# Patient Record
Sex: MALE | Race: WHITE | Employment: OTHER | URBAN - METROPOLITAN AREA
[De-identification: names, ages, dates, MRNs, and addresses within clinical notes are randomized per-mention and may not be internally consistent; named-entity substitution may affect disease eponyms.]

---

## 2017-01-09 ENCOUNTER — OFFICE VISIT (OUTPATIENT)
Dept: PULMONOLOGY | Facility: CLINIC | Age: 57
End: 2017-01-09

## 2017-01-09 VITALS
SYSTOLIC BLOOD PRESSURE: 153 MMHG | RESPIRATION RATE: 18 BRPM | HEART RATE: 74 BPM | DIASTOLIC BLOOD PRESSURE: 96 MMHG | OXYGEN SATURATION: 98 % | WEIGHT: 151.6 LBS | HEIGHT: 75 IN | BODY MASS INDEX: 18.85 KG/M2

## 2017-01-09 DIAGNOSIS — I50.22 CHRONIC SYSTOLIC CONGESTIVE HEART FAILURE (HCC): ICD-10-CM

## 2017-01-09 DIAGNOSIS — I63.311 CEREBROVASCULAR ACCIDENT (CVA) DUE TO THROMBOSIS OF RIGHT MIDDLE CEREBRAL ARTERY (HCC): ICD-10-CM

## 2017-01-09 DIAGNOSIS — J20.9 COPD WITH ACUTE BRONCHITIS (HCC): Primary | ICD-10-CM

## 2017-01-09 DIAGNOSIS — J44.0 COPD WITH ACUTE BRONCHITIS (HCC): Primary | ICD-10-CM

## 2017-01-09 DIAGNOSIS — I10 ESSENTIAL HYPERTENSION: ICD-10-CM

## 2017-01-09 DIAGNOSIS — F17.200 SMOKING: ICD-10-CM

## 2017-01-09 PROCEDURE — 99214 OFFICE O/P EST MOD 30 MIN: CPT | Performed by: INTERNAL MEDICINE

## 2017-01-09 RX ORDER — AZITHROMYCIN 250 MG/1
TABLET, FILM COATED ORAL
Qty: 6 TABLET | Refills: 0 | Status: SHIPPED | OUTPATIENT
Start: 2017-01-09 | End: 2017-07-10 | Stop reason: ALTCHOICE

## 2017-02-28 ENCOUNTER — CARE COORDINATION (OUTPATIENT)
Dept: CARE COORDINATION | Facility: CLINIC | Age: 57
End: 2017-02-28

## 2017-03-13 ENCOUNTER — TELEPHONE (OUTPATIENT)
Dept: ADMINISTRATIVE | Age: 57
End: 2017-03-13

## 2017-04-12 ENCOUNTER — CARE COORDINATION (OUTPATIENT)
Dept: CARE COORDINATION | Age: 57
End: 2017-04-12

## 2017-05-23 ENCOUNTER — CARE COORDINATION (OUTPATIENT)
Dept: CARE COORDINATION | Age: 57
End: 2017-05-23

## 2017-06-13 ENCOUNTER — TELEPHONE (OUTPATIENT)
Dept: ADMINISTRATIVE | Age: 57
End: 2017-06-13

## 2017-06-15 ENCOUNTER — TELEPHONE (OUTPATIENT)
Dept: ADMINISTRATIVE | Age: 57
End: 2017-06-15

## 2017-07-07 ENCOUNTER — CARE COORDINATION (OUTPATIENT)
Dept: CARE COORDINATION | Age: 57
End: 2017-07-07

## 2017-07-10 ENCOUNTER — OFFICE VISIT (OUTPATIENT)
Dept: PULMONOLOGY | Age: 57
End: 2017-07-10

## 2017-07-10 ENCOUNTER — OFFICE VISIT (OUTPATIENT)
Dept: PULMONOLOGY | Age: 57
End: 2017-07-10
Payer: MEDICARE

## 2017-07-10 ENCOUNTER — HOSPITAL ENCOUNTER (OUTPATIENT)
Age: 57
Discharge: HOME | End: 2017-07-10
Payer: MEDICARE

## 2017-07-10 ENCOUNTER — HOSPITAL ENCOUNTER (OUTPATIENT)
Dept: GENERAL RADIOLOGY | Age: 57
Discharge: HOME | End: 2017-07-10
Payer: MEDICARE

## 2017-07-10 VITALS
HEART RATE: 97 BPM | BODY MASS INDEX: 18.15 KG/M2 | RESPIRATION RATE: 16 BRPM | TEMPERATURE: 97.9 F | HEIGHT: 75 IN | OXYGEN SATURATION: 97 % | WEIGHT: 146 LBS | DIASTOLIC BLOOD PRESSURE: 92 MMHG | SYSTOLIC BLOOD PRESSURE: 130 MMHG

## 2017-07-10 DIAGNOSIS — R04.2 HEMOPTYSIS: ICD-10-CM

## 2017-07-10 DIAGNOSIS — J44.9 COPD (CHRONIC OBSTRUCTIVE PULMONARY DISEASE) WITH CHRONIC BRONCHITIS (HCC): Primary | ICD-10-CM

## 2017-07-10 DIAGNOSIS — R04.2 HEMOPTYSIS: Primary | ICD-10-CM

## 2017-07-10 DIAGNOSIS — I10 ESSENTIAL HYPERTENSION: ICD-10-CM

## 2017-07-10 DIAGNOSIS — F17.200 SMOKING: ICD-10-CM

## 2017-07-10 DIAGNOSIS — J96.01 ACUTE RESPIRATORY FAILURE WITH HYPOXIA (HCC): ICD-10-CM

## 2017-07-10 PROCEDURE — 94620 PR PULMONARY STRESS TESTING,SIMPLE: CPT | Performed by: INTERNAL MEDICINE

## 2017-07-10 PROCEDURE — G8419 CALC BMI OUT NRM PARAM NOF/U: HCPCS | Performed by: INTERNAL MEDICINE

## 2017-07-10 PROCEDURE — 94729 DIFFUSING CAPACITY: CPT | Performed by: INTERNAL MEDICINE

## 2017-07-10 PROCEDURE — 94375 RESPIRATORY FLOW VOLUME LOOP: CPT | Performed by: INTERNAL MEDICINE

## 2017-07-10 PROCEDURE — 99214 OFFICE O/P EST MOD 30 MIN: CPT | Performed by: INTERNAL MEDICINE

## 2017-07-10 PROCEDURE — 4004F PT TOBACCO SCREEN RCVD TLK: CPT | Performed by: INTERNAL MEDICINE

## 2017-07-10 PROCEDURE — G8428 CUR MEDS NOT DOCUMENT: HCPCS | Performed by: INTERNAL MEDICINE

## 2017-07-10 PROCEDURE — 94726 PLETHYSMOGRAPHY LUNG VOLUMES: CPT | Performed by: INTERNAL MEDICINE

## 2017-07-10 PROCEDURE — 71020 XR CHEST STANDARD TWO VW: CPT

## 2017-07-10 PROCEDURE — G8598 ASA/ANTIPLAT THER USED: HCPCS | Performed by: INTERNAL MEDICINE

## 2017-07-10 PROCEDURE — 3017F COLORECTAL CA SCREEN DOC REV: CPT | Performed by: INTERNAL MEDICINE

## 2017-07-12 RX ORDER — LEVOFLOXACIN 500 MG/1
500 TABLET, FILM COATED ORAL DAILY
Qty: 10 TABLET | Refills: 0 | Status: SHIPPED | OUTPATIENT
Start: 2017-07-12 | End: 2017-07-18 | Stop reason: SDUPTHER

## 2017-07-14 ENCOUNTER — OFFICE VISIT (OUTPATIENT)
Dept: FAMILY MEDICINE CLINIC | Age: 57
End: 2017-07-14
Payer: MEDICARE

## 2017-07-14 ENCOUNTER — HOSPITAL ENCOUNTER (OUTPATIENT)
Age: 57
Setting detail: SPECIMEN
Discharge: HOME | End: 2017-07-14
Payer: MEDICARE

## 2017-07-14 VITALS
TEMPERATURE: 96.6 F | SYSTOLIC BLOOD PRESSURE: 139 MMHG | DIASTOLIC BLOOD PRESSURE: 95 MMHG | HEART RATE: 94 BPM | HEIGHT: 72 IN | BODY MASS INDEX: 19.86 KG/M2 | WEIGHT: 146.6 LBS

## 2017-07-14 DIAGNOSIS — J44.9 CHRONIC OBSTRUCTIVE PULMONARY DISEASE, UNSPECIFIED COPD TYPE (HCC): Primary | ICD-10-CM

## 2017-07-14 DIAGNOSIS — F25.1 SCHIZOAFFECTIVE DISORDER, DEPRESSIVE TYPE (HCC): ICD-10-CM

## 2017-07-14 DIAGNOSIS — R56.9 SEIZURE (HCC): ICD-10-CM

## 2017-07-14 DIAGNOSIS — R11.2 INTRACTABLE VOMITING WITH NAUSEA, UNSPECIFIED VOMITING TYPE: ICD-10-CM

## 2017-07-14 PROBLEM — I10 ESSENTIAL HYPERTENSION: Status: ACTIVE | Noted: 2017-07-14

## 2017-07-14 LAB
ALBUMIN SERPL-MCNC: 4.3 G/DL (ref 3.5–5.2)
ALBUMIN/GLOBULIN RATIO: 1.3 (ref 1–2.5)
ALP BLD-CCNC: 98 U/L (ref 40–129)
ALT SERPL-CCNC: 90 U/L (ref 5–41)
AST SERPL-CCNC: 114 U/L
BILIRUB SERPL-MCNC: 0.36 MG/DL (ref 0.3–1.2)
BILIRUBIN DIRECT: 0.11 MG/DL
BILIRUBIN, INDIRECT: 0.25 MG/DL (ref 0–1)
GLOBULIN: ABNORMAL G/DL (ref 1.5–3.8)
TOTAL PROTEIN: 7.6 G/DL (ref 6.4–8.3)

## 2017-07-14 PROCEDURE — 99213 OFFICE O/P EST LOW 20 MIN: CPT | Performed by: FAMILY MEDICINE

## 2017-07-14 PROCEDURE — 3023F SPIROM DOC REV: CPT | Performed by: FAMILY MEDICINE

## 2017-07-14 PROCEDURE — 4004F PT TOBACCO SCREEN RCVD TLK: CPT | Performed by: FAMILY MEDICINE

## 2017-07-14 PROCEDURE — G8427 DOCREV CUR MEDS BY ELIG CLIN: HCPCS | Performed by: FAMILY MEDICINE

## 2017-07-14 PROCEDURE — G8926 SPIRO NO PERF OR DOC: HCPCS | Performed by: FAMILY MEDICINE

## 2017-07-14 PROCEDURE — 99214 OFFICE O/P EST MOD 30 MIN: CPT

## 2017-07-14 PROCEDURE — G8420 CALC BMI NORM PARAMETERS: HCPCS | Performed by: FAMILY MEDICINE

## 2017-07-14 PROCEDURE — 3017F COLORECTAL CA SCREEN DOC REV: CPT | Performed by: FAMILY MEDICINE

## 2017-07-14 PROCEDURE — G8598 ASA/ANTIPLAT THER USED: HCPCS | Performed by: FAMILY MEDICINE

## 2017-07-14 RX ORDER — ALBUTEROL SULFATE 90 UG/1
2 AEROSOL, METERED RESPIRATORY (INHALATION) EVERY 6 HOURS PRN
Qty: 1 INHALER | Refills: 3 | Status: SHIPPED | OUTPATIENT
Start: 2017-07-14 | End: 2017-07-18 | Stop reason: SDUPTHER

## 2017-07-14 RX ORDER — ONDANSETRON 4 MG/1
4 TABLET, FILM COATED ORAL DAILY PRN
Qty: 15 TABLET | Refills: 0 | Status: SHIPPED | OUTPATIENT
Start: 2017-07-14 | End: 2017-08-18 | Stop reason: ALTCHOICE

## 2017-07-14 ASSESSMENT — ENCOUNTER SYMPTOMS
ABDOMINAL DISTENTION: 0
CHEST TIGHTNESS: 0
CONSTIPATION: 0
SHORTNESS OF BREATH: 1
COUGH: 1
VOMITING: 1
WHEEZING: 0
DIARRHEA: 0
ABDOMINAL PAIN: 1

## 2017-07-18 ENCOUNTER — OFFICE VISIT (OUTPATIENT)
Dept: FAMILY MEDICINE CLINIC | Age: 57
End: 2017-07-18
Payer: MEDICARE

## 2017-07-18 ENCOUNTER — CARE COORDINATOR VISIT (OUTPATIENT)
Dept: CARE COORDINATION | Age: 57
End: 2017-07-18

## 2017-07-18 VITALS — DIASTOLIC BLOOD PRESSURE: 98 MMHG | HEART RATE: 101 BPM | SYSTOLIC BLOOD PRESSURE: 149 MMHG

## 2017-07-18 DIAGNOSIS — Z79.899 MEDICATION MANAGEMENT: Primary | ICD-10-CM

## 2017-07-18 PROCEDURE — 4004F PT TOBACCO SCREEN RCVD TLK: CPT | Performed by: PHARMACIST

## 2017-07-18 PROCEDURE — 99999 PR OFFICE/OUTPT VISIT,PROCEDURE ONLY: CPT | Performed by: PHARMACIST

## 2017-07-18 RX ORDER — LEVOFLOXACIN 500 MG/1
500 TABLET, FILM COATED ORAL DAILY
Qty: 19 TABLET | Refills: 0 | Status: SHIPPED | OUTPATIENT
Start: 2017-07-18 | End: 2017-07-18 | Stop reason: SDUPTHER

## 2017-07-18 RX ORDER — LEVOFLOXACIN 500 MG/1
500 TABLET, FILM COATED ORAL DAILY
Qty: 10 TABLET | Refills: 0 | Status: SHIPPED | OUTPATIENT
Start: 2017-07-18 | End: 2017-07-28

## 2017-07-18 RX ORDER — ALBUTEROL SULFATE 90 UG/1
2 AEROSOL, METERED RESPIRATORY (INHALATION) EVERY 6 HOURS PRN
Qty: 1 INHALER | Refills: 0 | Status: SHIPPED | OUTPATIENT
Start: 2017-07-18

## 2017-07-18 NOTE — CARE COORDINATION
Ambulatory Care Coordination Note  7/18/2017  CM Risk Score: 3  Rita Mortality Risk Score:      ACC: Rebeca Castaneda    Summary Note: CC met the patient today along with Kaylee Maloney. He continues to not take his medications. He gets $1900 a month from . He reports after paying his bills and buying groceries he has $900 left for the month. He has not purchased any prescribed medications. He does drink beer and he smokes cigarettes. He states he has 4 - 5 beers a day. He also states he plays [employer] and pool for leisure. He states he applied as directed for medicare part D \"a year ago\". It was at the 9.13.16 visit with CC he was directed to seek out part D. He reports he has not heard back from . He has tremors today. His B/P is elevated today. He does have a congested cough. He has not filled a [rescription for Levaquin from pulmonary earlier this month. He states he did not eat breakfast today. He states he has emesis after eating so he does not eat \"like I should\". He states he did follow with Dr. Kiara King for a seizure disorder but does not have a future appointment and can not remember his last appointment. CC asked him about a health goal. His response was \"go home sit in my chart and die\". He was followed in the past at [facility] but has not been seen there recently. He is at the appointment with his s/o, Bonilla. Assistance was given from Kaylee Maloney and Dr. Tatiana Lowery to obtain scripts for Albuterol, Marleen Hoops and Levaquin to be filled using the [facility]. The patient understands this assistance can be given once a year only. He is to contact  and Dr. Kiara King to schedule an appointment to be seen. He is scheduled for a visit in one week with Dr. Jazmín Yan for follow up.     Ambulatory Care Coordination Assessment    Care Coordination Protocol   Week 1 - Initial Assessment      Do you have all of your prescriptions and are they filled?:  No (Comment: States he cna not afford them) Barriers to medication adherence:  Other   How often do you have trouble taking your medications the way you have been told to take them?:  Sometimes I take them as prescribed. Do you have Home O2 Therapy?:  No                                                                                       Suggested Interventions and Community Resources                         Prior to Admission medications    Medication Sig Start Date End Date Taking? Authorizing Provider   ipratropium (ATROVENT HFA) 17 MCG/ACT inhaler Inhale 1 puff into the lungs 3 times daily 7/18/17 7/18/18  Hector Ang MD   albuterol sulfate HFA (PROAIR HFA) 108 (90 Base) MCG/ACT inhaler Inhale 2 puffs into the lungs every 6 hours as needed for Wheezing 7/18/17   Hector Ang MD   mometasone-formoterol [facility]) 200-5 MCG/ACT inhaler Inhale 2 puffs into the lungs every 12 hours 7/18/17   Hector Ang MD   levofloxacin (LEVAQUIN) 500 MG tablet Take 1 tablet by mouth daily for 10 days 7/18/17 7/28/17  Hector Ang MD   ondansetron (ZOFRAN) 4 MG tablet Take 1 tablet by mouth daily as needed for Nausea or Vomiting 7/14/17   Ermelinda Del Rosario MD   naproxen (NAPROSYN) 500 MG tablet Take 1 tablet by mouth 2 times daily (with meals) for 15 days 8/6/15 12/12/16  Jim Contreras MD   Respiratory Therapy Supplies (NEBULIZER COMPRESSOR) KIT 1 kit by Does not apply route once for 1 dose.  8/21/14 12/12/16  Christoph Patton MD       Future Appointments  Date Time Provider Stephanie Kilpatrick   7/20/2017 8:30 AM STV CT  [facility] CT SCAN [facility]   7/24/2017 9:45 AM Cherise Banegas MD Resp Spec [id number]   7/28/2017 9:45 AM Ermelinda Del Rosario MD [id number]

## 2017-07-20 ENCOUNTER — HOSPITAL ENCOUNTER (OUTPATIENT)
Dept: CT IMAGING | Age: 57
Discharge: HOME | End: 2017-07-20
Payer: MEDICARE

## 2017-07-20 DIAGNOSIS — R04.2 HEMOPTYSIS: ICD-10-CM

## 2017-07-20 PROCEDURE — 71260 CT THORAX DX C+: CPT

## 2017-07-20 PROCEDURE — 6360000004 HC RX CONTRAST MEDICATION: Performed by: INTERNAL MEDICINE

## 2017-07-20 RX ADMIN — IOVERSOL 75 ML: 741 INJECTION INTRA-ARTERIAL; INTRAVENOUS at 08:24

## 2017-07-24 ENCOUNTER — OFFICE VISIT (OUTPATIENT)
Dept: PULMONOLOGY | Age: 57
End: 2017-07-24
Payer: MEDICARE

## 2017-07-24 VITALS
RESPIRATION RATE: 15 BRPM | DIASTOLIC BLOOD PRESSURE: 93 MMHG | TEMPERATURE: 97.3 F | OXYGEN SATURATION: 94 % | SYSTOLIC BLOOD PRESSURE: 135 MMHG | BODY MASS INDEX: 19.37 KG/M2 | HEIGHT: 72 IN | WEIGHT: 143 LBS | HEART RATE: 93 BPM

## 2017-07-24 DIAGNOSIS — J44.9 COPD WITH CHRONIC BRONCHITIS AND EMPHYSEMA (HCC): Primary | ICD-10-CM

## 2017-07-24 PROCEDURE — 3017F COLORECTAL CA SCREEN DOC REV: CPT | Performed by: INTERNAL MEDICINE

## 2017-07-24 PROCEDURE — G8427 DOCREV CUR MEDS BY ELIG CLIN: HCPCS | Performed by: INTERNAL MEDICINE

## 2017-07-24 PROCEDURE — G8926 SPIRO NO PERF OR DOC: HCPCS | Performed by: INTERNAL MEDICINE

## 2017-07-24 PROCEDURE — 4004F PT TOBACCO SCREEN RCVD TLK: CPT | Performed by: INTERNAL MEDICINE

## 2017-07-24 PROCEDURE — 99214 OFFICE O/P EST MOD 30 MIN: CPT | Performed by: INTERNAL MEDICINE

## 2017-07-24 PROCEDURE — G8598 ASA/ANTIPLAT THER USED: HCPCS | Performed by: INTERNAL MEDICINE

## 2017-07-24 PROCEDURE — 3023F SPIROM DOC REV: CPT | Performed by: INTERNAL MEDICINE

## 2017-07-24 PROCEDURE — G8420 CALC BMI NORM PARAMETERS: HCPCS | Performed by: INTERNAL MEDICINE

## 2017-07-28 ENCOUNTER — OFFICE VISIT (OUTPATIENT)
Dept: FAMILY MEDICINE CLINIC | Age: 57
End: 2017-07-28
Payer: MEDICARE

## 2017-07-28 VITALS
OXYGEN SATURATION: 91 % | WEIGHT: 145 LBS | SYSTOLIC BLOOD PRESSURE: 104 MMHG | TEMPERATURE: 97.4 F | DIASTOLIC BLOOD PRESSURE: 68 MMHG | HEART RATE: 99 BPM | BODY MASS INDEX: 19.64 KG/M2 | HEIGHT: 72 IN

## 2017-07-28 DIAGNOSIS — Z12.11 COLON CANCER SCREENING: ICD-10-CM

## 2017-07-28 DIAGNOSIS — T81.82XD EMPHYSEMA (SUBCUTANEOUS) (SURGICAL) RESULTING FROM A PROCEDURE, SUBSEQUENT ENCOUNTER: Primary | ICD-10-CM

## 2017-07-28 DIAGNOSIS — G89.4 CHRONIC PAIN SYNDROME: ICD-10-CM

## 2017-07-28 PROCEDURE — 99213 OFFICE O/P EST LOW 20 MIN: CPT | Performed by: FAMILY MEDICINE

## 2017-07-28 PROCEDURE — 99213 OFFICE O/P EST LOW 20 MIN: CPT

## 2017-07-28 ASSESSMENT — ENCOUNTER SYMPTOMS
COUGH: 0
CHEST TIGHTNESS: 0
WHEEZING: 0
GASTROINTESTINAL NEGATIVE: 1

## 2017-07-31 ENCOUNTER — CARE COORDINATION (OUTPATIENT)
Dept: CARE COORDINATION | Age: 57
End: 2017-07-31

## 2017-08-03 ENCOUNTER — CARE COORDINATION (OUTPATIENT)
Dept: CARE COORDINATION | Age: 57
End: 2017-08-03

## 2017-08-18 ENCOUNTER — INITIAL CONSULT (OUTPATIENT)
Dept: PAIN MANAGEMENT | Age: 57
End: 2017-08-18
Payer: MEDICARE

## 2017-08-18 VITALS
HEIGHT: 75 IN | SYSTOLIC BLOOD PRESSURE: 116 MMHG | BODY MASS INDEX: 18.33 KG/M2 | OXYGEN SATURATION: 92 % | WEIGHT: 147.4 LBS | RESPIRATION RATE: 16 BRPM | HEART RATE: 88 BPM | DIASTOLIC BLOOD PRESSURE: 78 MMHG

## 2017-08-18 DIAGNOSIS — R20.0 LEFT ARM NUMBNESS: ICD-10-CM

## 2017-08-18 DIAGNOSIS — R20.0 LEFT LEG NUMBNESS: ICD-10-CM

## 2017-08-18 DIAGNOSIS — M54.2 CERVICALGIA: Primary | ICD-10-CM

## 2017-08-18 DIAGNOSIS — M25.552 BILATERAL HIP PAIN: ICD-10-CM

## 2017-08-18 DIAGNOSIS — F99 PSYCHIATRIC ILLNESS: ICD-10-CM

## 2017-08-18 DIAGNOSIS — M25.551 BILATERAL HIP PAIN: ICD-10-CM

## 2017-08-18 PROCEDURE — 4004F PT TOBACCO SCREEN RCVD TLK: CPT | Performed by: ANESTHESIOLOGY

## 2017-08-18 PROCEDURE — G8427 DOCREV CUR MEDS BY ELIG CLIN: HCPCS | Performed by: ANESTHESIOLOGY

## 2017-08-18 PROCEDURE — G8419 CALC BMI OUT NRM PARAM NOF/U: HCPCS | Performed by: ANESTHESIOLOGY

## 2017-08-18 PROCEDURE — G8598 ASA/ANTIPLAT THER USED: HCPCS | Performed by: ANESTHESIOLOGY

## 2017-08-18 PROCEDURE — 3017F COLORECTAL CA SCREEN DOC REV: CPT | Performed by: ANESTHESIOLOGY

## 2017-08-18 PROCEDURE — 99205 OFFICE O/P NEW HI 60 MIN: CPT | Performed by: ANESTHESIOLOGY

## 2017-08-18 ASSESSMENT — ENCOUNTER SYMPTOMS
EYE ITCHING: 1
BACK PAIN: 1
CHEST TIGHTNESS: 1
CHOKING: 1
COUGH: 1
SHORTNESS OF BREATH: 1
ALLERGIC/IMMUNOLOGIC NEGATIVE: 1
VOMITING: 1
BLOOD IN STOOL: 1
WHEEZING: 1
SINUS PRESSURE: 1
NAUSEA: 1

## 2017-08-28 ENCOUNTER — HOSPITAL ENCOUNTER (OUTPATIENT)
Dept: CT IMAGING | Age: 57
Discharge: HOME | End: 2017-08-28
Payer: MEDICARE

## 2017-08-28 ENCOUNTER — HOSPITAL ENCOUNTER (OUTPATIENT)
Age: 57
Discharge: HOME | End: 2017-08-28
Payer: MEDICARE

## 2017-08-28 ENCOUNTER — HOSPITAL ENCOUNTER (OUTPATIENT)
Dept: GENERAL RADIOLOGY | Age: 57
Discharge: HOME | End: 2017-08-28
Payer: MEDICARE

## 2017-08-28 DIAGNOSIS — M25.552 BILATERAL HIP PAIN: ICD-10-CM

## 2017-08-28 DIAGNOSIS — R20.0 LEFT ARM NUMBNESS: ICD-10-CM

## 2017-08-28 DIAGNOSIS — M25.551 BILATERAL HIP PAIN: ICD-10-CM

## 2017-08-28 DIAGNOSIS — M54.2 CERVICALGIA: ICD-10-CM

## 2017-08-28 DIAGNOSIS — R20.0 LEFT LEG NUMBNESS: ICD-10-CM

## 2017-08-28 PROCEDURE — 73521 X-RAY EXAM HIPS BI 2 VIEWS: CPT

## 2017-08-28 PROCEDURE — 72125 CT NECK SPINE W/O DYE: CPT

## 2017-08-28 PROCEDURE — 72052 X-RAY EXAM NECK SPINE 6/>VWS: CPT

## 2017-09-06 ENCOUNTER — CARE COORDINATION (OUTPATIENT)
Dept: CARE COORDINATION | Age: 57
End: 2017-09-06

## 2017-09-14 ENCOUNTER — HOSPITAL ENCOUNTER (OUTPATIENT)
Age: 57
Discharge: HOME | End: 2017-09-14
Payer: MEDICARE

## 2017-09-14 DIAGNOSIS — Z12.11 COLON CANCER SCREENING: ICD-10-CM

## 2017-09-14 LAB
CONTROL: PRESENT
HEMOCCULT STL QL: POSITIVE

## 2017-09-14 PROCEDURE — 82274 ASSAY TEST FOR BLOOD FECAL: CPT

## 2017-09-18 ENCOUNTER — TELEPHONE (OUTPATIENT)
Dept: FAMILY MEDICINE CLINIC | Age: 57
End: 2017-09-18

## 2017-09-18 DIAGNOSIS — R19.5 POSITIVE FECAL IMMUNOCHEMICAL TEST: Primary | ICD-10-CM

## 2017-10-04 ENCOUNTER — CARE COORDINATION (OUTPATIENT)
Dept: CARE COORDINATION | Age: 57
End: 2017-10-04

## 2017-10-04 ENCOUNTER — OFFICE VISIT (OUTPATIENT)
Dept: SURGERY | Age: 57
End: 2017-10-04
Payer: MEDICARE

## 2017-10-04 VITALS
WEIGHT: 148.8 LBS | BODY MASS INDEX: 18.5 KG/M2 | HEIGHT: 75 IN | HEART RATE: 86 BPM | DIASTOLIC BLOOD PRESSURE: 98 MMHG | SYSTOLIC BLOOD PRESSURE: 146 MMHG | TEMPERATURE: 98.4 F

## 2017-10-04 DIAGNOSIS — R19.5 POSITIVE FIT (FECAL IMMUNOCHEMICAL TEST): Primary | ICD-10-CM

## 2017-10-04 PROCEDURE — 99213 OFFICE O/P EST LOW 20 MIN: CPT

## 2017-10-04 PROCEDURE — 3017F COLORECTAL CA SCREEN DOC REV: CPT | Performed by: SURGERY

## 2017-10-04 PROCEDURE — 99213 OFFICE O/P EST LOW 20 MIN: CPT | Performed by: SURGERY

## 2017-10-04 PROCEDURE — G8598 ASA/ANTIPLAT THER USED: HCPCS | Performed by: SURGERY

## 2017-10-04 PROCEDURE — G8420 CALC BMI NORM PARAMETERS: HCPCS | Performed by: SURGERY

## 2017-10-04 PROCEDURE — G8484 FLU IMMUNIZE NO ADMIN: HCPCS | Performed by: SURGERY

## 2017-10-04 PROCEDURE — 4004F PT TOBACCO SCREEN RCVD TLK: CPT | Performed by: SURGERY

## 2017-10-04 PROCEDURE — G8428 CUR MEDS NOT DOCUMENT: HCPCS | Performed by: SURGERY

## 2017-10-04 NOTE — PATIENT INSTRUCTIONS
Thank you letting us take care of you today. We hope that all your questions were addressed. If a question was overlooked or something else comes to mind after you return home, please call our office at [phone]. If you need to cancel or change an appointment, surgery or procedure, please contact the office at [phone].

## 2017-10-04 NOTE — PROGRESS NOTES
 BICEPS TENDON REPAIR      CARDIAC SURGERY      cardiac cath x2    COLONOSCOPY  5/19/2014    FRACTURE SURGERY      right arm, left knee, l ankle pins placed    HERNIA REPAIR      KNEE SURGERY      TIBIA FRACTURE SURGERY Left 07/05/2013    IM nailing    TOE SURGERY Right     UMBILICAL HERNIA REPAIR  4/8/14    with mesh       Current Outpatient Prescriptions   Medication Sig Dispense Refill    ipratropium (ATROVENT HFA) 17 MCG/ACT inhaler Inhale 1 puff into the lungs 3 times daily 1 Inhaler 0    albuterol sulfate HFA (PROAIR HFA) 108 (90 Base) MCG/ACT inhaler Inhale 2 puffs into the lungs every 6 hours as needed for Wheezing 1 Inhaler 0    mometasone-formoterol (DULERA) 200-5 MCG/ACT inhaler Inhale 2 puffs into the lungs every 12 hours 1 Inhaler 0    Respiratory Therapy Supplies (NEBULIZER COMPRESSOR) KIT 1 kit by Does not apply route once for 1 dose. 1 kit 0     No current facility-administered medications for this visit. Allergies   Allergen Reactions    Pcn [Penicillins] Other (See Comments)     Pt unsure of reaction this is from childhood       Family History   Problem Relation Age of Onset   [facility] Stroke Mother     Diabetes Mother     Heart Disease Father     High Blood Pressure Father     Diabetes Maternal Grandmother        Social History     Social History    Marital status: Single     Spouse name: N/A    Number of children: N/A    Years of education: N/A     Occupational History    Not on file.      Social History Main Topics    Smoking status: Current Some Day Smoker     Packs/day: 0.20     Years: 39.00     Types: Cigarettes, Cigars    Smokeless tobacco: Never Used    Alcohol use 0.0 oz/week     0 Standard drinks or equivalent per week      Comment: socially    Drug use: No    Sexual activity: Not on file      Comment: 4 cigars a day     Other Topics Concern    Not on file     Social History Narrative       ROS: Negative except as noted in HPI    Objective   Vitals:

## 2017-10-04 NOTE — MR AVS SNAPSHOT
After Visit Summary             Janelle Fulton   10/4/2017 9:30 AM   Office Visit    Description:  Male : 1960   Provider:  Anna Orozco DO   Department:  [facility]              Your Follow-Up and Future Appointments         Below is a list of your follow-up and future appointments. This may not be a complete list as you may have made appointments directly with providers that we are not aware of or your providers may have made some for you. Please call your providers to confirm appointments. It is important to keep your appointments. Please bring your current insurance card, photo ID, co-pay, and all medication bottles to your appointment. If self-pay, payment is expected at the time of service. Your To-Do List     Future Appointments Provider Department Dept Phone    10/11/2017 1:30  [facility]  [facility] [phone]    PREP:    * Bring a list of medications and/or vitamins you are currently taking   * No lotions, powders, or oils to the area being tested the day of the exam    10/11/2017 1:30 PM MD Андрей Lockett         Information from Your Visit        Department     Name Address Phone Fax    [address] [address] [phone]      Vital Signs     Blood Pressure Pulse Temperature Height Weight Body Mass Index    146/98 (Site: Left Arm, Position: Sitting, Cuff Size: Medium Adult) 86 98.4 °F (36.9 °C) (Temporal) 6' 3.2\" (1.91 m) 148 lb 12.8 oz (67.5 kg) 18.5 kg/m2    Smoking Status                   Current Some Day Smoker           Instructions    Thank you letting us take care of you today. We hope that all your questions were addressed. If a question was overlooked or something else comes to mind after you return home, please call our office at [phone].     If you need to cancel or change an appointment, surgery or procedure, please contact the office at [phone]. Medications and Orders      Your Current Medications Are              ipratropium (ATROVENT HFA) 17 MCG/ACT inhaler Inhale 1 puff into the lungs 3 times daily    albuterol sulfate HFA (PROAIR HFA) 108 (90 Base) MCG/ACT inhaler Inhale 2 puffs into the lungs every 6 hours as needed for Wheezing    mometasone-formoterol (DULERA) 200-5 MCG/ACT inhaler Inhale 2 puffs into the lungs every 12 hours    Respiratory Therapy Supplies (NEBULIZER COMPRESSOR) KIT 1 kit by Does not apply route once for 1 dose.       Allergies              Pcn [Penicillins] Other (See Comments)    Pt unsure of reaction this is from childhood         Additional Information        Basic Information     Date Of Birth Sex Race Ethnicity Preferred Language    1960 Male White Non-/Non  English      Problem List as of 10/4/2017  Date Reviewed: 8/18/2017                COPD exacerbation ([address]    Essential hypertension    Schizoaffective disorder, depressive type ([address]    Seizure (HCC)    Intractable vomiting with nausea    Atypical chest pain    Elevated LFTs    Abnormal results of liver function studies     Screening for condition    Need for vaccination    Tremor    Hypertension    Seizures ([address]    Steroid-induced hyperglycemia    Pneumonitis    Syncope    Depression    Hypokalemia    S/P excision of skin lesion, follow-up exam    CHF (congestive heart failure), NYHA class III (HCC)    Dermoid cyst of scalp    Seasonal allergies    Hemorrhoids    Erectile dysfunction    Hematochezia    Preoperative clearance    Umbilical hernia    Gastroenteritis    Folliculitis    GERD (gastroesophageal reflux disease)    COPD (chronic obstructive pulmonary disease) (HCC)    HLD (hyperlipidemia)    Foot pain, right    HTN (hypertension)    Schizoaffective disorder ([address]    Fracture tibia/fibula      Immunizations as of 10/4/2017     Name Date For questions regarding your [employer] account call [phone]. If you have a clinical question, please call your doctor's office.

## 2017-10-11 ENCOUNTER — HOSPITAL ENCOUNTER (OUTPATIENT)
Dept: NEUROLOGY | Age: 57
Discharge: HOME | End: 2017-10-11
Payer: MEDICARE

## 2017-10-11 DIAGNOSIS — R20.0 LEFT LEG NUMBNESS: ICD-10-CM

## 2017-10-11 DIAGNOSIS — R20.0 LEFT ARM NUMBNESS: ICD-10-CM

## 2017-10-11 DIAGNOSIS — M54.2 CERVICALGIA: ICD-10-CM

## 2017-10-11 PROCEDURE — 95886 MUSC TEST DONE W/N TEST COMP: CPT | Performed by: PHYSICAL MEDICINE & REHABILITATION

## 2017-10-11 PROCEDURE — 95911 NRV CNDJ TEST 9-10 STUDIES: CPT | Performed by: PHYSICAL MEDICINE & REHABILITATION

## 2017-10-17 ENCOUNTER — TELEPHONE (OUTPATIENT)
Dept: PAIN MANAGEMENT | Age: 57
End: 2017-10-17

## 2017-10-17 NOTE — TELEPHONE ENCOUNTER
----- Message from Steffi Bonds MD sent at 10/17/2017 10:05 AM EDT -----  Call patient to schedule office visit to discuss CT scan and EMG results and future directionsguilf

## 2017-10-18 ENCOUNTER — OFFICE VISIT (OUTPATIENT)
Dept: PAIN MANAGEMENT | Age: 57
End: 2017-10-18
Payer: MEDICARE

## 2017-10-18 VITALS
SYSTOLIC BLOOD PRESSURE: 137 MMHG | HEART RATE: 111 BPM | OXYGEN SATURATION: 95 % | RESPIRATION RATE: 16 BRPM | WEIGHT: 146 LBS | DIASTOLIC BLOOD PRESSURE: 97 MMHG | BODY MASS INDEX: 18.15 KG/M2 | HEIGHT: 75 IN

## 2017-10-18 DIAGNOSIS — M47.812 SPONDYLOSIS OF CERVICAL REGION WITHOUT MYELOPATHY OR RADICULOPATHY: Primary | ICD-10-CM

## 2017-10-18 DIAGNOSIS — M87.00 AVN (AVASCULAR NECROSIS OF BONE) (HCC): ICD-10-CM

## 2017-10-18 DIAGNOSIS — M54.16 CHRONIC LUMBAR RADICULOPATHY: ICD-10-CM

## 2017-10-18 DIAGNOSIS — F99 PSYCHIATRIC ILLNESS: ICD-10-CM

## 2017-10-18 PROCEDURE — G8484 FLU IMMUNIZE NO ADMIN: HCPCS | Performed by: ANESTHESIOLOGY

## 2017-10-18 PROCEDURE — 99215 OFFICE O/P EST HI 40 MIN: CPT | Performed by: ANESTHESIOLOGY

## 2017-10-18 PROCEDURE — 3017F COLORECTAL CA SCREEN DOC REV: CPT | Performed by: ANESTHESIOLOGY

## 2017-10-18 PROCEDURE — 4004F PT TOBACCO SCREEN RCVD TLK: CPT | Performed by: ANESTHESIOLOGY

## 2017-10-18 PROCEDURE — G8598 ASA/ANTIPLAT THER USED: HCPCS | Performed by: ANESTHESIOLOGY

## 2017-10-18 PROCEDURE — G8427 DOCREV CUR MEDS BY ELIG CLIN: HCPCS | Performed by: ANESTHESIOLOGY

## 2017-10-18 PROCEDURE — G8420 CALC BMI NORM PARAMETERS: HCPCS | Performed by: ANESTHESIOLOGY

## 2017-10-18 ASSESSMENT — ENCOUNTER SYMPTOMS
BACK PAIN: 1
SHORTNESS OF BREATH: 1
COUGH: 1

## 2017-10-18 NOTE — PROGRESS NOTES
Subjective:      Patient ID: Oscar Morales is a 49 y.o. male. HPI     Among all this complicated pain history he is still able to identify 2 of his main pain complaints   #1 axial cervical spinal pain with extension over the shoulders and numbness in left arm and left leg   #2 chronic bilateral hip pain. His pain is chronic over many many years and has progressively been worsening. He relates this pain to his multiple previous injuries. He describes his pain as constant intense 10 out of 10 and aching throbbing tenderness nagging sharp shooting pain sensation. Every activity aggravates the pain. Here today to review diagnostic work up ordered in last visit    EXAMINATION: CT OF THE CERVICAL SPINE WITHOUT CONTRAST 8/28/2017 7:38 am    There is generalized straightening of the normal cervical lordosis. Severe right-sided facet arthropathy is present at C3-C4 and there is moderate facet arthropathy on the left at C2-C3. There is moderate left-sided neural foraminal stenosis at C2-C3 and severe right-sided neural foraminal stenosis at C3-C4. Degenerate endplate spurring present at the C6-C7. Biapical scarring. EXAMINATION: 7 VIEW CERVICAL SPINE WITH OBLIQUES AND FLEXION/EXTENSION VIEWS   8/28/2017 7:24 am         Cervical spine is not well seen below the C6 level. Again shown is slight loss of height of the C6 vertebral body, likely   degenerative. Calcific plaque of each carotid bulb. Mild-to-moderate   cervical spondylotic changes, most prominent at C6-C7 with loss of disc space   height and osteophytes. No convincing evidence of acute fracture. Oblique   views show mild neural foraminal narrowing on the left at C2-C3 and C3-C4   with severe right neural foraminal narrowing at C3-C4. Impression    Mild to moderate cervical spondylotic changes. No dynamic instability with flexion or extension.       EXAMINATION: SINGLE VIEW OF THE PELVIS AND 2 VIEWS OF EACH OF THE BILATERAL HIPS   8/28/2017 7:24 am  Impression 1. No acute osseous abnormality or significant arthritic change 2. Patchy increased sclerosis about the right humeral head which can be seen in setting of AVN. RECOMMENDATIONS: Further evaluation with MRI of the right hip as clinically warranted. EMG left upper and left lower extremity  Report reviewed  Chronic left L3 4 radiculopathy  Possible early sensory neuropathy    Review of Systems   Constitutional: Positive for activity change and appetite change. Respiratory: Positive for cough and shortness of breath. Cardiovascular: Negative. Genitourinary: Negative. Musculoskeletal: Positive for back pain. Neurological: Positive for weakness and numbness. Psychiatric/Behavioral: Positive for behavioral problems. Objective:   Physical Exam   Constitutional: He is oriented to person, place, and time. He appears well-developed and well-nourished. No distress. HENT:   Head: Normocephalic and atraumatic. Eyes: Conjunctivae and EOM are normal. Pupils are equal, round, and reactive to light. Cardiovascular: Normal rate, regular rhythm and normal heart sounds. Pulmonary/Chest: Effort normal and breath sounds normal.   Musculoskeletal:        Cervical back: He exhibits decreased range of motion, tenderness and pain. Lumbar back: He exhibits decreased range of motion, tenderness and pain. Neurological: He is alert and oriented to person, place, and time. He displays no atrophy and no tremor. No sensory deficit. He exhibits normal muscle tone. He displays no seizure activity. Gait abnormal. Coordination normal.   Ambulates with cane   Skin: Skin is warm and dry. Psychiatric: He has a normal mood and affect. His behavior is normal. Judgment and thought content normal.   Nursing note and vitals reviewed. Assessment:         This is a 53-year-old gentleman with a very complicated and long psychiatric history including bipolar disorder schizoaffective disorder and multiple suicidal attempt. Patient states that he was taking numerous psychiatric medications which were discontinued for the loss of medication coverage. Currently he is not taking any medication for his psychiatric issue and is not followed with a psychiatrist.     He is complaining of generalized all over the body pain. He has shaded in the entire body on the pain diagram.  His pain is chronic over many many years and has progressively been worsening. He relates this pain to his multiple previous injuries. He describes his pain as constant intense 10 out of 10 and aching throbbing tenderness nagging sharp shooting pain sensation. Every activity aggravates the pain. He reports generalized paresthesia all over the body. He reports weakness in his legs and difficulty in walking but denies any weight loss fever chills or loss of bladder or bowel control. Among all this complicated pain history he is still able to identify 2 of his main pain complaints   #1 axial cervical spinal pain with extension over the shoulders and numbness in left arm and left leg   #2 chronic bilateral hip pain. I ordered diagnostic work up    EXAMINATION: CT OF THE CERVICAL SPINE WITHOUT CONTRAST 8/28/2017 7:38 am    There is generalized straightening of the normal cervical lordosis. Severe right-sided facet arthropathy is present at C3-C4 and there is moderate facet arthropathy on the left at C2-C3. There is moderate left-sided neural foraminal stenosis at C2-C3 and severe right-sided neural foraminal stenosis at C3-C4. Degenerate endplate spurring present at the C6-C7. Biapical scarring. EXAMINATION: 7 VIEW CERVICAL SPINE WITH OBLIQUES AND FLEXION/EXTENSION VIEWS   8/28/2017 7:24 am         Cervical spine is not well seen below the C6 level. Again shown is slight loss of height of the C6 vertebral body, likely   degenerative. Calcific plaque of each carotid bulb.   Mild-to-moderate

## 2017-10-23 ENCOUNTER — APPOINTMENT (OUTPATIENT)
Dept: GENERAL RADIOLOGY | Age: 57
End: 2017-10-23
Attending: ANESTHESIOLOGY
Payer: MEDICARE

## 2017-10-23 ENCOUNTER — HOSPITAL ENCOUNTER (OUTPATIENT)
Age: 57
Setting detail: OUTPATIENT SURGERY
Discharge: HOME | End: 2017-10-23
Attending: ANESTHESIOLOGY | Admitting: ANESTHESIOLOGY
Payer: MEDICARE

## 2017-10-23 VITALS
HEART RATE: 87 BPM | TEMPERATURE: 97.7 F | DIASTOLIC BLOOD PRESSURE: 102 MMHG | RESPIRATION RATE: 20 BRPM | SYSTOLIC BLOOD PRESSURE: 162 MMHG | BODY MASS INDEX: 18.65 KG/M2 | WEIGHT: 150 LBS | HEIGHT: 75 IN | OXYGEN SATURATION: 96 %

## 2017-10-23 PROBLEM — M47.812 SPONDYLOSIS OF CERVICAL REGION WITHOUT MYELOPATHY OR RADICULOPATHY: Chronic | Status: ACTIVE | Noted: 2017-10-23

## 2017-10-23 PROCEDURE — 7100000010 HC PHASE II RECOVERY - FIRST 15 MIN: Performed by: ANESTHESIOLOGY

## 2017-10-23 PROCEDURE — 99152 MOD SED SAME PHYS/QHP 5/>YRS: CPT | Performed by: ANESTHESIOLOGY

## 2017-10-23 PROCEDURE — 64492 INJ PARAVERT F JNT C/T 3 LEV: CPT | Performed by: ANESTHESIOLOGY

## 2017-10-23 PROCEDURE — 3600000050 HC PAIN LEVEL 1 BASE: Performed by: ANESTHESIOLOGY

## 2017-10-23 PROCEDURE — 2500000003 HC RX 250 WO HCPCS: Performed by: ANESTHESIOLOGY

## 2017-10-23 PROCEDURE — 3600000051 HC PAIN LEVEL 1 ADDL 15 MIN: Performed by: ANESTHESIOLOGY

## 2017-10-23 PROCEDURE — 6360000004 HC RX CONTRAST MEDICATION: Performed by: ANESTHESIOLOGY

## 2017-10-23 PROCEDURE — 64490 INJ PARAVERT F JNT C/T 1 LEV: CPT | Performed by: ANESTHESIOLOGY

## 2017-10-23 PROCEDURE — 6360000002 HC RX W HCPCS: Performed by: ANESTHESIOLOGY

## 2017-10-23 PROCEDURE — 3209999900 FLUORO FOR SURGICAL PROCEDURES

## 2017-10-23 PROCEDURE — 64491 INJ PARAVERT F JNT C/T 2 LEV: CPT | Performed by: ANESTHESIOLOGY

## 2017-10-23 PROCEDURE — 7100000011 HC PHASE II RECOVERY - ADDTL 15 MIN: Performed by: ANESTHESIOLOGY

## 2017-10-23 RX ORDER — SODIUM CHLORIDE 0.9 % (FLUSH) 0.9 %
10 SYRINGE (ML) INJECTION PRN
Status: DISCONTINUED | OUTPATIENT
Start: 2017-10-23 | End: 2017-10-23 | Stop reason: HOSPADM

## 2017-10-23 RX ORDER — LIDOCAINE HYDROCHLORIDE 40 MG/ML
INJECTION, SOLUTION RETROBULBAR; TOPICAL PRN
Status: DISCONTINUED | OUTPATIENT
Start: 2017-10-23 | End: 2017-10-23 | Stop reason: HOSPADM

## 2017-10-23 RX ORDER — MIDAZOLAM HYDROCHLORIDE 1 MG/ML
INJECTION INTRAMUSCULAR; INTRAVENOUS PRN
Status: DISCONTINUED | OUTPATIENT
Start: 2017-10-23 | End: 2017-10-23 | Stop reason: HOSPADM

## 2017-10-23 RX ORDER — SODIUM CHLORIDE 0.9 % (FLUSH) 0.9 %
10 SYRINGE (ML) INJECTION EVERY 12 HOURS SCHEDULED
Status: DISCONTINUED | OUTPATIENT
Start: 2017-10-23 | End: 2017-10-23 | Stop reason: HOSPADM

## 2017-10-23 ASSESSMENT — PAIN DESCRIPTION - ORIENTATION
ORIENTATION: LEFT

## 2017-10-23 ASSESSMENT — PAIN SCALES - GENERAL
PAINLEVEL_OUTOF10: 10
PAINLEVEL_OUTOF10: 0
PAINLEVEL_OUTOF10: 10
PAINLEVEL_OUTOF10: 0
PAINLEVEL_OUTOF10: 0

## 2017-10-23 ASSESSMENT — PAIN DESCRIPTION - LOCATION
LOCATION: NECK

## 2017-10-23 ASSESSMENT — PAIN DESCRIPTION - DESCRIPTORS: DESCRIPTORS: CONSTANT

## 2017-10-23 ASSESSMENT — PAIN - FUNCTIONAL ASSESSMENT: PAIN_FUNCTIONAL_ASSESSMENT: 0-10

## 2017-10-23 NOTE — H&P
History and Physical Update    Pt Name: Arslan Wyman  MRN: [id number]  YOB: 1960  Date of evaluation: 10/23/2017      [x] I have reviewed the Office Progress Note found in [facility] dated 10/18/17 by Dr. Florence Emmanuel which meets the criteria for an Interval History and Physical note and is attached below. [x] I have examined  Arslan Wyman, a 55 yo male who presents feeling  anxious. No previous injections for pain management. H/o Asthma COPD a smoker for 45 years Has a frequent cough Used his inhaler here. Denies a fever, chills, SOB on exertion. States \"denied home O2 by insurance\" Current SaO2 96%   Drinks daily but denies alcohol today. Last consumed at \"8p last night\"  Presents for Nerve block left cervical C3 TON C4 C5  By Dr Florence Emmanuel for spondylosis of cervical region w/o myelopathy or radiculopathy. Denies chest pain, palpitations, dizziness, or lightheadedness. VITALS:  height is 6' 3\" (1.905 m) and weight is 150 lb (68 kg). His oral temperature is 97.7 °F (36.5 °C). His blood pressure is 178/105 (abnormal) and his pulse is 85. His respiration is 18 and oxygen saturation is 96%. Allergies:  Pcn [penicillins]    Medications:   No current facility-administered medications on file prior to encounter. Current Outpatient Prescriptions on File Prior to Encounter   Medication Sig Dispense Refill    ipratropium (ATROVENT HFA) 17 MCG/ACT inhaler Inhale 1 puff into the lungs 3 times daily 1 Inhaler 0    albuterol sulfate HFA (PROAIR HFA) 108 (90 Base) MCG/ACT inhaler Inhale 2 puffs into the lungs every 6 hours as needed for Wheezing 1 Inhaler 0    mometasone-formoterol (DULERA) 200-5 MCG/ACT inhaler Inhale 2 puffs into the lungs every 12 hours 1 Inhaler 0    Respiratory Therapy Supplies (NEBULIZER COMPRESSOR) KIT 1 kit by Does not apply route once for 1 dose. 1 kit 0            Prior to Admission medications    Medication Sig Start Date End Date Taking?  Authorizing Provider   ipratropium (ATROVENT HFA) 17 MCG/ACT inhaler Inhale 1 puff into the lungs 3 times daily 7/18/17 7/18/18 Yes Eboni Washburn MD   albuterol sulfate HFA (PROAIR HFA) 108 (90 Base) MCG/ACT inhaler Inhale 2 puffs into the lungs every 6 hours as needed for Wheezing 7/18/17  Yes Eboni Washburn MD   mometasone-formoterol [facility]) 200-5 MCG/ACT inhaler Inhale 2 puffs into the lungs every 12 hours 7/18/17   Eboni Washburn MD   Respiratory Therapy Supplies (NEBULIZER COMPRESSOR) KIT 1 kit by Does not apply route once for 1 dose. 8/21/14 12/12/16  Lamine Foreman MD       This is a 56 y.o. male who is cooperative, alert and oriented x3, in no acute distress. Heart: Heart sounds are normal.  Regular rate and rhythm without murmur, gallop or rub. Lungs Normal effort, unlabored respirations at rest, no use of accessory muscles Scattered inspiratory wheezes notes    Abdomen: soft, nontender, nondistended with bowel sounds    LABS:  No results for input(s): HGB, HCT, WBC, MCV, PLT, NA, K, CL, CO2, BUN, CREATININE, GLUCOSE, INR, PROTIME, APTT, AST, ALT, LABALBU, HCG in the last 720 hours. WILD Tucker-BC  Electronically signed 10/23/2017 at 2:29 PM       Progress Notes  Encounter Date: 10/18/2017  Jori Cyr MD   Pain Management   Expand All Collapse All    []Hide copied text  Subjective:      Patient ID: Ryan Estrada is a 49 y.o. male.     HPI      Among all this complicated pain history he is still able to identify 2 of his main pain complaints   #1 axial cervical spinal pain with extension over the shoulders and numbness in left arm and left leg   #2 chronic bilateral hip pain.    His pain is chronic over many many years and has progressively been worsening.  He relates this pain to his multiple previous injuries.  He describes his pain as constant intense 10 out of 10 and aching throbbing tenderness nagging sharp shooting pain sensation.  Every activity aggravates the pain.    Here today to review diagnostic Positive for back pain. Neurological: Positive for weakness and numbness. Psychiatric/Behavioral: Positive for behavioral problems.         Objective:   Physical Exam   Constitutional: He is oriented to person, place, and time. He appears well-developed and well-nourished. No distress. HENT:   Head: Normocephalic and atraumatic. Eyes: Conjunctivae and EOM are normal. Pupils are equal, round, and reactive to light. Cardiovascular: Normal rate, regular rhythm and normal heart sounds. Pulmonary/Chest: Effort normal and breath sounds normal.   Musculoskeletal:        Cervical back: He exhibits decreased range of motion, tenderness and pain. Lumbar back: He exhibits decreased range of motion, tenderness and pain. Neurological: He is alert and oriented to person, place, and time. He displays no atrophy and no tremor. No sensory deficit. He exhibits normal muscle tone. He displays no seizure activity. Gait abnormal. Coordination normal.   Ambulates with cane   Skin: Skin is warm and dry. Psychiatric: He has a normal mood and affect. His behavior is normal. Judgment and thought content normal.   Nursing note and vitals reviewed.        Assessment:         This is a 53-year-old gentleman with a very complicated and long psychiatric history including bipolar disorder schizoaffective disorder and multiple suicidal attempt.  Patient states that he was taking numerous psychiatric medications which were discontinued for the loss of medication coverage.  Currently he is not taking any medication for his psychiatric issue and is not followed with a psychiatrist.     He is complaining of generalized all over the body pain.  He has shaded in the entire body on the pain diagram.  His pain is chronic over many many years and has progressively been worsening.  He relates this pain to his multiple previous injuries.  He describes his pain as constant intense 10 out of 10 and aching throbbing tenderness nagging sharp shooting pain sensation.  Every activity aggravates the pain. He reports generalized paresthesia all over the body.  He reports weakness in his legs and difficulty in walking but denies any weight loss fever chills or loss of bladder or bowel control.     Among all this complicated pain history he is still able to identify 2 of his main pain complaints   #1 axial cervical spinal pain with extension over the shoulders and numbness in left arm and left leg   #2 chronic bilateral hip pain.     I ordered diagnostic work up     EXAMINATION: CT OF THE CERVICAL SPINE WITHOUT CONTRAST 8/28/2017 7:38 am    There is generalized straightening of the normal cervical lordosis.  Severe right-sided facet arthropathy is present at C3-C4 and there is moderate facet arthropathy on the left at C2-C3.  There is moderate left-sided neural foraminal stenosis at C2-C3 and severe right-sided neural foraminal stenosis at C3-C4.  Degenerate endplate spurring present at the C6-C7.  Biapical scarring.     EXAMINATION: 7 VIEW CERVICAL SPINE WITH OBLIQUES AND FLEXION/EXTENSION VIEWS   8/28/2017 7:24 am            Cervical spine is not well seen below the C6 level. Again shown is slight loss of height of the C6 vertebral body, likely   degenerative.  Calcific plaque of each carotid bulb.  Mild-to-moderate   cervical spondylotic changes, most prominent at C6-C7 with loss of disc space   height and osteophytes.  No convincing evidence of acute fracture.  Oblique   views show mild neural foraminal narrowing on the left at C2-C3 and C3-C4   with severe right neural foraminal narrowing at C3-C4.               Impression     Mild to moderate cervical spondylotic changes.     No dynamic instability with flexion or extension.      EXAMINATION: SINGLE VIEW OF THE PELVIS AND 2 VIEWS OF EACH OF THE BILATERAL HIPS   8/28/2017 7:24 am  Impression 1. No acute osseous abnormality or significant arthritic change 2.  Patchy increased sclerosis about the right humeral head which can be seen in setting of AVN.   RECOMMENDATIONS: Further evaluation with MRI of the right hip as clinically warranted.     EMG left upper and left lower extremity  Report reviewed  Chronic left L3 4 radiculopathy  Possible early sensory neuropathy     1. Spondylosis of cervical region without myelopathy or radiculopathy  LA INJ DX/THER AGNT PARAVERT FACET JOINT, CERV/THORAC, 1ST LEVEL   2. Psychiatric illness      3. Chronic lumbar radiculopathy  CT Lumbar Spine WO Contrast   4. AVN (avascular necrosis of bone) ([facility] [address]  MRI HIP RIGHT WO CONTRAST     Ambulatory referral to Orthopedic Surgery                                    Plan: ALL RESULTS DISCUSSED IN DETAIL WITH PATIENT           Orders Placed This Encounter   Procedures    CT Lumbar Spine WO Contrast       Standing Status:   Future       Standing Expiration Date:   10/19/2018       Order Specific Question:   Reason for exam:       Answer:   Back and leg pain    MRI HIP RIGHT WO CONTRAST       Standing Status:   Future       Standing Expiration Date:   10/18/2018    Ambulatory referral to Orthopedic Surgery       Referral Priority:   Routine       Referral Type:   Consult for Advice and Opinion       Referral Reason:   Specialty Services Required       Referred to Provider:   Jacinto Dick MD       Requested Specialty:   Orthopedic Surgery       Number of Visits Requested:   1    LA INJ DX/THER AGNT PARAVERT FACET JOINT, CERV/THORAC, 1ST LEVEL       Left cervical C3/TON/C4/C5        Encounter Medications    No orders of the defined types were placed in this encounter.                                            Office Visit on 10/18/2017            Detailed Report        Progress Notes Info     Author Note Status Last Update User   Kari Casanova MD Signed Kari Casanova MD   Last Update Date/Time: 10/18/2017 11:46 AM   Chart Review Routing History     Routing history could not be found for this note.  This is because the note

## 2017-10-24 ENCOUNTER — HOSPITAL ENCOUNTER (OUTPATIENT)
Dept: MRI IMAGING | Age: 57
Discharge: HOME | End: 2017-10-24
Payer: MEDICARE

## 2017-10-24 ENCOUNTER — HOSPITAL ENCOUNTER (OUTPATIENT)
Dept: CT IMAGING | Age: 57
Discharge: HOME | End: 2017-10-24
Payer: MEDICARE

## 2017-10-24 DIAGNOSIS — M87.00 AVN (AVASCULAR NECROSIS OF BONE) (HCC): ICD-10-CM

## 2017-10-24 DIAGNOSIS — M54.16 CHRONIC LUMBAR RADICULOPATHY: ICD-10-CM

## 2017-10-24 PROCEDURE — 72131 CT LUMBAR SPINE W/O DYE: CPT

## 2017-10-24 PROCEDURE — 73721 MRI JNT OF LWR EXTRE W/O DYE: CPT

## 2017-10-26 ENCOUNTER — TELEPHONE (OUTPATIENT)
Dept: PAIN MANAGEMENT | Age: 57
End: 2017-10-26

## 2017-10-26 DIAGNOSIS — M87.051 AVASCULAR NECROSIS OF BONE OF RIGHT HIP (HCC): Primary | ICD-10-CM

## 2017-11-07 ENCOUNTER — CARE COORDINATION (OUTPATIENT)
Dept: CARE COORDINATION | Age: 57
End: 2017-11-07

## 2017-12-26 ENCOUNTER — CARE COORDINATION (OUTPATIENT)
Dept: CARE COORDINATION | Age: 57
End: 2017-12-26

## 2018-02-09 ENCOUNTER — CARE COORDINATION (OUTPATIENT)
Dept: CARE COORDINATION | Age: 58
End: 2018-02-09

## 2018-02-09 NOTE — CARE COORDINATION
Attempting to reach patient for a follow up care coordination call, left VM for patient to return CC call.

## 2018-03-28 ENCOUNTER — CARE COORDINATION (OUTPATIENT)
Dept: CARE COORDINATION | Age: 58
End: 2018-03-28

## 2018-03-28 NOTE — CARE COORDINATION
VM left with contact information requesting a return call. Letter mailed to the patient requesting a return call. CC has been unable to connect with the patient since speaking with his friend Edmundosarah DuarteNesbitt on 8.3.17.

## 2018-05-01 ENCOUNTER — TELEPHONE (OUTPATIENT)
Dept: ADMINISTRATIVE | Age: 58
End: 2018-05-01

## 2018-05-01 ENCOUNTER — CARE COORDINATION (OUTPATIENT)
Dept: CARE COORDINATION | Age: 58
End: 2018-05-01

## 2018-05-25 ENCOUNTER — TELEPHONE (OUTPATIENT)
Dept: FAMILY MEDICINE CLINIC | Age: 58
End: 2018-05-25

## 2019-03-27 ENCOUNTER — TELEPHONE (OUTPATIENT)
Dept: FAMILY MEDICINE CLINIC | Age: 59
End: 2019-03-27

## 2020-08-09 ENCOUNTER — HOSPITAL ENCOUNTER (OUTPATIENT)
Dept: PREADMISSION TESTING | Age: 60
Setting detail: SPECIMEN
Discharge: HOME | End: 2020-08-13
Payer: MEDICARE

## 2020-08-09 PROCEDURE — U0003 INFECTIOUS AGENT DETECTION BY NUCLEIC ACID (DNA OR RNA); SEVERE ACUTE RESPIRATORY SYNDROME CORONAVIRUS 2 (SARS-COV-2) (CORONAVIRUS DISEASE [COVID-19]), AMPLIFIED PROBE TECHNIQUE, MAKING USE OF HIGH THROUGHPUT TECHNOLOGIES AS DESCRIBED BY CMS-2020-01-R: HCPCS

## 2020-08-11 LAB — SARS-COV-2, NAA: NOT DETECTED

## 2020-08-12 ENCOUNTER — TELEPHONE (OUTPATIENT)
Dept: FAMILY MEDICINE CLINIC | Age: 60
End: 2020-08-12

## 2020-08-13 ENCOUNTER — HOSPITAL ENCOUNTER (OUTPATIENT)
Dept: NEUROLOGY | Age: 60
Discharge: HOME | End: 2020-08-13
Payer: MEDICARE

## 2020-08-13 PROCEDURE — 94729 DIFFUSING CAPACITY: CPT

## 2020-08-13 PROCEDURE — 94060 EVALUATION OF WHEEZING: CPT

## 2020-08-13 RX ORDER — ALBUTEROL SULFATE 90 UG/1
2 AEROSOL, METERED RESPIRATORY (INHALATION) EVERY 6 HOURS PRN
Status: DISCONTINUED | OUTPATIENT
Start: 2020-08-13 | End: 2020-08-14 | Stop reason: HOSPADM

## 2020-08-14 NOTE — PROCEDURES
[id number] [facility] [address]                [address] [place] [address], [address] [place] [address]                               PULMONARY FUNCTION    PATIENT NAME: Cleophas Oppenheim                  :        1960  MED REC NO:   [id number]                             ROOM:  ACCOUNT NO:   [de-identified]                           ADMIT DATE: 2020  PROVIDER:     [facility]    DATE OF PROCEDURE:  2020    TYPE OF STUDY:  Spirometry with diffusion and bronchodilator testing. RESULTS:  The patient had FEV1 of 1.54, which is severely reduced at 48%  predicted. FVC was 2.99, which is mildly reduced at 75% predicted. FEV1/FVC was 51. The mid-flows were severely reduced at 33% predicted. Diffusion was severely reduced at _____% predicted. Following  bronchodilators, there was no significant change in the flows. IMPRESSION:  Severe obstructive pulmonary disease with severe diffusion  impairment and no significant change in the flows following  bronchodilators. Clinical correlation is recommended.         Robert Mcdermott    D: 2020 12:29:25       T: 2020 13:17:34     [email or web address]  Job#: [id number]     Doc#: [id number]    CC:

## 2020-11-03 PROBLEM — I10 HYPERTENSION: Status: RESOLVED | Noted: 2020-11-03 | Resolved: 2020-11-03

## 2020-11-21 ENCOUNTER — HOSPITAL ENCOUNTER (OUTPATIENT)
Dept: PREADMISSION TESTING | Age: 60
Setting detail: SPECIMEN
Discharge: HOME | End: 2020-11-25
Payer: MEDICARE

## 2020-11-21 ENCOUNTER — HOSPITAL ENCOUNTER (OUTPATIENT)
Age: 60
Setting detail: SPECIMEN
Discharge: HOME | End: 2020-11-21
Payer: MEDICARE

## 2020-11-21 LAB
ABSOLUTE EOS #: 0.09 K/UL (ref 0–0.44)
ABSOLUTE IMMATURE GRANULOCYTE: <0.03 K/UL (ref 0–0.3)
ABSOLUTE LYMPH #: 1.75 K/UL (ref 1.1–3.7)
ABSOLUTE MONO #: 0.73 K/UL (ref 0.1–1.2)
BASOPHILS # BLD: 1 % (ref 0–2)
BASOPHILS ABSOLUTE: 0.05 K/UL (ref 0–0.2)
DIFFERENTIAL TYPE: ABNORMAL
EOSINOPHILS RELATIVE PERCENT: 2 % (ref 1–4)
HCT VFR BLD CALC: 51 % (ref 40.7–50.3)
HEMOGLOBIN: 16.5 G/DL (ref 13–17)
IMMATURE GRANULOCYTES: 0 %
LYMPHOCYTES # BLD: 32 % (ref 24–43)
MCH RBC QN AUTO: 34 PG (ref 25.2–33.5)
MCHC RBC AUTO-ENTMCNC: 32.4 G/DL (ref 28.4–34.8)
MCV RBC AUTO: 104.9 FL (ref 82.6–102.9)
MONOCYTES # BLD: 13 % (ref 3–12)
NRBC AUTOMATED: 0 PER 100 WBC
PDW BLD-RTO: 12.7 % (ref 11.8–14.4)
PLATELET # BLD: 197 K/UL (ref 138–453)
PLATELET ESTIMATE: ABNORMAL
PMV BLD AUTO: 11 FL (ref 8.1–13.5)
RBC # BLD: 4.86 M/UL (ref 4.21–5.77)
RBC # BLD: ABNORMAL 10*6/UL
SEG NEUTROPHILS: 52 % (ref 36–65)
SEGMENTED NEUTROPHILS ABSOLUTE COUNT: 2.87 K/UL (ref 1.5–8.1)
WBC # BLD: 5.5 K/UL (ref 3.5–11.3)
WBC # BLD: ABNORMAL 10*3/UL

## 2020-11-21 PROCEDURE — U0003 INFECTIOUS AGENT DETECTION BY NUCLEIC ACID (DNA OR RNA); SEVERE ACUTE RESPIRATORY SYNDROME CORONAVIRUS 2 (SARS-COV-2) (CORONAVIRUS DISEASE [COVID-19]), AMPLIFIED PROBE TECHNIQUE, MAKING USE OF HIGH THROUGHPUT TECHNOLOGIES AS DESCRIBED BY CMS-2020-01-R: HCPCS

## 2020-11-21 PROCEDURE — 36415 COLL VENOUS BLD VENIPUNCTURE: CPT

## 2020-11-21 PROCEDURE — 85025 COMPLETE CBC W/AUTO DIFF WBC: CPT

## 2020-11-21 PROCEDURE — 81256 HFE GENE: CPT

## 2020-11-23 LAB — SARS-COV-2, NAA: DETECTED

## 2020-11-24 ENCOUNTER — TELEPHONE (OUTPATIENT)
Dept: ADMINISTRATIVE | Age: 60
End: 2020-11-24

## 2020-11-27 LAB
C282Y HEMOCHROMATOSIS MUT: NEGATIVE
H63D HEMOCHROMATOSIS MUT: NEGATIVE
HEMOCHROMATOSIS MUTATION INT: NORMAL
HEMOCHROMATOSIS SPECIMEN: NORMAL
S65C HEMOCHROMATOSIS MUT: NEGATIVE

## 2020-12-27 ENCOUNTER — HOSPITAL ENCOUNTER (OUTPATIENT)
Dept: LAB | Age: 60
Setting detail: SPECIMEN
Discharge: HOME | End: 2020-12-27
Payer: MEDICARE

## 2020-12-27 PROCEDURE — U0003 INFECTIOUS AGENT DETECTION BY NUCLEIC ACID (DNA OR RNA); SEVERE ACUTE RESPIRATORY SYNDROME CORONAVIRUS 2 (SARS-COV-2) (CORONAVIRUS DISEASE [COVID-19]), AMPLIFIED PROBE TECHNIQUE, MAKING USE OF HIGH THROUGHPUT TECHNOLOGIES AS DESCRIBED BY CMS-2020-01-R: HCPCS

## 2020-12-28 LAB
SARS-COV-2, RAPID: NORMAL
SARS-COV-2: NORMAL
SARS-COV-2: NOT DETECTED
SOURCE: NORMAL

## 2020-12-29 ENCOUNTER — TELEPHONE (OUTPATIENT)
Dept: PRIMARY CARE CLINIC | Age: 60
End: 2020-12-29

## 2020-12-31 ENCOUNTER — ANESTHESIA EVENT (OUTPATIENT)
Dept: OPERATING ROOM | Age: 60
End: 2020-12-31
Payer: MEDICARE

## 2020-12-31 ENCOUNTER — ANESTHESIA (OUTPATIENT)
Dept: OPERATING ROOM | Age: 60
End: 2020-12-31
Payer: MEDICARE

## 2020-12-31 ENCOUNTER — HOSPITAL ENCOUNTER (OUTPATIENT)
Age: 60
Setting detail: OUTPATIENT SURGERY
Discharge: HOME | End: 2020-12-31
Attending: INTERNAL MEDICINE | Admitting: INTERNAL MEDICINE
Payer: MEDICARE

## 2020-12-31 VITALS
BODY MASS INDEX: 20.51 KG/M2 | SYSTOLIC BLOOD PRESSURE: 138 MMHG | DIASTOLIC BLOOD PRESSURE: 89 MMHG | TEMPERATURE: 96.8 F | WEIGHT: 165 LBS | RESPIRATION RATE: 15 BRPM | HEART RATE: 78 BPM | HEIGHT: 75 IN | OXYGEN SATURATION: 93 %

## 2020-12-31 VITALS — OXYGEN SATURATION: 96 % | DIASTOLIC BLOOD PRESSURE: 68 MMHG | SYSTOLIC BLOOD PRESSURE: 118 MMHG

## 2020-12-31 DIAGNOSIS — Z01.818 PREOP TESTING: ICD-10-CM

## 2020-12-31 PROCEDURE — 3700000001 HC ADD 15 MINUTES (ANESTHESIA): Performed by: INTERNAL MEDICINE

## 2020-12-31 PROCEDURE — 88305 TISSUE EXAM BY PATHOLOGIST: CPT

## 2020-12-31 PROCEDURE — 6370000000 HC RX 637 (ALT 250 FOR IP): Performed by: INTERNAL MEDICINE

## 2020-12-31 PROCEDURE — 3609012400 HC EGD TRANSORAL BIOPSY SINGLE/MULTIPLE: Performed by: INTERNAL MEDICINE

## 2020-12-31 PROCEDURE — 6360000002 HC RX W HCPCS: Performed by: NURSE ANESTHETIST, CERTIFIED REGISTERED

## 2020-12-31 PROCEDURE — 3700000000 HC ANESTHESIA ATTENDED CARE: Performed by: INTERNAL MEDICINE

## 2020-12-31 PROCEDURE — 7100000011 HC PHASE II RECOVERY - ADDTL 15 MIN: Performed by: INTERNAL MEDICINE

## 2020-12-31 PROCEDURE — 3609010600 HC COLONOSCOPY POLYPECTOMY SNARE/COLD BIOPSY: Performed by: INTERNAL MEDICINE

## 2020-12-31 PROCEDURE — 2720000010 HC SURG SUPPLY STERILE: Performed by: INTERNAL MEDICINE

## 2020-12-31 PROCEDURE — 7100000010 HC PHASE II RECOVERY - FIRST 15 MIN: Performed by: INTERNAL MEDICINE

## 2020-12-31 PROCEDURE — 2580000003 HC RX 258: Performed by: ANESTHESIOLOGY

## 2020-12-31 PROCEDURE — 2709999900 HC NON-CHARGEABLE SUPPLY: Performed by: INTERNAL MEDICINE

## 2020-12-31 DEVICE — WORKING LENGTH 235CM, WORKING CHANNEL 2.8MM
Type: IMPLANTABLE DEVICE | Status: FUNCTIONAL
Brand: RESOLUTION 360 CLIP

## 2020-12-31 RX ORDER — TRAZODONE HYDROCHLORIDE 100 MG/1
100 TABLET ORAL NIGHTLY
COMMUNITY

## 2020-12-31 RX ORDER — SODIUM CHLORIDE, SODIUM LACTATE, POTASSIUM CHLORIDE, CALCIUM CHLORIDE 600; 310; 30; 20 MG/100ML; MG/100ML; MG/100ML; MG/100ML
INJECTION, SOLUTION INTRAVENOUS CONTINUOUS
Status: DISCONTINUED | OUTPATIENT
Start: 2020-12-31 | End: 2020-12-31 | Stop reason: HOSPADM

## 2020-12-31 RX ORDER — CARVEDILOL 12.5 MG/1
12.5 TABLET ORAL 2 TIMES DAILY WITH MEALS
Status: ON HOLD | COMMUNITY
End: 2021-05-12 | Stop reason: SDUPTHER

## 2020-12-31 RX ORDER — LIDOCAINE HYDROCHLORIDE 10 MG/ML
1 INJECTION, SOLUTION EPIDURAL; INFILTRATION; INTRACAUDAL; PERINEURAL
Status: DISCONTINUED | OUTPATIENT
Start: 2020-12-31 | End: 2020-12-31 | Stop reason: HOSPADM

## 2020-12-31 RX ORDER — SODIUM CHLORIDE 0.9 % (FLUSH) 0.9 %
10 SYRINGE (ML) INJECTION EVERY 12 HOURS SCHEDULED
Status: DISCONTINUED | OUTPATIENT
Start: 2020-12-31 | End: 2020-12-31 | Stop reason: HOSPADM

## 2020-12-31 RX ORDER — SODIUM CHLORIDE 0.9 % (FLUSH) 0.9 %
10 SYRINGE (ML) INJECTION PRN
Status: DISCONTINUED | OUTPATIENT
Start: 2020-12-31 | End: 2020-12-31 | Stop reason: HOSPADM

## 2020-12-31 RX ORDER — CYCLOBENZAPRINE HCL 10 MG
10 TABLET ORAL 3 TIMES DAILY PRN
COMMUNITY
End: 2021-05-08

## 2020-12-31 RX ORDER — FOLIC ACID 1 MG/1
1 TABLET ORAL DAILY
COMMUNITY

## 2020-12-31 RX ORDER — FENTANYL CITRATE 50 UG/ML
INJECTION, SOLUTION INTRAMUSCULAR; INTRAVENOUS PRN
Status: DISCONTINUED | OUTPATIENT
Start: 2020-12-31 | End: 2020-12-31 | Stop reason: SDUPTHER

## 2020-12-31 RX ORDER — ACETAMINOPHEN 325 MG/1
650 TABLET ORAL EVERY 6 HOURS PRN
COMMUNITY

## 2020-12-31 RX ORDER — OMEPRAZOLE 20 MG/1
40 CAPSULE, DELAYED RELEASE ORAL DAILY
COMMUNITY

## 2020-12-31 RX ORDER — PROPOFOL 10 MG/ML
INJECTION, EMULSION INTRAVENOUS PRN
Status: DISCONTINUED | OUTPATIENT
Start: 2020-12-31 | End: 2020-12-31 | Stop reason: SDUPTHER

## 2020-12-31 RX ORDER — SODIUM CHLORIDE 9 MG/ML
INJECTION, SOLUTION INTRAVENOUS CONTINUOUS
Status: DISCONTINUED | OUTPATIENT
Start: 2020-12-31 | End: 2020-12-31

## 2020-12-31 RX ORDER — ZONISAMIDE 100 MG/1
100 CAPSULE ORAL DAILY
COMMUNITY

## 2020-12-31 RX ADMIN — PROPOFOL 75 MG: 10 INJECTION, EMULSION INTRAVENOUS at 12:28

## 2020-12-31 RX ADMIN — PROPOFOL 75 MG: 10 INJECTION, EMULSION INTRAVENOUS at 12:29

## 2020-12-31 RX ADMIN — SODIUM CHLORIDE, POTASSIUM CHLORIDE, SODIUM LACTATE AND CALCIUM CHLORIDE: 600; 310; 30; 20 INJECTION, SOLUTION INTRAVENOUS at 09:49

## 2020-12-31 RX ADMIN — PROPOFOL 50 MG: 10 INJECTION, EMULSION INTRAVENOUS at 12:33

## 2020-12-31 RX ADMIN — PROPOFOL 50 MG: 10 INJECTION, EMULSION INTRAVENOUS at 12:39

## 2020-12-31 RX ADMIN — PROPOFOL 50 MG: 10 INJECTION, EMULSION INTRAVENOUS at 12:53

## 2020-12-31 RX ADMIN — Medication 100 MCG: at 12:28

## 2020-12-31 RX ADMIN — PROPOFOL 50 MG: 10 INJECTION, EMULSION INTRAVENOUS at 12:45

## 2020-12-31 ASSESSMENT — PULMONARY FUNCTION TESTS
PIF_VALUE: 0

## 2020-12-31 ASSESSMENT — PAIN SCALES - GENERAL
PAINLEVEL_OUTOF10: 0

## 2020-12-31 ASSESSMENT — PAIN - FUNCTIONAL ASSESSMENT: PAIN_FUNCTIONAL_ASSESSMENT: 0-10

## 2020-12-31 NOTE — OP NOTE
Operative Note      Patient: Ellen Márquez  YOB: 1960  MRN: [id number]    Date of Procedure: 12/31/2020    Pre-Op Diagnosis: Nausea and vomiting    Indication for the procedure: Patient is a pleasant 64years old male who is seen with nausea and vomiting. He is scheduled for EGD for further evaluation. Post-Op Diagnosis: Esophagitis, gastritis and small hiatal hernia       Procedure(s):  COLONOSCOPY POLYPECTOMY SNARE/COLD BIOPSY WITH CLIP APPLICATION AND CONTROL OF BLEEDING  EGD BIOPSY    Surgeon(s): Kimberlyn Gunter MD    Assistant:   * No surgical staff found *    Informed consent: Risks, benefits, and alternatives of the procedure were explained to the patient prior to the procedure. Risks include but not limited to bleeding, perforation, aspiration, allergic reaction to medication or death. Patient was also informed about the risk of missing a lesion. Anesthesia: Monitor Anesthesia Care    Estimated Blood Loss (mL): Minimal    Complications: None    Specimens:   ID Type Source Tests Collected by Time Destination   A : DUODENUM Tissue Duodenum SURGICAL PATHOLOGY Kimberlyn Gunter MD 12/31/2020 1231    B : STOMACH Tissue Stomach SURGICAL PATHOLOGY Kimberlyn Gunter MD 12/31/2020 1232    C : DISTAL ESOPHAGUS Tissue Esophagus SURGICAL PATHOLOGY Kimberlyn Gunter MD 12/31/2020 1234    D : PROXIMAL ESOPHAGUS Tissue Esophagus SURGICAL PATHOLOGY Kimberlyn Gunter MD 12/31/2020 1235    E : BIOPSY TRANSVERSE COLON POLYP Tissue Colon-Transverse SURGICAL PATHOLOGY Kimberlyn Gunter MD 12/31/2020 1242    F : rectal polyp Tissue Colon SURGICAL PATHOLOGY Kimberlyn Gunter MD 12/31/2020 1252        Implants:  Implant Name Type Inv.  Item Serial No.  Lot No. LRB No. Used Action   CLIP ENDOSCP 235CM RESOL 360  CLIP ENDOSCP 235CM RESOL 360  [employer]- [id number] N/A 1 Implanted         Drains: * No LDAs found *    Findings: 1-LA grade B esophagitis  2-gastric erosions  3-small hiatal hernia    Detailed Description of Procedure:   Patient was placed in the left lateral decubitus position. The well-lubricated Olympus EGD scope was passed through the bite-block was advanced into the esophagus. Esophageal mucosa of the upper middle esophagus appeared normal.  The scope was then advanced into the distal esophagus. There was LA grade B esophagitis. Biopsies were taken. The scope was then advanced into the stomach toward the antrum. There was antral erosions. Retroflexion was done. Small hiatal hernia were seen. The scope was then advanced into the duodenum. Duodenal mucosa of the bulb and the second portion of the duodenum were seen and appeared normal.  Biopsies were taken for celiac disease. The scope was then removed outside the patient. Plan: 1. Follow up on results of pathology  2-follow-up in the office in 1 to 2 weeks.     Electronically signed by Christiano Salmeron MD on 12/31/2020 at 12:58 PM

## 2020-12-31 NOTE — ANESTHESIA POSTPROCEDURE EVALUATION
Department of Anesthesiology  Postprocedure Note    Patient: Bandar Orozco  MRN: [id number]  YOB: 1960  Date of evaluation: 12/31/2020  Time:  1:55 PM     Procedure Summary     Date: 12/31/20 Room / Location: [facility]  / [address] [place] [address]    Anesthesia Start: 2710 Anesthesia Stop: 1302    Procedures:       COLONOSCOPY POLYPECTOMY SNARE/COLD BIOPSY WITH CLIP APPLICATION AND CONTROL OF BLEEDING (N/A )      EGD BIOPSY (N/A ) Diagnosis: (DX GERD, DIARRHEA)    Surgeons: Linda Phelps MD Responsible Provider: Jordan Yo MD    Anesthesia Type: MAC ASA Status: 3          Anesthesia Type: MAC    Izaiah Phase I: Izaiah Score: 7    Izaiah Phase II: Izaiah Score: 7    Last vitals: Reviewed and per EMR flowsheets.        Anesthesia Post Evaluation    Patient location during evaluation: PACU  Complications: no

## 2020-12-31 NOTE — H&P
History and Physical Service   [facility] [place] [facility]IENT    HISTORY AND PHYSICAL EXAMINATION            Date of Evaluation: 12/31/2020  Patient name:  Allison Rodriguez  MRN:   [id number]  YOB: 1960  PCP:    Lion Monahan MD    History Obtained From:     Patient, medical records    History of Present Illness: This is Allison Rodriguez a 64 y.o. male with multiple comorbidities managed by specialist who presents today for a diagnostic colonoscopy and EGD by Dr. Taty Martinez for diarrhea, GERD. Patient c/o chronic loose watery stools daily for > 5 years. Denies blood or tarry appearance, abdominal pain He c/o nausea, vomiting liquids and food daily The patient with Hx elevated LFT's with known alcohol abuse, drinking 12 pack of beer and 7-8 shots of liquor daily per Dr Annabelle Lei progress notes. His last drink,a beer, was at 0100 today Anesthesia notified. He admits he followed the bowel prep until watery clear. Previous colonoscopy 5/19/2014. He denies unintentional weight loss, fever, chills, cough, wheezing, or chest pain.  No FH colon cancer     Past Medical History:     Past Medical History:   Diagnosis Date    Arthritis     Asthma     Attempted suicide ([address]     attempted 7 times    Bipolar affective ([address]     Blood in stool     CHF (congestive heart failure), NYHA class III ([address] 8/22/2014    COPD (chronic obstructive pulmonary disease) ([address]     CVA (cerebral vascular accident) ([address]     x2 in 2006    Depression 7/26/2015    Difficult intubation 7/5/2013    Needed Glidescope, only epiglottis seen, easy mask ventilation    Erectile dysfunction 5/23/2014    Fracture of wrist     left    GERD (gastroesophageal reflux disease) 2/19/2014    Head injury     Hepatitis     HLD (hyperlipidemia) 2/19/2014    Hypertension     Insomnia     Left wrist pain     Pain     LEFT KNEE, RIGHT HIP    Seasonal allergies 7/15/2014    Seizures ([address]     Spondylosis of cervical region MD Jeyson   zonisamide (ZONEGRAN) 100 MG capsule Take 100 mg by mouth daily    Historical Provider, MD   ipratropium (ATROVENT HFA) 17 MCG/ACT inhaler Inhale 1 puff into the lungs 3 times daily 7/18/17 7/18/18  Damián Solis MD   mometasone-formoterol [facility]) 200-5 MCG/ACT inhaler Inhale 2 puffs into the lungs every 12 hours 7/18/17   Damián Solis MD        Allergies:     Pcn [penicillins]    Social History:     Tobacco:    reports that he has been smoking cigarettes and cigars. He has a 7.80 pack-year smoking history. He has never used smokeless tobacco.  Alcohol:      reports current alcohol use of about 9.0 standard drinks of alcohol per week. Drug Use:  reports no history of drug use. Family History:     Family History   Problem Relation Age of Onset   [de-identified] Stroke Mother     Diabetes Mother     Heart Disease Father     High Blood Pressure Father     Diabetes Maternal Grandmother        Review of Systems:     Positive and Negative as described in HPI. CONSTITUTIONAL: fatigue negative for fevers, chills, sweats, weight loss  HEENT:  negative for vision, hearing changes, runny nose, throat pain  RESPIRATORY:  negative for shortness of breath, cough, congestion, wheezing. CARDIOVASCULAR: Hx CHF  negative for chest pain, palpitations.   GASTROINTESTINAL: See HPI   +nausea, vomiting, diarrhea   GENITOURINARY:  negative for difficulty of urination, burning with urination, frequency   INTEGUMENT:  negative for rash, skin lesions, easy bruising   HEMATOLOGIC/LYMPHATIC:  negative for swelling/edema   ALLERGIC/IMMUNOLOGIC:  negative for urticaria , itching  ENDOCRINE:  negative increase in drinking, increase in urination, hot or cold intolerance  MUSCULOSKELETAL:  Arthralgia back and neck negative joint pains, muscle aches, swelling of joints  NEUROLOGICAL: multiple strokes Hx seizure disorder with last seizure months ago negative for headaches, dizziness, lightheadedness, numbness, pain, tingling extremities  BEHAVIOR/PSYCH:  negative for depression, anxiety    Physical Exam:   BP (!) 148/83   Pulse 110   Temp 96.5 °F (35.8 °C) (Temporal)   Resp 16   Ht 6' 3\" (1.905 m)   Wt 165 lb (74.8 kg)   SpO2 94%   BMI 20.62 kg/m²   No results for input(s): POCGLU in the last 72 hours. General Appearance: thin, disheveled   alert, well,  and in no acute distress  Mental status: oriented to person, place, and time with normal affect  Head:  normocephalic, atraumatic. Dul  Eye: no icterus, redness, pupils equal and reactive, extraocular eye movements intact, conjunctiva clear  Ear: normal external ear, no discharge, hearing intact  Nose:  no drainage noted  Mouth: mucous membranes moist  Neck: supple, no carotid bruits, thyroid not palpable  Lungs: Equal expansion, unlabored at rest, no use of accessory muscles inspiratory expiratory wheezing, diminished breath sounds and scattered rhonchi Anesthesia notified Patient used rescue inhaler. Improved  Cardiovascular:  tachycardiac Repeat apical  asymptomatic rate and regular rhythm, no murmur, gallop, rub. Abdomen: Soft, nontender, nondistended, normal bowel sounds  Neurologic: There are no new focal motor or sensory deficits, normal muscle tone and bulk, no abnormal sensation, normal speech, cranial nerves II through XII grossly intact  Skin: No gross lesions, rashes, bruising or bleeding on exposed skin area  Extremities:  peripheral pulses palpable, no pedal edema or calf pain with palpation  Psych: normal affect     Investigations:      Laboratory Testing:  No results found for this or any previous visit (from the past 24 hour(s)). No results for input(s): HGB, HCT, WBC, MCV, PLATELET, NA, K, CL, CO2, BUN, CREATININE, GLUCOSE, INR, PROTIME, APTT, AST, ALT, LABALBU, HCG in the last 720 hours. Recent Labs     12/27/20  1005   COVID19       Not Detected           Imaging/Diagnostics:    No results found  Diagnosis:      1.  GERD, Diarrhea    Plans:     1.  Diagnostic colonoscopy , EGD      Jorja Apgar, APRN - CNP  2020  10:06 AM

## 2020-12-31 NOTE — OP NOTE
bleeding AVM in the ascending colon that was clipped. Another AVM was seen in the cecum that was not actively bleeding. Detailed Description of Procedure:   Patient was placed in left lateral decubitus position. Initially digital rectal exam was performed. There was perianal skin tags noted. The well-lubricated Olympus colonoscope was passed through the rectum into the cecum. The terminal ileum was intubated and appeared normal.  The scope was then brought back into the cecum. There was a medium size AVM that was not actively bleeding. The scope was then directed to the ascending colon. In the ascending colon there was an actively bleeding AVM 1 Hemoclip was placed there was complete cessation of bleeding. The scope was then brought back into the transverse colon. In the transverse colon there was a 4 mm sessile polyp that was removed biopsy. The scope was then back in the descending colon. Descending colon and sigmoid colon mucosa appeared normal.  The scope was in the rectum the rectum. There was 2 polyps that measured 3 to 4 mm removed with biopsy. Retroflexion was done. Internal hemorrhoids were seen. The scope was then withdrawn outside the patient. Plan: 1. Follow up on results of pathology  2-follow-up in the clinic in 1 to 2 weeks  3-dermatology evaluations for skin tags in the perianal area.   Electronically signed by Marc Talbert MD on 12/31/2020 at 1:02 PM

## 2020-12-31 NOTE — ANESTHESIA PRE PROCEDURE
Department of Anesthesiology  Preprocedure Note       Name:  Della Fitzpatrick   Age:  64 y.o.  :  1960                                          MRN:  [id number]         Date:  2020      Surgeon: Clover Farrell): Carlos Posey MD    Procedure: Procedure(s):  COLONOSCOPY DIAGNOSTIC  EGD ESOPHAGOGASTRODUODENOSCOPY    Medications prior to admission:   Prior to Admission medications    Medication Sig Start Date End Date Taking? Authorizing Provider   carvedilol (COREG) 12.5 MG tablet Take 12.5 mg by mouth 2 times daily (with meals)   Yes Historical Provider, MD   cyclobenzaprine (FLEXERIL) 10 MG tablet Take 10 mg by mouth 3 times daily as needed for Muscle spasms   Yes Historical Provider, MD   folic acid (FOLVITE) 1 MG tablet Take 1 mg by mouth daily   Yes Historical Provider, MD   omeprazole (PRILOSEC) 20 MG delayed release capsule Take 40 mg by mouth daily   Yes Historical Provider, MD   traZODone (DESYREL) 100 MG tablet Take 100 mg by mouth nightly   Yes Historical Provider, MD   acetaminophen (TYLENOL) 325 MG tablet Take 650 mg by mouth every 6 hours as needed for Pain   Yes Historical Provider, MD   LISINOPRIL PO Take 5 mg by mouth   Yes Historical Provider, MD   albuterol sulfate HFA (PROAIR HFA) 108 (90 Base) MCG/ACT inhaler Inhale 2 puffs into the lungs every 6 hours as needed for Wheezing 17  Yes Trista Miller MD   Respiratory Therapy Supplies (NEBULIZER COMPRESSOR) KIT 1 kit by Does not apply route once for 1 dose.  14 Yes Anahi Mesa MD   zonisamide (ZONEGRAN) 100 MG capsule Take 100 mg by mouth daily    Historical Provider, MD   ipratropium (ATROVENT HFA) 17 MCG/ACT inhaler Inhale 1 puff into the lungs 3 times daily 17  Trista Miller MD   mometasone-formoterol [facility]) 200-5 MCG/ACT inhaler Inhale 2 puffs into the lungs every 12 hours 17   Trista Miller MD       Current medications:    Current Facility-Administered Medications Medication Dose Route Frequency Provider Last Rate Last Admin    lactated ringers infusion   Intravenous Continuous Agnieszka Downey  mL/hr at 12/31/20 0949 New Bag at 12/31/20 0949    sodium chloride flush 0.9 % injection 10 mL  10 mL Intravenous 2 times per day Agnieszka Downey MD        sodium chloride flush 0.9 % injection 10 mL  10 mL Intravenous PRN Agnieszka Downey MD        lidocaine PF 1 % injection 1 mL  1 mL Intradermal Once PRN Agnieszka Downey MD           Allergies:     Allergies   Allergen Reactions    Pcn [Penicillins] Other (See Comments)     Pt unsure of reaction this is from childhood       Problem List:    Patient Active Problem List   Diagnosis Code    Fracture tibia/fibula S82.209A, S82.409A    Schizoaffective disorder ([facility] X80.8    Umbilical hernia Q81.2    Gastroenteritis R65.1    Folliculitis A41.0    GERD (gastroesophageal reflux disease) K21.9    COPD (chronic obstructive pulmonary disease) ([facility] J44.9    HLD (hyperlipidemia) E78.5    Foot pain, right M79.671    Hematochezia K92.1    Hemorrhoids K64.9    Erectile dysfunction N52.9    Seasonal allergies J30.2    COPD exacerbation ([address] J44.1    CHF (congestive heart failure), NYHA class III (HCC) I50.9    Dermoid cyst of scalp D23.4    Pneumonitis J18.9    Syncope R55    Depression F32.9    Chest pain R07.9    Syncope and collapse R55    Hypokalemia E87.6    Seizures ([address] R56.9    Steroid-induced hyperglycemia R73.9, T38.0X5A    Need for vaccination Z23    Tremor R25.1    Atypical chest pain R07.89    Elevated LFTs R79.89    Abnormal results of liver function studies  R94.5    Essential hypertension I10    Schizoaffective disorder, depressive type (HCC) F25.1    Seizure ([address] R56.9    Intractable vomiting with nausea R11.2    Spondylosis of cervical region without myelopathy or radiculopathy M47.812    Avascular necrosis of bone of right hip (HCC) M87.051       Past Medical History:        Diagnosis Date  Arthritis     Asthma     Attempted suicide ([facility]     attempted 7 times    Bipolar affective ([address]     Blood in stool     CHF (congestive heart failure), NYHA class III ([address] 8/22/2014    COPD (chronic obstructive pulmonary disease) ([address]     CVA (cerebral vascular accident) ([address]     x2 in 2006    Depression 7/26/2015    Difficult intubation 7/5/2013    Needed Glidescope, only epiglottis seen, easy mask ventilation    Erectile dysfunction 5/23/2014    Fracture of wrist     left    GERD (gastroesophageal reflux disease) 2/19/2014    Head injury     Hepatitis     HLD (hyperlipidemia) 2/19/2014    Hypertension     Insomnia     Left wrist pain     Pain     LEFT KNEE, RIGHT HIP    Seasonal allergies 7/15/2014    Seizures ([address]     Spondylosis of cervical region without myelopathy or radiculopathy     Stroke ([facility] 10/31/11    pt states a total of 4-5strokes    Vomiting     Wears glasses        Past Surgical History:        Procedure Laterality Date    ANESTHESIA NERVE BLOCK Left 10/23/2017    NERVE BLOCK LEFT CERVICAL C3 TON C4 C5 performed by Donny Chan MD at [address]      cardiac cath x2    COLONOSCOPY  5/19/2014    ENDOSCOPY, COLON, DIAGNOSTIC      FRACTURE SURGERY      right arm, left knee, l ankle pins placed    HERNIA REPAIR      KNEE SURGERY      NERVE BLOCK Left 10/23/2017    medial branch block, cervical    TIBIA FRACTURE SURGERY Left 07/05/2013    IM nailing    TOE SURGERY Right     UMBILICAL HERNIA REPAIR  4/8/14    with mesh       Social History:    Social History     Tobacco Use    Smoking status: Current Some Day Smoker     Packs/day: 0.20     Years: 39.00     Pack years: 7.80     Types: Cigarettes, Cigars    Smokeless tobacco: Never Used   Substance Use Topics    Alcohol use:  Yes     Alcohol/week: 9.0 standard drinks     Types: 5 Cans of beer, 4 Shots of liquor per week     Comment: 5 can/day / 4 shot /week Ready to quit: Not Answered  Counseling given: Not Answered      Vital Signs (Current):   Vitals:    12/31/20 0923 12/31/20 0923 12/31/20 0932   BP:  (!) 148/83    Pulse:  110    Resp: 16 16    Temp: 96.5 °F (35.8 °C) 96.5 °F (35.8 °C)    TempSrc: Temporal Temporal    SpO2:  94%    Weight:   165 lb (74.8 kg)   Height:   6' 3\" (1.905 m)                                              BP Readings from Last 3 Encounters:   12/31/20 (!) 148/83   10/23/17 (!) 162/102   10/18/17 (!) 137/97       NPO Status: Time of last liquid consumption: 0100                        Time of last solid consumption: 1800                        Date of last liquid consumption: 12/31/20                        Date of last solid food consumption: 12/29/20    BMI:   Wt Readings from Last 3 Encounters:   12/31/20 165 lb (74.8 kg)   10/23/17 150 lb (68 kg)   10/18/17 146 lb (66.2 kg)     Body mass index is 20.62 kg/m². CBC:   Lab Results   Component Value Date    WBC 5.5 11/21/2020    RBC 4.86 11/21/2020    HGB 16.5 11/21/2020    HCT 51.0 11/21/2020    .9 11/21/2020    RDW 12.7 11/21/2020     11/21/2020       CMP:   Lab Results   Component Value Date     09/13/2016    K 3.8 09/13/2016    CL 96 09/13/2016    CO2 22 09/13/2016    BUN 10 09/13/2016    CREATININE 0.62 09/13/2016    GFRAA >60 09/13/2016    LABGLOM >60 09/13/2016    GLUCOSE 114 09/13/2016    PROT 7.6 07/14/2017    CALCIUM 9.4 09/13/2016    BILITOT 0.36 07/14/2017    ALKPHOS 98 07/14/2017     07/14/2017    ALT 90 07/14/2017       POC Tests: No results for input(s): POCGLU, POCNA, POCK, POCCL, POCBUN, POCHEMO, POCHCT in the last 72 hours.     Coags:   Lab Results   Component Value Date    PROTIME 9.4 11/09/2015    INR 0.9 11/09/2015    APTT 25.4 11/09/2015       HCG (If Applicable): No results found for: PREGTESTUR, PREGSERUM, HCG, HCGQUANT     ABGs:   Lab Results   Component Value Date    PHART 7.44 08/31/2012 PO2ART 287 08/31/2012    RWG1UNA 40 08/31/2012    KRV5JUR 26.8 08/31/2012    D4KARXIU 100 08/31/2012        Type & Screen (If Applicable):  No results found for: LABABO, [address]    Drug/Infectious Status (If Applicable):  Lab Results   Component Value Date    HEPCAB NONREACTIVE 10/25/2016       COVID-19 Screening (If Applicable):   Lab Results   Component Value Date    COVID19 Not Detected 12/27/2020    COVID19 Detected 11/21/2020         Anesthesia Evaluation   no history of anesthetic complications:   Airway: Mallampati: II  TM distance: >3 FB   Neck ROM: full  Mouth opening: > = 3 FB Dental:          Pulmonary:   (+) COPD:  asthma: current smoker                           Cardiovascular:    (+) hypertension:, CHF: diastolic, LEYVA:, hyperlipidemia    (-)  angina                Neuro/Psych:   (+) CVA:, psychiatric history:            GI/Hepatic/Renal:   (+) GERD:,           Endo/Other:                     Abdominal:           Vascular:                                      Anesthesia Plan      MAC     ASA 3       Induction: intravenous. Anesthetic plan and risks discussed with patient.                       Leonora Urrutia MD   12/31/2020

## 2021-01-05 LAB — SURGICAL PATHOLOGY REPORT: NORMAL

## 2021-01-29 ENCOUNTER — HOSPITAL ENCOUNTER (OUTPATIENT)
Age: 61
Discharge: HOME | End: 2021-01-29
Payer: MEDICARE

## 2021-01-29 LAB
FOLATE: 19.9 NG/ML
VITAMIN B-12: 447 PG/ML (ref 232–1245)

## 2021-01-29 PROCEDURE — 82746 ASSAY OF FOLIC ACID SERUM: CPT

## 2021-01-29 PROCEDURE — 82607 VITAMIN B-12: CPT

## 2021-01-29 PROCEDURE — 36415 COLL VENOUS BLD VENIPUNCTURE: CPT

## 2021-01-29 PROCEDURE — 80203 DRUG SCREEN QUANT ZONISAMIDE: CPT

## 2021-01-30 LAB — ZONISAMIDE: 3 UG/ML (ref 10–40)

## 2021-02-18 ENCOUNTER — HOSPITAL ENCOUNTER (OUTPATIENT)
Age: 61
Discharge: HOME | End: 2021-02-18
Payer: MEDICARE

## 2021-02-18 PROCEDURE — 36415 COLL VENOUS BLD VENIPUNCTURE: CPT

## 2021-02-18 PROCEDURE — 80203 DRUG SCREEN QUANT ZONISAMIDE: CPT

## 2021-02-20 LAB — ZONISAMIDE: 3 UG/ML (ref 10–40)

## 2021-03-21 ENCOUNTER — HOSPITAL ENCOUNTER (OUTPATIENT)
Dept: LAB | Age: 61
Setting detail: SPECIMEN
Discharge: HOME | End: 2021-03-21
Payer: MEDICARE

## 2021-03-21 DIAGNOSIS — Z01.818 PREOP TESTING: Primary | ICD-10-CM

## 2021-03-21 PROCEDURE — U0003 INFECTIOUS AGENT DETECTION BY NUCLEIC ACID (DNA OR RNA); SEVERE ACUTE RESPIRATORY SYNDROME CORONAVIRUS 2 (SARS-COV-2) (CORONAVIRUS DISEASE [COVID-19]), AMPLIFIED PROBE TECHNIQUE, MAKING USE OF HIGH THROUGHPUT TECHNOLOGIES AS DESCRIBED BY CMS-2020-01-R: HCPCS

## 2021-03-21 PROCEDURE — U0005 INFEC AGEN DETEC AMPLI PROBE: HCPCS

## 2021-03-22 LAB
SARS-COV-2: NORMAL
SARS-COV-2: NOT DETECTED
SOURCE: NORMAL

## 2021-03-23 ENCOUNTER — TELEPHONE (OUTPATIENT)
Dept: PRIMARY CARE CLINIC | Age: 61
End: 2021-03-23

## 2021-03-24 ENCOUNTER — ANESTHESIA EVENT (OUTPATIENT)
Dept: OPERATING ROOM | Age: 61
End: 2021-03-24
Payer: MEDICARE

## 2021-03-25 ENCOUNTER — HOSPITAL ENCOUNTER (OUTPATIENT)
Age: 61
Setting detail: OUTPATIENT SURGERY
Discharge: HOME | End: 2021-03-25
Attending: INTERNAL MEDICINE | Admitting: INTERNAL MEDICINE
Payer: MEDICARE

## 2021-03-25 ENCOUNTER — ANESTHESIA (OUTPATIENT)
Dept: OPERATING ROOM | Age: 61
End: 2021-03-25
Payer: MEDICARE

## 2021-03-25 VITALS — SYSTOLIC BLOOD PRESSURE: 162 MMHG | DIASTOLIC BLOOD PRESSURE: 91 MMHG | OXYGEN SATURATION: 98 %

## 2021-03-25 VITALS
BODY MASS INDEX: 21.14 KG/M2 | OXYGEN SATURATION: 96 % | HEART RATE: 96 BPM | HEIGHT: 75 IN | RESPIRATION RATE: 16 BRPM | DIASTOLIC BLOOD PRESSURE: 88 MMHG | WEIGHT: 170 LBS | TEMPERATURE: 97.9 F | SYSTOLIC BLOOD PRESSURE: 168 MMHG

## 2021-03-25 PROCEDURE — 3609012400 HC EGD TRANSORAL BIOPSY SINGLE/MULTIPLE: Performed by: INTERNAL MEDICINE

## 2021-03-25 PROCEDURE — 2500000003 HC RX 250 WO HCPCS: Performed by: NURSE ANESTHETIST, CERTIFIED REGISTERED

## 2021-03-25 PROCEDURE — 2709999900 HC NON-CHARGEABLE SUPPLY: Performed by: INTERNAL MEDICINE

## 2021-03-25 PROCEDURE — 2580000003 HC RX 258: Performed by: ANESTHESIOLOGY

## 2021-03-25 PROCEDURE — 7100000010 HC PHASE II RECOVERY - FIRST 15 MIN: Performed by: INTERNAL MEDICINE

## 2021-03-25 PROCEDURE — 6360000002 HC RX W HCPCS: Performed by: NURSE ANESTHETIST, CERTIFIED REGISTERED

## 2021-03-25 PROCEDURE — 88305 TISSUE EXAM BY PATHOLOGIST: CPT

## 2021-03-25 PROCEDURE — 3700000001 HC ADD 15 MINUTES (ANESTHESIA): Performed by: INTERNAL MEDICINE

## 2021-03-25 PROCEDURE — 7100000011 HC PHASE II RECOVERY - ADDTL 15 MIN: Performed by: INTERNAL MEDICINE

## 2021-03-25 PROCEDURE — 3700000000 HC ANESTHESIA ATTENDED CARE: Performed by: INTERNAL MEDICINE

## 2021-03-25 PROCEDURE — 88342 IMHCHEM/IMCYTCHM 1ST ANTB: CPT

## 2021-03-25 RX ORDER — SODIUM CHLORIDE 0.9 % (FLUSH) 0.9 %
10 SYRINGE (ML) INJECTION PRN
Status: DISCONTINUED | OUTPATIENT
Start: 2021-03-25 | End: 2021-03-25 | Stop reason: HOSPADM

## 2021-03-25 RX ORDER — LIDOCAINE HYDROCHLORIDE 20 MG/ML
INJECTION, SOLUTION INFILTRATION; PERINEURAL PRN
Status: DISCONTINUED | OUTPATIENT
Start: 2021-03-25 | End: 2021-03-25 | Stop reason: SDUPTHER

## 2021-03-25 RX ORDER — FENTANYL CITRATE 50 UG/ML
25 INJECTION, SOLUTION INTRAMUSCULAR; INTRAVENOUS EVERY 5 MIN PRN
Status: DISCONTINUED | OUTPATIENT
Start: 2021-03-25 | End: 2021-03-25 | Stop reason: HOSPADM

## 2021-03-25 RX ORDER — SODIUM CHLORIDE 0.9 % (FLUSH) 0.9 %
10 SYRINGE (ML) INJECTION EVERY 12 HOURS SCHEDULED
Status: DISCONTINUED | OUTPATIENT
Start: 2021-03-25 | End: 2021-03-25 | Stop reason: HOSPADM

## 2021-03-25 RX ORDER — PROMETHAZINE HYDROCHLORIDE 25 MG/ML
6.25 INJECTION, SOLUTION INTRAMUSCULAR; INTRAVENOUS
Status: DISCONTINUED | OUTPATIENT
Start: 2021-03-25 | End: 2021-03-25 | Stop reason: HOSPADM

## 2021-03-25 RX ORDER — OXYCODONE HYDROCHLORIDE AND ACETAMINOPHEN 5; 325 MG/1; MG/1
1 TABLET ORAL PRN
Status: DISCONTINUED | OUTPATIENT
Start: 2021-03-25 | End: 2021-03-25 | Stop reason: HOSPADM

## 2021-03-25 RX ORDER — SODIUM CHLORIDE 9 MG/ML
INJECTION, SOLUTION INTRAVENOUS CONTINUOUS
Status: DISCONTINUED | OUTPATIENT
Start: 2021-03-26 | End: 2021-03-25

## 2021-03-25 RX ORDER — OXYCODONE HYDROCHLORIDE AND ACETAMINOPHEN 5; 325 MG/1; MG/1
2 TABLET ORAL PRN
Status: DISCONTINUED | OUTPATIENT
Start: 2021-03-25 | End: 2021-03-25 | Stop reason: HOSPADM

## 2021-03-25 RX ORDER — HYDRALAZINE HYDROCHLORIDE 20 MG/ML
5 INJECTION INTRAMUSCULAR; INTRAVENOUS EVERY 10 MIN PRN
Status: DISCONTINUED | OUTPATIENT
Start: 2021-03-25 | End: 2021-03-25 | Stop reason: HOSPADM

## 2021-03-25 RX ORDER — ONDANSETRON 2 MG/ML
4 INJECTION INTRAMUSCULAR; INTRAVENOUS
Status: DISCONTINUED | OUTPATIENT
Start: 2021-03-25 | End: 2021-03-25 | Stop reason: HOSPADM

## 2021-03-25 RX ORDER — LIDOCAINE HYDROCHLORIDE 10 MG/ML
1 INJECTION, SOLUTION EPIDURAL; INFILTRATION; INTRACAUDAL; PERINEURAL
Status: DISCONTINUED | OUTPATIENT
Start: 2021-03-26 | End: 2021-03-25 | Stop reason: HOSPADM

## 2021-03-25 RX ORDER — SODIUM CHLORIDE, SODIUM LACTATE, POTASSIUM CHLORIDE, CALCIUM CHLORIDE 600; 310; 30; 20 MG/100ML; MG/100ML; MG/100ML; MG/100ML
INJECTION, SOLUTION INTRAVENOUS CONTINUOUS
Status: DISCONTINUED | OUTPATIENT
Start: 2021-03-26 | End: 2021-03-25 | Stop reason: HOSPADM

## 2021-03-25 RX ORDER — HYDROMORPHONE HYDROCHLORIDE 1 MG/ML
0.5 INJECTION, SOLUTION INTRAMUSCULAR; INTRAVENOUS; SUBCUTANEOUS EVERY 5 MIN PRN
Status: DISCONTINUED | OUTPATIENT
Start: 2021-03-25 | End: 2021-03-25 | Stop reason: HOSPADM

## 2021-03-25 RX ORDER — LABETALOL HYDROCHLORIDE 5 MG/ML
5 INJECTION, SOLUTION INTRAVENOUS EVERY 10 MIN PRN
Status: DISCONTINUED | OUTPATIENT
Start: 2021-03-25 | End: 2021-03-25 | Stop reason: HOSPADM

## 2021-03-25 RX ORDER — PROPOFOL 10 MG/ML
INJECTION, EMULSION INTRAVENOUS PRN
Status: DISCONTINUED | OUTPATIENT
Start: 2021-03-25 | End: 2021-03-25 | Stop reason: SDUPTHER

## 2021-03-25 RX ADMIN — PROPOFOL 30 MG: 10 INJECTION, EMULSION INTRAVENOUS at 12:36

## 2021-03-25 RX ADMIN — PROPOFOL 100 MG: 10 INJECTION, EMULSION INTRAVENOUS at 12:32

## 2021-03-25 RX ADMIN — PROPOFOL 40 MG: 10 INJECTION, EMULSION INTRAVENOUS at 12:37

## 2021-03-25 RX ADMIN — SODIUM CHLORIDE, POTASSIUM CHLORIDE, SODIUM LACTATE AND CALCIUM CHLORIDE: 600; 310; 30; 20 INJECTION, SOLUTION INTRAVENOUS at 10:52

## 2021-03-25 RX ADMIN — PROPOFOL 30 MG: 10 INJECTION, EMULSION INTRAVENOUS at 12:34

## 2021-03-25 RX ADMIN — LIDOCAINE HYDROCHLORIDE 100 MG: 20 INJECTION, SOLUTION INFILTRATION; PERINEURAL at 12:32

## 2021-03-25 ASSESSMENT — PULMONARY FUNCTION TESTS
PIF_VALUE: 1
PIF_VALUE: 0

## 2021-03-25 ASSESSMENT — PAIN SCALES - GENERAL
PAINLEVEL_OUTOF10: 0
PAINLEVEL_OUTOF10: 0

## 2021-03-25 NOTE — ANESTHESIA POSTPROCEDURE EVALUATION
Department of Anesthesiology  Postprocedure Note    Patient: Aundrea Ramey  MRN: [id number]  [place]: 1960  Date of evaluation: 3/25/2021  Time:  1:24 PM     Procedure Summary     Date: 03/25/21 Room / Location: [place] [address] / [address] [place] [address]    Anesthesia Start: 6271 Anesthesia Stop: 2265    Procedure: EGD BIOPSY (N/A ) Diagnosis: (DX REEVAL ESOPHAGITIS)    Surgeons: Vic Montoya MD Responsible Provider: Meghan Burciaga MD    Anesthesia Type: MAC ASA Status: 3          Anesthesia Type: MAC    Izaiah Phase I:      Izaiah Phase II:      Last vitals: Reviewed and per EMR flowsheets.        Anesthesia Post Evaluation    Patient participation: complete - patient participated  Level of consciousness: awake  Airway patency: patent  Nausea & Vomiting: no nausea  Complications: no  Cardiovascular status: blood pressure returned to baseline  Respiratory status: acceptable  Hydration status: euvolemic

## 2021-03-25 NOTE — H&P
History and Physical Service   [place] [address]    HISTORY AND PHYSICAL EXAMINATION            Date of Evaluation: 3/25/2021  Patient name:  Chandler Powell  MRN:   [id number]  YOB: 1960  PCP:    Redd Orona MD    History Obtained From:     Patient, medical records    History of Present Illness: This is Chandler Powell a 64 y.o. male with multiple comorbidities who presents today for an EGD by Dr. Belén Feliciano for reevaluation of esophagitis. Hx chronic nausea and vomiting for several years and follows with Gastroenterologist Dr Victorina Hill. Treated medically with PPI several medications  Hx elevated AST and ALT  Patient has alcohol dependence, \"drinking 5-10 beers daily and followed by shots of tequila\" His last drink was \"last night at 1130pm\". He states that the medications are helping and but continues to have n/v in morning or when coughing. He was scheduled by Dr Victorina Hill to have repeat EGD and presents for his procedure. (Please refer to hardcopy note in short chart).      Past Medical History:     Past Medical History:   Diagnosis Date    Arthritis     Asthma     Attempted suicide ([address]     attempted 7 times    Bipolar affective ([address]     Blood in stool     CHF (congestive heart failure), NYHA class III ([address] 8/22/2014    COPD (chronic obstructive pulmonary disease) ([address]     CVA (cerebral vascular accident) ([address]     x2 in 2006    Depression 7/26/2015    Difficult intubation 7/5/2013    Needed Glidescope, only epiglottis seen, easy mask ventilation    Erectile dysfunction 5/23/2014    Fracture of wrist     left    GERD (gastroesophageal reflux disease) 2/19/2014    Head injury     Hepatitis     HLD (hyperlipidemia) 2/19/2014    Hypertension     Insomnia     Left wrist pain     Pain     LEFT KNEE, RIGHT HIP    Seasonal allergies 7/15/2014    Seizures ([address]     Spondylosis of cervical region without myelopathy or radiculopathy     Stroke ([address] 10/31/11    pt albuterol sulfate HFA (PROAIR HFA) 108 (90 Base) MCG/ACT inhaler Inhale 2 puffs into the lungs every 6 hours as needed for Wheezing 7/18/17  Yes Amy Gates MD   mometasone-formoterol (DULERA) 200-5 MCG/ACT inhaler Inhale 2 puffs into the lungs every 12 hours 7/18/17  Yes Amy Gates MD   acetaminophen (TYLENOL) 325 MG tablet Take 650 mg by mouth every 6 hours as needed for Pain    Historical Provider, MD   Respiratory Therapy Supplies (NEBULIZER COMPRESSOR) KIT 1 kit by Does not apply route once for 1 dose. 8/21/14 12/31/20  Nita Simmonds, MD        Allergies:     Pcn [penicillins]    Social History:     Tobacco:    reports that he has been smoking cigarettes and cigars. He has a 7.80 pack-year smoking history. He has never used smokeless tobacco.  Alcohol:      reports current alcohol use of about 9.0 standard drinks of alcohol per week. Drug Use:  reports no history of drug use. Family History:     Family History   Problem Relation Age of Onset   Joseph Willis Stroke Mother     Diabetes Mother     Heart Disease Father     High Blood Pressure Father     Diabetes Maternal Grandmother        Review of Systems:     Positive and Negative as described in HPI. CONSTITUTIONAL:  negative for fevers, chills, sweats, fatigue, weight loss  HEENT:  negative for vision, hearing changes, runny nose, throat pain  RESPIRATORY: Hx Asthma/COPD   Coughing often and continues to \" smoke 5-6 cigarettes/day\"  negative for shortness of breath, cough, congestion, wheezing. CARDIOVASCULAR:  negative for chest pain, palpitations.   GASTROINTESTINAL:  See HPI   GENITOURINARY:  negative for difficulty of urination, burning with urination, frequency   INTEGUMENT:  negative for rash, skin lesions, easy bruising   HEMATOLOGIC/LYMPHATIC:  negative for swelling/edema   ALLERGIC/IMMUNOLOGIC:  negative for urticaria , itching  ENDOCRINE:  negative increase in drinking, increase in urination, hot or cold intolerance  MUSCULOSKELETAL: neck and back pain  negative joint pains, muscle aches, swelling of joints  NEUROLOGICAL:  negative for headaches, dizziness, lightheadedness, numbness, pain, tingling extremities  BEHAVIOR/PSYCH:  negative for depression, anxiety    Physical Exam:   BP (!) 160/94   Pulse 101   Temp 97.5 °F (36.4 °C) (Temporal)   Resp 24   Ht 6' 3\" (1.905 m)   Wt 170 lb (77.1 kg)   SpO2 94%   BMI 21.25 kg/m²   No LMP for male patient. No obstetric history on file. No results for input(s): POCGLU in the last 72 hours. General Appearance:  alert, well appearing, and in no acute distress  Mental status: oriented to person, place, and time with normal affect  Head:  normocephalic, atraumatic. Eye: no icterus, redness, pupils equal and reactive, extraocular eye movements intact, conjunctiva clear  Ear: normal external ear, no discharge, hearing intact  Nose:  no drainage noted  Mouth: mucous membranes moist  Neck: supple, no carotid bruits, thyroid not palpable  Lungs: Bilateral equal air entry, clear to ausculation, no wheezing, rales or rhonchi, normal effort  Cardiovascular:  asymptomatic tachycardic rate and regular rhythm, no murmur, gallop, rub. Abdomen: distended with mid epigastric tenderness soft, normal bowel sounds  Neurologic: There are no new focal motor or sensory deficits, normal muscle tone and bulk, no abnormal sensation, normal speech, cranial nerves II through XII grossly intact  Skin: No gross lesions, rashes, bruising or bleeding on exposed skin area  Extremities:  peripheral pulses palpable, no pedal edema or calf pain with palpation. Equal bilateral lower extremity strength   Psych: normal affect     Investigations:      Laboratory Testing:  No results found for this or any previous visit (from the past 24 hour(s)). No results for input(s): HGB, HCT, WBC, MCV, PLATELET, NA, K, CL, CO2, BUN, CREATININE, GLUCOSE, INR, PROTIME, APTT, AST, ALT, LABALBU, HCG in the last 720 hours.     Recent Labs 03/21/21  1240   COVID19       Not Detected     Imaging/Diagnostics:    No results found. Diagnosis:      1. Reevaluation of esophagitis    Plans:     1.  EGD      ROMANA Causey CNP  3/25/2021  11:12 AM

## 2021-03-25 NOTE — ANESTHESIA PRE PROCEDURE
Rate Last Admin    [START ON 3/26/2021] lactated ringers infusion   Intravenous Continuous Durel Rasp,  mL/hr at 03/25/21 1052 New Bag at 03/25/21 1052    sodium chloride flush 0.9 % injection 10 mL  10 mL Intravenous 2 times per day Durel Rasp, DO        sodium chloride flush 0.9 % injection 10 mL  10 mL Intravenous PRN Durel Rasp, DO        [START ON 3/26/2021] lidocaine PF 1 % injection 1 mL  1 mL Intradermal Once PRN Durel Rasp, DO           Allergies:     Allergies   Allergen Reactions    Pcn [Penicillins] Other (See Comments)     Pt unsure of reaction this is from childhood       Problem List:    Patient Active Problem List   Diagnosis Code    Fracture tibia/fibula S82.209A, S82.409A    Schizoaffective disorder ([facility] J94.9    Umbilical hernia M29.4    Gastroenteritis Q18.7    Folliculitis Y06.5    GERD (gastroesophageal reflux disease) K21.9    COPD (chronic obstructive pulmonary disease) ([facility] J44.9    HLD (hyperlipidemia) E78.5    Foot pain, right M79.671    Hematochezia K92.1    Hemorrhoids K64.9    Erectile dysfunction N52.9    Seasonal allergies J30.2    COPD exacerbation ([facility] J44.1    CHF (congestive heart failure), NYHA class III ([facility]) I50.9    Dermoid cyst of scalp D23.4    Pneumonitis J18.9    Syncope R55    Depression F32.9    Chest pain R07.9    Syncope and collapse R55    Hypokalemia E87.6    Seizures ([facility] R56.9    Steroid-induced hyperglycemia R73.9, T38.0X5A    Need for vaccination Z23    Tremor R25.1    Atypical chest pain R07.89    Elevated LFTs R79.89    Abnormal results of liver function studies  R94.5    Essential hypertension I10    Schizoaffective disorder, depressive type (HCC) F25.1    Seizure ([facility] R56.9    Intractable vomiting with nausea R11.2    Spondylosis of cervical region without myelopathy or radiculopathy M47.812    Avascular necrosis of bone of right hip (HCC) M87.051       Past Medical History: Diagnosis Date    Arthritis     Asthma     Attempted suicide ([facility]     attempted 7 times    Bipolar affective ([facility]     Blood in stool     CHF (congestive heart failure), NYHA class III ([address] 8/22/2014    COPD (chronic obstructive pulmonary disease) ([facility]     CVA (cerebral vascular accident) ([facility]     x2 in 2006    Depression 7/26/2015    Difficult intubation 7/5/2013    Needed Glidescope, only epiglottis seen, easy mask ventilation    Erectile dysfunction 5/23/2014    Fracture of wrist     left    GERD (gastroesophageal reflux disease) 2/19/2014    Head injury     Hepatitis     HLD (hyperlipidemia) 2/19/2014    Hypertension     Insomnia     Left wrist pain     Pain     LEFT KNEE, RIGHT HIP    Seasonal allergies 7/15/2014    Seizures ([facility]     Spondylosis of cervical region without myelopathy or radiculopathy     Stroke ([facility] 10/31/11    pt states a total of 4-5strokes    Vomiting     Wears glasses        Past Surgical History:        Procedure Laterality Date    ANESTHESIA NERVE BLOCK Left 10/23/2017    NERVE BLOCK LEFT CERVICAL C3 TON C4 C5 performed by Carmela Angel MD at [address]      cardiac cath x2    COLONOSCOPY  5/19/2014    COLONOSCOPY N/A 12/31/2020    COLONOSCOPY POLYPECTOMY SNARE/COLD BIOPSY WITH CLIP APPLICATION AND CONTROL OF BLEEDING performed by Flori Gonsales MD at [address] [place] [address] Rd, COLON, [address]      right arm, left knee, l ankle pins placed    HERNIA REPAIR      KNEE SURGERY      NERVE BLOCK Left 10/23/2017    medial branch block, cervical    TIBIA FRACTURE SURGERY Left 07/05/2013    IM nailing    TOE SURGERY Right     UMBILICAL HERNIA REPAIR  4/8/14    with mesh    UPPER GASTROINTESTINAL ENDOSCOPY N/A 12/31/2020    EGD BIOPSY performed by Flori Gonsales MD at [place] [address] History:    Social History     Tobacco Use    Smoking status: Current Some Day Smoker     Packs/day: 0.20 Years: 39.00     Pack years: 7.80     Types: Cigarettes, Cigars    Smokeless tobacco: Never Used   Substance Use Topics    Alcohol use: Yes     Alcohol/week: 9.0 standard drinks     Types: 5 Cans of beer, 4 Shots of liquor per week     Comment: 5 can/day / 4 shot day                                Ready to quit: Not Answered  Counseling given: Not Answered      Vital Signs (Current):   Vitals:    03/25/21 1030 03/25/21 1043   BP: (!) 160/94    Pulse: 101    Resp: 24    Temp: 97.5 °F (36.4 °C)    TempSrc: Temporal    SpO2: 94%    Weight:  170 lb (77.1 kg)   Height:  6' 3\" (1.905 m)                                              BP Readings from Last 3 Encounters:   03/25/21 (!) 160/94   12/31/20 138/89   12/31/20 118/68       NPO Status: Time of last liquid consumption: 2300                        Time of last solid consumption: 2300                        Date of last liquid consumption: 03/24/21                        Date of last solid food consumption: 03/24/21    BMI:   Wt Readings from Last 3 Encounters:   03/25/21 170 lb (77.1 kg)   12/31/20 165 lb (74.8 kg)   10/23/17 150 lb (68 kg)     Body mass index is 21.25 kg/m². CBC:   Lab Results   Component Value Date    WBC 5.5 11/21/2020    RBC 4.86 11/21/2020    HGB 16.5 11/21/2020    HCT 51.0 11/21/2020    .9 11/21/2020    RDW 12.7 11/21/2020     11/21/2020       CMP:   Lab Results   Component Value Date     09/13/2016    K 3.8 09/13/2016    CL 96 09/13/2016    CO2 22 09/13/2016    BUN 10 09/13/2016    CREATININE 0.62 09/13/2016    GFRAA >60 09/13/2016    LABGLOM >60 09/13/2016    GLUCOSE 114 09/13/2016    PROT 7.6 07/14/2017    CALCIUM 9.4 09/13/2016    BILITOT 0.36 07/14/2017    ALKPHOS 98 07/14/2017     07/14/2017    ALT 90 07/14/2017       POC Tests: No results for input(s): POCGLU, POCNA, POCK, POCCL, POCBUN, POCHEMO, POCHCT in the last 72 hours.     Coags:   Lab Results   Component Value Date    PROTIME 9.4 11/09/2015    INR

## 2021-03-25 NOTE — OP NOTE
Operative Note      Patient: Nereida Cueto  YOB: 1960  MRN: [id number]    Date of Procedure: 3/25/2021    Pre-Op Diagnosis: Reflux esophagitis    Indication for the procedure: Patient is a pleasant 64years old male who is seen with history of reflux esophagitis. He is scheduled for EGD to confirm healing of esophagitis and rebiopsy for Soliz's. Post-Op Diagnosis: Small hiatal hernia, gastritis, retained food in the stomach, irregular Z-line. Healing of previously seen esophagitis. Duodenal polyp       Procedure(s):  EGD BIOPSY    Surgeon(s): Veronica Vega MD    Assistant:   * No surgical staff found *    Informed consent: Risks, benefits, and alternatives of the procedure were explained to the patient prior to the procedure. Risks include but not limited to bleeding, perforation, aspiration, allergic reaction to medication or death. Patient was also informed about the risk of missing a lesion. Anesthesia: Monitor Anesthesia Care    Estimated Blood Loss (mL): Minimal    Complications: None    Specimens:   ID Type Source Tests Collected by Time Destination   A : STOMACH Tissue Stomach SURGICAL PATHOLOGY Veronica Vega MD 3/25/2021 1151    B : DISTAL ESOPHAGUS Tissue Esophagus SURGICAL PATHOLOGY Veronica Vega MD 3/25/2021 1152    D : DUODENUM POLYP Tissue Duodenum SURGICAL PATHOLOGY Veronica Vega MD 3/25/2021 1236        Implants:  * No implants in log *      Drains: * No LDAs found *    Findings: 1-small hiatal hernia  2-retained food in the stomach  3-irregular Z-line. Biopsies were taken to rule out Soliz's esophagus. 4-patchy hyperemia of the stomach. Biopsies were taken to rule out H. Pylori. 5-4 mm sessile polyp in the duodenal bulb that was removed with biopsy. Detailed Description of Procedure:   Patient was placed in the left lateral decubitus position. The well-lubricated Olympus EGD scope was passed through the bite-block was advanced into the esophagus.   Esophageal mucosa of the upper middle esophagus appeared normal.  The scope was then advanced into the distal esophagus. The Z-line was seen. It was irregular. Biopsies were taken to rule out Soliz's esophagus. The scope was then advanced into the stomach. There was retained food in the stomach. Retained food limited examination of the stomach. Retroflexion was done. Part of the cardia and fundus was not seen because of retained food. Biopsies were taken from the antrum and gastric body to rule out H. pylori. Pylorus was intubated. In the duodenal bulb there was a 4 mm sessile polyp that was removed with biopsy. The scope was then advanced into the second portion of duodenum which appeared normal.  The scope was then removed outside the patient. Plan: 1. Follow up on results of pathology  2. Follow-up in the office in 1 to 2 weeks. 3.  Gastric emptying study.     Electronically signed by Donald Forrest MD on 3/25/2021 at 12:41 PM

## 2021-03-29 LAB — SURGICAL PATHOLOGY REPORT: NORMAL

## 2021-05-08 ENCOUNTER — APPOINTMENT (OUTPATIENT)
Dept: CT IMAGING | Age: 61
DRG: 194 | End: 2021-05-08
Payer: MEDICARE

## 2021-05-08 ENCOUNTER — HOSPITAL ENCOUNTER (INPATIENT)
Age: 61
LOS: 4 days | Discharge: HOME | DRG: 194 | End: 2021-05-12
Attending: EMERGENCY MEDICINE | Admitting: INTERNAL MEDICINE
Payer: MEDICARE

## 2021-05-08 ENCOUNTER — APPOINTMENT (OUTPATIENT)
Dept: GENERAL RADIOLOGY | Age: 61
DRG: 194 | End: 2021-05-08
Payer: MEDICARE

## 2021-05-08 DIAGNOSIS — J18.9 CAVITARY PNEUMONIA: Primary | ICD-10-CM

## 2021-05-08 DIAGNOSIS — J98.4 CAVITARY PNEUMONIA: Primary | ICD-10-CM

## 2021-05-08 LAB
ABSOLUTE EOS #: 0.03 K/UL (ref 0–0.44)
ABSOLUTE IMMATURE GRANULOCYTE: 0.05 K/UL (ref 0–0.3)
ABSOLUTE LYMPH #: 1.17 K/UL (ref 1.1–3.7)
ABSOLUTE MONO #: 1.42 K/UL (ref 0.1–1.2)
ANION GAP SERPL CALCULATED.3IONS-SCNC: 14 MMOL/L (ref 9–17)
BASOPHILS # BLD: 0 % (ref 0–2)
BASOPHILS ABSOLUTE: 0.05 K/UL (ref 0–0.2)
BNP INTERPRETATION: NORMAL
BUN BLDV-MCNC: 8 MG/DL (ref 6–20)
BUN/CREAT BLD: 11 (ref 9–20)
CALCIUM SERPL-MCNC: 9.8 MG/DL (ref 8.6–10.4)
CHLORIDE BLD-SCNC: 104 MMOL/L (ref 98–107)
CO2: 19 MMOL/L (ref 20–31)
CREAT SERPL-MCNC: 0.75 MG/DL (ref 0.7–1.2)
DIFFERENTIAL TYPE: ABNORMAL
EOSINOPHILS RELATIVE PERCENT: 0 % (ref 1–4)
GFR AFRICAN AMERICAN: >60 ML/MIN
GFR NON-AFRICAN AMERICAN: >60 ML/MIN
GFR SERPL CREATININE-BSD FRML MDRD: ABNORMAL ML/MIN/{1.73_M2}
GFR SERPL CREATININE-BSD FRML MDRD: ABNORMAL ML/MIN/{1.73_M2}
GLUCOSE BLD-MCNC: 120 MG/DL (ref 70–99)
HCT VFR BLD CALC: 49.2 % (ref 40.7–50.3)
HEMOGLOBIN: 15.9 G/DL (ref 13–17)
IMMATURE GRANULOCYTES: 0 %
LYMPHOCYTES # BLD: 10 % (ref 24–43)
MCH RBC QN AUTO: 33.8 PG (ref 25.2–33.5)
MCHC RBC AUTO-ENTMCNC: 32.3 G/DL (ref 28.4–34.8)
MCV RBC AUTO: 104.7 FL (ref 82.6–102.9)
MONOCYTES # BLD: 12 % (ref 3–12)
NRBC AUTOMATED: 0 PER 100 WBC
PDW BLD-RTO: 12.5 % (ref 11.8–14.4)
PLATELET # BLD: 196 K/UL (ref 138–453)
PLATELET ESTIMATE: ABNORMAL
PMV BLD AUTO: 9.5 FL (ref 8.1–13.5)
POTASSIUM SERPL-SCNC: 4.2 MMOL/L (ref 3.7–5.3)
PRO-BNP: 147 PG/ML
RBC # BLD: 4.7 M/UL (ref 4.21–5.77)
RBC # BLD: ABNORMAL 10*6/UL
SARS-COV-2, RAPID: NOT DETECTED
SEG NEUTROPHILS: 78 % (ref 36–65)
SEGMENTED NEUTROPHILS ABSOLUTE COUNT: 8.74 K/UL (ref 1.5–8.1)
SODIUM BLD-SCNC: 137 MMOL/L (ref 135–144)
SPECIMEN DESCRIPTION: NORMAL
WBC # BLD: 11.5 K/UL (ref 3.5–11.3)
WBC # BLD: ABNORMAL 10*3/UL

## 2021-05-08 PROCEDURE — 71260 CT THORAX DX C+: CPT

## 2021-05-08 PROCEDURE — 6370000000 HC RX 637 (ALT 250 FOR IP): Performed by: INTERNAL MEDICINE

## 2021-05-08 PROCEDURE — 94640 AIRWAY INHALATION TREATMENT: CPT

## 2021-05-08 PROCEDURE — 1200000000 HC SEMI PRIVATE

## 2021-05-08 PROCEDURE — 6370000000 HC RX 637 (ALT 250 FOR IP): Performed by: NURSE PRACTITIONER

## 2021-05-08 PROCEDURE — 83880 ASSAY OF NATRIURETIC PEPTIDE: CPT

## 2021-05-08 PROCEDURE — 6360000002 HC RX W HCPCS: Performed by: NURSE PRACTITIONER

## 2021-05-08 PROCEDURE — 99222 1ST HOSP IP/OBS MODERATE 55: CPT | Performed by: NURSE PRACTITIONER

## 2021-05-08 PROCEDURE — 2580000003 HC RX 258: Performed by: EMERGENCY MEDICINE

## 2021-05-08 PROCEDURE — 2580000003 HC RX 258: Performed by: INTERNAL MEDICINE

## 2021-05-08 PROCEDURE — 87040 BLOOD CULTURE FOR BACTERIA: CPT

## 2021-05-08 PROCEDURE — 99284 EMERGENCY DEPT VISIT MOD MDM: CPT

## 2021-05-08 PROCEDURE — 85025 COMPLETE CBC W/AUTO DIFF WBC: CPT

## 2021-05-08 PROCEDURE — 80048 BASIC METABOLIC PNL TOTAL CA: CPT

## 2021-05-08 PROCEDURE — 6360000002 HC RX W HCPCS: Performed by: INTERNAL MEDICINE

## 2021-05-08 PROCEDURE — 71101 X-RAY EXAM UNILAT RIBS/CHEST: CPT

## 2021-05-08 PROCEDURE — 87635 SARS-COV-2 COVID-19 AMP PRB: CPT

## 2021-05-08 PROCEDURE — 2500000003 HC RX 250 WO HCPCS: Performed by: NURSE PRACTITIONER

## 2021-05-08 PROCEDURE — 6360000004 HC RX CONTRAST MEDICATION: Performed by: EMERGENCY MEDICINE

## 2021-05-08 RX ORDER — CLINDAMYCIN PHOSPHATE 300 MG/50ML
300 INJECTION INTRAVENOUS EVERY 8 HOURS
Status: DISCONTINUED | OUTPATIENT
Start: 2021-05-09 | End: 2021-05-12 | Stop reason: HOSPADM

## 2021-05-08 RX ORDER — ACETAMINOPHEN 325 MG/1
650 TABLET ORAL EVERY 6 HOURS PRN
Status: DISCONTINUED | OUTPATIENT
Start: 2021-05-08 | End: 2021-05-08

## 2021-05-08 RX ORDER — PROMETHAZINE HYDROCHLORIDE 12.5 MG/1
12.5 TABLET ORAL EVERY 6 HOURS PRN
Status: DISCONTINUED | OUTPATIENT
Start: 2021-05-08 | End: 2021-05-12 | Stop reason: HOSPADM

## 2021-05-08 RX ORDER — OXYCODONE HYDROCHLORIDE AND ACETAMINOPHEN 5; 325 MG/1; MG/1
2 TABLET ORAL ONCE
Status: COMPLETED | OUTPATIENT
Start: 2021-05-08 | End: 2021-05-08

## 2021-05-08 RX ORDER — TRAZODONE HYDROCHLORIDE 100 MG/1
100 TABLET ORAL NIGHTLY
Status: DISCONTINUED | OUTPATIENT
Start: 2021-05-08 | End: 2021-05-12 | Stop reason: HOSPADM

## 2021-05-08 RX ORDER — ACETAMINOPHEN 325 MG/1
650 TABLET ORAL EVERY 6 HOURS PRN
Status: DISCONTINUED | OUTPATIENT
Start: 2021-05-08 | End: 2021-05-12 | Stop reason: HOSPADM

## 2021-05-08 RX ORDER — ZONISAMIDE 100 MG/1
100 CAPSULE ORAL DAILY
Status: DISCONTINUED | OUTPATIENT
Start: 2021-05-08 | End: 2021-05-12 | Stop reason: HOSPADM

## 2021-05-08 RX ORDER — 0.9 % SODIUM CHLORIDE 0.9 %
80 INTRAVENOUS SOLUTION INTRAVENOUS ONCE
Status: COMPLETED | OUTPATIENT
Start: 2021-05-08 | End: 2021-05-08

## 2021-05-08 RX ORDER — METHYLPREDNISOLONE SODIUM SUCCINATE 125 MG/2ML
125 INJECTION, POWDER, LYOPHILIZED, FOR SOLUTION INTRAMUSCULAR; INTRAVENOUS ONCE
Status: COMPLETED | OUTPATIENT
Start: 2021-05-08 | End: 2021-05-08

## 2021-05-08 RX ORDER — SODIUM CHLORIDE 0.9 % (FLUSH) 0.9 %
10 SYRINGE (ML) INJECTION PRN
Status: DISCONTINUED | OUTPATIENT
Start: 2021-05-08 | End: 2021-05-12 | Stop reason: HOSPADM

## 2021-05-08 RX ORDER — FOLIC ACID 1 MG/1
1 TABLET ORAL DAILY
Status: DISCONTINUED | OUTPATIENT
Start: 2021-05-08 | End: 2021-05-12 | Stop reason: HOSPADM

## 2021-05-08 RX ORDER — NICOTINE 21 MG/24HR
1 PATCH, TRANSDERMAL 24 HOURS TRANSDERMAL DAILY PRN
Status: DISCONTINUED | OUTPATIENT
Start: 2021-05-08 | End: 2021-05-12 | Stop reason: HOSPADM

## 2021-05-08 RX ORDER — LORAZEPAM 2 MG/ML
1 INJECTION INTRAMUSCULAR EVERY 4 HOURS PRN
Status: DISCONTINUED | OUTPATIENT
Start: 2021-05-08 | End: 2021-05-12 | Stop reason: HOSPADM

## 2021-05-08 RX ORDER — LEVOFLOXACIN 5 MG/ML
750 INJECTION, SOLUTION INTRAVENOUS EVERY 24 HOURS
Status: DISCONTINUED | OUTPATIENT
Start: 2021-05-08 | End: 2021-05-12 | Stop reason: HOSPADM

## 2021-05-08 RX ORDER — SODIUM CHLORIDE 0.9 % (FLUSH) 0.9 %
10 SYRINGE (ML) INJECTION PRN
Status: DISCONTINUED | OUTPATIENT
Start: 2021-05-08 | End: 2021-05-08

## 2021-05-08 RX ORDER — SODIUM CHLORIDE 9 MG/ML
INJECTION, SOLUTION INTRAVENOUS CONTINUOUS
Status: DISCONTINUED | OUTPATIENT
Start: 2021-05-08 | End: 2021-05-12

## 2021-05-08 RX ORDER — SODIUM CHLORIDE 9 MG/ML
25 INJECTION, SOLUTION INTRAVENOUS PRN
Status: DISCONTINUED | OUTPATIENT
Start: 2021-05-08 | End: 2021-05-12 | Stop reason: HOSPADM

## 2021-05-08 RX ORDER — ALBUTEROL SULFATE 2.5 MG/3ML
2.5 SOLUTION RESPIRATORY (INHALATION)
Status: DISCONTINUED | OUTPATIENT
Start: 2021-05-08 | End: 2021-05-12 | Stop reason: HOSPADM

## 2021-05-08 RX ORDER — ACETAMINOPHEN 650 MG/1
650 SUPPOSITORY RECTAL EVERY 6 HOURS PRN
Status: DISCONTINUED | OUTPATIENT
Start: 2021-05-08 | End: 2021-05-12 | Stop reason: HOSPADM

## 2021-05-08 RX ORDER — FAMOTIDINE 40 MG/1
40 TABLET, FILM COATED ORAL DAILY
COMMUNITY

## 2021-05-08 RX ORDER — ONDANSETRON 2 MG/ML
4 INJECTION INTRAMUSCULAR; INTRAVENOUS EVERY 6 HOURS PRN
Status: DISCONTINUED | OUTPATIENT
Start: 2021-05-08 | End: 2021-05-12 | Stop reason: HOSPADM

## 2021-05-08 RX ORDER — ALBUTEROL SULFATE 2.5 MG/3ML
2.5 SOLUTION RESPIRATORY (INHALATION) ONCE
Status: COMPLETED | OUTPATIENT
Start: 2021-05-08 | End: 2021-05-08

## 2021-05-08 RX ORDER — SODIUM CHLORIDE 0.9 % (FLUSH) 0.9 %
5-40 SYRINGE (ML) INJECTION EVERY 12 HOURS SCHEDULED
Status: DISCONTINUED | OUTPATIENT
Start: 2021-05-08 | End: 2021-05-12 | Stop reason: HOSPADM

## 2021-05-08 RX ORDER — CLINDAMYCIN PHOSPHATE 600 MG/50ML
600 INJECTION INTRAVENOUS ONCE
Status: COMPLETED | OUTPATIENT
Start: 2021-05-08 | End: 2021-05-08

## 2021-05-08 RX ORDER — BUDESONIDE AND FORMOTEROL FUMARATE DIHYDRATE 160; 4.5 UG/1; UG/1
2 AEROSOL RESPIRATORY (INHALATION) 2 TIMES DAILY
Status: DISCONTINUED | OUTPATIENT
Start: 2021-05-08 | End: 2021-05-12 | Stop reason: HOSPADM

## 2021-05-08 RX ORDER — CHOLESTYRAMINE
POWDER (GRAM) MISCELLANEOUS
COMMUNITY

## 2021-05-08 RX ORDER — IPRATROPIUM BROMIDE AND ALBUTEROL SULFATE 2.5; .5 MG/3ML; MG/3ML
1 SOLUTION RESPIRATORY (INHALATION)
Status: DISCONTINUED | OUTPATIENT
Start: 2021-05-08 | End: 2021-05-12 | Stop reason: HOSPADM

## 2021-05-08 RX ORDER — THIAMINE MONONITRATE (VIT B1) 100 MG
100 TABLET ORAL DAILY
COMMUNITY

## 2021-05-08 RX ORDER — PANTOPRAZOLE SODIUM 40 MG/1
40 TABLET, DELAYED RELEASE ORAL
Status: DISCONTINUED | OUTPATIENT
Start: 2021-05-09 | End: 2021-05-12 | Stop reason: HOSPADM

## 2021-05-08 RX ORDER — CARVEDILOL 12.5 MG/1
12.5 TABLET ORAL 2 TIMES DAILY WITH MEALS
Status: DISCONTINUED | OUTPATIENT
Start: 2021-05-08 | End: 2021-05-12 | Stop reason: HOSPADM

## 2021-05-08 RX ORDER — GAUZE BANDAGE 2" X 2"
100 BANDAGE TOPICAL DAILY
Status: DISCONTINUED | OUTPATIENT
Start: 2021-05-08 | End: 2021-05-12 | Stop reason: HOSPADM

## 2021-05-08 RX ORDER — DICYCLOMINE HYDROCHLORIDE 10 MG/1
10 CAPSULE ORAL
COMMUNITY

## 2021-05-08 RX ADMIN — ZONISAMIDE 100 MG: 100 CAPSULE ORAL at 21:13

## 2021-05-08 RX ADMIN — METHYLPREDNISOLONE SODIUM SUCCINATE 125 MG: 125 INJECTION, POWDER, FOR SOLUTION INTRAMUSCULAR; INTRAVENOUS at 15:30

## 2021-05-08 RX ADMIN — LORAZEPAM 1 MG: 2 INJECTION, SOLUTION INTRAMUSCULAR; INTRAVENOUS at 23:14

## 2021-05-08 RX ADMIN — FOLIC ACID 1 MG: 1 TABLET ORAL at 19:59

## 2021-05-08 RX ADMIN — OXYCODONE AND ACETAMINOPHEN 2 TABLET: 5; 325 TABLET ORAL at 15:30

## 2021-05-08 RX ADMIN — SODIUM CHLORIDE 80 ML: 9 INJECTION, SOLUTION INTRAVENOUS at 16:01

## 2021-05-08 RX ADMIN — IPRATROPIUM BROMIDE AND ALBUTEROL SULFATE 1 AMPULE: .5; 3 SOLUTION RESPIRATORY (INHALATION) at 21:07

## 2021-05-08 RX ADMIN — LEVOFLOXACIN 750 MG: 5 INJECTION, SOLUTION INTRAVENOUS at 20:00

## 2021-05-08 RX ADMIN — BUDESONIDE AND FORMOTEROL FUMARATE DIHYDRATE 2 PUFF: 160; 4.5 AEROSOL RESPIRATORY (INHALATION) at 21:13

## 2021-05-08 RX ADMIN — CLINDAMYCIN PHOSPHATE 600 MG: 600 INJECTION, SOLUTION INTRAVENOUS at 18:15

## 2021-05-08 RX ADMIN — SODIUM CHLORIDE: 9 INJECTION, SOLUTION INTRAVENOUS at 19:43

## 2021-05-08 RX ADMIN — ENOXAPARIN SODIUM 40 MG: 40 INJECTION SUBCUTANEOUS at 19:59

## 2021-05-08 RX ADMIN — IOPAMIDOL 75 ML: 755 INJECTION, SOLUTION INTRAVENOUS at 16:00

## 2021-05-08 RX ADMIN — CARVEDILOL 12.5 MG: 12.5 TABLET, FILM COATED ORAL at 20:00

## 2021-05-08 RX ADMIN — SODIUM CHLORIDE, PRESERVATIVE FREE 10 ML: 5 INJECTION INTRAVENOUS at 16:00

## 2021-05-08 RX ADMIN — Medication 100 MG: at 20:01

## 2021-05-08 RX ADMIN — TRAZODONE HYDROCHLORIDE 100 MG: 100 TABLET ORAL at 20:00

## 2021-05-08 RX ADMIN — SODIUM CHLORIDE, PRESERVATIVE FREE 10 ML: 5 INJECTION INTRAVENOUS at 20:01

## 2021-05-08 RX ADMIN — ALBUTEROL SULFATE 2.5 MG: 2.5 SOLUTION RESPIRATORY (INHALATION) at 16:29

## 2021-05-08 RX ADMIN — OXYCODONE AND ACETAMINOPHEN 2 TABLET: 5; 325 TABLET ORAL at 21:12

## 2021-05-08 ASSESSMENT — ENCOUNTER SYMPTOMS
COUGH: 1
SORE THROAT: 0
NAUSEA: 0
SHORTNESS OF BREATH: 1
COLOR CHANGE: 0
VOMITING: 0
ABDOMINAL PAIN: 0

## 2021-05-08 ASSESSMENT — PAIN SCALES - GENERAL
PAINLEVEL_OUTOF10: 10
PAINLEVEL_OUTOF10: 8

## 2021-05-08 NOTE — PLAN OF CARE
Problem: Falls - Risk of:  Goal: Will remain free from falls  Description: Will remain free from falls  Outcome: Ongoing  Goal: Absence of physical injury  Description: Absence of physical injury  Outcome: Ongoing     Problem: Skin Integrity:  Goal: Will show no infection signs and symptoms  Description: Will show no infection signs and symptoms  Outcome: Ongoing  Goal: Absence of new skin breakdown  Description: Absence of new skin breakdown  Outcome: Ongoing     Problem: SAFETY  Goal: Free from accidental physical injury  Outcome: Ongoing  Goal: Free from intentional harm  Outcome: Ongoing     Problem: DAILY CARE  Goal: Daily care needs are met  Outcome: Ongoing     Problem: PAIN  Goal: Patient's pain/discomfort is manageable  Outcome: Ongoing     Problem: SKIN INTEGRITY  Goal: Skin integrity is maintained or improved  Outcome: Ongoing     Problem: KNOWLEDGE DEFICIT  Goal: Patient/S.O. demonstrates understanding of disease process, treatment plan, medications, and discharge instructions.   Outcome: Ongoing     Problem: DISCHARGE BARRIERS  Goal: Patient's continuum of care needs are met  Outcome: Ongoing

## 2021-05-08 NOTE — ED PROVIDER NOTES
The patient was seen and examined by me in conjunction with the mid-level provider. I agree with his/her assessment and treatment plan. Cavitary pneumonia identified and he is being admitted for IV antibiotic.      Anitra Penaloza MD  05/08/21 5612

## 2021-05-08 NOTE — ED PROVIDER NOTES
[facility] ED  eMERGENCY dEPARTMENT eNCOUnter      Pt Name: Taiwo Wilson  MRN: [id number]  [place] 1960  Date of evaluation: 5/8/2021  Provider: ROMANA Barron CNP    CHIEF COMPLAINT       Chief Complaint   Patient presents with   Buelah Slater Fall     about 2 weeks ago    Shortness of Breath     hx COPD         HISTORY OF PRESENT ILLNESS  (Location/Symptom, Timing/Onset, Context/Setting, Quality, Duration, Modifying Factors, Severity.)   Taiwo Wilson is a 64 y.o. male who presents to the emergency department via private auto for left rib pain, SOB, cough, wheezing. He has a history of COPD. 2 weeks ago he tripped over his dog and fell, injuring the rib area. His discomfort and SOB have increased over the past 2 weeks. Denies fever, chills, N/V. Rates his pain 10/10 at this time. Nursing Notes were reviewed.     ALLERGIES     Pcn [penicillins]    CURRENT MEDICATIONS       Previous Medications    ACETAMINOPHEN (TYLENOL) 325 MG TABLET    Take 650 mg by mouth every 6 hours as needed for Pain    ALBUTEROL SULFATE HFA (PROAIR HFA) 108 (90 BASE) MCG/ACT INHALER    Inhale 2 puffs into the lungs every 6 hours as needed for Wheezing    CARVEDILOL (COREG) 12.5 MG TABLET    Take 12.5 mg by mouth 2 times daily (with meals)    CHOLESTYRAMINE POWD    by Does not apply route 1 scoop daily    FOLIC ACID (FOLVITE) 1 MG TABLET    Take 1 mg by mouth daily    LISINOPRIL PO    Take 5 mg by mouth    MOMETASONE-FORMOTEROL (DULERA) 200-5 MCG/ACT INHALER    Inhale 2 puffs into the lungs every 12 hours    OMEPRAZOLE (PRILOSEC) 20 MG DELAYED RELEASE CAPSULE    Take 40 mg by mouth daily    TRAZODONE (DESYREL) 100 MG TABLET    Take 100 mg by mouth nightly    ZONISAMIDE (ZONEGRAN) 100 MG CAPSULE    Take 100 mg by mouth daily       PAST MEDICAL HISTORY         Diagnosis Date    Arthritis     Asthma     Attempted suicide ([address]     attempted 7 times    Bipolar affective ([address]     Blood in stool     CHF (congestive heart failure), NYHA class III ([facility] 8/22/2014    COPD (chronic obstructive pulmonary disease) (HCC)     CVA (cerebral vascular accident) ([facility]     x2 in 2006    Depression 7/26/2015    Difficult intubation 7/5/2013    Needed Glidescope, only epiglottis seen, easy mask ventilation    Erectile dysfunction 5/23/2014    Fracture of wrist     left    GERD (gastroesophageal reflux disease) 2/19/2014    Head injury     Hepatitis     HLD (hyperlipidemia) 2/19/2014    Hypertension     Insomnia     Left wrist pain     Pain     LEFT KNEE, RIGHT HIP    Seasonal allergies 7/15/2014    Seizures ([address]     Spondylosis of cervical region without myelopathy or radiculopathy     Stroke ([facility] 10/31/11    pt states a total of 4-5strokes    Vomiting     Wears glasses        SURGICAL HISTORY           Procedure Laterality Date    ANESTHESIA NERVE BLOCK Left 10/23/2017    NERVE BLOCK LEFT CERVICAL C3 TON C4 C5 performed by Gerard Romeo MD at [address]      cardiac cath x2    COLONOSCOPY  5/19/2014    COLONOSCOPY N/A 12/31/2020    COLONOSCOPY POLYPECTOMY SNARE/COLD BIOPSY WITH CLIP APPLICATION AND CONTROL OF BLEEDING performed by Nicole Goltz, MD at [address], COLON, DIAGNOSTIC     [address]      right arm, left knee, l ankle pins placed    HERNIA REPAIR      KNEE SURGERY      NERVE BLOCK Left 10/23/2017    medial branch block, cervical    TIBIA FRACTURE SURGERY Left 07/05/2013    IM nailing    TOE SURGERY Right     UMBILICAL HERNIA REPAIR  4/8/14    with mesh    UPPER GASTROINTESTINAL ENDOSCOPY N/A 12/31/2020    EGD BIOPSY performed by Nicole Goltz, MD at [address] N/A 3/25/2021    EGD BIOPSY performed by Nicole Goltz, MD at [place] [address]           Problem Relation Age of Onset    Stroke Mother     Diabetes Mother     Heart Disease Father     High Blood Pressure Father     present. Abdominal:      General: There is no distension. Palpations: Abdomen is soft. Tenderness: There is no abdominal tenderness. There is no guarding. Musculoskeletal:      Comments: Moves extremities   Skin:     General: Skin is warm and dry. Findings: No rash. Neurological:      Mental Status: He is alert and oriented to person, place, and time. Psychiatric:         Behavior: Behavior normal.           DIAGNOSTIC RESULTS     RADIOLOGY:   Non-plain film images such as CT, Ultrasound and MRI are read by the radiologist. Harriett Covarrubias radiographic images are visualized and preliminarily interpreted by the emergency physician with the below findings:    Interpretation per the Radiologist below, if available at the time of this note:    Xr Ribs Left Include Chest (min 3 Views)    Result Date: 5/8/2021  EXAMINATION: 4 XRAY VIEWS OF THE LEFT RIBS WITH FRONTAL XRAY VIEW OF THE CHEST 5/8/2021 11:39 am COMPARISON: Chest radiograph, 07/10/2017 HISTORY: ORDERING SYSTEM PROVIDED HISTORY: Fall left rib pain TECHNOLOGIST PROVIDED HISTORY: Fall left rib pain Reason for Exam: fell x 2 weeks ago. patient had trouble breathing. pain around the T8-T12 area. anterior to lateral aspects. sharp pain when takes in breath. no surgeries to the area. Acuity: Acute Type of Exam: Initial Mechanism of Injury: fall Relevant Medical/Surgical History: COPD FINDINGS: There is a suspicious cavitary lesion identified within the left upper lung, measuring approximately 3.6 cm, new when compared to the previous exam. There are chronic interstitial opacity seen in the background. Biapical pleural scarring is noted. No pneumothorax is seen. No free air. Dedicated imaging of the left ribs shows no acute fracture. No erosions are seen. Remaining osseous structures of the chest are unremarkable.      Suspicious cavitary lesion within the left upper lung, which may reflect either cavitary inflammation or infection versus a cavitary neoplasm. Chest CT is necessary at this time. Ct Chest W Contrast    Result Date: 5/8/2021  EXAMINATION: CT OF THE CHEST WITH CONTRAST 5/8/2021 3:37 pm TECHNIQUE: CT of the chest was performed with the administration of intravenous contrast. Multiplanar reformatted images are provided for review. Dose modulation, iterative reconstruction, and/or weight based adjustment of the mA/kV was utilized to reduce the radiation dose to as low as reasonably achievable. COMPARISON: 07/20/2017 HISTORY: ORDERING SYSTEM PROVIDED HISTORY: SOB, r/o mass TECHNOLOGIST PROVIDED HISTORY: SOB, r/o mass Decision Support Exception - unselect if not a suspected or confirmed emergency medical condition->Emergency Medical Condition (MA) Reason for Exam: lt side chest pain Acuity: Acute Type of Exam: Initial FINDINGS: Mediastinum:  Normal cardiac size. Aorta enhances normally and is normal in caliber. The main pulmonary arteries are grossly normal.  No significant mediastinal, hilar or axillary lymphadenopathy. Thyroid gland shows no significant abnormalities. Esophagus shows no significant abnormalities. Lungs/pleura: Mild centrilobular emphysema. Within the superior segment of the left lower lobe there is 4.7 x 4.0 cm masslike consolidation with cavitation and adjacent pleural thickening/fluid. While this could be due to a pneumonia, findings are worrisome for malignancy which cannot be entirely excluded. More inferiorly from the mass is a subtle generalized ground-glass attenuation of the superior segment which may represent extension of the process. No pleural effusion or pneumothorax. No discrete solid nodules. Upper Abdomen: Patchy diffuse hepatic steatosis. This could potentially obscure liver lesion. No adrenal mass. Soft Tissues/Bones: No acute abnormality of the bones. The superficial soft tissues show no significant abnormalities.      1. Masslike airspace consolidative density with central cavitation noted in the superior segment of the left lower lobe about 4.7 x 4.0 cm. While this could be pneumonia, it remains concerning for malignancy. Please correlate clinically. Short interval 6 week follow-up is recommended if treated as pneumonia. 2. Severe diffuse patchy hepatic steatosis noted which could obscure liver lesion such as metastasis. In light of the left lower lobe lesion, consideration may be given to contrast enhanced MRI to exclude metastasis particularly 8th malignancy remains a concern after clinical evaluation. 3. Emphysema. LABS:  Labs Reviewed   BASIC METABOLIC PANEL - Abnormal; Notable for the following components:       Result Value    Glucose 120 (*)     CO2 19 (*)     All other components within normal limits   CBC WITH AUTO DIFFERENTIAL - Abnormal; Notable for the following components:    WBC 11.5 (*)     .7 (*)     MCH 33.8 (*)     Seg Neutrophils 78 (*)     Lymphocytes 10 (*)     Eosinophils % 0 (*)     Segs Absolute 8.74 (*)     Absolute Mono # 1.42 (*)     All other components within normal limits   COVID-19, RAPID   CULTURE, BLOOD 1   CULTURE, BLOOD 1   BRAIN NATRIURETIC PEPTIDE       All other labs were within normal range or not returned as of this dictation.     EMERGENCY DEPARTMENT COURSE and DIFFERENTIAL DIAGNOSIS/MDM:   Vitals:    Vitals:    05/08/21 1343 05/08/21 1345   BP:  (!) 146/88   Pulse:  121   Resp:  20   Temp:  98.8 °F (37.1 °C)   SpO2:  95%   Weight: 170 lb (77.1 kg)    Height: 6' 3\" (1.905 m)        MEDICATIONS GIVEN IN THE ED:  Medications   sodium chloride flush 0.9 % injection 10 mL (10 mLs Intravenous Given 5/8/21 1600)   clindamycin (CLEOCIN) 600 mg in dextrose 5 % 50 mL IVPB (has no administration in time range)   methylPREDNISolone sodium (SOLU-MEDROL) injection 125 mg (125 mg Intramuscular Given 5/8/21 1530)   oxyCODONE-acetaminophen (PERCOCET) 5-325 MG per tablet 2 tablet (2 tablets Oral Given 5/8/21 1530)   0.9 % sodium chloride bolus (80 mLs Intravenous New Bag 5/8/21 1601)   iopamidol (ISOVUE-370) 76 % injection 75 mL (75 mLs Intravenous Given 5/8/21 1600)   albuterol (PROVENTIL) nebulizer solution 2.5 mg (2.5 mg Nebulization Given 5/8/21 1629)       CLINICAL DECISION MAKING:  The patient presented alert with a nontoxic appearance and was seen in conjunction with Dr. Valerio Fu. Imaging showed cavitary pneumonia with malignancy not ruled out. He will be admitted for further evaluation and treatment. I spoke with Dr. Nitin Briones from [facility]. CONSULTS:  IP CONSULT TO INTERNAL MEDICINE      FINAL IMPRESSION      1.  Cavitary pneumonia            Problem List  Patient Active Problem List   Diagnosis Code    Fracture tibia/fibula S82.209A, S82.409A    Schizoaffective disorder ([address] B04.1    Umbilical hernia U45.8    Gastroenteritis M09.4    Folliculitis I14.4    GERD (gastroesophageal reflux disease) K21.9    COPD (chronic obstructive pulmonary disease) ([facility]) J44.9    HLD (hyperlipidemia) E78.5    Foot pain, right M79.671    Hematochezia K92.1    Hemorrhoids K64.9    Erectile dysfunction N52.9    Seasonal allergies J30.2    COPD exacerbation ([address] J44.1    CHF (congestive heart failure), NYHA class III ([facility]) I50.9    Dermoid cyst of scalp D23.4    Pneumonitis J18.9    Syncope R55    Depression F32.9    Chest pain R07.9    Syncope and collapse R55    Hypokalemia E87.6    Seizures ([address] R56.9    Steroid-induced hyperglycemia R73.9, T38.0X5A    Need for vaccination Z23    Tremor R25.1    Atypical chest pain R07.89    Elevated LFTs R79.89    Abnormal results of liver function studies  R94.5    Essential hypertension I10    Schizoaffective disorder, depressive type ([address] F25.1    Seizure ([address] R56.9    Intractable vomiting with nausea R11.2    Spondylosis of cervical region without myelopathy or radiculopathy M47.812    Avascular necrosis of bone of right hip ([facility]) M87.051    Pneumonia with cavity of lung J18.9, J98.4         DISPOSITION/PLAN DISPOSITION Admitted 05/08/2021 05:30:55 PM      PATIENT REFERRED TO:   No follow-up provider specified.     DISCHARGE MEDICATIONS:     New Prescriptions    No medications on file           (Please note that portions of this note were completed with a voice recognition program.  Efforts were made to edit the dictations but occasionally words are mis-transcribed.)    ROMANA Phipps CNP, APRN - CNP  05/08/21 [id number]

## 2021-05-09 ENCOUNTER — APPOINTMENT (OUTPATIENT)
Dept: GENERAL RADIOLOGY | Age: 61
DRG: 194 | End: 2021-05-09
Payer: MEDICARE

## 2021-05-09 LAB
ANION GAP SERPL CALCULATED.3IONS-SCNC: 11 MMOL/L (ref 9–17)
BUN BLDV-MCNC: 8 MG/DL (ref 6–20)
BUN/CREAT BLD: 11 (ref 9–20)
CALCIUM SERPL-MCNC: 8.5 MG/DL (ref 8.6–10.4)
CHLORIDE BLD-SCNC: 105 MMOL/L (ref 98–107)
CO2: 20 MMOL/L (ref 20–31)
CREAT SERPL-MCNC: 0.74 MG/DL (ref 0.7–1.2)
GFR AFRICAN AMERICAN: >60 ML/MIN
GFR NON-AFRICAN AMERICAN: >60 ML/MIN
GFR SERPL CREATININE-BSD FRML MDRD: ABNORMAL ML/MIN/{1.73_M2}
GFR SERPL CREATININE-BSD FRML MDRD: ABNORMAL ML/MIN/{1.73_M2}
GLUCOSE BLD-MCNC: 135 MG/DL (ref 70–99)
HCT VFR BLD CALC: 42.7 % (ref 40.7–50.3)
HEMOGLOBIN: 13.6 G/DL (ref 13–17)
MCH RBC QN AUTO: 33.3 PG (ref 25.2–33.5)
MCHC RBC AUTO-ENTMCNC: 31.9 G/DL (ref 28.4–34.8)
MCV RBC AUTO: 104.4 FL (ref 82.6–102.9)
NRBC AUTOMATED: 0 PER 100 WBC
PDW BLD-RTO: 12.1 % (ref 11.8–14.4)
PLATELET # BLD: 182 K/UL (ref 138–453)
PMV BLD AUTO: 10 FL (ref 8.1–13.5)
POTASSIUM SERPL-SCNC: 4.2 MMOL/L (ref 3.7–5.3)
RBC # BLD: 4.09 M/UL (ref 4.21–5.77)
SODIUM BLD-SCNC: 136 MMOL/L (ref 135–144)
WBC # BLD: 8.5 K/UL (ref 3.5–11.3)

## 2021-05-09 PROCEDURE — 6370000000 HC RX 637 (ALT 250 FOR IP): Performed by: INTERNAL MEDICINE

## 2021-05-09 PROCEDURE — 2580000003 HC RX 258: Performed by: INTERNAL MEDICINE

## 2021-05-09 PROCEDURE — 99232 SBSQ HOSP IP/OBS MODERATE 35: CPT | Performed by: INTERNAL MEDICINE

## 2021-05-09 PROCEDURE — 94640 AIRWAY INHALATION TREATMENT: CPT

## 2021-05-09 PROCEDURE — 80048 BASIC METABOLIC PNL TOTAL CA: CPT

## 2021-05-09 PROCEDURE — 71046 X-RAY EXAM CHEST 2 VIEWS: CPT

## 2021-05-09 PROCEDURE — 85027 COMPLETE CBC AUTOMATED: CPT

## 2021-05-09 PROCEDURE — 2500000003 HC RX 250 WO HCPCS: Performed by: INTERNAL MEDICINE

## 2021-05-09 PROCEDURE — 71045 X-RAY EXAM CHEST 1 VIEW: CPT

## 2021-05-09 PROCEDURE — 94761 N-INVAS EAR/PLS OXIMETRY MLT: CPT

## 2021-05-09 PROCEDURE — 1200000000 HC SEMI PRIVATE

## 2021-05-09 PROCEDURE — 6360000002 HC RX W HCPCS: Performed by: INTERNAL MEDICINE

## 2021-05-09 PROCEDURE — 36415 COLL VENOUS BLD VENIPUNCTURE: CPT

## 2021-05-09 RX ORDER — OXYCODONE HYDROCHLORIDE AND ACETAMINOPHEN 5; 325 MG/1; MG/1
1 TABLET ORAL EVERY 6 HOURS PRN
Status: DISCONTINUED | OUTPATIENT
Start: 2021-05-09 | End: 2021-05-12 | Stop reason: HOSPADM

## 2021-05-09 RX ADMIN — IPRATROPIUM BROMIDE AND ALBUTEROL SULFATE 1 AMPULE: .5; 3 SOLUTION RESPIRATORY (INHALATION) at 11:16

## 2021-05-09 RX ADMIN — CARVEDILOL 12.5 MG: 12.5 TABLET, FILM COATED ORAL at 10:00

## 2021-05-09 RX ADMIN — FOLIC ACID 1 MG: 1 TABLET ORAL at 10:00

## 2021-05-09 RX ADMIN — CARVEDILOL 12.5 MG: 12.5 TABLET, FILM COATED ORAL at 18:16

## 2021-05-09 RX ADMIN — IPRATROPIUM BROMIDE AND ALBUTEROL SULFATE 1 AMPULE: .5; 3 SOLUTION RESPIRATORY (INHALATION) at 14:13

## 2021-05-09 RX ADMIN — TRAZODONE HYDROCHLORIDE 100 MG: 100 TABLET ORAL at 23:09

## 2021-05-09 RX ADMIN — BUDESONIDE AND FORMOTEROL FUMARATE DIHYDRATE 2 PUFF: 160; 4.5 AEROSOL RESPIRATORY (INHALATION) at 07:46

## 2021-05-09 RX ADMIN — CLINDAMYCIN PHOSPHATE 300 MG: 300 INJECTION, SOLUTION INTRAVENOUS at 18:16

## 2021-05-09 RX ADMIN — CLINDAMYCIN PHOSPHATE 300 MG: 300 INJECTION, SOLUTION INTRAVENOUS at 10:00

## 2021-05-09 RX ADMIN — IPRATROPIUM BROMIDE AND ALBUTEROL SULFATE 1 AMPULE: .5; 3 SOLUTION RESPIRATORY (INHALATION) at 07:46

## 2021-05-09 RX ADMIN — Medication 100 MG: at 10:00

## 2021-05-09 RX ADMIN — BUDESONIDE AND FORMOTEROL FUMARATE DIHYDRATE 2 PUFF: 160; 4.5 AEROSOL RESPIRATORY (INHALATION) at 20:07

## 2021-05-09 RX ADMIN — SODIUM CHLORIDE: 9 INJECTION, SOLUTION INTRAVENOUS at 11:19

## 2021-05-09 RX ADMIN — LORAZEPAM 1 MG: 2 INJECTION, SOLUTION INTRAMUSCULAR; INTRAVENOUS at 03:11

## 2021-05-09 RX ADMIN — IPRATROPIUM BROMIDE AND ALBUTEROL SULFATE 1 AMPULE: .5; 3 SOLUTION RESPIRATORY (INHALATION) at 20:04

## 2021-05-09 RX ADMIN — CLINDAMYCIN PHOSPHATE 300 MG: 300 INJECTION, SOLUTION INTRAVENOUS at 02:10

## 2021-05-09 RX ADMIN — LEVOFLOXACIN 750 MG: 5 INJECTION, SOLUTION INTRAVENOUS at 20:00

## 2021-05-09 RX ADMIN — OXYCODONE AND ACETAMINOPHEN 1 TABLET: 5; 325 TABLET ORAL at 20:00

## 2021-05-09 RX ADMIN — PANTOPRAZOLE SODIUM 40 MG: 40 TABLET, DELAYED RELEASE ORAL at 06:54

## 2021-05-09 RX ADMIN — ENOXAPARIN SODIUM 40 MG: 40 INJECTION SUBCUTANEOUS at 10:00

## 2021-05-09 RX ADMIN — ZONISAMIDE 100 MG: 100 CAPSULE ORAL at 10:01

## 2021-05-09 SDOH — HEALTH STABILITY: MENTAL HEALTH: HOW OFTEN DO YOU HAVE A DRINK CONTAINING ALCOHOL?: NOT ASKED

## 2021-05-09 SDOH — HEALTH STABILITY: MENTAL HEALTH: HOW MANY STANDARD DRINKS CONTAINING ALCOHOL DO YOU HAVE ON A TYPICAL DAY?: NOT ASKED

## 2021-05-09 ASSESSMENT — ENCOUNTER SYMPTOMS
BACK PAIN: 1
CONSTIPATION: 0
COUGH: 1
NAUSEA: 1
NAUSEA: 0
SHORTNESS OF BREATH: 0
ABDOMINAL PAIN: 1
VOMITING: 0
ABDOMINAL PAIN: 0
COUGH: 0
VOMITING: 1
COLOR CHANGE: 0
SHORTNESS OF BREATH: 1
DIARRHEA: 0
BLOOD IN STOOL: 0
WHEEZING: 0

## 2021-05-09 ASSESSMENT — PAIN SCALES - GENERAL
PAINLEVEL_OUTOF10: 5
PAINLEVEL_OUTOF10: 8

## 2021-05-09 ASSESSMENT — PAIN - FUNCTIONAL ASSESSMENT: PAIN_FUNCTIONAL_ASSESSMENT: ACTIVITIES ARE NOT PREVENTED

## 2021-05-09 ASSESSMENT — PAIN DESCRIPTION - FREQUENCY: FREQUENCY: INTERMITTENT

## 2021-05-09 ASSESSMENT — PAIN DESCRIPTION - PAIN TYPE: TYPE: ACUTE PAIN

## 2021-05-09 NOTE — FLOWSHEET NOTE
Patient was sleeping. Patient food tray untouched. TV off,  shared in silent prayer.    leaves prayer card for possible follow up.     05/09/21 1717   Encounter Summary   Services provided to: Patient   Referral/Consult From: [facility]   (5-9-21)   Complexity of Encounter Low   Length of Encounter 15 minutes   Routine   Type Initial   Assessment Sleeping   Intervention Prayer

## 2021-05-09 NOTE — PROGRESS NOTES
[facility]    Office: [address], DO, Love Favre, DO, Grayland Runner, DO, Shanell Eye Blood, DO, Guero Serrano MD, Kristen Mejia MD, Jakob Plaza MD, Radha Oakley MD, Toña Arnold MD, Kat Manzano MD, Beulah Carlson MD, Joseph Goel MD, Mario Alberto Ramírez MD, So Curry, DO, Mau Velasquez MD, Dre Archer MD, Arianna Ramirez DO, Murlean Gowers, MD,  Ana Napier DO, Laz Malin MD, Liz Woodard MD, Elizabeth Ko [facility], [facility], CNP, James Moody, CNP, Darrian Powell, CNS, Darryl Leyva, CNP, Leandra Lopez, CNP, Hiral Valencia, CNP, Ivy Solano, [facility], Nereida Alvarenga, CNP, Chandler Montero PA-C, Mona Marquez, [facility], Kevyn Dumont, CNP, Marin De Anda, CNP, Symone Herr, CNP, Ramirez Hobson, [facility], [facility], [facility]    Progress Note    5/9/2021    4:12 PM    Name:   Mulu Vasquez  MRN:     [id number]     [place]:      [de-identified]   Room:   2008/2008-02  IP Day:  1  Admit Date:  5/8/2021  2:39 PM    PCP:   Larisas Keller MD  Code Status:  Full Code    Subjective:     C/C:   Chief Complaint   Patient presents with   Marley Stephens     about 2 weeks ago    Shortness of Breath     hx COPD       Interval History Status:  Improved  Still reporting left pleuritic chest pain with deep breathing  Requesting Percocet  Cough productive of white sputum  Less wheezing    Data Base Updates:  BP labile with readings as high as 170/89  Afebrile  O2 sats in the 90s, room air    WBC8.5k/uL RBC4. 09Low m/uL Mzzhnmfels41.6g/dL Ykvyobnuav40.7% ZAR539. 4High     Wigmggu388Istg mg/dL     BUN8mg/dL CREATININE0.74mg/dL   Bun/Cre Ratio11     Calcium8.5Low       Brief History:     As documented in the medical record: \"The patient presents to the hospital with complaint of shortness of breath left sided rib pain. He states his pain is sharp, intermittent and aggravated with deep breathing. He utilized his inhaler without relief. He states his shortness of breath has progressed over the past few days. The patient states that he fell approximately 2 weeks ago and thinks he may have injured his left ribs. He denies loss of consciousness or head strike. He endorses chronic diffuse abdominal pain that he describes as dull and aching. He reports nausea and vomiting nearly ever morning. He has been following outpatient with GI for these issues. He denies fever, chills or additional symptomology. No further modifying factors. He has past medical history that includes COPD, GERD and CVA. The patient endorses daily alcohol consumption. He states he drinks 4-6, 24oz beers and 5-10 shots of liquor a day. His last alcoholic drink was today.     Rapid SARS-CoV-2 negative.     X-ray left ribs including chest shows suspicious cavitary lesion within the left upper lung, which may reflect either cavitary inflammation or infection versus a cavitary neoplasm. Chest CT is necessary at this time.     CT chest with contrast shows:  1. Masslike airspace consolidative density with central cavitation noted in the superior segment of the left lower lobe about 4.7 x 4.0 cm.  While this could be pneumonia, it remains concerning for malignancy.  Please correlate clinically. Short interval 6 week follow-up is recommended if treated as pneumonia. 2. Severe diffuse patchy hepatic steatosis noted which could obscure liver lesion such as metastasis.  In light of the left lower lobe lesion, consideration may be given to contrast enhanced MRI to exclude metastasis particularly 8th malignancy remains a concern after clinical evaluation. 3. Emphysema.        EGD 3/25/2021 with Dr. Adrian Byrne. Post-Op Diagnosis: Small hiatal hernia, gastritis, retained food in the stomach, irregular Z-line. Healing of previously seen esophagitis.  Duodenal polyp.   Known history of Soliz's     Findings: 1-small hiatal hernia  2-retained food in the stomach  3-irregular Z-line.  Biopsies were taken to rule out Soliz's esophagus. 4-patchy hyperemia of the stomach.  Biopsies were taken to rule out H. Pylori. 5-4 mm sessile polyp in the duodenal bulb that was removed with biopsy. Initial plan has included:  Pneumonia- IV clindamycin and levofloxacin. COPD- Nebulizer treatments. Supplemental oxygen as needed. GERD- pantoprazole. Hypertension- continue carvedilol. Dyslipidemia- check lipid panel. Not currently on statin therapy. Alcohol abuse- thiamine and folate supplementation, observe for withdrawal symptoms, beer with meals. Patient would benefit from outpatient rehabilitation program.  Tobacco dependence- smoking cessation, nicotine patch. Monitor vital signs. Follow chemistries. DVT prophylaxis. General diet. Activity as tolerated with assist.   PT/OT. Consult social work for discharge planning. \"         Medications: Allergies:     Allergies   Allergen Reactions    Pcn [Penicillins] Other (See Comments)     Pt unsure of reaction this is from childhood       Current Meds:   Scheduled Meds:    carvedilol  12.5 mg Oral BID WC    folic acid  1 mg Oral Daily    traZODone  100 mg Oral Nightly    zonisamide  100 mg Oral Daily    pantoprazole  40 mg Oral QAM AC    budesonide-formoterol  2 puff Inhalation BID    sodium chloride flush  5-40 mL Intravenous 2 times per day    enoxaparin  40 mg Subcutaneous Daily    ipratropium-albuterol  1 ampule Inhalation Q4H WA    levofloxacin  750 mg Intravenous Q24H    clindamycin (CLEOCIN) IV  300 mg Intravenous Q8H    thiamine mononitrate  100 mg Oral Daily     Continuous Infusions:    sodium chloride 75 mL/hr at 05/09/21 1119    sodium chloride       PRN Meds: sodium chloride flush, sodium chloride, nicotine, promethazine **OR** ondansetron, magnesium hydroxide, acetaminophen **OR** acetaminophen, albuterol, LORazepam    Data:     Past Medical History:   has a past medical history of Arthritis, Asthma, Attempted suicide ([address] Bipolar Conjunctivae normal.   Neck:      Musculoskeletal: Neck supple. Trachea: No tracheal deviation. Cardiovascular:      Rate and Rhythm: Normal rate and regular rhythm. Pulmonary:      Effort: Pulmonary effort is normal. No respiratory distress. Breath sounds: Rhonchi present. No wheezing or rales. Chest:      Chest wall: No tenderness. Abdominal:      General: Bowel sounds are normal. There is no distension. Palpations: Abdomen is soft. Tenderness: There is no abdominal tenderness. Musculoskeletal:         General: No tenderness. Skin:     General: Skin is warm and dry. Neurological:      General: No focal deficit present. Mental Status: He is alert and oriented to person, place, and time. Vitals:  /81   Pulse 82   Temp 97.3 °F (36.3 °C) (Oral)   Resp 18   Ht 6' 3\" (1.905 m)   Wt 158 lb (71.7 kg)   SpO2 98%   BMI 19.75 kg/m²   Temp (24hrs), Av.6 °F (36.4 °C), Min:97.3 °F (36.3 °C), Max:97.9 °F (36.6 °C)    No results for input(s): POCGLU in the last 72 hours. I/O (24Hr):     Intake/Output Summary (Last 24 hours) at 2021 1612  Last data filed at 2021 0653  Gross per 24 hour   Intake 1522 ml   Output 600 ml   Net 922 ml       Labs:  Hematology:  Recent Labs     21  1500 21  0700   WBC 11.5* 8.5   RBC 4.70 4.09*   HGB 15.9 13.6   HCT 49.2 42.7   .7* 104.4*   MCH 33.8* 33.3   MCHC 32.3 31.9   RDW 12.5 12.1    182   MPV 9.5 10.0     Chemistry:  Recent Labs     21  1500 21  0700    136   K 4.2 4.2    105   CO2 19* 20   GLUCOSE 120* 135*   BUN 8 8   CREATININE 0.75 0.74   ANIONGAP 14 11   LABGLOM >60 >60   GFRAA >60 >60   CALCIUM 9.8 8.5*   PROBNP 147  --    No results for input(s): PROT, LABALBU, LABA1C, E3HADGT, K6BZLPM, FT4, TSH, AST, ALT, LDH, GGT, ALKPHOS, LABGGT, BILITOT, BILIDIR, AMMONIA, AMYLASE, LIPASE, LACTATE, CHOL, HDL, LDLCHOLESTEROL, CHOLHDLRATIO, TRIG, VLDL, PIC48CN, PHENYTOIN, PHENYF, Antibiotics  Respiratory therapy  Reflux precautions  Ativan  Thiamine  Observe for DTs  Smoking cessation  Blood Pressure - Monitor and control   The patient is not interested in alcohol abstinence  He will be offered a beer with meals  GI follow-up Dr. Alesia Mckeon re: Soliz's esophagitis, GES  Reflux precautions     IP CONSULT TO INTERNAL MEDICINE  IP CONSULT TO [address] [place] [address]  5/9/2021  4:12 PM

## 2021-05-09 NOTE — CARE COORDINATION
CASE MANAGEMENT NOTE:    Admission Date:  5/8/2021 Lamonte Bunch is a 64 y.o.  male    Admitted for : Pneumonia with cavity of lung [J18.9, J98.4]    Met with:  Patient    PCP:  Xiang Barker MD                                Insurance:  [facility]      Is patient alert and oriented at time of discussion:  Yes    Current Residence/ Living Arrangements:  independently at home             Current Services PTA:  No    Does patient go to outpatient dialysis: No  If yes, location and chair time: n/a    Is patient agreeable to VNS: No    Freedom of choice provided:  Yes    List of [address] provided: No    VNS chosen:  No    DME:  straight cane and aerosal machine    Home Oxygen: No    Nebulizer: No    CPAP/BIPAP: No    Supplier: N/A    Potential Assistance Needed: No    SNF needed: No    Freedom of choice and list provided: Yes    Pharmacy:  [facility] on [address] at [facility]       Does Patient want to use MEDS to BEDS? No    Is patient currently receiving oral anticoagulation therapy? NA    Is the Patient an [facility] with Readmission Risk Score greater than 14%?  no  If yes, pt needs a follow up appointment made within 7 days. Family Members/Caregivers that pt would like involved in their care:    Yes    If yes, list name here:  Isabella Mendoza lives with pt at her home    Transportation Provider:  Family             Discharge Plan:  The Plan for Transition of Care is related to the following treatment goals:  Return home with [facility]    The Patient and/or patient representative patient was provided with a choice of provider and agrees   with the discharge plan. [x] Yes [] No    Freedom of choice list was provided with basic dialogue that supports the patient's individualized plan of care/goals, treatment preferences and shares the quality data associated with the providers.  [x] Yes [] No                 Electronically signed by: OCTAVIANO Castro, CHIDI on 5/9/2021 at 3:27 PM

## 2021-05-09 NOTE — PLAN OF CARE
Problem: Falls - Risk of:  Goal: Will remain free from falls  Description: Will remain free from falls  5/9/2021 1157 by Grant Sellers RN  Outcome: Ongoing     Problem: Falls - Risk of:  Goal: Absence of physical injury  Description: Absence of physical injury  5/9/2021 1157 by Grant Sellers RN  Outcome: Ongoing     Problem: Skin Integrity:  Goal: Will show no infection signs and symptoms  Description: Will show no infection signs and symptoms  5/9/2021 1157 by Grant Sellers RN  Outcome: Ongoing     Problem: Skin Integrity:  Goal: Absence of new skin breakdown  Description: Absence of new skin breakdown  5/9/2021 1157 by Grant Sellers RN  Outcome: Ongoing     Problem: SAFETY  Goal: Free from accidental physical injury  5/9/2021 1157 by Grant Sellers RN  Outcome: Ongoing     Problem: SAFETY  Goal: Free from intentional harm  5/9/2021 1157 by Grant Sellers RN  Outcome: Ongoing     Problem: DAILY CARE  Goal: Daily care needs are met  5/9/2021 1157 by Grant Sellers RN  Outcome: Ongoing     Problem: PAIN  Goal: Patient's pain/discomfort is manageable  5/9/2021 1157 by Grant Sellers RN  Outcome: Ongoing     Problem: SKIN INTEGRITY  Goal: Skin integrity is maintained or improved  5/9/2021 1157 by Grant Sellers RN  Outcome: Ongoing     Problem: KNOWLEDGE DEFICIT  Goal: Patient/S.O. demonstrates understanding of disease process, treatment plan, medications, and discharge instructions.   5/9/2021 1157 by Grant Sellers RN  Outcome: Ongoing     Problem: DISCHARGE BARRIERS  Goal: Patient's continuum of care needs are met  5/9/2021 1157 by Grant Sellers RN  Outcome: Ongoing

## 2021-05-09 NOTE — H&P
approximately 2 weeks ago and thinks he may have injured his left ribs. He denies loss of consciousness or head strike. He endorses chronic diffuse abdominal pain that he describes as dull and aching. He reports nausea and vomiting nearly ever morning. He has been following outpatient with GI for these issues. He denies fever, chills or additional symptomology. No further modifying factors. He has past medical history that includes COPD, GERD and CVA. The patient endorses daily alcohol consumption. He states he drinks 4-6, 24oz beers and 5-10 shots of liquor a day. His last alcoholic drink was today. Rapid SARS-CoV-2 negative. X-ray left ribs including chest shows suspicious cavitary lesion within the left upper lung, which may reflect either cavitary inflammation or infection versus a cavitary neoplasm. Chest CT is necessary at this time. CT chest with contrast shows:  1. Masslike airspace consolidative density with central cavitation noted in the superior segment of the left lower lobe about 4.7 x 4.0 cm. While this could be pneumonia, it remains concerning for malignancy. Please correlate clinically. Short interval 6 week follow-up is recommended if treated as pneumonia. 2. Severe diffuse patchy hepatic steatosis noted which could obscure liver lesion such as metastasis. In light of the left lower lobe lesion, consideration may be given to contrast enhanced MRI to exclude metastasis particularly 8th malignancy remains a concern after clinical evaluation. 3. Emphysema. EGD 3/25/2021 with Dr. Gretchen Hernandes. Post-Op Diagnosis: Small hiatal hernia, gastritis, retained food in the stomach, irregular Z-line. Healing of previously seen esophagitis. Duodenal polyp. Findings: 1-small hiatal hernia  2-retained food in the stomach  3-irregular Z-line. Biopsies were taken to rule out Soliz's esophagus. 4-patchy hyperemia of the stomach. Biopsies were taken to rule out H. Pylori.   5-4 mm sessile polyp in the duodenal bulb that was removed with biopsy.     Past Medical History:     Past Medical History:   Diagnosis Date    Arthritis     Asthma     Attempted suicide ([address]     attempted 7 times    Bipolar affective ([address]     Blood in stool     CHF (congestive heart failure), NYHA class III ([address] 08/22/2014    COPD (chronic obstructive pulmonary disease) ([address]     CVA (cerebral vascular accident) ([address]     x2 in 2006    Depression 07/26/2015    Difficult intubation 07/05/2013    Needed Glidescope, only epiglottis seen, easy mask ventilation    Erectile dysfunction 05/23/2014    Fracture of wrist     left    GERD (gastroesophageal reflux disease) 02/19/2014    Head injury     Hepatitis     HLD (hyperlipidemia) 02/19/2014    Hypertension     Insomnia     Left wrist pain     Pain     LEFT KNEE, RIGHT HIP    Seasonal allergies 07/15/2014    Seizures ([address]     Spondylosis of cervical region without myelopathy or radiculopathy     Stroke ([address] 10/31/2011    pt states a total of 4-5strokes    Vomiting     Wears glasses         Past Surgical History:     Past Surgical History:   Procedure Laterality Date    ANESTHESIA NERVE BLOCK Left 10/23/2017    NERVE BLOCK LEFT CERVICAL C3 TON C4 C5 performed by Patricia Parisi MD at [address]      cardiac cath x2    COLONOSCOPY  05/19/2014    COLONOSCOPY N/A 12/31/2020    COLONOSCOPY POLYPECTOMY SNARE/COLD BIOPSY WITH CLIP APPLICATION AND CONTROL OF BLEEDING performed by Saeid Flores MD at [address], COLON, [address]      right arm, left knee, l ankle pins placed    HERNIA REPAIR      KNEE SURGERY      NERVE BLOCK Left 10/23/2017    medial branch block, cervical    TIBIA FRACTURE SURGERY Left 07/05/2013    IM nailing    TOE SURGERY Right     UMBILICAL HERNIA REPAIR  04/08/2014    with mesh    UPPER GASTROINTESTINAL ENDOSCOPY N/A 12/31/2020    EGD BIOPSY performed by Faisal Alaniz Shakila Fay MD at [address] [facility] 03/25/2021    EGD BIOPSY performed by Cruz Miller MD at [address] [facility]        Medications Prior to Admission:     Prior to Admission medications    Medication Sig Start Date End Date Taking? Authorizing Provider   Cholestyramine POWD by Does not apply route 1 scoop daily   Yes Historical Provider, MD   famotidine (PEPCID) 40 MG tablet Take 40 mg by mouth daily   Yes Historical Provider, MD   vitamin B-1 (THIAMINE) 100 MG tablet Take 100 mg by mouth daily   Yes Historical Provider, MD   dicyclomine (BENTYL) 10 MG capsule Take 10 mg by mouth 3 times daily (before meals)   Yes Historical Provider, MD   carvedilol (COREG) 12.5 MG tablet Take 12.5 mg by mouth 2 times daily (with meals)   Yes Historical Provider, MD   folic acid (FOLVITE) 1 MG tablet Take 1 mg by mouth daily   Yes Historical Provider, MD   omeprazole (PRILOSEC) 20 MG delayed release capsule Take 40 mg by mouth daily   Yes Historical Provider, MD   traZODone (DESYREL) 100 MG tablet Take 100 mg by mouth nightly   Yes Historical Provider, MD   acetaminophen (TYLENOL) 325 MG tablet Take 650 mg by mouth every 6 hours as needed for Pain   Yes Historical Provider, MD   zonisamide (ZONEGRAN) 100 MG capsule Take 100 mg by mouth daily   Yes Historical Provider, MD   LISINOPRIL PO Take 5 mg by mouth 2 times daily    Yes Historical Provider, MD   albuterol sulfate HFA (PROAIR HFA) 108 (90 Base) MCG/ACT inhaler Inhale 2 puffs into the lungs every 6 hours as needed for Wheezing 7/18/17  Yes Denise Carranza MD   mometasone-formoterol (DULERA) 200-5 MCG/ACT inhaler Inhale 2 puffs into the lungs every 12 hours 7/18/17  Yes Denise Carranza MD        Allergies:     Pcn [penicillins]    Social History:     Tobacco:    reports that he has been smoking cigarettes and cigars. He has a 7.80 pack-year smoking history.  He has never used smokeless tobacco.  Alcohol:      reports current alcohol use of about 9.0 standard drinks of alcohol per week. Drug Use:  reports previous drug use. Family History:     Family History   Problem Relation Age of Onset   Kristan Manual Stroke Mother     Diabetes Mother     Heart Disease Father     High Blood Pressure Father     Diabetes Maternal Grandmother        Review of Systems:     Positive and Negative as described in HPI. Review of Systems   Constitutional: Negative for chills, diaphoresis and fever. HENT: Negative for congestion. Eyes: Negative for visual disturbance. Respiratory: Positive for shortness of breath. Negative for cough and wheezing. Cardiovascular: Negative for palpitations and leg swelling. Gastrointestinal: Positive for abdominal pain, nausea and vomiting. Negative for blood in stool, constipation and diarrhea. Endocrine: Negative for cold intolerance and heat intolerance. Genitourinary: Negative for difficulty urinating, dysuria, frequency and urgency. Musculoskeletal: Negative for gait problem and neck pain. Left rib pain    Skin: Negative for color change and rash. Neurological: Negative for dizziness, weakness, light-headedness, numbness and headaches. Hematological: Does not bruise/bleed easily. Psychiatric/Behavioral: The patient is not nervous/anxious. All other systems reviewed and are negative. Physical Exam:   BP (!) 170/89   Pulse 87   Temp 97.9 °F (36.6 °C) (Oral)   Resp 20   Ht 6' 3\" (1.905 m)   Wt 170 lb (77.1 kg)   SpO2 96%   BMI 21.25 kg/m²   Temp (24hrs), Av.4 °F (36.9 °C), Min:97.9 °F (36.6 °C), Max:98.8 °F (37.1 °C)    No results for input(s): POCGLU in the last 72 hours. Intake/Output Summary (Last 24 hours) at 2021  Last data filed at 2021 1600  Gross per 24 hour   Intake 10 ml   Output    Net 10 ml       Physical Exam  Vitals signs and nursing note reviewed. Constitutional:       Appearance: He is not diaphoretic. HENT:      Head: Normocephalic and atraumatic.       Right Ear: Hearing normal.      Left Ear: Hearing normal.      Nose: Nose normal. No rhinorrhea. Eyes:      General: Lids are normal.      Extraocular Movements:      Right eye: Normal extraocular motion. Left eye: Normal extraocular motion. Conjunctiva/sclera: Conjunctivae normal.      Right eye: Right conjunctiva is not injected. Left eye: Left conjunctiva is not injected. Pupils: Pupils are equal, round, and reactive to light. Pupils are equal.      Right eye: Pupil is reactive. Left eye: Pupil is reactive. Neck:      Musculoskeletal: Neck supple. Thyroid: No thyromegaly. Trachea: Trachea normal. No tracheal deviation. Cardiovascular:      Rate and Rhythm: Normal rate and regular rhythm. Pulses: Normal pulses. Heart sounds: Normal heart sounds. No murmur. Pulmonary:      Effort: Pulmonary effort is normal.      Breath sounds: No stridor. Examination of the right-upper field reveals wheezing. Examination of the left-upper field reveals wheezing. Examination of the right-middle field reveals wheezing. Examination of the left-middle field reveals wheezing. Examination of the right-lower field reveals decreased breath sounds and wheezing. Examination of the left-lower field reveals decreased breath sounds and wheezing. Decreased breath sounds and wheezing present. Chest:      Chest wall: Tenderness present. Comments: Left chest wall tenderness to palpation  Abdominal:      General: Bowel sounds are normal. There is no distension. Palpations: Abdomen is soft. There is no mass. Tenderness: There is generalized abdominal tenderness. Skin:     General: Skin is warm and dry. Neurological:      Mental Status: He is alert and oriented to person, place, and time. He is not disoriented. Cranial Nerves: No cranial nerve deficit.    Psychiatric:         Speech: Speech normal.         Behavior: Behavior normal.         Investigations:      Laboratory Testing:  Recent Results (from the past 24 hour(s))   COVID-19, Rapid    Collection Time: 05/08/21  3:00 PM    Specimen: Nasopharyngeal Swab   Result Value Ref Range    Specimen Description . NASOPHARYNGEAL SWAB     SARS-CoV-2, Rapid Not Detected Not Detected   Basic Metabolic Prof    Collection Time: 05/08/21  3:00 PM   Result Value Ref Range    Glucose 120 (H) 70 - 99 mg/dL    BUN 8 6 - 20 mg/dL    CREATININE 0.75 0.70 - 1.20 mg/dL    Bun/Cre Ratio 11 9 - 20    Calcium 9.8 8.6 - 10.4 mg/dL    Sodium 137 135 - 144 mmol/L    Potassium 4.2 3.7 - 5.3 mmol/L    Chloride 104 98 - 107 mmol/L    CO2 19 (L) 20 - 31 mmol/L    Anion Gap 14 9 - 17 mmol/L    GFR Non-African American >60 >60 mL/min    GFR African American >60 >60 mL/min    GFR Comment          GFR Staging NOT REPORTED    Brain Natriuretic Peptide    Collection Time: 05/08/21  3:00 PM   Result Value Ref Range    Pro- <300 pg/mL    BNP Interpretation Pro-BNP Reference Range:    CBC with DIFF    Collection Time: 05/08/21  3:00 PM   Result Value Ref Range    WBC 11.5 (H) 3.5 - 11.3 k/uL    RBC 4.70 4.21 - 5.77 m/uL    Hemoglobin 15.9 13.0 - 17.0 g/dL    Hematocrit 49.2 40.7 - 50.3 %    .7 (H) 82.6 - 102.9 fL    MCH 33.8 (H) 25.2 - 33.5 pg    MCHC 32.3 28.4 - 34.8 g/dL    RDW 12.5 11.8 - 14.4 %    Platelets 472 430 - 322 k/uL    MPV 9.5 8.1 - 13.5 fL    NRBC Automated 0.0 0.0 per 100 WBC    Differential Type NOT REPORTED     Seg Neutrophils 78 (H) 36 - 65 %    Lymphocytes 10 (L) 24 - 43 %    Monocytes 12 3 - 12 %    Eosinophils % 0 (L) 1 - 4 %    Basophils 0 0 - 2 %    Immature Granulocytes 0 0 %    Segs Absolute 8.74 (H) 1.50 - 8.10 k/uL    Absolute Lymph # 1.17 1.10 - 3.70 k/uL    Absolute Mono # 1.42 (H) 0.10 - 1.20 k/uL    Absolute Eos # 0.03 0.00 - 0.44 k/uL    Basophils Absolute 0.05 0.00 - 0.20 k/uL    Absolute Immature Granulocyte 0.05 0.00 - 0.30 k/uL    WBC Morphology NOT REPORTED     RBC Morphology MACROCYTOSIS PRESENT     Platelet Estimate NOT REPORTED Imaging/Diagnostics:  Xr Ribs Left Include Chest (min 3 Views)    Result Date: 5/8/2021  Suspicious cavitary lesion within the left upper lung, which may reflect either cavitary inflammation or infection versus a cavitary neoplasm. Chest CT is necessary at this time. Ct Chest W Contrast    Result Date: 5/8/2021  1. Masslike airspace consolidative density with central cavitation noted in the superior segment of the left lower lobe about 4.7 x 4.0 cm. While this could be pneumonia, it remains concerning for malignancy. Please correlate clinically. Short interval 6 week follow-up is recommended if treated as pneumonia. 2. Severe diffuse patchy hepatic steatosis noted which could obscure liver lesion such as metastasis. In light of the left lower lobe lesion, consideration may be given to contrast enhanced MRI to exclude metastasis particularly 8th malignancy remains a concern after clinical evaluation. 3. Emphysema. Assessment :      Hospital Problems           Last Modified POA    * (Principal) Pneumonia with cavity of lung 5/8/2021 Yes    GERD (gastroesophageal reflux disease) 5/8/2021 Yes    COPD (chronic obstructive pulmonary disease) ([facility] 5/8/2021 Yes    Dyslipidemia 5/8/2021 Yes    Essential hypertension 5/8/2021 Yes    Alcohol abuse 5/8/2021 Yes    Tobacco dependence 5/8/2021 Yes        Plan:     Patient status inpatient in the  Med/Surge unit. Pneumonia- IV clindamycin and levofloxacin. COPD- Nebulizer treatments. Supplemental oxygen as needed. GERD- pantoprazole. Hypertension- continue carvedilol. Dyslipidemia- check lipid panel. Not currently on statin therapy. Alcohol abuse- thiamine and folate supplementation, observe for withdrawal symptoms, beer with meals. Patient would benefit from outpatient rehabilitation program.  Tobacco dependence- smoking cessation, nicotine patch. Monitor vital signs. Follow chemistries. DVT prophylaxis. General diet.   Activity as tolerated with assist.   PT/OT. Consult social work for discharge planning. Plan of care discussed with patient. Consultations:   IP CONSULT TO INTERNAL MEDICINE  IP CONSULT TO SOCIAL WORK    Patient is admitted as inpatient status because of co-morbidities listed above, severity of signs and symptoms as outlined, requirement for current medical therapies and most importantly because of direct risk to patient if care not provided in a hospital setting. Expected length of stay > 48 hours.     ROMANA Puri - CNP  5/8/2021  10:29 PM    Copy sent to Dr. Timmy Hilton MD

## 2021-05-09 NOTE — PLAN OF CARE
Problem: Falls - Risk of:  Goal: Will remain free from falls  Description: Will remain free from falls  5/8/2021 2246 by Rena Mitchell RN  Outcome: Ongoing  Note: Patient is a fall risk during this admission. Fall risk assessment was performed. Patient is absent of falls. Bed is in the lowest position. Wheels on the bed are locked. Call light and bed side table are within reach. Clutter is removed. Patient was educated to call out when needing assistance or wanting to get out of bed. Patient offered toileting assistance during rounding. Hourly rounds have been performed. Problem: Skin Integrity:  Goal: Will show no infection signs and symptoms  Description: Will show no infection signs and symptoms  5/8/2021 2246 by Rena Mitchell RN  Outcome: Ongoing  Note: Skin integrity intact. Mucous membranes pink, moist and intact. Patient turns self  every two hours. Skin and pressure ulcer assessment performed. Skin assessment ongoing. Pressure ulcer preventions being practiced - see charting for details. Patient turns self every two hours. Problem: SAFETY  Goal: Free from accidental physical injury  5/8/2021 2246 by Rena Mitchell RN  Outcome: Ongoing  Note: Patient free from injury or intentional harm. Problem: PAIN  Goal: Patient's pain/discomfort is manageable  5/8/2021 2246 by Rena Mitchell RN  Outcome: Ongoing  Note: Pt medicated with pain medication prn. Assessed all pain characteristics including level, type, location, frequency, and onset. Non-pharmacologic interventions offered to pt as well. Pt states pain is tolerable at this time. Will continue to monitor      Problem: KNOWLEDGE DEFICIT  Goal: Patient/S.O. demonstrates understanding of disease process, treatment plan, medications, and discharge instructions. 5/8/2021 2246 by Rena Mitchell RN  Outcome: Ongoing  Note: Elevate the head of the bed during and after meals.   Encourage coughing and deep breathing exercises. Monitor the amount and characteristics of the patient's sputum. Assess and monitor the patient's respiratory status before and after coughing exercises. Eliminate barriers to airway clearance, such as improper positioning. Assess and monitor vital signs. Provide exercise and activity periods as soon as possible to prevent stasis of secretions. .  Administer prescribed medications and nebulizer treatments; monitor the patient's response. Assess and monitor pulse oximetry. Collaborate with respiratory therapist as appropriate.

## 2021-05-10 LAB
ALBUMIN SERPL-MCNC: 3.1 G/DL (ref 3.5–5.2)
ALBUMIN/GLOBULIN RATIO: ABNORMAL (ref 1–2.5)
ALP BLD-CCNC: 72 U/L (ref 40–129)
ALT SERPL-CCNC: 26 U/L (ref 5–41)
ANION GAP SERPL CALCULATED.3IONS-SCNC: 11 MMOL/L (ref 9–17)
AST SERPL-CCNC: 32 U/L
BILIRUB SERPL-MCNC: 0.28 MG/DL (ref 0.3–1.2)
BILIRUBIN DIRECT: 0.1 MG/DL
BILIRUBIN, INDIRECT: 0.18 MG/DL (ref 0–1)
BUN BLDV-MCNC: 8 MG/DL (ref 6–20)
CALCIUM SERPL-MCNC: 8.5 MG/DL (ref 8.6–10.4)
CHLORIDE BLD-SCNC: 107 MMOL/L (ref 98–107)
CHOLESTEROL/HDL RATIO: 2
CHOLESTEROL: 138 MG/DL
CO2: 20 MMOL/L (ref 20–31)
CREAT SERPL-MCNC: 0.66 MG/DL (ref 0.7–1.2)
GFR AFRICAN AMERICAN: >60 ML/MIN
GFR NON-AFRICAN AMERICAN: >60 ML/MIN
GFR SERPL CREATININE-BSD FRML MDRD: ABNORMAL ML/MIN/{1.73_M2}
GFR SERPL CREATININE-BSD FRML MDRD: ABNORMAL ML/MIN/{1.73_M2}
GLUCOSE BLD-MCNC: 94 MG/DL (ref 70–99)
HCT VFR BLD CALC: 41.2 % (ref 40.7–50.3)
HDLC SERPL-MCNC: 68 MG/DL
HEMOGLOBIN: 13.3 G/DL (ref 13–17)
LDL CHOLESTEROL: 57 MG/DL (ref 0–130)
MCH RBC QN AUTO: 33 PG (ref 25.2–33.5)
MCHC RBC AUTO-ENTMCNC: 32.3 G/DL (ref 28.4–34.8)
MCV RBC AUTO: 102.2 FL (ref 82.6–102.9)
NRBC AUTOMATED: 0 PER 100 WBC
PDW BLD-RTO: 12 % (ref 11.8–14.4)
PLATELET # BLD: 207 K/UL (ref 138–453)
PMV BLD AUTO: 10 FL (ref 8.1–13.5)
POTASSIUM SERPL-SCNC: 3.6 MMOL/L (ref 3.7–5.3)
RBC # BLD: 4.03 M/UL (ref 4.21–5.77)
SODIUM BLD-SCNC: 138 MMOL/L (ref 135–144)
TOTAL PROTEIN: 5.7 G/DL (ref 6.4–8.3)
TRIGL SERPL-MCNC: 65 MG/DL
VLDLC SERPL CALC-MCNC: NORMAL MG/DL (ref 1–30)
WBC # BLD: 11.1 K/UL (ref 3.5–11.3)

## 2021-05-10 PROCEDURE — 97535 SELF CARE MNGMENT TRAINING: CPT

## 2021-05-10 PROCEDURE — 97116 GAIT TRAINING THERAPY: CPT

## 2021-05-10 PROCEDURE — 6370000000 HC RX 637 (ALT 250 FOR IP): Performed by: INTERNAL MEDICINE

## 2021-05-10 PROCEDURE — 6360000002 HC RX W HCPCS: Performed by: INTERNAL MEDICINE

## 2021-05-10 PROCEDURE — 1200000000 HC SEMI PRIVATE

## 2021-05-10 PROCEDURE — 2500000003 HC RX 250 WO HCPCS: Performed by: INTERNAL MEDICINE

## 2021-05-10 PROCEDURE — 82248 BILIRUBIN DIRECT: CPT

## 2021-05-10 PROCEDURE — 36415 COLL VENOUS BLD VENIPUNCTURE: CPT

## 2021-05-10 PROCEDURE — 97530 THERAPEUTIC ACTIVITIES: CPT

## 2021-05-10 PROCEDURE — 99232 SBSQ HOSP IP/OBS MODERATE 35: CPT | Performed by: INTERNAL MEDICINE

## 2021-05-10 PROCEDURE — 94640 AIRWAY INHALATION TREATMENT: CPT

## 2021-05-10 PROCEDURE — 80053 COMPREHEN METABOLIC PANEL: CPT

## 2021-05-10 PROCEDURE — 85027 COMPLETE CBC AUTOMATED: CPT

## 2021-05-10 PROCEDURE — 94760 N-INVAS EAR/PLS OXIMETRY 1: CPT

## 2021-05-10 PROCEDURE — 99222 1ST HOSP IP/OBS MODERATE 55: CPT | Performed by: INTERNAL MEDICINE

## 2021-05-10 PROCEDURE — 97163 PT EVAL HIGH COMPLEX 45 MIN: CPT

## 2021-05-10 PROCEDURE — 80061 LIPID PANEL: CPT

## 2021-05-10 PROCEDURE — 2580000003 HC RX 258: Performed by: INTERNAL MEDICINE

## 2021-05-10 PROCEDURE — 97167 OT EVAL HIGH COMPLEX 60 MIN: CPT

## 2021-05-10 RX ORDER — METHYLPREDNISOLONE SODIUM SUCCINATE 40 MG/ML
40 INJECTION, POWDER, LYOPHILIZED, FOR SOLUTION INTRAMUSCULAR; INTRAVENOUS EVERY 8 HOURS
Status: DISCONTINUED | OUTPATIENT
Start: 2021-05-10 | End: 2021-05-11

## 2021-05-10 RX ADMIN — IPRATROPIUM BROMIDE AND ALBUTEROL SULFATE 1 AMPULE: .5; 3 SOLUTION RESPIRATORY (INHALATION) at 20:32

## 2021-05-10 RX ADMIN — LEVOFLOXACIN 750 MG: 5 INJECTION, SOLUTION INTRAVENOUS at 20:38

## 2021-05-10 RX ADMIN — SODIUM CHLORIDE: 9 INJECTION, SOLUTION INTRAVENOUS at 14:10

## 2021-05-10 RX ADMIN — METHYLPREDNISOLONE SODIUM SUCCINATE 40 MG: 40 INJECTION, POWDER, FOR SOLUTION INTRAMUSCULAR; INTRAVENOUS at 17:56

## 2021-05-10 RX ADMIN — Medication 100 MG: at 09:43

## 2021-05-10 RX ADMIN — LORAZEPAM 1 MG: 2 INJECTION, SOLUTION INTRAMUSCULAR; INTRAVENOUS at 20:37

## 2021-05-10 RX ADMIN — BUDESONIDE AND FORMOTEROL FUMARATE DIHYDRATE 2 PUFF: 160; 4.5 AEROSOL RESPIRATORY (INHALATION) at 20:32

## 2021-05-10 RX ADMIN — CLINDAMYCIN PHOSPHATE 300 MG: 300 INJECTION, SOLUTION INTRAVENOUS at 01:47

## 2021-05-10 RX ADMIN — IPRATROPIUM BROMIDE AND ALBUTEROL SULFATE 1 AMPULE: .5; 3 SOLUTION RESPIRATORY (INHALATION) at 15:45

## 2021-05-10 RX ADMIN — IPRATROPIUM BROMIDE AND ALBUTEROL SULFATE 1 AMPULE: .5; 3 SOLUTION RESPIRATORY (INHALATION) at 07:17

## 2021-05-10 RX ADMIN — CLINDAMYCIN PHOSPHATE 300 MG: 300 INJECTION, SOLUTION INTRAVENOUS at 09:44

## 2021-05-10 RX ADMIN — LORAZEPAM 1 MG: 2 INJECTION, SOLUTION INTRAMUSCULAR; INTRAVENOUS at 00:22

## 2021-05-10 RX ADMIN — TRAZODONE HYDROCHLORIDE 100 MG: 100 TABLET ORAL at 20:33

## 2021-05-10 RX ADMIN — CARVEDILOL 12.5 MG: 12.5 TABLET, FILM COATED ORAL at 09:51

## 2021-05-10 RX ADMIN — IPRATROPIUM BROMIDE AND ALBUTEROL SULFATE 1 AMPULE: .5; 3 SOLUTION RESPIRATORY (INHALATION) at 11:49

## 2021-05-10 RX ADMIN — ZONISAMIDE 100 MG: 100 CAPSULE ORAL at 09:43

## 2021-05-10 RX ADMIN — ONDANSETRON 4 MG: 2 INJECTION INTRAMUSCULAR; INTRAVENOUS at 12:01

## 2021-05-10 RX ADMIN — OXYCODONE AND ACETAMINOPHEN 1 TABLET: 5; 325 TABLET ORAL at 12:01

## 2021-05-10 RX ADMIN — SODIUM CHLORIDE: 9 INJECTION, SOLUTION INTRAVENOUS at 00:26

## 2021-05-10 RX ADMIN — CARVEDILOL 12.5 MG: 12.5 TABLET, FILM COATED ORAL at 17:55

## 2021-05-10 RX ADMIN — OXYCODONE AND ACETAMINOPHEN 1 TABLET: 5; 325 TABLET ORAL at 20:33

## 2021-05-10 RX ADMIN — FOLIC ACID 1 MG: 1 TABLET ORAL at 09:43

## 2021-05-10 RX ADMIN — ENOXAPARIN SODIUM 40 MG: 40 INJECTION SUBCUTANEOUS at 09:44

## 2021-05-10 RX ADMIN — PANTOPRAZOLE SODIUM 40 MG: 40 TABLET, DELAYED RELEASE ORAL at 06:01

## 2021-05-10 RX ADMIN — CLINDAMYCIN PHOSPHATE 300 MG: 300 INJECTION, SOLUTION INTRAVENOUS at 17:58

## 2021-05-10 RX ADMIN — BUDESONIDE AND FORMOTEROL FUMARATE DIHYDRATE 2 PUFF: 160; 4.5 AEROSOL RESPIRATORY (INHALATION) at 07:17

## 2021-05-10 ASSESSMENT — ENCOUNTER SYMPTOMS
ABDOMINAL PAIN: 0
BACK PAIN: 1
COUGH: 1
SHORTNESS OF BREATH: 0
NAUSEA: 0
VOMITING: 0

## 2021-05-10 ASSESSMENT — PAIN DESCRIPTION - DESCRIPTORS: DESCRIPTORS: ACHING

## 2021-05-10 ASSESSMENT — PAIN DESCRIPTION - PAIN TYPE: TYPE: CHRONIC PAIN

## 2021-05-10 ASSESSMENT — PAIN SCALES - GENERAL
PAINLEVEL_OUTOF10: 4
PAINLEVEL_OUTOF10: 5
PAINLEVEL_OUTOF10: 7
PAINLEVEL_OUTOF10: 1

## 2021-05-10 ASSESSMENT — PAIN DESCRIPTION - LOCATION
LOCATION: CHEST
LOCATION: CHEST

## 2021-05-10 NOTE — PLAN OF CARE
Problem: Falls - Risk of:  Goal: Will remain free from falls  Description: Will remain free from falls  Outcome: Met This Shift  Goal: Absence of physical injury  Description: Absence of physical injury  Outcome: Met This Shift   Ineffective airway plan of care initiated. Pt has occasional productive cough. IV antibiotics continued. IV solumedrol initiated this afternoon. Pt does have bilateral expiratory wheezing. No acute respiratory distress. Oxygen sats low 90's at room air. Labs noted. No signs of DT's.

## 2021-05-10 NOTE — PROGRESS NOTES
of consciousness or head strike. He endorses chronic diffuse abdominal pain that he describes as dull and aching. He reports nausea and vomiting nearly ever morning. He has been following outpatient with GI for these issues. He denies fever, chills or additional symptomology. No further modifying factors. He has past medical history that includes COPD, GERD and CVA. The patient endorses daily alcohol consumption. He states he drinks 4-6, 24oz beers and 5-10 shots of liquor a day. His last alcoholic drink was today.     Rapid SARS-CoV-2 negative.     X-ray left ribs including chest shows suspicious cavitary lesion within the left upper lung, which may reflect either cavitary inflammation or infection versus a cavitary neoplasm. Chest CT is necessary at this time.     CT chest with contrast shows:  1. Masslike airspace consolidative density with central cavitation noted in the superior segment of the left lower lobe about 4.7 x 4.0 cm.  While this could be pneumonia, it remains concerning for malignancy.  Please correlate clinically. Short interval 6 week follow-up is recommended if treated as pneumonia. 2. Severe diffuse patchy hepatic steatosis noted which could obscure liver lesion such as metastasis.  In light of the left lower lobe lesion, consideration may be given to contrast enhanced MRI to exclude metastasis particularly 8th malignancy remains a concern after clinical evaluation. 3. Emphysema.        EGD 3/25/2021 with Dr. Ugo Hidalgo. Post-Op Diagnosis: Small hiatal hernia, gastritis, retained food in the stomach, irregular Z-line. Healing of previously seen esophagitis.  Duodenal polyp. Known history of Soliz's     Findings: 1-small hiatal hernia  2-retained food in the stomach  3-irregular Z-line.  Biopsies were taken to rule out Soliz's esophagus. 4-patchy hyperemia of the stomach.  Biopsies were taken to rule out H. Pylori.   5-4 mm sessile polyp in the duodenal bulb that was removed with biopsy. Initial plan has included:  Pneumonia- IV clindamycin and levofloxacin. COPD- Nebulizer treatments. Supplemental oxygen as needed. GERD- pantoprazole. Hypertension- continue carvedilol. Dyslipidemia- check lipid panel. Not currently on statin therapy. Alcohol abuse- thiamine and folate supplementation, observe for withdrawal symptoms, beer with meals. Patient would benefit from outpatient rehabilitation program.  Tobacco dependence- smoking cessation, nicotine patch. Monitor vital signs. Follow chemistries. DVT prophylaxis. General diet. Activity as tolerated with assist.   PT/OT. Consult social work for discharge planning. \"         Medications: Allergies:     Allergies   Allergen Reactions    Pcn [Penicillins] Other (See Comments)     Pt unsure of reaction this is from childhood       Current Meds:   Scheduled Meds:    methylPREDNISolone  40 mg Intravenous Q8H    carvedilol  12.5 mg Oral BID WC    folic acid  1 mg Oral Daily    traZODone  100 mg Oral Nightly    zonisamide  100 mg Oral Daily    pantoprazole  40 mg Oral QAM AC    budesonide-formoterol  2 puff Inhalation BID    sodium chloride flush  5-40 mL Intravenous 2 times per day    enoxaparin  40 mg Subcutaneous Daily    ipratropium-albuterol  1 ampule Inhalation Q4H WA    levofloxacin  750 mg Intravenous Q24H    clindamycin (CLEOCIN) IV  300 mg Intravenous Q8H    thiamine mononitrate  100 mg Oral Daily     Continuous Infusions:    sodium chloride 75 mL/hr at 05/10/21 1410    sodium chloride       PRN Meds: oxyCODONE-acetaminophen, sodium chloride flush, sodium chloride, nicotine, promethazine **OR** ondansetron, magnesium hydroxide, acetaminophen **OR** acetaminophen, albuterol, LORazepam    Data:     Past Medical History:   has a past medical history of Arthritis, Asthma, Attempted suicide ([facility] [address] Bipolar affective ([facility] [address] Blood in stool, CHF (congestive heart failure), NYHA class III ([address] COPD (chronic Conjunctivae normal.   Neck:      Musculoskeletal: Neck supple. Trachea: No tracheal deviation. Cardiovascular:      Rate and Rhythm: Normal rate and regular rhythm. Pulmonary:      Effort: Pulmonary effort is normal. No respiratory distress. Breath sounds: Rhonchi present. No wheezing or rales. Chest:      Chest wall: No tenderness. Abdominal:      General: Bowel sounds are normal. There is no distension. Palpations: Abdomen is soft. Tenderness: There is no abdominal tenderness. Musculoskeletal:         General: No tenderness. Skin:     General: Skin is warm and dry. Neurological:      General: No focal deficit present. Mental Status: He is alert and oriented to person, place, and time. Comments: No tremor  No asterixis         Vitals:  BP (!) 146/77   Pulse 91   Temp 98.4 °F (36.9 °C) (Oral)   Resp 16   Ht 6' 3\" (1.905 m)   Wt 167 lb 11.2 oz (76.1 kg)   SpO2 92%   BMI 20.96 kg/m²   Temp (24hrs), Av.2 °F (36.8 °C), Min:98.1 °F (36.7 °C), Max:98.4 °F (36.9 °C)    No results for input(s): POCGLU in the last 72 hours. I/O (24Hr):     Intake/Output Summary (Last 24 hours) at 5/10/2021 1717  Last data filed at 5/10/2021 5988  Gross per 24 hour   Intake 1317 ml   Output 2650 ml   Net -1333 ml       Labs:  Hematology:  Recent Labs     21  1500 21  0700 05/10/21  0601   WBC 11.5* 8.5 11.1   RBC 4.70 4.09* 4.03*   HGB 15.9 13.6 13.3   HCT 49.2 42.7 41.2   .7* 104.4* 102.2   MCH 33.8* 33.3 33.0   MCHC 32.3 31.9 32.3   RDW 12.5 12.1 12.0    182 207   MPV 9.5 10.0 10.0     Chemistry:  Recent Labs     21  1500 21  0700 05/10/21  0601    136 138   K 4.2 4.2 3.6*    105 107   CO2 19* 20 20   GLUCOSE 120* 135* 94   BUN 8 8 8   CREATININE 0.75 0.74 0.66*   ANIONGAP 14 11 11   LABGLOM >60 >60 >60   GFRAA >60 >60 >60   CALCIUM 9.8 8.5* 8.5*   PROBNP 147  --   --      Recent Labs     05/10/21  0601   PROT 5.7*   LABALBU 3.1* AST 32   ALT 26   ALKPHOS 72   BILITOT 0.28*   BILIDIR 0.10   CHOL 138   HDL 68   LDLCHOLESTEROL 57   CHOLHDLRATIO 2.0   TRIG 65   VLDL NOT REPORTED     ABG:  Lab Results   Component Value Date    PHART 7.44 08/31/2012    MFQ4BQA 40 08/31/2012    PO2ART 287 08/31/2012    IFK6NKW 26.8 08/31/2012    NBEA NOT REPORTED 08/31/2012    PBEA 3 08/31/2012    DMD6CAZ 28 08/31/2012    J2LDIUCU 100 08/31/2012    FIO2 100.0 08/31/2012     Lab Results   Component Value Date/Time    SPECIAL NOT REPORTED 05/08/2021 05:40 PM     Lab Results   Component Value Date/Time    CULTURE NO GROWTH 2 DAYS 05/08/2021 05:40 PM       Radiology:  [facility] Chest (2 Vw)    Result Date: 5/9/2021  Stable left upper lobe cavitary lesion. Please refer to previous CT for further detail     Xr Ribs Left Include Chest (min 3 Views)    Result Date: 5/8/2021  Suspicious cavitary lesion within the left upper lung, which may reflect either cavitary inflammation or infection versus a cavitary neoplasm. Chest CT is necessary at this time. Ct Chest W Contrast    Result Date: 5/8/2021  1. Masslike airspace consolidative density with central cavitation noted in the superior segment of the left lower lobe about 4.7 x 4.0 cm. While this could be pneumonia, it remains concerning for malignancy. Please correlate clinically. Short interval 6 week follow-up is recommended if treated as pneumonia. 2. Severe diffuse patchy hepatic steatosis noted which could obscure liver lesion such as metastasis. In light of the left lower lobe lesion, consideration may be given to contrast enhanced MRI to exclude metastasis particularly 8th malignancy remains a concern after clinical evaluation. 3. Emphysema.          Assessment:        Principal Problem:    Cavitary pneumonia  Active Problems:    GERD (gastroesophageal reflux disease)    COPD (chronic obstructive pulmonary disease) (HCC)    Dyslipidemia    Essential hypertension    Alcohol abuse    Tobacco dependence    Soliz's esophagus with esophagitis  Resolved Problems:    * No resolved hospital problems.  *      Plan:        Antibiotics  -Levaquin  -Cleocin  Pulmonary eval & f/u as scheduled Dr Caity Jackson  Will need follow-up CT chest surveillance of cavitation  Respiratory therapy  Reflux precautions  Aspiration precautions  Ativan  Thiamine  Observe for DTs  Smoking cessation  Blood Pressure - Monitor and control   The patient is not interested in alcohol abstinence  He will be offered a beer with meals  (Reports he used to drink a sixpack in the morning before work, a case of beer and a bottle of [employer] GreenSandnifin during work, and then out to the bars for more drinking in the evening)  GI follow-up Dr. David Zambrano re: Soliz's esophagitis, GES  Reflux precautions     IP CONSULT TO INTERNAL MEDICINE  IP CONSULT TO SOCIAL WORK  IP CONSULT TO PULMONOLOGY  IP CONSULT TO [facility] [address], DO  5/10/2021  5:17 PM

## 2021-05-10 NOTE — PROGRESS NOTES
diagnosis was Cavitary pneumonia. has a past medical history of Arthritis, Asthma, Attempted suicide ([facility] [address] Bipolar affective ([address] Blood in stool, CHF (congestive heart failure), NYHA class III ([address] COPD (chronic obstructive pulmonary disease) ([address] CVA (cerebral vascular accident) ([address] Depression, Difficult intubation, Erectile dysfunction, Fracture of wrist, GERD (gastroesophageal reflux disease), Head injury, Hepatitis, HLD (hyperlipidemia), Hypertension, Insomnia, Left wrist pain, Pain, Seasonal allergies, Seizures ([address] Spondylosis of cervical region without myelopathy or radiculopathy, Stroke ([address] Vomiting, and Wears glasses. has a past surgical history that includes fracture surgery; hernia repair; knee surgery; Toe Surgery (Right); Biceps tendon repair; Tibia fracture surgery (Left, 07/05/2013); Cardiac surgery; Umbilical hernia repair (04/08/2014); Colonoscopy (05/19/2014); Nerve Block (Left, 10/23/2017); Anesthesia Nerve Block (Left, 10/23/2017); Endoscopy, colon, diagnostic; Colonoscopy (N/A, 12/31/2020); Upper gastrointestinal endoscopy (N/A, 12/31/2020); and Upper gastrointestinal endoscopy (N/A, 03/25/2021).            Restrictions  Restrictions/Precautions  Restrictions/Precautions: General Precautions, Fall Risk, Up as Tolerated    Subjective   General  Chart Reviewed: Yes  Patient assessed for rehabilitation services?: Yes  Family / Caregiver Present: No  Height and Weight  Height: 6' 3\" (190.5 cm)  Social/Functional History  Social/Functional History  Lives With: Significant other(in and out- not always home)  Type of Home: House  Home Layout: Two level(sleeps on first floor on couch; uses full bath in basement)  Home Access: Stairs to enter with rails  Entrance Stairs - Number of Steps: 6  Entrance Stairs - Rails: Both  Bathroom Shower/Tub: Walk-in shower  Bathroom Toilet: Standard  Bathroom Equipment: Shower chair  Home Equipment: Cane, Rolling walker, [employer]  ADL Assistance: Independent  Homemaking Assistance: Independent  Ambulation Assistance: Independent(uses cane in community)  Transfer Assistance: Independent  Active : No(pt. has seizures)  Occupation: On disability  Type of occupation: printer  Leisure & Hobbies: watch soap [employer]  Additional Comments: Fall Hx:  20 falls in last 3 months; pt. states he \"needs a new hip. \"    Pt. does not manage his own meds. Objective   Vision: Impaired  Vision Exceptions: Wears glasses for reading  Hearing: Within functional limits    Orientation  Overall Orientation Status: Within Functional Limits  Observation/Palpation  Posture: Fair  Observation: IV; Entered room to find pt. had vomited- states he \"vomits every day\" and RN is aware;  Balance  Sitting Balance: Supervision  Standing Balance: Contact guard assistance  Functional Mobility  Functional - Mobility Device: Cane  Activity: To/from bathroom  Assist Level: Contact guard assistance  Functional Mobility Comments: Pt initially using cane for mob in room. Also trialling walker. Pt benefits from support of RW vs cane as pt reports having frequent falls at home with hip giving out. Pt shuffles with cane and with RW pt 's step length smooths out somewhat. Pt is resistive to RW despite education. Toilet Transfers  Equipment Used: Standard toilet  Toilet Transfer: Contact guard assistance  ADL  Feeding: Independent  Grooming: Stand by assistance;Setup  UE Bathing: Stand by assistance  LE Bathing: Contact guard assistance  UE Dressing: Stand by assistance  LE Dressing: Contact guard assistance  Toileting: Contact guard assistance  Additional Comments: CGA for standing portions of ADLs d/t dec. balance. Pt ed on fall prevention [employer] use of DME (shower chair, long handled sponge, etc).   Tone RUE  RUE Tone: Normotonic  Tone LUE  LUE Tone: Normotonic  Coordination  Movements Are Fluid And Coordinated: Yes     Bed mobility  Supine to Sit: Stand by assistance  Sit to Supine: Stand by assistance  Transfers  Sit to stand: Contact guard assistance;Stand by assistance  Stand to sit: Contact guard assistance;Stand by assistance     Cognition  Overall Cognitive Status: Exceptions  Safety Judgement: Decreased awareness of need for assistance;Decreased awareness of need for safety  Problem Solving: Assistance required to identify errors made;Assistance required to generate solutions  Insights: Decreased awareness of deficits  Cognition Comment: flat affect  Perception  Overall Perceptual Status: WFL     Sensation  Overall Sensation Status: Impaired(numb digits 4-5 L hand; states it started after his covid vaccination)        LUE AROM (degrees)  LUE AROM : WFL  RUE AROM (degrees)  RUE AROM : WFL  LUE Strength  Gross LUE Strength: WFL  LUE Strength Comment: B UE's 4+/5  RUE Strength  Gross RUE Strength: WFL                   Plan   Plan  Times per week: 4-5x/week, 1-2x/day  Current Treatment Recommendations: Strengthening, Balance Training, Functional Mobility Training, Endurance Training, Safety Education & Training, Self-Care / ADL, Patient/Caregiver Education & Training, Equipment Evaluation, Education, & procurement, Positioning         Goals  Short term goals  Time Frame for Short term goals: by discharge, pt will  Short term goal 1: demo S/MI with ADL transfers and functional mob in room for ADL completion with approp AD/DME and good safety  Short term goal 2: demo S/MI with toileting routine with good safety  Short term goal 3: demo I with UB ADLs and S/MI with LB ADLs with good safety/pacing and DME as needed  Short term goal 4: demo and verb good understanding of fall prevention techs, EC/WS techs, equip needs, and B UE HEP  Patient Goals   Patient goals : to go home       Therapy Time   Individual Concurrent Group Co-treatment   Time In 1136         Time Out 1210         Minutes 34          treatment min: 24           Spenser Gaspar OT

## 2021-05-10 NOTE — PROGRESS NOTES
Physical Therapy    Facility/Department: [facility]  Initial Assessment    NAME: Tina Piña  : 1960  MRN: [id number]    Date of Service: 5/10/2021    Discharge Recommendations:    Due to recent hospitalization and medical condition, pt would benefit from additional therapy at time of discharge to ensure safety. Please refer to the AM-PAC score for current functional status. Tina Piña is a 64 y.o. male who presents to the emergency department via private auto for left rib pain, SOB, cough, wheezing. He has a history of COPD. 2 weeks ago he tripped over his dog and fell, injuring the rib area. His discomfort and SOB have increased over the past 2 weeks. Denies fever, chills, N/V. Rates his pain 10/10 at this time.        Assessment   Body structures, Functions, Activity limitations: Decreased functional mobility ; Decreased ROM; Decreased strength;Decreased endurance;Decreased safe awareness;Decreased balance  Assessment: Pt. amb. in hallway and proving safest with RW.  Pt. however, does not care for RW and plans on continuing to use his st. cane at home. He does own RW so edu. provided as to where/when to use. Specific instructions for Next Treatment: progress gait/balance  Prognosis: Excellent  Decision Making: High Complexity  PT Education: Goals;PT Role;Plan of Care;General Safety  Patient Education: Encouraged pt. to sit on EOB to eat meals-  Edu. for increased lung expansion in sitting vs. supine. REQUIRES PT FOLLOW UP: Yes  Activity Tolerance  Activity Tolerance: Patient limited by fatigue;Patient limited by endurance       Patient Diagnosis(es): The encounter diagnosis was Cavitary pneumonia.      has a past medical history of Arthritis, Asthma, Attempted suicide ([address] Bipolar affective ([address] Blood in stool, CHF (congestive heart failure), NYHA class III ([address] COPD (chronic obstructive pulmonary disease) ([address] CVA (cerebral vascular accident) ([address] Depression, Difficult Assistance: Independent  Active : No(pt. has seizures)  Occupation: On disability  Type of occupation: printer  Leisure & Hobbies: watch soap operas  Additional Comments: Fall Hx:  20 falls in last 3 months; pt. states he \"needs a new hip. \"    Pt. does not manage his own meds. Cognition   Cognition  Overall Cognitive Status: Exceptions  Safety Judgement: Decreased awareness of need for assistance;Decreased awareness of need for safety  Problem Solving: Assistance required to identify errors made;Assistance required to generate solutions  Insights: Decreased awareness of deficits  Cognition Comment: flat affect    Objective     Observation/Palpation  Observation: IV; Entered room to find pt. had vomited- states he \"vomits every day\" and RN is aware;    AROM RLE (degrees)  RLE AROM: WFL  AROM LLE (degrees)  LLE AROM : WFL  Strength RLE  Strength RLE: WFL  Strength LLE  Strength LLE: WFL  Strength Other  Other: See OT eval for UE ROM/MMT  Tone RLE  RLE Tone: Normotonic  Tone LLE  LLE Tone: Normotonic  Sensation  Overall Sensation Status: Impaired(numb digits 4-5 L hand; states it started after his covid vaccination)  Bed mobility  Supine to Sit: Stand by assistance  Sit to Supine: Stand by assistance  Transfers  Sit to Stand: Contact guard assistance  Stand to sit: Contact guard assistance  Ambulation  Ambulation?: Yes  Ambulation 1  Surface: level tile  Device: No Device; Single point cane;Rolling Walker  Assistance: Minimal assistance;Contact guard assistance  Quality of Gait: Pt. with decreased step length and rigid trunk. Shuffles feet with no device. With st. cane step length improved, however not able to step through and still unsteady. Improved moreso with RW.  Pt. reports falling with st. cane at home. Edu. provided as to how RW offers more support. Pt. did not acknowledge this and stated he feels better with st. cane. Continued to recommend RW, as pt. owns one at home. Distance: 25ft.  no device; 90ft. st. cane, 100ft. RW     Balance  Posture: Fair  Sitting - Static: Good  Sitting - Dynamic: Good  Standing - Static: Fair;+  Exercises  Comments: Edu. to perform AROM bilat. UEs/ LEs on own in room as able. Plan   Plan  Times per week: 1-2x/day; 5-6days/wk  Specific instructions for Next Treatment: progress gait/balance  Current Treatment Recommendations: Strengthening, ROM, Balance Training, Functional Mobility Training, Transfer Training, Gait Training, Endurance Training, Patient/Caregiver Education & Training, Safety Education & Training, Home Exercise Program  Safety Devices  Type of devices: All fall risk precautions in place, Gait belt, Bed alarm in place, Patient at risk for falls, Call light within reach, Left in bed, Nurse notified    G-Code       OutComes Score                                                  AM-PAC Score  AM-PAC Inpatient Mobility Raw Score : 21 (05/10/21 1257)  AM-PAC Inpatient T-Scale Score : 50.25 (05/10/21 1257)  Mobility Inpatient CMS 0-100% Score: 28.97 (05/10/21 1257)  Mobility Inpatient CMS G-Code Modifier : Hannah Sean (05/10/21 1257)          Goals  Short term goals  Time Frame for Short term goals: 12 visits:  Short term goal 1: Pt. to be indep with bed mob. Short term goal 2: Pt. to be indep with transfers with approp. AD  Short term goal 3: Pt. to be SBA for gait with approp. AD, 50ft. (household distance)  Short term goal 4: Pt. to have good standing dynamic balance with UE support  Short term goal 5: Pt. to tolerate 25+ min. of PT for ther ex/ gait/ balance training  Patient Goals   Patient goals : d/c home       Therapy Time   Individual Concurrent Group Co-treatment   Time In 1105         Time Out 1138         Minutes 33              Treatment time:  23min.      Greg Gusman, PT

## 2021-05-10 NOTE — PROGRESS NOTES
Comprehensive Nutrition Assessment    Type and Reason for Visit:  Initial, Positive Nutrition Screen(Nausea and vomiting/ poor appetite)    Nutrition Recommendations/Plan:   1. Continue with current General diet: may have beer with meals  2. Start Ensure Enlive 2x/d increase to 3x/d as tolerated  3. Monitor p.o intakes, GI status, N/V, supplement acceptance, and labs    Nutrition Assessment:  Patient admitted for pneumonia with cavity of lung. Patient presented with shortness of breath and has hx: COPD, GERD, CVA. Patient states following up at [facility] where he had an EGD and they found food was still in his stomach. Patient states change in appetite/intake due to this. \"Used to be able to eat two foot long subs at [place] and now can only eat 1/4 of one sub\". Weight gain noted per patient- states he was 164lb and now is 174lb even thought he is eating less. With complaints of bloating. Patient reports nausea/vomiting every morning however, patient did report that he is a daily drinker. Daily alcohol intake: 4-6, 24oz beers + 5-10 shots of liquid/day. 100% chicken noodle soup and iced tea for lunch. Patient states eating breakfast however, being sick right after eating. Patient states he does okay with fluids/liquids- recommended continuing boost supplement at home and Ensure supplements while admitted here. Patient agreeable. Will add Ensure Enlive 2x/d for patients report of poor appetite. Will monitor p.o intakes, GI status with N/V, supplement acceptance, and labs. Malnutrition Assessment:  Malnutrition Status:   At risk for malnutrition (Comment)    Context:  Chronic Illness     Findings of the 6 clinical characteristics of malnutrition:  Energy Intake:  7 - 75% or less estimated energy requirements for 1 month or longer  Weight Loss:  No significant weight loss     Body Fat Loss:  Unable to assess     Muscle Mass Loss:  Unable to assess    Fluid Accumulation:  No significant fluid accumulation  Strength:  Not Performed    Estimated Daily Nutrient Needs:  Energy (kcal):  2166 kcal (1.3 activity factor); Weight Used for Energy Requirements:  Current Protein (g):   (1.2-1.4 g/kg); Weight Used for Protein Requirements:  Current        Method Used for Fluid Requirements:  1 ml/kcal      Nutrition Related Findings:  GI: WDL; no edema; daily alcohol use- patient only drinks at 6-7pm so he can sleep at night. N/V every day. EGD at [facility]      Wounds:  None       Current Nutrition Therapies:    DIET GENERAL;  Dietary Nutrition Supplements: Other Oral Supplement (see comment)    Anthropometric Measures:  · Height: 6' 3\" (190.5 cm)  · Current Body Weight: 167 lb 11.2 oz (76.1 kg)   · Admission Body Weight: 170 lb (77.1 kg)(stated)    · Usual Body Weight: 164 lb (74.4 kg)     · Ideal Body Weight: 196 lbs; % Ideal Body Weight 85.6 %   · BMI: 21  · Adjusted Body Weight:  ; No Adjustment    · BMI Categories: Normal Weight (BMI 18.5-24. 9)       Nutrition Diagnosis:   · Predicted inadequate energy intake related to inadequate protein-energy intake, altered GI function as evidenced by poor intake prior to admission, vomiting, nausea, other (comment)(bloating)    Nutrition Interventions:   Food and/or Nutrient Delivery:  Continue Current Diet, Start Oral Nutrition Supplement  Nutrition Education/Counseling:  Education not indicated   Coordination of Nutrition Care:  Continue to monitor while inpatient    Goals:  p.o intakes >50%       Nutrition Monitoring and Evaluation:   Behavioral-Environmental Outcomes:  None Identified   Food/Nutrient Intake Outcomes:  Food and Nutrient Intake, Supplement Intake  Physical Signs/Symptoms Outcomes:  Biochemical Data, Nausea or Vomiting, Weight     Discharge Planning:    Continue current diet, Continue Oral Nutrition Supplement     Rosina Meraz, [address] [place] [address]  Office Number: [phone]

## 2021-05-10 NOTE — PLAN OF CARE
Problem: Falls - Risk of:  Goal: Will remain free from falls  Description: Will remain free from falls  5/10/2021 0005 by Tejas Bishop RN  Outcome: Ongoing  Note: Siderails up x 2  Hourly rounding  Call light in reach  Instructed to call for assist before attempting out of bed. Remains free from falls and accidental injury at this time   Floor free from obstacles  Bed is locked and in lowest position  Adequate lighting provided  Bed alarm on, Red Falling star and Stay with Me signs posted         Problem: Falls - Risk of:  Goal: Absence of physical injury  Description: Absence of physical injury  5/10/2021 0005 by Tejas Bishop RN  Outcome: Ongoing     Problem: Skin Integrity:  Goal: Will show no infection signs and symptoms  Description: Will show no infection signs and symptoms  5/10/2021 0005 by Tejas Bishop RN  Outcome: Ongoing     Problem: Skin Integrity:  Goal: Absence of new skin breakdown  Description: Absence of new skin breakdown  5/10/2021 0005 by Tejas Bishop RN  Outcome: Ongoing  Note: Checked for incontinence every 2 hours and prn. Pericare as needed. Assisted to reposition off back frequently. Heels off bed with pillows.        Problem: SAFETY  Goal: Free from accidental physical injury  5/10/2021 0005 by Tejas Bishop RN  Outcome: Ongoing     Problem: SAFETY  Goal: Free from intentional harm  5/10/2021 0005 by Tejas Bishop RN  Outcome: Ongoing     Problem: PAIN  Goal: Patient's pain/discomfort is manageable  5/10/2021 0005 by Tejas Bishop RN  Outcome: Ongoing     Problem: SKIN INTEGRITY  Goal: Skin integrity is maintained or improved  5/10/2021 0005 by Tejas Bishop RN  Outcome: Ongoing

## 2021-05-10 NOTE — PLAN OF CARE
Nutrition Problem #1: Predicted inadequate energy intake  Intervention: Food and/or Nutrient Delivery: Continue Current Diet, Start Oral Nutrition Supplement  Nutritional Goals: p.o intakes >50%

## 2021-05-10 NOTE — CONSULTS
[facility] -   Infectious diseases evaluation  admission date 5/8/2021    reason for consultation:   Cavitary lung lesion    Impression :   Current:  · Masslike airspace consolidative density in the left lower lobe with central cavitation differential diagnosis includes infection or malignancy. · COPD/emphysema  · Alcohol abuse  · Penicillin allergy      Recommendations   · IV Levaquin and clindamycin  · Respiratory culture  · Follow blood cultures  · Procalcitonin level in a.m.  · HIV screen  · Consider bronchoscopy  · COVID-19 rapid test was negative 5/8/2021        History of Present Illness:   Initial history:  Mor Obrien is a 64y.o.-year-old male presented to the hospital with left-sided pleuritic pain associated with shortness of breath, cough productive of yellow phlegm, worsening over 2 weeks. The pain is intermittent, worse with movement and deep breath, no alleviating factors. He denied any recent travel or sick contacts  He denied fever or chills, denied nausea or vomiting, no other complaints. He drinks 6 beers and 4 shots of tequila daily for more than 15 years the patient also smokes actively  CT chest showed Masslike airspace consolidative density in the left lower lobe with central cavitation . He denied recent travel or sick contacts    Interval changes  5/10/2021   Patient Vitals for the past 8 hrs:   BP Temp Temp src Pulse Resp SpO2 Height   05/10/21 1833 (!) 171/103 98.2 °F (36.8 °C) Oral 85 18 91 %    05/10/21 1755 (!) 177/104   82      05/10/21 1338       6' 3\" (1.905 m)           I have personally reviewed the past medical history, past surgical history, medications, social history, and family history, and I haveupdated the database accordingly.       Allergies:   Pcn [penicillins]     Review of Systems:     Review of Systems  As per history present illness, other than above 12 system review was negative  Physical Examination :       Physical Exam  Constitutional:       General: He is not in acute distress. HENT:      Head: Normocephalic and atraumatic. Nose: Nose normal. No congestion. Mouth/Throat:      Pharynx: Oropharynx is clear. No posterior oropharyngeal erythema. Eyes:      General: No scleral icterus. Conjunctiva/sclera: Conjunctivae normal.   Neck:      Musculoskeletal: Neck supple. No neck rigidity. Cardiovascular:      Rate and Rhythm: Normal rate and regular rhythm. Heart sounds: No murmur. Pulmonary:      Effort: Pulmonary effort is normal. No respiratory distress. Breath sounds: Wheezing present. Comments: Decreased breath sounds bilaterally  Abdominal:      General: Abdomen is flat. There is no distension. Palpations: Abdomen is soft. Skin:     General: Skin is warm and dry. Neurological:      General: No focal deficit present. Mental Status: He is alert and oriented to person, place, and time.    Psychiatric:         Mood and Affect: Mood normal.         Behavior: Behavior normal.         Past Medical History:     Past Medical History:   Diagnosis Date    Arthritis     Asthma     Attempted suicide ([facility]     attempted 7 times    Bipolar affective ([address]     Blood in stool     CHF (congestive heart failure), NYHA class III ([address] 08/22/2014    COPD (chronic obstructive pulmonary disease) ([address]     CVA (cerebral vascular accident) ([facility] [address]     x2 in 2006    Depression 07/26/2015    Difficult intubation 07/05/2013    Needed Glidescope, only epiglottis seen, easy mask ventilation    Erectile dysfunction 05/23/2014    Fracture of wrist     left    GERD (gastroesophageal reflux disease) 02/19/2014    Head injury     Hepatitis     HLD (hyperlipidemia) 02/19/2014    Hypertension     Insomnia     Left wrist pain     Pain     LEFT KNEE, RIGHT HIP    Seasonal allergies 07/15/2014    Seizures (HCC)     Spondylosis of cervical region without myelopathy or radiculopathy     Stroke ([facility] [address] 10/31/2011    pt states a total of 4-5strokes    Vomiting     Wears glasses        Past Surgical  History:     Past Surgical History:   Procedure Laterality Date    ANESTHESIA NERVE BLOCK Left 10/23/2017    NERVE BLOCK LEFT CERVICAL C3 TON C4 C5 performed by Fernando Lee MD at [address]      cardiac cath x2    COLONOSCOPY  05/19/2014    COLONOSCOPY N/A 12/31/2020    COLONOSCOPY POLYPECTOMY SNARE/COLD BIOPSY WITH CLIP APPLICATION AND CONTROL OF BLEEDING performed by Maryjane Hernandez MD at [address], COLON, DIAGNOSTIC      FRACTURE SURGERY      right arm, left knee, l ankle pins placed    HERNIA REPAIR      KNEE SURGERY      NERVE BLOCK Left 10/23/2017    medial branch block, cervical    TIBIA FRACTURE SURGERY Left 07/05/2013    IM nailing    TOE SURGERY Right     UMBILICAL HERNIA REPAIR  04/08/2014    with mesh    UPPER GASTROINTESTINAL ENDOSCOPY N/A 12/31/2020    EGD BIOPSY performed by Maryjane Hernandez MD at [facility] 03/25/2021    EGD BIOPSY performed by Maryjane Hernandez MD at [facility]       Medications:      methylPREDNISolone  40 mg Intravenous Q8H    carvedilol  12.5 mg Oral BID WC    folic acid  1 mg Oral Daily    traZODone  100 mg Oral Nightly    zonisamide  100 mg Oral Daily    pantoprazole  40 mg Oral QAM AC    budesonide-formoterol  2 puff Inhalation BID    sodium chloride flush  5-40 mL Intravenous 2 times per day    enoxaparin  40 mg Subcutaneous Daily    ipratropium-albuterol  1 ampule Inhalation Q4H WA    levofloxacin  750 mg Intravenous Q24H    clindamycin (CLEOCIN) IV  300 mg Intravenous Q8H    thiamine mononitrate  100 mg Oral Daily       Social History:     Social History     Socioeconomic History    Marital status: Single     Spouse name: Not on file    Number of children: Not on file    Years of education: Not on file    Highest education level: Not on file   Occupational History    Not on file   Social Needs    Financial resource strain: Not on file    Food insecurity     Worry: Not on file     Inability: Not on file    Transportation needs     Medical: Not on file     Non-medical: Not on file   Tobacco Use    Smoking status: Current Some Day Smoker     Packs/day: 0.20     Years: 39.00     Pack years: 7.80     Types: Cigarettes, Cigars    Smokeless tobacco: Never Used   Substance and Sexual Activity    Alcohol use:  Yes     Alcohol/week: 9.0 standard drinks     Types: 5 Cans of beer, 4 Shots of liquor per week     Comment: 5 can/day / 4 shot day (the patient reports that his usual routine use to include a sixpack before work, a case of beer and a bottle of [employer] while at work, and then out to the bar in the evening for more drinking)    Drug use: Not Currently    Sexual activity: Not Currently   Lifestyle    Physical activity     Days per week: Not on file     Minutes per session: Not on file    Stress: Not on file   Relationships    Social connections     Talks on phone: Not on file     Gets together: Not on file     Attends Anabaptist service: Not on file     Active member of club or organization: Not on file     Attends meetings of clubs or organizations: Not on file     Relationship status: Not on file    Intimate partner violence     Fear of current or ex partner: Not on file     Emotionally abused: Not on file     Physically abused: Not on file     Forced sexual activity: Not on file   Other Topics Concern    Not on file   Social History Narrative    Not on file       Family History:     Family History   Problem Relation Age of Onset    Stroke Mother     Diabetes Mother     Heart Disease Father     High Blood Pressure Father     Diabetes Maternal Grandmother       Medical Decision Making:   I have independently reviewed/ordered the following labs:    CBC with Differential:   Recent Labs     05/08/21  1500 05/09/21  0700 05/10/21  0601   WBC 11.5* 8.5 11.1   HGB 15.9 13.6 13.3   HCT 49.2 42.7 41.2    182 207   LYMPHOPCT 10*  --   --    MONOPCT 12  --   --      BMP:  Recent Labs     05/09/21  0700 05/10/21  0601    138   K 4.2 3.6*    107   CO2 20 20   BUN 8 8   CREATININE 0.74 0.66*     Hepatic Function Panel:   Recent Labs     05/10/21  0601   PROT 5.7*   LABALBU 3.1*   BILIDIR 0.10   IBILI 0.18   BILITOT 0.28*   ALKPHOS 72   ALT 26   AST 32     No results for input(s): RPR in the last 72 hours. No results for input(s): HIV in the last 72 hours. No results for input(s): BC in the last 72 hours. Lab Results   Component Value Date    CREATININE 0.66 05/10/2021    GLUCOSE 94 05/10/2021       Detailed results: Thank you for allowing us to participate in the care of this patient. Please call with questions. This note is created with the assistance of a speech recognition program.  While intending to generate adocument that actually reflects the content of the visit, the document can still have some errors including those of syntax and sound a like substitutions which may escape proof reading. It such instances, actual meaningcan be extrapolated by contextual diversion.     Tico Dueñas MD  Office: [phone]  Perfect serve / office [phone]

## 2021-05-10 NOTE — CONSULTS
Pulmonary Medicine and Critical Care Consult    Patient - Isidoro Nieves   MRN -  [id number]   Nii # - [de-identified]   - 1960      Date of Admission -  2021  2:39 PM  Date of evaluation -  5/10/2021  Room -    Hospital Day - [facility],  Primary Care Physician - Michelle Huggins MD     Reason for Consult      Cavitating pneumonia  Assessment   · COPD exacerbation/emphysema  · Cavitating pneumonia; doubt lung mass  · Smoker  · Pleurisy  · Alcoholism/alcohol abuse    Recommendations   · Oxygen by nasal cannula  · Incentive spirometry every hour awake  · DuoNeb by nebulizer every 4 hours while awake  · Symbicort  · Solu-Medrol IV 40 mg/h  · Levaquin clindamycin IV  · Sputum culture  · Follow-up chest x-ray  · ID consult  · Follow-up CT chest as outpatient  · Alcohol and smoking cessation  · CIWA protocol  · DVT prophylaxis    Problem List      Patient Active Problem List   Diagnosis    Fracture tibia/fibula    Schizoaffective disorder ([address]    Umbilical hernia    Gastroenteritis    Folliculitis    GERD (gastroesophageal reflux disease)    COPD (chronic obstructive pulmonary disease) ([address]    Dyslipidemia    Foot pain, right    Hematochezia    Hemorrhoids    Erectile dysfunction    Seasonal allergies    COPD exacerbation ([address]    CHF (congestive heart failure), NYHA class III (HCC)    Dermoid cyst of scalp    Pneumonitis    Syncope    Depression    Chest pain    Syncope and collapse    Hypokalemia    Seizures (HCC)    Steroid-induced hyperglycemia    Need for vaccination    Tremor    Atypical chest pain    Elevated LFTs    Abnormal results of liver function studies     Essential hypertension    Schizoaffective disorder, depressive type ([address]    Seizure ([address]    Intractable vomiting with nausea    Spondylosis of cervical region without myelopathy or radiculopathy    Avascular necrosis of bone of right hip ([address]    Cavitary pneumonia    Alcohol abuse    Tobacco dependence    Soliz's esophagus with esophagitis       SPENSER Rocha is 64 y.o.,  male, previous medical history of COPD, CHF, hypertension, schizoaffective/depression, seizure disorder, hyperlipidemia, gastroesophageal flux disease and chronic alcohol abuse. Patient presents for left-sided chest pain mostly on inspiration and worsening shortness of breath over the last 2 weeks. He had worsening cough during the same time with occasional sputum production. He has no weight loss. No fever or chills. He had associated wheezing. He has been drinking 6 beers a day and 4 shots of tequila daily. He had been drinking for over 15 years. He had a chest x-ray showed cavitary lesion within left upper lung. CT scan showed masslike airspace consolidative density with central cavitation noted in the superior segment of the left lower lobe about 4.7 x 4.0 cm. I was consulted for further evaluation. He still smokes.     PMHx   Past Medical History      Diagnosis Date    Arthritis     Asthma     Attempted suicide ([address]     attempted 7 times    Bipolar affective ([address]     Blood in stool     CHF (congestive heart failure), NYHA class III ([address] 08/22/2014    COPD (chronic obstructive pulmonary disease) ([address]     CVA (cerebral vascular accident) ([address]     x2 in 2006    Depression 07/26/2015    Difficult intubation 07/05/2013    Needed Glidescope, only epiglottis seen, easy mask ventilation    Erectile dysfunction 05/23/2014    Fracture of wrist     left    GERD (gastroesophageal reflux disease) 02/19/2014    Head injury     Hepatitis     HLD (hyperlipidemia) 02/19/2014    Hypertension     Insomnia     Left wrist pain     Pain     LEFT KNEE, RIGHT HIP    Seasonal allergies 07/15/2014    Seizures (HCC)     Spondylosis of cervical region without myelopathy or radiculopathy     Stroke ([address] 10/31/2011    pt states a total of 4-5strokes    Vomiting     Wears glasses       Past Surgical History        Procedure Laterality Date    ANESTHESIA NERVE BLOCK Left 10/23/2017    NERVE BLOCK LEFT CERVICAL C3 TON C4 C5 performed by Laron Perez MD at [address]      cardiac cath x2    COLONOSCOPY  05/19/2014    COLONOSCOPY N/A 12/31/2020    COLONOSCOPY POLYPECTOMY SNARE/COLD BIOPSY WITH CLIP APPLICATION AND CONTROL OF BLEEDING performed by Cruz Miller MD at [address] [place] [address] Av, COLON, DIAGNOSTIC      FRACTURE SURGERY      right arm, left knee, l ankle pins placed    HERNIA REPAIR      KNEE SURGERY      NERVE BLOCK Left 10/23/2017    medial branch block, cervical    TIBIA FRACTURE SURGERY Left 07/05/2013    IM nailing    TOE SURGERY Right     UMBILICAL HERNIA REPAIR  04/08/2014    with mesh    UPPER GASTROINTESTINAL ENDOSCOPY N/A 12/31/2020    EGD BIOPSY performed by Cruz Miller MD at [facility] 03/25/2021    EGD BIOPSY performed by Cruz Miller MD at [facility]    Current Medications    carvedilol  12.5 mg Oral BID WC    folic acid  1 mg Oral Daily    traZODone  100 mg Oral Nightly    zonisamide  100 mg Oral Daily    pantoprazole  40 mg Oral QAM AC    budesonide-formoterol  2 puff Inhalation BID    sodium chloride flush  5-40 mL Intravenous 2 times per day    enoxaparin  40 mg Subcutaneous Daily    ipratropium-albuterol  1 ampule Inhalation Q4H WA    levofloxacin  750 mg Intravenous Q24H    clindamycin (CLEOCIN) IV  300 mg Intravenous Q8H    thiamine mononitrate  100 mg Oral Daily     oxyCODONE-acetaminophen, sodium chloride flush, sodium chloride, nicotine, promethazine **OR** ondansetron, magnesium hydroxide, acetaminophen **OR** acetaminophen, albuterol, LORazepam  IV Drips/Infusions   sodium chloride 75 mL/hr at 05/10/21 1410    sodium chloride       Home Medications  Medications Prior to Admission: Cholestyramine POWD, by Does not apply route 1 scoop daily  famotidine (PEPCID) 40 MG tablet, Take 40 mg by mouth daily  vitamin B-1 (THIAMINE) 100 MG tablet, Take 100 mg by mouth daily  dicyclomine (BENTYL) 10 MG capsule, Take 10 mg by mouth 3 times daily (before meals)  carvedilol (COREG) 12.5 MG tablet, Take 12.5 mg by mouth 2 times daily (with meals)  folic acid (FOLVITE) 1 MG tablet, Take 1 mg by mouth daily  omeprazole (PRILOSEC) 20 MG delayed release capsule, Take 40 mg by mouth daily  traZODone (DESYREL) 100 MG tablet, Take 100 mg by mouth nightly  acetaminophen (TYLENOL) 325 MG tablet, Take 650 mg by mouth every 6 hours as needed for Pain  zonisamide (ZONEGRAN) 100 MG capsule, Take 100 mg by mouth daily  LISINOPRIL PO, Take 5 mg by mouth 2 times daily   albuterol sulfate HFA (PROAIR HFA) 108 (90 Base) MCG/ACT inhaler, Inhale 2 puffs into the lungs every 6 hours as needed for Wheezing  mometasone-formoterol (DULERA) 200-5 MCG/ACT inhaler, Inhale 2 puffs into the lungs every 12 hours    Allergies    Pcn [penicillins]  Social History     Social History     Socioeconomic History    Marital status: Single     Spouse name: Not on file    Number of children: Not on file    Years of education: Not on file    Highest education level: Not on file   Occupational History    Not on file   Social Needs    Financial resource strain: Not on file    Food insecurity     Worry: Not on file     Inability: Not on file   [employer] needs     Medical: Not on file     Non-medical: Not on file   Tobacco Use    Smoking status: Current Some Day Smoker     Packs/day: 0.20     Years: 39.00     Pack years: 7.80     Types: Cigarettes, Cigars    Smokeless tobacco: Never Used   Substance and Sexual Activity    Alcohol use:  Yes     Alcohol/week: 9.0 standard drinks     Types: 5 Cans of beer, 4 Shots of liquor per week     Comment: 5 can/day / 4 shot day (the patient reports that his usual routine use to include a sixpack before work, a case of beer and a bottle of [employer] while at work, and then out to the bar in the evening for more drinking)    Drug use: Not Currently    Sexual activity: Not Currently   Lifestyle    Physical activity     Days per week: Not on file     Minutes per session: Not on file    Stress: Not on file   Relationships    Social connections     Talks on phone: Not on file     Gets together: Not on file     Attends [employer] service: Not on file     Active member of club or organization: Not on file     Attends meetings of clubs or organizations: Not on file     Relationship status: Not on file    Intimate partner violence     Fear of current or ex partner: Not on file     Emotionally abused: Not on file     Physically abused: Not on file     Forced sexual activity: Not on file   Other Topics Concern    Not on file   Social History Narrative    Not on file     Family History          Problem Relation Age of Onset    Stroke Mother     Diabetes Mother     Heart Disease Father     High Blood Pressure Father     Diabetes Maternal Grandmother      ROS - 11 systems   General Denies any fever or chills  HEENT Denies any diplopia, tinnitus or vertigo  Resp as above  Cardiac Denies any palpitations, claudication or edema  GI Denies any melena, hematochezia, hematemesis or pyrosis   Denies any frequency, urgency, hesitancy or incontinence  Heme Denies bruising or bleeding easily  Endocrine Denies any history of diabetes or thyroid disease  Neuro Denies any focal motor  deficits  Psychiatric Denies suicidal ideation  Skin Denies rashes, itching, open sores    Vitals     height is 6' 3\" (1.905 m) and weight is 167 lb 11.2 oz (76.1 kg). His oral temperature is 98.4 °F (36.9 °C). His blood pressure is 146/77 (abnormal) and his pulse is 91. His respiration is 16 and oxygen saturation is 92%. Body mass index is 20.96 kg/m².   I/O        Intake/Output Summary (Last 24 hours) at 5/10/2021 1605  Last data filed at 5/10/2021 0921  Gross per 24 hour   Intake 1317 ml Output 2650 ml   Net -1333 ml     I/O last 3 completed shifts: In: 9731 [I.V.:1317]  Out: 2650 [Urine:2650]   Patient Vitals for the past 96 hrs (Last 3 readings):   Weight   05/10/21 0402 167 lb 11.2 oz (76.1 kg)   05/09/21 0330 158 lb (71.7 kg)   05/08/21 1343 170 lb (77.1 kg)     Exam   General Appearance  Awake, alert, oriented, in no acute distress  HEENT - Head is normocephalic, atraumatic. Pupil reactive to light  Neck - Supple, symmetrical, trachea midline and Soft, trachea midline and straight  Lungs -decreased breath sounds mild wheezing  Cardiovascular - Heart sounds are normal.  Regular rhythm normal rate without murmur, gallop or rub. Abdomen - Soft, nontender, nondistended, no masses or organomegaly  Neurologic - CN II-XII are grossly intact.  There are no focal motor  deficits  Skin - No bruising or bleeding  Extremities - No cyanosis, clubbing or edema    Labs  - Old records and notes have been reviewed in [facility]   CBC     Lab Results   Component Value Date    WBC 11.1 05/10/2021    RBC 4.03 05/10/2021    HGB 13.3 05/10/2021    HCT 41.2 05/10/2021     05/10/2021    .2 05/10/2021    MCH 33.0 05/10/2021    MCHC 32.3 05/10/2021    RDW 12.0 05/10/2021    LYMPHOPCT 10 05/08/2021    MONOPCT 12 05/08/2021    BASOPCT 0 05/08/2021    MONOSABS 1.42 05/08/2021    LYMPHSABS 1.17 05/08/2021    EOSABS 0.03 05/08/2021    BASOSABS 0.05 05/08/2021    DIFFTYPE NOT REPORTED 05/08/2021     BMP   Lab Results   Component Value Date     05/10/2021    K 3.6 05/10/2021     05/10/2021    CO2 20 05/10/2021    BUN 8 05/10/2021    CREATININE 0.66 05/10/2021    GLUCOSE 94 05/10/2021    CALCIUM 8.5 05/10/2021    MG 2.0 07/26/2015     LFTS  Lab Results   Component Value Date    ALKPHOS 72 05/10/2021    ALT 26 05/10/2021    AST 32 05/10/2021    PROT 5.7 05/10/2021    BILITOT 0.28 05/10/2021    BILIDIR 0.10 05/10/2021    IBILI 0.18 05/10/2021    LABALBU 3.1 05/10/2021         Lab Results   Component Value Date    APTT 25.4 11/09/2015     INR   Lab Results   Component Value Date    INR 0.9 11/09/2015    INR 0.9 07/26/2015    INR 0.9 09/01/2012    PROTIME 9.4 11/09/2015    PROTIME 9.7 07/26/2015    PROTIME 10.2 09/01/2012       Radiology        CT chest 5/8    1. Masslike airspace consolidative density with central cavitation noted in   the superior segment of the left lower lobe about 4.7 x 4.0 cm.  While this   could be pneumonia, it remains concerning for malignancy.  Please correlate   clinically.  Short interval 6 week follow-up is recommended if treated as   pneumonia. 2. Severe diffuse patchy hepatic steatosis noted which could obscure liver   lesion such as metastasis.  In light of the left lower lobe lesion,   consideration may be given to contrast enhanced MRI to exclude metastasis   particularly 8th malignancy remains a concern after clinical evaluation. 3. Emphysema.             (See actual reports for details)    \"Thank you for asking us to see this patient\"    Case discussed with nurse and patient/family. Questions and concerns addressed.     Electronically signed by     William Morales MD on 5/10/2021 at 4:05 PM

## 2021-05-11 ENCOUNTER — APPOINTMENT (OUTPATIENT)
Dept: GENERAL RADIOLOGY | Age: 61
DRG: 194 | End: 2021-05-11
Payer: MEDICARE

## 2021-05-11 LAB
HIV AG/AB: NONREACTIVE
PROCALCITONIN: 0.07 NG/ML

## 2021-05-11 PROCEDURE — 84145 PROCALCITONIN (PCT): CPT

## 2021-05-11 PROCEDURE — 6370000000 HC RX 637 (ALT 250 FOR IP): Performed by: INTERNAL MEDICINE

## 2021-05-11 PROCEDURE — 94640 AIRWAY INHALATION TREATMENT: CPT

## 2021-05-11 PROCEDURE — 97110 THERAPEUTIC EXERCISES: CPT

## 2021-05-11 PROCEDURE — 87389 HIV-1 AG W/HIV-1&-2 AB AG IA: CPT

## 2021-05-11 PROCEDURE — 2500000003 HC RX 250 WO HCPCS: Performed by: NURSE PRACTITIONER

## 2021-05-11 PROCEDURE — 36415 COLL VENOUS BLD VENIPUNCTURE: CPT

## 2021-05-11 PROCEDURE — 99232 SBSQ HOSP IP/OBS MODERATE 35: CPT | Performed by: INTERNAL MEDICINE

## 2021-05-11 PROCEDURE — 94761 N-INVAS EAR/PLS OXIMETRY MLT: CPT

## 2021-05-11 PROCEDURE — 2580000003 HC RX 258: Performed by: INTERNAL MEDICINE

## 2021-05-11 PROCEDURE — 71045 X-RAY EXAM CHEST 1 VIEW: CPT

## 2021-05-11 PROCEDURE — 97116 GAIT TRAINING THERAPY: CPT

## 2021-05-11 PROCEDURE — 6360000002 HC RX W HCPCS: Performed by: INTERNAL MEDICINE

## 2021-05-11 PROCEDURE — 2500000003 HC RX 250 WO HCPCS: Performed by: INTERNAL MEDICINE

## 2021-05-11 PROCEDURE — 1200000000 HC SEMI PRIVATE

## 2021-05-11 RX ORDER — LISINOPRIL 10 MG/1
10 TABLET ORAL 2 TIMES DAILY
Status: DISCONTINUED | OUTPATIENT
Start: 2021-05-11 | End: 2021-05-12 | Stop reason: HOSPADM

## 2021-05-11 RX ORDER — CARVEDILOL 12.5 MG/1
12.5 TABLET ORAL ONCE
Status: COMPLETED | OUTPATIENT
Start: 2021-05-11 | End: 2021-05-11

## 2021-05-11 RX ORDER — MOXIFLOXACIN HYDROCHLORIDE 400 MG/1
400 TABLET ORAL DAILY
Qty: 21 TABLET | Refills: 0 | Status: SHIPPED | OUTPATIENT
Start: 2021-05-11 | End: 2021-06-01

## 2021-05-11 RX ORDER — PREDNISONE 20 MG/1
40 TABLET ORAL DAILY
Status: DISCONTINUED | OUTPATIENT
Start: 2021-05-11 | End: 2021-05-12 | Stop reason: HOSPADM

## 2021-05-11 RX ORDER — METOPROLOL TARTRATE 5 MG/5ML
5 INJECTION INTRAVENOUS EVERY 4 HOURS PRN
Status: DISCONTINUED | OUTPATIENT
Start: 2021-05-11 | End: 2021-05-12 | Stop reason: HOSPADM

## 2021-05-11 RX ADMIN — IPRATROPIUM BROMIDE AND ALBUTEROL SULFATE 1 AMPULE: .5; 3 SOLUTION RESPIRATORY (INHALATION) at 16:12

## 2021-05-11 RX ADMIN — FOLIC ACID 1 MG: 1 TABLET ORAL at 10:01

## 2021-05-11 RX ADMIN — CLINDAMYCIN PHOSPHATE 300 MG: 300 INJECTION, SOLUTION INTRAVENOUS at 11:13

## 2021-05-11 RX ADMIN — ZONISAMIDE 100 MG: 100 CAPSULE ORAL at 10:01

## 2021-05-11 RX ADMIN — TRAZODONE HYDROCHLORIDE 100 MG: 100 TABLET ORAL at 19:36

## 2021-05-11 RX ADMIN — METOPROLOL TARTRATE 5 MG: 5 INJECTION INTRAVENOUS at 19:51

## 2021-05-11 RX ADMIN — OXYCODONE AND ACETAMINOPHEN 1 TABLET: 5; 325 TABLET ORAL at 14:41

## 2021-05-11 RX ADMIN — IPRATROPIUM BROMIDE AND ALBUTEROL SULFATE 1 AMPULE: .5; 3 SOLUTION RESPIRATORY (INHALATION) at 07:28

## 2021-05-11 RX ADMIN — CLINDAMYCIN PHOSPHATE 300 MG: 300 INJECTION, SOLUTION INTRAVENOUS at 02:42

## 2021-05-11 RX ADMIN — IPRATROPIUM BROMIDE AND ALBUTEROL SULFATE 1 AMPULE: .5; 3 SOLUTION RESPIRATORY (INHALATION) at 11:33

## 2021-05-11 RX ADMIN — SODIUM CHLORIDE: 9 INJECTION, SOLUTION INTRAVENOUS at 02:45

## 2021-05-11 RX ADMIN — CARVEDILOL 12.5 MG: 12.5 TABLET, FILM COATED ORAL at 17:31

## 2021-05-11 RX ADMIN — PANTOPRAZOLE SODIUM 40 MG: 40 TABLET, DELAYED RELEASE ORAL at 06:17

## 2021-05-11 RX ADMIN — LORAZEPAM 1 MG: 2 INJECTION, SOLUTION INTRAMUSCULAR; INTRAVENOUS at 00:21

## 2021-05-11 RX ADMIN — LEVOFLOXACIN 750 MG: 5 INJECTION, SOLUTION INTRAVENOUS at 19:36

## 2021-05-11 RX ADMIN — BUDESONIDE AND FORMOTEROL FUMARATE DIHYDRATE 2 PUFF: 160; 4.5 AEROSOL RESPIRATORY (INHALATION) at 19:27

## 2021-05-11 RX ADMIN — METHYLPREDNISOLONE SODIUM SUCCINATE 40 MG: 40 INJECTION, POWDER, FOR SOLUTION INTRAMUSCULAR; INTRAVENOUS at 00:21

## 2021-05-11 RX ADMIN — ENOXAPARIN SODIUM 40 MG: 40 INJECTION SUBCUTANEOUS at 10:01

## 2021-05-11 RX ADMIN — LISINOPRIL 10 MG: 10 TABLET ORAL at 22:05

## 2021-05-11 RX ADMIN — BUDESONIDE AND FORMOTEROL FUMARATE DIHYDRATE 2 PUFF: 160; 4.5 AEROSOL RESPIRATORY (INHALATION) at 07:28

## 2021-05-11 RX ADMIN — CLINDAMYCIN PHOSPHATE 300 MG: 300 INJECTION, SOLUTION INTRAVENOUS at 17:13

## 2021-05-11 RX ADMIN — CARVEDILOL 12.5 MG: 12.5 TABLET, FILM COATED ORAL at 17:13

## 2021-05-11 RX ADMIN — OXYCODONE AND ACETAMINOPHEN 1 TABLET: 5; 325 TABLET ORAL at 23:11

## 2021-05-11 RX ADMIN — SODIUM CHLORIDE: 9 INJECTION, SOLUTION INTRAVENOUS at 14:44

## 2021-05-11 RX ADMIN — Medication 100 MG: at 10:01

## 2021-05-11 RX ADMIN — IPRATROPIUM BROMIDE AND ALBUTEROL SULFATE 1 AMPULE: .5; 3 SOLUTION RESPIRATORY (INHALATION) at 19:26

## 2021-05-11 RX ADMIN — CARVEDILOL 12.5 MG: 12.5 TABLET, FILM COATED ORAL at 10:01

## 2021-05-11 RX ADMIN — METHYLPREDNISOLONE SODIUM SUCCINATE 40 MG: 40 INJECTION, POWDER, FOR SOLUTION INTRAMUSCULAR; INTRAVENOUS at 10:01

## 2021-05-11 RX ADMIN — METOPROLOL TARTRATE 5 MG: 5 INJECTION INTRAVENOUS at 14:41

## 2021-05-11 RX ADMIN — METHYLPREDNISOLONE SODIUM SUCCINATE 40 MG: 40 INJECTION, POWDER, FOR SOLUTION INTRAMUSCULAR; INTRAVENOUS at 00:24

## 2021-05-11 RX ADMIN — PREDNISONE 40 MG: 20 TABLET ORAL at 17:13

## 2021-05-11 RX ADMIN — LORAZEPAM 1 MG: 2 INJECTION, SOLUTION INTRAMUSCULAR; INTRAVENOUS at 23:11

## 2021-05-11 ASSESSMENT — PAIN SCALES - GENERAL
PAINLEVEL_OUTOF10: 4
PAINLEVEL_OUTOF10: 4
PAINLEVEL_OUTOF10: 2

## 2021-05-11 ASSESSMENT — PAIN DESCRIPTION - LOCATION
LOCATION: GENERALIZED
LOCATION: CHEST

## 2021-05-11 ASSESSMENT — PAIN DESCRIPTION - PAIN TYPE: TYPE: ACUTE PAIN

## 2021-05-11 ASSESSMENT — ENCOUNTER SYMPTOMS
COUGH: 1
ALLERGIC/IMMUNOLOGIC NEGATIVE: 1
GASTROINTESTINAL NEGATIVE: 1

## 2021-05-11 ASSESSMENT — PAIN DESCRIPTION - ORIENTATION: ORIENTATION: RIGHT

## 2021-05-11 ASSESSMENT — PAIN - FUNCTIONAL ASSESSMENT: PAIN_FUNCTIONAL_ASSESSMENT: ACTIVITIES ARE NOT PREVENTED

## 2021-05-11 ASSESSMENT — PAIN DESCRIPTION - ONSET: ONSET: PROGRESSIVE

## 2021-05-11 NOTE — PROGRESS NOTES
CLINICAL PHARMACY NOTE: MEDS TO [address] Select Patient?: No  Total # of Prescriptions Filled: 4   The following medications were delivered to the patient:  · MOXIFLOXACIN 400MG  · PREDNISONE 20MG  · CARVEDILOL 25MG  · KISINOPRIL 10MG  Total # of Interventions Completed: 1  Time Spent (min): 50    Additional Documentation:  HAD TO DELIVER 3 MORE RXS ON THE 12TH

## 2021-05-11 NOTE — PLAN OF CARE
Problem: PAIN  Goal: Patient's pain/discomfort is manageable  Note: Pt medicated with pain medication prn. Assessed all pain characteristics including level, type, location, frequency, and onset. Non-pharmacologic interventions offered to pt as well. Pt states pain is tolerable at this time.  Will continue to monitor

## 2021-05-11 NOTE — PROGRESS NOTES
Subjective: pt tired but agreeable to PT  Pain Screening  Patient Currently in Pain: Yes  Pain Assessment  Pain Assessment: 0-10  Pain Level: 4  Pain Location: Generalized  Vital Signs  Patient Currently in Pain: Yes       Orientation     Cognition      Objective   Bed mobility  Scooting: Contact guard assistance  Transfers  Sit to Stand: Contact guard assistance  Stand to sit: Contact guard assistance  Stand Pivot Transfers: Contact guard assistance  Ambulation  Ambulation?: Yes  Ambulation 1  Surface: level tile  Device: Single point cane  Assistance: Contact guard assistance  Quality of Gait: rigid trunk, shuffles feet  Gait Deviations: Decreased step length;Decreased step height  Distance: 95 ft     Balance  Posture: Fair  Sitting - Static: Good  Sitting - Dynamic: Good  Standing - Static: Fair;+  Exercises  Comments: issued pt AROM HEP for LE's reviewed ex with pt. G-Code     OutComes Score                                                     AM-PAC Score  AM-PAC Inpatient Mobility Raw Score : 19 (05/11/21 1309)  AM-PAC Inpatient T-Scale Score : 45.44 (05/11/21 1309)  Mobility Inpatient CMS 0-100% Score: 41.77 (05/11/21 1309)  Mobility Inpatient CMS G-Code Modifier : CK (05/11/21 1309)          Goals  Short term goals  Time Frame for Short term goals: 12 visits:  Short term goal 1: Pt. to be indep with bed mob. Short term goal 2: Pt. to be indep with transfers with approp. AD  Short term goal 3: Pt. to be SBA for gait with approp. AD, 50ft. (household distance)  Short term goal 4: Pt. to have good standing dynamic balance with UE support  Short term goal 5: Pt. to tolerate 25+ min.  of PT for ther ex/ gait/ balance training  Patient Goals   Patient goals : d/c home    Plan    Plan  Times per week: 1-2x/day; 5-6days/wk  Specific instructions for Next Treatment: progress gait/balance  Current Treatment Recommendations: Strengthening, ROM, Balance Training, Functional Mobility Training,

## 2021-05-11 NOTE — PROGRESS NOTES
[facility]  Progress Note    Phyllis Valdes  MRN: [id number]  Date: 5/11/2021  LOS: 3     Reason for F/U :   Left lower lobe pneumonia    Impression :   Left lower lobe masslike consolidative density with central cavitation raising concern for infection versus malignancy  COPD/emphysema  EtOH abuse  Penicillin allergy  Uncontrolled hypertension    Recommendations: The patient is currently on Levaquin and clindamycin. He should be okay to discharge on oral antibiotics with moxifloxacin. The patient will need another 3 weeks of antibiotic coverage  The patient will need follow-up CT imaging and if there is no resolution in the cavitary changes then the patient will need further/invasive work-up for malignancy    Infection Control Recommendations:   Universal precautions    Discharge Planning:   Patient will need Midline Catheter Insertion/ PICC line Insertion: No  Patient will need: Home IV , [place],  SNF,  LTAC: Undetermined  Patient willneed outpatient wound care: No    Medical Decision making / Summary of Stay:   Phyllis Valdes is a 64y.o.-year-old male presented to the hospital with left-sided pleuritic pain associated with shortness of breath, cough productive of yellow phlegm, worsening over 2 weeks. The pain is intermittent, worse with movement and deep breath, no alleviating factors. He denied any recent travel or sick contacts  He denied fever or chills, denied nausea or vomiting, no other complaints. He drinks 6 beers and 4 shots of tequila daily for more than 15 years the patient also smokes actively  CT chest showed Masslike airspace consolidative density in the left lower lobe with central cavitation .   He denied recent travel or sick contacts    Current evaluation:5/11/2021    BP (!) 174/94   Pulse 82   Temp 97.5 °F (36.4 °C) (Oral)   Resp 16   Ht 6' 3\" (1.905 m)   Wt 170 lb 4.8 oz (77.2 kg)   SpO2 96%   BMI 21.29 kg/m²     Temperature Range: Temp: 97.5 °F (36.4 °C) Temp  Av.8 °F (36.6 °C)  Min: 97.5 °F (36.4 °C)  Max: 98.2 °F (36.8 °C)  The patient is seen and evaluated at bedside he is awake and alert in no acute distress. He still does have a cough and some left-sided pleuritic chest pain but overall these are improved. No subjective fevers or chills. No cough or shortness of breath  No chest pains or palpitations. Review of Systems   Constitutional: Negative. Respiratory: Positive for cough. Cardiovascular: Positive for chest pain. Gastrointestinal: Negative. Genitourinary: Negative. Musculoskeletal: Negative. Allergic/Immunologic: Negative. Neurological: Negative. Physical Examination :     Physical Exam  Constitutional:       Appearance: He is well-developed. HENT:      Head: Normocephalic and atraumatic. Neck:      Musculoskeletal: Normal range of motion and neck supple. Cardiovascular:      Rate and Rhythm: Normal rate. Heart sounds: Normal heart sounds. No friction rub. No gallop. Pulmonary:      Effort: Pulmonary effort is normal.      Breath sounds: Normal breath sounds. No wheezing. Abdominal:      General: Bowel sounds are normal.      Palpations: Abdomen is soft. There is no mass. Tenderness: There is no abdominal tenderness. Musculoskeletal: Normal range of motion. Lymphadenopathy:      Cervical: No cervical adenopathy. Skin:     General: Skin is warm and dry. Neurological:      Mental Status: He is alert and oriented to person, place, and time.          Laboratory data:   I have independently reviewed the followinglabs:  CBC with Differential:   Recent Labs     21  1500 21  0700 05/10/21  0601   WBC 11.5* 8.5 11.1   HGB 15.9 13.6 13.3   HCT 49.2 42.7 41.2    182 207   LYMPHOPCT 10*  --   --    MONOPCT 12  --   --      BMP:   Recent Labs     21  0700 05/10/21  0601    138   K 4.2 3.6*    107   CO2 20 20   BUN 8 8   CREATININE 0.74 0.66*     Hepatic Function Panel:   Recent Labs     05/10/21  0601   PROT 5.7*   LABALBU 3.1*   BILIDIR 0.10   IBILI 0.18   BILITOT 0.28*   ALKPHOS 72   ALT 26   AST 32         Lab Results   Component Value Date    PROCAL 0.07 05/11/2021     No results found for: CRP  Lab Results   Component Value Date    SEDRATE 2 11/09/2015         No results found for: DDIMER  No results found for: FERRITIN  No results found for: LDH  Lab Results   Component Value Date    FIBRINOGEN 392 11/09/2015       Results in Past 30 Days  Result Component Current Result Ref Range Previous Result Ref Range   SARS-CoV-2, Rapid Not Detected (5/8/2021) Not Detected Not in Time Range      Lab Results   Component Value Date    COVID19 Not Detected 05/08/2021    COVID19 Not Detected 03/21/2021    COVID19 Not Detected 12/27/2020    COVID19 Detected 11/21/2020    COVID19 Not Detected 08/09/2020       No results for input(s): [facility] in the last 72 hours. HIV Ag/Ab NONREACTIVE  NONREACTIVE Final 05/11/2021  6:29  [address]   No laboratory evidence of HIV infection.  If acute HIV infection is suspected, consider   testing for HIV-1 RNA. Imaging Studies:   ONE XRAY VIEW OF THE CHEST 5/11/2021 6:44 am  Impression   Slowly resolving left focal suprahilar opacity.  Continued follow-up advised. CT OF THE CHEST WITH CONTRAST 5/8/2021 3:37 pm  Impression   1. Masslike airspace consolidative density with central cavitation noted in   the superior segment of the left lower lobe about 4.7 x 4.0 cm.  While this   could be pneumonia, it remains concerning for malignancy.  Please correlate   clinically.  Short interval 6 week follow-up is recommended if treated as   pneumonia.    2. Severe diffuse patchy hepatic steatosis noted which could obscure liver   lesion such as metastasis.  In light of the left lower lobe lesion,   consideration may be given to contrast enhanced MRI to exclude metastasis   particularly 8th malignancy remains a concern after clinical evaluation. 3. Emphysema. Cultures:     Culture, Blood 1 [[id number]] Collected: 05/08/21 1740   Order Status: Completed Specimen: Blood Updated: 05/11/21 0016    Specimen Description . BLOOD LEFT HAND    Special Requests NOT REPORTED    Culture NO GROWTH 3 DAYS   Culture, Blood 1 [[id number]] Collected: 05/08/21 1735   Order Status: Completed Specimen: Blood Updated: 05/11/21 0016    Specimen Description . BLOOD LEFT ARM    Special Requests NOT REPORTED    Culture NO GROWTH 3 DAYS   Sputum gram stain [[id number]]    Order Status: No result Specimen: Sputum Expectorated    Respiratory Culture [[id number]]    Order Status: No result Specimen: Sputum Expectorated    COVID-19, Rapid [[id number]] Collected: 05/08/21 1500   Order Status: Completed Specimen: Nasopharyngeal Swab Updated: 05/08/21 1608    Specimen Description . NASOPHARYNGEAL SWAB    SARS-CoV-2, Rapid Not Detected    Comment:        Rapid NAAT:  The specimen is NEGATIVE for SARS-CoV-2, the novel coronavirus associated with   COVID-19.         The ID NOW COVID-19 assay is designed to detect the virus that causes COVID-19 in patients   with signs and symptoms of infection who are suspected of COVID-19. An individual without symptoms of COVID-19 and who is not shedding SARS-CoV-2 virus would   expect to have a negative (not detected) result in this assay. Negative results should be treated as presumptive and, if inconsistent with clinical signs   and symptoms or necessary for patient management,   should be tested with an alternative molecular assay.  Negative results do not preclude   SARS-CoV-2 infection and   should not be used as the sole basis for patient management decisions.         Fact sheet for Healthcare Providers: Eric   Fact sheet for Patients: Eric           Methodology: Isothermal Nucleic Acid Amplification             Medications:      methylPREDNISolone  40 mg Intravenous Q8H    carvedilol  12.5 mg Oral BID WC    folic acid  1 mg Oral Daily    traZODone  100 mg Oral Nightly    zonisamide  100 mg Oral Daily    pantoprazole  40 mg Oral QAM AC    budesonide-formoterol  2 puff Inhalation BID    sodium chloride flush  5-40 mL Intravenous 2 times per day    enoxaparin  40 mg Subcutaneous Daily    ipratropium-albuterol  1 ampule Inhalation Q4H WA    levofloxacin  750 mg Intravenous Q24H    clindamycin (CLEOCIN) IV  300 mg Intravenous Q8H    thiamine mononitrate  100 mg Oral Daily           [facility]  [address]  Perfect Serve messaging  OFFICE: [phone]      Electronically signed by [address] [place], [place] [address], [place] on 5/11/2021 at 2:39 PM  Thank you for allowing us to participate in the care of this patient. Please call with questions. This note iscreated with the assistance of a speech recognition program.  While intending to generate a document that actually reflects the content of the visit, the document can still have some errors including those of syntax andsound a like substitutions which may escape proof reading. In such instances, actual meaning can be extrapolated by contextual diversion.

## 2021-05-11 NOTE — PLAN OF CARE
Problem: Falls - Risk of:  Goal: Will remain free from falls  Description: Will remain free from falls  Outcome: Ongoing  Note: Patient is a fall risk during this admission. Fall risk assessment was performed. Patient is absent of falls. Bed is in the lowest position. Wheels on the bed are locked. Call light and bed side table are within reach. Clutter is removed. Patient was educated to call out when needing assistance or wanting to get out of bed. Patient offered toileting assistance during rounding. Hourly rounds have been performed. Problem: PAIN  Goal: Patient's pain/discomfort is manageable  5/11/2021 1532 by Jose Raygoza RN  Outcome: Ongoing  Note: Pt medicated with pain medication prn. Assessed all pain characteristics including level, type, location, frequency, and onset. Non-pharmacologic interventions offered to pt as well. Pt states pain is tolerable at this time. Will continue to monitor      Problem:  Activity Intolerance:  Goal: Ability to tolerate increased activity will improve  Description: Ability to tolerate increased activity will improve  Outcome: Ongoing     Problem: Airway Clearance - Ineffective:  Goal: Ability to maintain a clear airway will improve  Description: Ability to maintain a clear airway will improve  Outcome: Ongoing     Problem: Airway Clearance - Ineffective:  Goal: Clear lung sounds  Description: Clear lung sounds  Outcome: Ongoing     Problem: Breathing Pattern - Ineffective:  Goal: Ability to achieve and maintain a regular respiratory rate will improve  Description: Ability to achieve and maintain a regular respiratory rate will improve  Outcome: Ongoing     Problem: Gas Exchange - Impaired:  Goal: Levels of oxygenation will improve  Description: Levels of oxygenation will improve  Outcome: Ongoing     Problem: Tobacco Use:  Goal: Inpatient tobacco use cessation counseling participation  Description: Inpatient tobacco use cessation counseling participation

## 2021-05-11 NOTE — PROGRESS NOTES
Pulmonary Critical Care Progress Note    Patient seen for the follow up of Cavitary pneumonia     Subjective:    He denies chest pain. Mild occasional cough, mostly dry. Shortness of breath is improved. He is on room air. He has good appetite. Examination:    Vitals: BP (!) 167/108   Pulse 91   Temp 97.8 °F (36.6 °C) (Oral)   Resp 16   Ht 6' 3\" (1.905 m)   Wt 170 lb 4.8 oz (77.2 kg)   SpO2 93%   BMI 21.29 kg/m²   SpO2  Av.1 %  Min: 90 %  Max: 97 %  General appearance: alert and cooperative with exam  Neck: No JVD  Lungs: Decreased breath sound no crackles or wheezing  Heart: regular rate and rhythm, S1, S2 normal, no gallop  Abdomen: Soft, non tender, + BS  Extremities: no cyanosis or clubbing.  No significant edema    LABs:    CBC:   Recent Labs     21  0700 05/10/21  0601   WBC 8.5 11.1   HGB 13.6 13.3   HCT 42.7 41.2    207     BMP:   Recent Labs     21  0700 05/10/21  0601    138   K 4.2 3.6*   CO2 20 20   BUN 8 8   CREATININE 0.74 0.66*   LABGLOM >60 >60   GLUCOSE 135* 94     LIVER PROFILE:  Recent Labs     05/10/21  0601   AST 32   ALT 26   LABALBU 3.1*       Radiology:    Chest x-ray   Slowly resolving left focal suprahilar opacity.  Continued follow-up advised      Impression:    · COPD exacerbation/emphysema  · Cavitating pneumonia; doubt lung mass  · Smoker  · Pleurisy  · Alcoholism/alcohol abuse      Recommendations:    · Oxygen by nasal cannula  · Incentive spirometry every hour awake  · DuoNeb by nebulizer every 4 hours while awake  · Symbicort  · Discontinue Solu-Medrol  · Prednisone 40 p.o. daily to stop  · Levaquin clindamycin IV switch to Avelox on discharge per ID for additional 3 weeks  · Sputum culture  · Follow-up CT chest as outpatient in 3 to 4 weeks  · Alcohol and smoking cessation  · CIWA protocol  · Discharge planning per primary  · DVT prophylaxis    Radha Soliz MD, [facility]  Pulmonary Critical Care and Sleep Medicine,  [facility]  Cell: [phone]  Office: [phone]

## 2021-05-12 VITALS
HEART RATE: 81 BPM | HEIGHT: 75 IN | OXYGEN SATURATION: 97 % | DIASTOLIC BLOOD PRESSURE: 95 MMHG | RESPIRATION RATE: 16 BRPM | BODY MASS INDEX: 20.64 KG/M2 | WEIGHT: 166 LBS | TEMPERATURE: 98.1 F | SYSTOLIC BLOOD PRESSURE: 152 MMHG

## 2021-05-12 PROCEDURE — 99239 HOSP IP/OBS DSCHRG MGMT >30: CPT | Performed by: INTERNAL MEDICINE

## 2021-05-12 PROCEDURE — 6370000000 HC RX 637 (ALT 250 FOR IP): Performed by: INTERNAL MEDICINE

## 2021-05-12 PROCEDURE — 2580000003 HC RX 258: Performed by: INTERNAL MEDICINE

## 2021-05-12 PROCEDURE — 94761 N-INVAS EAR/PLS OXIMETRY MLT: CPT

## 2021-05-12 PROCEDURE — 6360000002 HC RX W HCPCS: Performed by: INTERNAL MEDICINE

## 2021-05-12 PROCEDURE — 99232 SBSQ HOSP IP/OBS MODERATE 35: CPT | Performed by: NURSE PRACTITIONER

## 2021-05-12 PROCEDURE — 94640 AIRWAY INHALATION TREATMENT: CPT

## 2021-05-12 PROCEDURE — 2500000003 HC RX 250 WO HCPCS: Performed by: INTERNAL MEDICINE

## 2021-05-12 RX ORDER — LISINOPRIL 10 MG/1
10 TABLET ORAL 2 TIMES DAILY
Qty: 30 TABLET | Refills: 1 | Status: SHIPPED | OUTPATIENT
Start: 2021-05-12 | End: 2021-05-12 | Stop reason: SDUPTHER

## 2021-05-12 RX ORDER — PREDNISONE 20 MG/1
TABLET ORAL
Qty: 8 TABLET | Refills: 0 | Status: SHIPPED | OUTPATIENT
Start: 2021-05-12

## 2021-05-12 RX ORDER — CARVEDILOL 25 MG/1
25 TABLET ORAL 2 TIMES DAILY WITH MEALS
Qty: 60 TABLET | Refills: 1 | Status: SHIPPED | OUTPATIENT
Start: 2021-05-12

## 2021-05-12 RX ORDER — LISINOPRIL 10 MG/1
10 TABLET ORAL 2 TIMES DAILY
Qty: 60 TABLET | Refills: 1 | Status: SHIPPED | OUTPATIENT
Start: 2021-05-12

## 2021-05-12 RX ORDER — CARVEDILOL 12.5 MG/1
25 TABLET ORAL 2 TIMES DAILY WITH MEALS
Qty: 60 TABLET | Refills: 1 | Status: SHIPPED | OUTPATIENT
Start: 2021-05-12 | End: 2021-05-12 | Stop reason: SDUPTHER

## 2021-05-12 RX ADMIN — OXYCODONE AND ACETAMINOPHEN 1 TABLET: 5; 325 TABLET ORAL at 11:43

## 2021-05-12 RX ADMIN — IPRATROPIUM BROMIDE AND ALBUTEROL SULFATE 1 AMPULE: .5; 3 SOLUTION RESPIRATORY (INHALATION) at 11:36

## 2021-05-12 RX ADMIN — BUDESONIDE AND FORMOTEROL FUMARATE DIHYDRATE 2 PUFF: 160; 4.5 AEROSOL RESPIRATORY (INHALATION) at 07:33

## 2021-05-12 RX ADMIN — Medication 100 MG: at 11:44

## 2021-05-12 RX ADMIN — FOLIC ACID 1 MG: 1 TABLET ORAL at 11:45

## 2021-05-12 RX ADMIN — PREDNISONE 40 MG: 20 TABLET ORAL at 11:56

## 2021-05-12 RX ADMIN — ZONISAMIDE 100 MG: 100 CAPSULE ORAL at 11:43

## 2021-05-12 RX ADMIN — IPRATROPIUM BROMIDE AND ALBUTEROL SULFATE 1 AMPULE: .5; 3 SOLUTION RESPIRATORY (INHALATION) at 07:32

## 2021-05-12 RX ADMIN — ENOXAPARIN SODIUM 40 MG: 40 INJECTION SUBCUTANEOUS at 11:46

## 2021-05-12 RX ADMIN — CARVEDILOL 12.5 MG: 12.5 TABLET, FILM COATED ORAL at 11:44

## 2021-05-12 RX ADMIN — PANTOPRAZOLE SODIUM 40 MG: 40 TABLET, DELAYED RELEASE ORAL at 06:44

## 2021-05-12 RX ADMIN — CLINDAMYCIN PHOSPHATE 300 MG: 300 INJECTION, SOLUTION INTRAVENOUS at 11:46

## 2021-05-12 RX ADMIN — LISINOPRIL 10 MG: 10 TABLET ORAL at 11:45

## 2021-05-12 RX ADMIN — SODIUM CHLORIDE: 9 INJECTION, SOLUTION INTRAVENOUS at 06:44

## 2021-05-12 RX ADMIN — CLINDAMYCIN PHOSPHATE 300 MG: 300 INJECTION, SOLUTION INTRAVENOUS at 01:49

## 2021-05-12 ASSESSMENT — ENCOUNTER SYMPTOMS
COUGH: 1
GASTROINTESTINAL NEGATIVE: 1

## 2021-05-12 ASSESSMENT — PAIN SCALES - GENERAL
PAINLEVEL_OUTOF10: 4
PAINLEVEL_OUTOF10: 4

## 2021-05-12 ASSESSMENT — PAIN DESCRIPTION - FREQUENCY: FREQUENCY: INTERMITTENT

## 2021-05-12 NOTE — PROGRESS NOTES
Pulmonary Critical Care Progress Note  MACARIO Charles/Barb Santos MD     Patient seen for the follow up of  Cavitary pneumonia     Subjective:  He is sitting up in the edge of bed, family at bedside as well as RN. They are going over his discharge paperwork. He is overall feeling much better, shortness of breath is improved. He also has significant improvement in his cough and chest discomfort with coughing. Examination:  Vitals: BP (!) 152/95   Pulse 81   Temp 98.1 °F (36.7 °C) (Oral)   Resp 16   Ht 6' 3\" (1.905 m)   Wt 166 lb (75.3 kg)   SpO2 97%   BMI 20.75 kg/m²   General appearance: alert and cooperative with exam, sitting up edge of bed  Neck: No JVD  Lungs: Decreased air exchange, no wheezing or crackles  Heart: regular rate and rhythm, S1, S2 normal, no gallop  Abdomen: Soft, non tender, + BS  Extremities: no cyanosis or clubbing. No significant edema    LABs:  CBC:   Recent Labs     05/10/21  0601   WBC 11.1   HGB 13.3   HCT 41.2        BMP:   Recent Labs     05/10/21  0601      K 3.6*   CO2 20   BUN 8   CREATININE 0.66*   LABGLOM >60   GLUCOSE 94     LIVER PROFILE:  Recent Labs     05/10/21  0601   AST 32   ALT 26   LABALBU 3.1*     Radiology:  5/8/2021      Impression:  · COPD exacerbation/emphysema  · Left lower lobe cavitating pneumonia; doubt lung mass  · Smoker  · Pleurisy  · Alcoholism/alcohol abuse    Recommendations:  · Continue oral Avelox x3 more weeks as per infectious disease recommendations  · Prednisone taper  · Albuterol and Ipratropium Q 4 hours and prn  · Symbicort  · DVT prophylaxis with low molecular weight heparin  · Alcohol and smoking cessation  · He will need follow-up CT chest in 3 to 4 weeks to follow-up on resolution of cavitary changes.   · Okay to discharge from pulmonary standpoint with outpatient follow-up with Dr. Katarina Heard in 1 to 2 weeks  · Discussed with RN at bedside  · Discussed with patient and family      MACARIO Charles Pulmonary Critical Care and Sleep Medicine,  Patient seen under the supervision of Jackelin Gutierres MD, [facility]

## 2021-05-12 NOTE — PROGRESS NOTES
[facility]  Office: [address], DO, Freddy Santiago, DO, Richelle Riggs, DO, Sury Penn Blood, DO, Judith Horan MD, Kristina Montero MD, Felicity Briscoe MD, Avelina Chowdhury MD, Tony Melgar MD, Kain Motta MD, Zehra Osman MD, Mireya Lamar MD, Herrera Lanier, DO, Carl Mckeon MD, Jose Antonio Merida, DO, Tri Everett MD,  Danny Avery, DO, Romana Begin, MD, Juan Manuel Cary MD, Nichole Krishnan MD, Marylin Pace MD, [facility], [facility], [facility], CNP, Keenan [facility], CNP, Fly Gutierrez, CNS, Kike Chaidez, CNP, Diana Hdz, CNP, Alva Valero, CNP, Chinedu Briggs, CNP, Ankit Malik, CNP, JOSEPH SnyderC, Aileen Bradley, [facility], Jess Ferreira, CNP, Priyanka Richardson, CNP, Sapphire Paiz, CNP, Leela Okeefe, CNP, Afsaneh [facility], CNP, [facility]sh[facility]    Progress Note    5/12/2021    8:30 AM    Name:   Marciano Brooks  MRN:     [id number]     [place]:      [de-identified]   Room:   2008/2008-02  IP Day:  4  Admit Date:  5/8/2021  2:39 PM    PCP:   Meryle Rue, MD  Code Status:  Full Code    Subjective:     C/C:   Chief Complaint   Patient presents with   Rowan Cliftoner     about 2 weeks ago    Shortness of Breath     hx COPD     Interval History Status: improved. Patient's breathing is better. Cough is improving. BP is improved today, 339 systolic. He received his lisinopril, but the Coreg was still only 12.5 mg twice daily. Brief History:     Per my partner:  \"As documented in the medical record: \"The patient presents to the hospital with complaint of shortness of breath left sided rib pain. He states his pain is sharp, intermittent and aggravated with deep breathing. He utilized his inhaler without relief. He states his shortness of breath has progressed over the past few days. The patient states that he fell approximately 2 weeks ago and thinks he may have injured his left ribs.  He denies loss of consciousness or head strike. He endorses chronic diffuse abdominal pain that he describes as dull and aching. He reports nausea and vomiting nearly ever morning. He has been following outpatient with GI for these issues. He denies fever, chills or additional symptomology. No further modifying factors. He has past medical history that includes COPD, GERD and CVA. The patient endorses daily alcohol consumption. He states he drinks 4-6, 24oz beers and 5-10 shots of liquor a day. His last alcoholic drink was today.     Rapid SARS-CoV-2 negative.     X-ray left ribs including chest shows suspicious cavitary lesion within the left upper lung, which may reflect either cavitary inflammation or infection versus a cavitary neoplasm.  Chest CT is necessary at this time.     CT chest with contrast shows:  1. Masslike airspace consolidative density with central cavitation noted in the superior segment of the left lower lobe about 4.7 x 4.0 cm.  While this could be pneumonia, it remains concerning for malignancy.  Please correlate clinically. Short interval 6 week follow-up is recommended if treated as pneumonia. 2. Severe diffuse patchy hepatic steatosis noted which could obscure liver lesion such as metastasis.  In light of the left lower lobe lesion, consideration may be given to contrast enhanced MRI to exclude metastasis particularly 8th malignancy remains a concern after clinical evaluation. 3. Emphysema.        EGD 3/25/2021 with Dr. Talib Ceballos hiatal hernia, gastritis, retained food in the stomach, irregular Z-line. Healing of previously seen esophagitis.  Duodenal polyp. Known history of Soliz's     Findings: 1-small hiatal hernia  2-retained food in the stomach  3-irregular Z-line.  Biopsies were taken to rule out Soliz's esophagus. 4-patchy hyperemia of the stomach.  Biopsies were taken to rule out H. Pylori.   5-4 mm sessile polyp in the duodenal bulb that was removed with Hepatitis, HLD (hyperlipidemia), Hypertension, Insomnia, Left wrist pain, Pain, Seasonal allergies, Seizures ([address] Spondylosis of cervical region without myelopathy or radiculopathy, Stroke ([address] Vomiting, and Wears glasses. Social History:   reports that he has been smoking cigarettes and cigars. He has a 7.80 pack-year smoking history. He has never used smokeless tobacco. He reports current alcohol use of about 9.0 standard drinks of alcohol per week. He reports previous drug use. Family History:   Family History   Problem Relation Age of Onset    Stroke Mother     Diabetes Mother     Heart Disease Father     High Blood Pressure Father     Diabetes Maternal Grandmother        Vitals:  BP (!) 159/92   Pulse 75   Temp 97.7 °F (36.5 °C) (Oral)   Resp 16   Ht 6' 3\" (1.905 m)   Wt 166 lb (75.3 kg)   SpO2 96%   BMI 20.75 kg/m²   Temp (24hrs), Av.6 °F (36.4 °C), Min:97.3 °F (36.3 °C), Max:97.9 °F (36.6 °C)    No results for input(s): POCGLU in the last 72 hours. I/O (24Hr):     Intake/Output Summary (Last 24 hours) at 2021 0830  Last data filed at 2021 0646  Gross per 24 hour   Intake 2138 ml   Output 3825 ml   Net -1687 ml       Labs:  Hematology:  Recent Labs     05/10/21  0601   WBC 11.1   RBC 4.03*   HGB 13.3   HCT 41.2   .2   MCH 33.0   MCHC 32.3   RDW 12.0      MPV 10.0     Chemistry:  Recent Labs     05/10/21  0601      K 3.6*      CO2 20   GLUCOSE 94   BUN 8   CREATININE 0.66*   ANIONGAP 11   LABGLOM >60   GFRAA >60   CALCIUM 8.5*     Recent Labs     05/10/21  0601   PROT 5.7*   LABALBU 3.1*   AST 32   ALT 26   ALKPHOS 72   BILITOT 0.28*   BILIDIR 0.10   CHOL 138   HDL 68   LDLCHOLESTEROL 57   CHOLHDLRATIO 2.0   TRIG 65   VLDL NOT REPORTED     ABG:  Lab Results   Component Value Date    PHART 744 2012    UWC9UBF 40 2012    PO2ART 287 2012    TQG2MNL 26.8 2012    NBEA NOT REPORTED 2012    PBEA 3 2012    WDZ4MSK 28 08/31/2012    E0BFRUUL 100 08/31/2012    FIO2 100.0 08/31/2012     Lab Results   Component Value Date/Time    SPECIAL NOT REPORTED 05/08/2021 05:40 PM     Lab Results   Component Value Date/Time    CULTURE NO GROWTH 4 DAYS 05/08/2021 05:40 PM       Radiology:  Destini Jose M Chest (2 Vw)    Result Date: 5/9/2021  Stable left upper lobe cavitary lesion. Please refer to previous CT for further detail     Xr Ribs Left Include Chest (min 3 Views)    Result Date: 5/8/2021  Suspicious cavitary lesion within the left upper lung, which may reflect either cavitary inflammation or infection versus a cavitary neoplasm. Chest CT is necessary at this time. Ct Chest W Contrast    Result Date: 5/8/2021  1. Masslike airspace consolidative density with central cavitation noted in the superior segment of the left lower lobe about 4.7 x 4.0 cm. While this could be pneumonia, it remains concerning for malignancy. Please correlate clinically. Short interval 6 week follow-up is recommended if treated as pneumonia. 2. Severe diffuse patchy hepatic steatosis noted which could obscure liver lesion such as metastasis. In light of the left lower lobe lesion, consideration may be given to contrast enhanced MRI to exclude metastasis particularly 8th malignancy remains a concern after clinical evaluation. 3. Emphysema. Xr Chest Portable    Result Date: 5/11/2021  Slowly resolving left focal suprahilar opacity. Continued follow-up advised.      Xr Chest Portable    Result Date: 5/9/2021  Stable chest when compared to the earlier exam       Physical Examination:        General appearance:  alert, cooperative and no distress  Mental Status:  oriented to person, place and time and normal affect  Lungs: Occasional crackles right base, improved breath sounds overall, no rhonchi  Heart:  regular rate and rhythm, no murmur  Abdomen:  soft, nontender, nondistended, normal bowel sounds, no masses, hepatomegaly, splenomegaly  Extremities:  no edema,

## 2021-05-12 NOTE — PLAN OF CARE
Problem: Falls - Risk of:  Goal: Will remain free from falls  Description: Will remain free from falls  5/11/2021 1532 by Omar Gutierrez RN  Outcome: Ongoing  Note: Patient is a fall risk during this admission. Fall risk assessment was performed. Patient is absent of falls. Bed is in the lowest position. Wheels on the bed are locked. Call light and bed side table are within reach. Clutter is removed. Patient was educated to call out when needing assistance or wanting to get out of bed. Patient offered toileting assistance during rounding. Hourly rounds have been performed.

## 2021-05-12 NOTE — PROGRESS NOTES
Patient discharged to home in stable condition and instructed to follow up with CT scan in 3-4 weeks, follow up with Dr. Nelida Davis in 1-2 week, PCP in 1 week, and Dr. Melanie Berger in 4 weeks. Script for CT given to patient. Patient and caregiver instructed to  medications from OP pharmacy. All belongings in patient possession at time of discharge. Patient denies any further questions.

## 2021-05-12 NOTE — PROGRESS NOTES
[facility]  Progress Note    Selam Cash  MRN: [id number]  Date: 5/12/2021  LOS: 4     Reason for F/U :   Left lower lobe pneumonia    Impression :   1. Left lower lobe masslike consolidative density with central cavitation raising concern for infection versus malignancy  2. COPD/emphysema  3. EtOH abuse  4. Penicillin allergy  5. Uncontrolled hypertension    Recommendations:   · The patient continues on levofloxacin and clindamycin  · The plan is for patient to be discharged on oral moxifloxacin for another 3 weeks of antibiotic coverage  · Patient will need a follow-up CT scan of the chest.  If there is no resolution of the cavitary changes, patient will need further/invasive work-up for possible malignancy    Infection Control Recommendations:   Universal precautions    Discharge Planning:   Estimated Length of IV antimicrobials: No  Patient will need Midline Catheter Insertion/ PICC line Insertion: No  Patient will need: Home IV , [place],  SNF,  LTAC: Undetermined  Patient willneed outpatient wound care: No    Medical Decision making / Summary of Stay:   Abimael Murrieta a 64y.o.-year-old male presented to the hospital with left-sided pleuritic pain associated with shortness of breath, cough productive of yellow phlegm, worsening over 2 weeks.  The pain is intermittent, worse with movement and deep breath, no alleviating factors. He denied any recent travel or sick contacts  He denied fever or chills, denied nausea or vomiting, no other complaints.   He drinks 6 beers and 4 shots of tequila daily for more than 15 years the patient also smokes actively  CT chest showed Masslike airspace consolidative density in the left lower lobe with central cavitation .  He denied recent travel or sick contacts    Current evaluation:5/12/2021    BP (!) 145/81   Pulse 87   Temp 97.9 °F (36.6 °C) (Oral)   Resp 18   Ht 6' 3\" (1.905 m)   Wt 166 lb (75.3 kg)   SpO2 96%   BMI 20.75 kg/m² Temperature Range: Temp: 97.9 °F (36.6 °C) Temp  Av.6 °F (36.4 °C)  Min: 97.3 °F (36.3 °C)  Max: 97.9 °F (36.6 °C)    The patient was seen and evaluated sitting up in bed, systolic blood pressure 056 this morning  States he feels well  He continues to have a cough, denies shortness of breath  He has not had a bowel movement since Saturday, but attributes this to him not being in his routine at home, denies abdominal pain or nausea    Review of Systems   Constitutional: Negative. HENT: Negative. Respiratory: Positive for cough. Cardiovascular: Negative. Gastrointestinal: Negative. Genitourinary: Negative. Musculoskeletal: Negative. Skin: Negative. Neurological: Negative. Psychiatric/Behavioral: Negative. Physical Examination :     Physical Exam  Constitutional:       General: He is not in acute distress. Appearance: Normal appearance. He is normal weight. HENT:      Head: Normocephalic and atraumatic. Cardiovascular:      Rate and Rhythm: Normal rate and regular rhythm. Pulmonary:      Effort: Pulmonary effort is normal. No respiratory distress. Breath sounds: Wheezing present. Abdominal:      General: Abdomen is flat. Bowel sounds are normal. There is no distension. Palpations: Abdomen is soft. Musculoskeletal: Normal range of motion. General: No swelling. Skin:     General: Skin is warm and dry. Neurological:      General: No focal deficit present. Mental Status: He is alert and oriented to person, place, and time. Mental status is at baseline. Psychiatric:         Mood and Affect: Mood normal.         Behavior: Behavior normal.         Thought Content:  Thought content normal.         Laboratory data:   I have independently reviewed the followinglabs:  CBC with Differential:   Recent Labs     21  0700 05/10/21  0601   WBC 8.5 11.1   HGB 13.6 13.3   HCT 42.7 41.2    207     BMP:   Recent Labs     21  0700 05/10/21  0601    138   K 4.2 3.6*    107   CO2 20 20   BUN 8 8   CREATININE 0.74 0.66*     Hepatic Function Panel:   Recent Labs     05/10/21  0601   PROT 5.7*   LABALBU 3.1*   BILIDIR 0.10   IBILI 0.18   BILITOT 0.28*   ALKPHOS 72   ALT 26   AST 32         Lab Results   Component Value Date    PROCAL 0.07 05/11/2021     No results found for: CRP  Lab Results   Component Value Date    SEDRATE 2 11/09/2015         No results found for: DDIMER  No results found for: FERRITIN  No results found for: LDH  Lab Results   Component Value Date    FIBRINOGEN 392 11/09/2015       Results in Past 30 Days  Result Component Current Result Ref Range Previous Result Ref Range   SARS-CoV-2, Rapid Not Detected (5/8/2021) Not Detected Not in Time Range      Lab Results   Component Value Date    COVID19 Not Detected 05/08/2021    COVID19 Not Detected 03/21/2021    COVID19 Not Detected 12/27/2020    COVID19 Detected 11/21/2020    COVID19 Not Detected 08/09/2020       No results for input(s): [facility] in the last 72 hours. Imaging Studies:   No new imaging    Cultures:     Culture, Blood 1 [[id number]] Collected: 05/08/21 1740   Order Status: Completed Specimen: Blood Updated: 05/11/21 0016    Specimen Description . BLOOD LEFT HAND    Special Requests NOT REPORTED    Culture NO GROWTH 3 DAYS   Culture, Blood 1 [[id number]] Collected: 05/08/21 1735   Order Status: Completed Specimen: Blood Updated: 05/11/21 0016    Specimen Description . BLOOD LEFT ARM    Special Requests NOT REPORTED    Culture NO GROWTH 3 DAYS         Medications:      predniSONE  40 mg Oral Daily    lisinopril  10 mg Oral BID    carvedilol  12.5 mg Oral BID WC    folic acid  1 mg Oral Daily    traZODone  100 mg Oral Nightly    zonisamide  100 mg Oral Daily    pantoprazole  40 mg Oral QAM AC    budesonide-formoterol  2 puff Inhalation BID    sodium chloride flush  5-40 mL Intravenous 2 times per day    enoxaparin  40 mg Subcutaneous Daily    ipratropium-albuterol  1 ampule Inhalation Q4H WA    levofloxacin  750 mg Intravenous Q24H    clindamycin (CLEOCIN) IV  300 mg Intravenous Q8H    thiamine mononitrate  100 mg Oral Daily           [facility]  [phone] [facility] messaging  OFFICE: [phone]      Electronically signed by ROMANA Ryan CNP on 5/12/2021 at 6:24 AM  Thank you for allowing us to participate in the care of this patient. Please call with questions. This note iscreated with the assistance of a speech recognition program.  While intending to generate a document that actually reflects the content of the visit, the document can still have some errors including those of syntax andsound a like substitutions which may escape proof reading. In such instances, actual meaning can be extrapolated by contextual diversion.

## 2021-05-12 NOTE — DISCHARGE SUMMARY
[facility]  Office: [address], DO, Bobby Jared, DO, Jaydon Kang, DO, Mary Beth Espinoza, DO, Geovany Vasquez MD, Candie Sanders MD, Radha Pryor MD, Willette Klinefelter, MD, Lesli Burkett MD, Flor Mcmahon MD, Ronda Barkley MD, Arslan Miner MD, Chino Hayes, DO, Abhi Cee MD, Dionte Nascimento DO, Ahsan Barilals MD,  Lefty Graham DO, Janes Carmichael MD, Eugene Cabrera MD, Suzi Tapia MD, Lizabeth Shannon MD, Merary Toscano [facility], CNP, Eze Hemp, CNP, Senia Ayala, CNS, Iraida Mcgee, CNP, Fredy Turk, CNP, Laura Alba, CNP, Sabra Baez, CNP, Magy Gupta, CNP, Juan Purdy PA-C, Colton Sales, [facility], Brea Ramey, CNP, Shanthi Card, CNP, Nathan Regan, CNP, Marland Cowden, CNP, Harika Luke, CNP, [facility]    Discharge Summary     Patient ID: Jessica Johnson  :  1960   MRN: [id number]     ACCOUNT:  [de-identified]   Patient's PCP: Jim Sanders MD  Admit Date: 2021   Discharge Date: 2021     Length of Stay: 4  Code Status:  Full Code  Admitting Physician: Edith Carrillo DO  Discharge Physician: Tejas Elizalde MD     Active Discharge Diagnoses:     Hospital Problem Lists:  Principal Problem:    Cavitary pneumonia  Active Problems:    GERD (gastroesophageal reflux disease)    COPD (chronic obstructive pulmonary disease) ([facility] [address]    Dyslipidemia    Essential hypertension    Alcohol abuse    Tobacco dependence    Soliz's esophagus with esophagitis  Resolved Problems:    * No resolved hospital problems. *      Admission Condition:  poor     Discharged Condition: fair    Hospital Stay:     Hospital Course:  Jessica Johnson is a 64 y.o. male who was admitted for the management of  Cavitary pneumonia , presented to ER with *Fall (about 2 weeks ago) and Shortness of Breath (hx COPD)    Per my partner:  \"As documented in the medical record:   \"The patient presents to the hospital with complaint of shortness of breath left sided rib pain. He states his pain is sharp, intermittent and aggravated with deep breathing. He utilized his inhaler without relief. He states his shortness of breath has progressed over the past few days. The patient states that he fell approximately 2 weeks ago and thinks he may have injured his left ribs. He denies loss of consciousness or head strike. He endorses chronic diffuse abdominal pain that he describes as dull and aching. He reports nausea and vomiting nearly ever morning. He has been following outpatient with GI for these issues. He denies fever, chills or additional symptomology. No further modifying factors. He has past medical history that includes COPD, GERD and CVA. The patient endorses daily alcohol consumption. He states he drinks 4-6, 24oz beers and 5-10 shots of liquor a day. His last alcoholic drink was today.     Rapid SARS-CoV-2 negative.     X-ray left ribs including chest shows suspicious cavitary lesion within the left upper lung, which may reflect either cavitary inflammation or infection versus a cavitary neoplasm.  Chest CT is necessary at this time.     CT chest with contrast shows:  1. Masslike airspace consolidative density with central cavitation noted in the superior segment of the left lower lobe about 4.7 x 4.0 cm.  While this could be pneumonia, it remains concerning for malignancy.  Please correlate clinically. Short interval 6 week follow-up is recommended if treated as pneumonia. 2. Severe diffuse patchy hepatic steatosis noted which could obscure liver lesion such as metastasis.  In light of the left lower lobe lesion, consideration may be given to contrast enhanced MRI to exclude metastasis particularly 8th malignancy remains a concern after clinical evaluation.  3. Emphysema.        EGD 3/25/2021 with Dr. Montaño Officer hiatal hernia, gastritis, retained food in the stomach, metastasis particularly 8th malignancy remains a concern after clinical evaluation. 3. Emphysema. Xr Chest Portable    Result Date: 5/11/2021  Slowly resolving left focal suprahilar opacity. Continued follow-up advised. Xr Chest Portable    Result Date: 5/9/2021  Stable chest when compared to the earlier exam       Consultations:    Consults:     Final Specialist Recommendations/Findings:   IP CONSULT TO INTERNAL MEDICINE  IP CONSULT TO SOCIAL WORK  IP CONSULT TO PULMONOLOGY  IP CONSULT TO INFECTIOUS DISEASES      The patient was seen and examined on day of discharge and this discharge summary is in conjunction with any daily progress note from day of discharge. Discharge plan:     Disposition: Home    Physician Follow Up: Autumn Patrick MD  [address] [place] [address],  [address]  [facility] [address] [facility]  [phone]      follow up in 3-4 weeks after CT scan    Richard Turner MD  [phone] [facility], [address] [address] [facility] [address]  [phone]      follow up in 4 weeks after CT scan    Asya Blair MD  [place] [address]  [phone]    Schedule an appointment as soon as possible for a visit in 1 week         Requiring Further Evaluation/Follow Up POST HOSPITALIZATION/Incidental Findings: Cavitary pneumonia    Diet: regular diet    Activity: As tolerated    Instructions to Patient: Stop smoking tobacco, stop drinking alcohol    Discharge Medications:      Medication List      START taking these medications    moxifloxacin 400 MG tablet  Commonly known as:  Avelox  Take 1 tablet by mouth daily for 21 days     predniSONE 20 MG tablet  Commonly known as: DELTASONE  Take 2 tabs po daily x 4 days        CHANGE how you take these medications    carvedilol 25 MG tablet  Commonly known as: COREG  Take 1 tablet by mouth 2 times daily (with meals)  What changed:   medication strength  how much to take     lisinopril 10 MG tablet  Commonly known as: PRINIVIL;ZESTRIL  Take 1 tablet by mouth 2 times daily  What changed:   medication strength  how much to take        CONTINUE taking these medications    acetaminophen 325 MG tablet  Commonly known as: TYLENOL     albuterol sulfate  (90 Base) MCG/ACT inhaler  Commonly known as: ProAir HFA  Inhale 2 puffs into the lungs every 6 hours as needed for Wheezing     Cholestyramine Powd     dicyclomine 10 MG capsule  Commonly known as: BENTYL     famotidine 40 MG tablet  Commonly known as: PEPCID     folic acid 1 MG tablet  Commonly known as: FOLVITE     mometasone-formoterol 200-5 MCG/ACT inhaler  Commonly known as: Dulera  Inhale 2 puffs into the lungs every 12 hours     omeprazole 20 MG delayed release capsule  Commonly known as: PRILOSEC     traZODone 100 MG tablet  Commonly known as: DESYREL     vitamin B-1 100 MG tablet  Commonly known as: THIAMINE     zonisamide 100 MG capsule  Commonly known as: Naty Carlos           Where to Get Your Medications      These medications were sent to [facility], [address] [facility] [phone] [facility] [phone]  [address] [place] [address]    Phone: [phone]   carvedilol 25 MG tablet  lisinopril 10 MG tablet  moxifloxacin 400 MG tablet  predniSONE 20 MG tablet         Discharge Procedure Orders   CT CHEST WO CONTRAST   Standing Status: Future Standing Exp. Date: 06/12/21       Time Spent on discharge is  33 mins in patient examination, evaluation, counseling as well as medication reconciliation, prescriptions for required medications, discharge plan and follow up. Electronically signed by   Vick Mixon MD  5/12/2021  1:48 PM      Thank you Dr. Yaritza Zuniga MD for the opportunity to be involved in this patient's care.

## 2021-05-12 NOTE — PLAN OF CARE
Problem: Falls - Risk of:  Goal: Will remain free from falls  Description: Will remain free from falls  Outcome: Ongoing  Goal: Absence of physical injury  Description: Absence of physical injury  Outcome: Ongoing     Problem: Skin Integrity:  Goal: Will show no infection signs and symptoms  Description: Will show no infection signs and symptoms  Outcome: Ongoing  Goal: Absence of new skin breakdown  Description: Absence of new skin breakdown  Outcome: Ongoing     Problem: SAFETY  Goal: Free from accidental physical injury  Outcome: Ongoing  Goal: Free from intentional harm  Outcome: Ongoing     Problem: DAILY CARE  Goal: Daily care needs are met  Outcome: Ongoing     Problem: PAIN  Goal: Patient's pain/discomfort is manageable  5/12/2021 1325 by Federico Raygoza RN  Outcome: Ongoing  5/12/2021 0257 by Casey Jernigan RN  Note: Pt medicated with pain medication prn. Assessed all pain characteristics including level, type, location, frequency, and onset. Non-pharmacologic interventions offered to pt as well. Pt states pain is tolerable at this time. Will continue to monitor      Problem: SKIN INTEGRITY  Goal: Skin integrity is maintained or improved  Outcome: Ongoing     Problem: KNOWLEDGE DEFICIT  Goal: Patient/S.O. demonstrates understanding of disease process, treatment plan, medications, and discharge instructions.   Outcome: Ongoing     Problem: DISCHARGE BARRIERS  Goal: Patient's continuum of care needs are met  Outcome: Ongoing     Problem: Pain:  Goal: Pain level will decrease  Description: Pain level will decrease  Outcome: Ongoing  Goal: Control of acute pain  Description: Control of acute pain  Outcome: Ongoing  Goal: Control of chronic pain  Description: Control of chronic pain  Outcome: Ongoing     Problem: IP BALANCE  Goal: LTG - Patient will maintain balance to allow for safe/functional mobility  Outcome: Ongoing     Problem: Nutrition  Goal: Optimal nutrition therapy  Outcome: Ongoing

## 2021-05-14 LAB
CULTURE: NORMAL
CULTURE: NORMAL
Lab: NORMAL
Lab: NORMAL
SPECIMEN DESCRIPTION: NORMAL
SPECIMEN DESCRIPTION: NORMAL

## 2021-05-18 ENCOUNTER — APPOINTMENT (OUTPATIENT)
Dept: CT IMAGING | Age: 61
End: 2021-05-18
Payer: MEDICARE

## 2021-05-18 ENCOUNTER — TELEPHONE (OUTPATIENT)
Dept: OTHER | Facility: CLINIC | Age: 61
End: 2021-05-18

## 2021-05-18 ENCOUNTER — HOSPITAL ENCOUNTER (EMERGENCY)
Age: 61
Discharge: HOME | End: 2021-05-18
Attending: EMERGENCY MEDICINE
Payer: MEDICARE

## 2021-05-18 ENCOUNTER — APPOINTMENT (OUTPATIENT)
Dept: GENERAL RADIOLOGY | Age: 61
End: 2021-05-18
Payer: MEDICARE

## 2021-05-18 VITALS
DIASTOLIC BLOOD PRESSURE: 95 MMHG | TEMPERATURE: 98.2 F | BODY MASS INDEX: 20.62 KG/M2 | WEIGHT: 165 LBS | SYSTOLIC BLOOD PRESSURE: 163 MMHG | RESPIRATION RATE: 22 BRPM | HEART RATE: 82 BPM | OXYGEN SATURATION: 97 %

## 2021-05-18 DIAGNOSIS — R07.89 OTHER CHEST PAIN: Primary | ICD-10-CM

## 2021-05-18 DIAGNOSIS — J18.9 PNEUMONIA DUE TO ORGANISM: ICD-10-CM

## 2021-05-18 LAB
ABSOLUTE EOS #: 0.1 K/UL (ref 0–0.44)
ABSOLUTE IMMATURE GRANULOCYTE: 0.12 K/UL (ref 0–0.3)
ABSOLUTE LYMPH #: 1.77 K/UL (ref 1.1–3.7)
ABSOLUTE MONO #: 0.92 K/UL (ref 0.1–1.2)
ANION GAP SERPL CALCULATED.3IONS-SCNC: 16 MMOL/L (ref 9–17)
BASOPHILS # BLD: 1 % (ref 0–2)
BASOPHILS ABSOLUTE: 0.06 K/UL (ref 0–0.2)
BUN BLDV-MCNC: 8 MG/DL (ref 6–20)
BUN/CREAT BLD: 13 (ref 9–20)
C-REACTIVE PROTEIN: 9.8 MG/L (ref 0–5)
CALCIUM SERPL-MCNC: 9.2 MG/DL (ref 8.6–10.4)
CHLORIDE BLD-SCNC: 99 MMOL/L (ref 98–107)
CO2: 19 MMOL/L (ref 20–31)
CREAT SERPL-MCNC: 0.6 MG/DL (ref 0.7–1.2)
D-DIMER QUANTITATIVE: 0.48 MG/L FEU (ref 0–0.59)
DIFFERENTIAL TYPE: ABNORMAL
EOSINOPHILS RELATIVE PERCENT: 1 % (ref 1–4)
GFR AFRICAN AMERICAN: >60 ML/MIN
GFR NON-AFRICAN AMERICAN: >60 ML/MIN
GFR SERPL CREATININE-BSD FRML MDRD: ABNORMAL ML/MIN/{1.73_M2}
GFR SERPL CREATININE-BSD FRML MDRD: ABNORMAL ML/MIN/{1.73_M2}
GLUCOSE BLD-MCNC: 109 MG/DL (ref 70–99)
HCT VFR BLD CALC: 47.9 % (ref 40.7–50.3)
HEMOGLOBIN: 15.7 G/DL (ref 13–17)
IMMATURE GRANULOCYTES: 1 %
LACTIC ACID: 1 MMOL/L (ref 0.5–2.2)
LYMPHOCYTES # BLD: 15 % (ref 24–43)
MCH RBC QN AUTO: 33.2 PG (ref 25.2–33.5)
MCHC RBC AUTO-ENTMCNC: 32.8 G/DL (ref 28.4–34.8)
MCV RBC AUTO: 101.3 FL (ref 82.6–102.9)
MONOCYTES # BLD: 8 % (ref 3–12)
NRBC AUTOMATED: 0 PER 100 WBC
PDW BLD-RTO: 12.9 % (ref 11.8–14.4)
PLATELET # BLD: 336 K/UL (ref 138–453)
PLATELET ESTIMATE: ABNORMAL
PMV BLD AUTO: 8.9 FL (ref 8.1–13.5)
POTASSIUM SERPL-SCNC: 4 MMOL/L (ref 3.7–5.3)
PROCALCITONIN: 0.09 NG/ML
RBC # BLD: 4.73 M/UL (ref 4.21–5.77)
RBC # BLD: ABNORMAL 10*6/UL
SEDIMENTATION RATE, ERYTHROCYTE: 43 MM (ref 0–20)
SEG NEUTROPHILS: 74 % (ref 36–65)
SEGMENTED NEUTROPHILS ABSOLUTE COUNT: 8.49 K/UL (ref 1.5–8.1)
SODIUM BLD-SCNC: 134 MMOL/L (ref 135–144)
TROPONIN INTERP: NORMAL
TROPONIN T: NORMAL NG/ML
TROPONIN, HIGH SENSITIVITY: 7 NG/L (ref 0–22)
WBC # BLD: 11.5 K/UL (ref 3.5–11.3)
WBC # BLD: ABNORMAL 10*3/UL

## 2021-05-18 PROCEDURE — 86140 C-REACTIVE PROTEIN: CPT

## 2021-05-18 PROCEDURE — 71260 CT THORAX DX C+: CPT

## 2021-05-18 PROCEDURE — 2580000003 HC RX 258: Performed by: EMERGENCY MEDICINE

## 2021-05-18 PROCEDURE — 83605 ASSAY OF LACTIC ACID: CPT

## 2021-05-18 PROCEDURE — 99282 EMERGENCY DEPT VISIT SF MDM: CPT

## 2021-05-18 PROCEDURE — 71046 X-RAY EXAM CHEST 2 VIEWS: CPT

## 2021-05-18 PROCEDURE — 84484 ASSAY OF TROPONIN QUANT: CPT

## 2021-05-18 PROCEDURE — 80048 BASIC METABOLIC PNL TOTAL CA: CPT

## 2021-05-18 PROCEDURE — 85025 COMPLETE CBC W/AUTO DIFF WBC: CPT

## 2021-05-18 PROCEDURE — 6360000004 HC RX CONTRAST MEDICATION: Performed by: EMERGENCY MEDICINE

## 2021-05-18 PROCEDURE — 85379 FIBRIN DEGRADATION QUANT: CPT

## 2021-05-18 PROCEDURE — 84145 PROCALCITONIN (PCT): CPT

## 2021-05-18 PROCEDURE — 85652 RBC SED RATE AUTOMATED: CPT

## 2021-05-18 RX ORDER — SODIUM CHLORIDE 0.9 % (FLUSH) 0.9 %
10 SYRINGE (ML) INJECTION ONCE
Status: COMPLETED | OUTPATIENT
Start: 2021-05-18 | End: 2021-05-18

## 2021-05-18 RX ORDER — 0.9 % SODIUM CHLORIDE 0.9 %
80 INTRAVENOUS SOLUTION INTRAVENOUS ONCE
Status: COMPLETED | OUTPATIENT
Start: 2021-05-18 | End: 2021-05-18

## 2021-05-18 RX ORDER — OXYCODONE HYDROCHLORIDE AND ACETAMINOPHEN 5; 325 MG/1; MG/1
1 TABLET ORAL ONCE
Status: DISCONTINUED | OUTPATIENT
Start: 2021-05-18 | End: 2021-05-18 | Stop reason: HOSPADM

## 2021-05-18 RX ORDER — OXYCODONE HYDROCHLORIDE AND ACETAMINOPHEN 5; 325 MG/1; MG/1
1 TABLET ORAL EVERY 6 HOURS PRN
Qty: 12 TABLET | Refills: 0 | Status: SHIPPED | OUTPATIENT
Start: 2021-05-18 | End: 2021-05-25

## 2021-05-18 RX ADMIN — SODIUM CHLORIDE 80 ML: 9 INJECTION, SOLUTION INTRAVENOUS at 17:55

## 2021-05-18 RX ADMIN — SODIUM CHLORIDE, PRESERVATIVE FREE 10 ML: 5 INJECTION INTRAVENOUS at 17:56

## 2021-05-18 RX ADMIN — IOPAMIDOL 75 ML: 755 INJECTION, SOLUTION INTRAVENOUS at 17:55

## 2021-05-18 ASSESSMENT — ENCOUNTER SYMPTOMS
SINUS PAIN: 0
APNEA: 0
COLOR CHANGE: 0
CONSTIPATION: 0
DIARRHEA: 0
SHORTNESS OF BREATH: 0
ABDOMINAL DISTENTION: 0
VOMITING: 0
CHEST TIGHTNESS: 0
EYES NEGATIVE: 1

## 2021-05-18 ASSESSMENT — PAIN SCALES - GENERAL: PAINLEVEL_OUTOF10: 4

## 2021-05-18 NOTE — ED PROVIDER NOTES
EMERGENCY DEPARTMENT ENCOUNTER    Pt Name: Shannan Ceja  MRN: [id number]  [place] 1960  Date of evaluation: 5/18/21  CHIEF COMPLAINT       Chief Complaint   Patient presents with    Shortness of Breath    Cough    COPD     HISTORY OF PRESENT ILLNESS   53-year-old male presenting to the emergency room with continued issues with chest pain to the left lung. Patient was discharged 5/12/2021 after being diagnosed with a cavitary pneumonia. Patient is on oral antibiotics and has been taking these medications. He is concerned because of the pain in the chest.  Patient has been able to keep down medications. No vomiting. He states that he does have distention and some generalized abdominal discomfort. REVIEW OF SYSTEMS     Review of Systems   Constitutional: Negative for activity change, chills and diaphoresis. HENT: Negative for congestion, sinus pain and tinnitus. Eyes: Negative. Respiratory: Negative for apnea, chest tightness and shortness of breath. Cardiovascular: Positive for chest pain. Gastrointestinal: Negative for abdominal distention, constipation, diarrhea and vomiting. Genitourinary: Negative for difficulty urinating and frequency. Musculoskeletal: Negative for arthralgias and myalgias. Skin: Negative for color change and rash. Neurological: Negative for dizziness. Hematological: Negative. Psychiatric/Behavioral: Negative.         PASTMEDICAL HISTORY     Past Medical History:   Diagnosis Date    Arthritis     Asthma     Attempted suicide ([address]     attempted 7 times    Bipolar affective ([address]     Blood in stool     CHF (congestive heart failure), NYHA class III ([address] 08/22/2014    COPD (chronic obstructive pulmonary disease) ([address]     CVA [facility]cerebral vascular accident) [facility]     x2 in 2006    Depression 07/26/2015    Difficult intubation 07/05/2013    Needed Glidescope, only epiglottis seen, easy mask ventilation    Erectile dysfunction 05/23/2014  Fracture of wrist     left    GERD (gastroesophageal reflux disease) 02/19/2014    Head injury     Hepatitis     HLD (hyperlipidemia) 02/19/2014    Hypertension     Insomnia     Left wrist pain     Pain     LEFT KNEE, RIGHT HIP    Seasonal allergies 07/15/2014    Seizures ([facility])     Spondylosis of cervical region without myelopathy or radiculopathy     Stroke ([facility]) 10/31/2011    pt states a total of 4-5strokes    Vomiting     Wears glasses      Past Problem List  Patient Active Problem List   Diagnosis Code    Fracture tibia/fibula S82.209A, S82.409A    Schizoaffective disorder ([facility]) L50.7    Umbilical hernia G89.6    Gastroenteritis O65.2    Folliculitis T72.9    GERD (gastroesophageal reflux disease) K21.9    COPD (chronic obstructive pulmonary disease) ([facility]) J44.9    Dyslipidemia E78.5    Foot pain, right M79.671    Hematochezia K92.1    Hemorrhoids K64.9    Erectile dysfunction N52.9    Seasonal allergies J30.2    COPD exacerbation ([facility]) J44.1    CHF (congestive heart failure), NYHA class III ([facility]) I50.9    Dermoid cyst of scalp D23.4    Pneumonitis J18.9    Syncope R55    Depression F32.9    Chest pain R07.9    Syncope and collapse R55    Hypokalemia E87.6    Seizures ([facility]) R56.9    Steroid-induced hyperglycemia R73.9, T38.0X5A    Need for vaccination Z23    Tremor R25.1    Atypical chest pain R07.89    Elevated LFTs R79.89    Abnormal results of liver function studies  R94.5    Essential hypertension I10    Schizoaffective disorder, depressive type ([facility]) F25.1    Seizure ([facility]) R56.9    Intractable vomiting with nausea R11.2    Spondylosis of cervical region without myelopathy or radiculopathy M47.812    Avascular necrosis of bone of right hip ([facility]) M87.051    Cavitary pneumonia J18.9, J98.4    Alcohol abuse F10.10    Tobacco dependence F17.200    Soliz's esophagus with esophagitis K22.70, K20.90     SURGICAL HISTORY       Past Surgical History: Procedure Laterality Date    ANESTHESIA NERVE BLOCK Left 10/23/2017    NERVE BLOCK LEFT CERVICAL C3 TON C4 C5 performed by Patricia Parisi MD at [address]      cardiac cath x2    COLONOSCOPY  05/19/2014    COLONOSCOPY N/A 12/31/2020    COLONOSCOPY POLYPECTOMY SNARE/COLD BIOPSY WITH CLIP APPLICATION AND CONTROL OF BLEEDING performed by Saeid Flores MD at [address], COLON, DIAGNOSTIC      FRACTURE SURGERY      right arm, left knee, l ankle pins placed    HERNIA REPAIR      KNEE SURGERY      NERVE BLOCK Left 10/23/2017    medial branch block, cervical    TIBIA FRACTURE SURGERY Left 07/05/2013    IM nailing    TOE SURGERY Right     UMBILICAL HERNIA REPAIR  04/08/2014    with mesh    UPPER GASTROINTESTINAL ENDOSCOPY N/A 12/31/2020    EGD BIOPSY performed by Saeid Flores MD at [address] [place] [address] 03/25/2021    EGD BIOPSY performed [facility]aeid Flores MD at [facility]       Discharge Medication List as of 5/18/2021  7:03 PM      CONTINUE these medications which have NOT CHANGED    Details   predniSONE (DELTASONE) 20 MG tablet Take 2 tabs po daily x 4 days, Disp-8 tablet, R-0Normal      carvedilol (COREG) 25 MG tablet Take 1 tablet by mouth 2 times daily (with meals), Disp-60 tablet, R-1Normal      lisinopril (PRINIVIL;ZESTRIL) 10 MG tablet Take 1 tablet by mouth 2 times daily, Disp-60 tablet, R-1Normal      moxifloxacin (AVELOX) 400 MG tablet Take 1 tablet by mouth daily for 21 days, Disp-21 tablet, R-0Normal      Cholestyramine POWD by Does not apply route 1 scoop dailyHistorical Med      famotidine (PEPCID) 40 MG tablet Take 40 mg by mouth dailyHistorical Med      vitamin B-1 (THIAMINE) 100 MG tablet Take 100 mg by mouth dailyHistorical Med      dicyclomine (BENTYL) 10 MG capsule Take 10 mg by mouth 3 times daily (before meals)Historical Med      folic acid (FOLVITE) 1 MG tablet Take 1 mg by mouth dailyHistorical Med      omeprazole (PRILOSEC) 20 MG delayed release capsule Take 40 mg by mouth dailyHistorical Med      traZODone (DESYREL) 100 MG tablet Take 100 mg by mouth nightlyHistorical Med      acetaminophen (TYLENOL) 325 MG tablet Take 650 mg by mouth every 6 hours as needed for PainHistorical Med      zonisamide (ZONEGRAN) 100 MG capsule Take 100 mg by mouth dailyHistorical Med      albuterol sulfate HFA (PROAIR HFA) 108 (90 Base) MCG/ACT inhaler Inhale 2 puffs into the lungs every 6 hours as needed for Wheezing, Disp-1 Inhaler, R-0Print      mometasone-formoterol (DULERA) 200-5 MCG/ACT inhaler Inhale 2 puffs into the lungs every 12 hours, Disp-1 Inhaler, R-0Print           ALLERGIES     is allergic to pcn [penicillins]. FAMILY HISTORY     He indicated that his mother is alive. He indicated that his father is alive. He indicated that the status of his maternal grandmother is unknown. SOCIAL HISTORY       Social History     Tobacco Use    Smoking status: Current Some Day Smoker     Packs/day: 0.20     Years: 39.00     Pack years: 7.80     Types: Cigarettes, Cigars    Smokeless tobacco: Never Used   Vaping Use    Vaping Use: Never used   Substance Use Topics    Alcohol use: Yes     Alcohol/week: 9.0 standard drinks     Types: 5 Cans of beer, 4 Shots of liquor per week     Comment: 5 can/day / 4 shot day (the patient reports that his usual routine use to include a sixpack before work, a case of beer and a bottle of [employer] while at work, and then out to the bar in the evening for more drinking)    Drug use: Not Currently     PHYSICAL EXAM     INITIAL VITALS: BP (!) 163/95   Pulse 82   Temp 98.2 °F (36.8 °C)   Resp 22   Wt 165 lb (74.8 kg)   SpO2 97%   BMI 20.62 kg/m²    Physical Exam  Constitutional:       General: He is not in acute distress. Appearance: He is well-developed. HENT:      Head: Normocephalic. Eyes:      Pupils: Pupils are equal, round, and reactive to light. Cardiovascular:      Rate and Rhythm: Normal rate and regular rhythm. Heart sounds: Normal heart sounds. Pulmonary:      Effort: Pulmonary effort is normal. No respiratory distress. Breath sounds: Decreased breath sounds present. Comments: Slightly diminished lung sounds throughout. No wheezing rhonchi or rales heard  Abdominal:      General: Bowel sounds are normal.      Palpations: Abdomen is soft. Tenderness: There is no abdominal tenderness. Musculoskeletal:         General: Normal range of motion. Skin:     General: Skin is warm and dry. Neurological:      Mental Status: He is alert and oriented to person, place, and time. MEDICAL DECISION MAKIN-year-old male presenting to the emergency room with issues with shortness of breath cough and chest discomfort. Discomfort is likely due to persistent cough and the cavitary lesion found during his hospitalization. I am not concerned for cardiac etiology given patient's history and exam.  Patient does not have significantly elevated white count. His blood work overall is within normal limits. CT chest does show improving interval change of the lesion. Patient's vitals are within normal limits other than mildly elevated blood pressure. Plan at this time is for discharge pain medications and PCP follow-up. CRITICAL CARE:       PROCEDURES:    Procedures    DIAGNOSTIC RESULTS   EKG:All EKG's are interpreted by the Emergency Department Physician who either signs or Co-signs this chart in the absence of a cardiologist.        RADIOLOGY:All plain film, CT, MRI, and formal ultrasound images (except ED bedside ultrasound) are read by the radiologist, see reports below, unless otherwisenoted in MDM or here. CT CHEST W CONTRAST   Final Result   Interval decrease in size of area of consolidation likely representing   resolving infectious process. No new masses noted.          XR CHEST (2 VW)   Final Result   Hyperinflated, clear lungs. LABS: All lab results were reviewed by myself, and all abnormals are listed below. Labs Reviewed   CBC WITH AUTO DIFFERENTIAL - Abnormal; Notable for the following components:       Result Value    WBC 11.5 (*)     Seg Neutrophils 74 (*)     Lymphocytes 15 (*)     Immature Granulocytes 1 (*)     Segs Absolute 8.49 (*)     All other components within normal limits   BASIC METABOLIC PANEL W/ REFLEX TO MG FOR LOW K - Abnormal; Notable for the following components:    Glucose 109 (*)     CREATININE 0.60 (*)     Sodium 134 (*)     CO2 19 (*)     All other components within normal limits   SEDIMENTATION RATE - Abnormal; Notable for the following components:    Sed Rate 43 (*)     All other components within normal limits   C-REACTIVE PROTEIN - Abnormal; Notable for the following components:    CRP 9.8 (*)     All other components within normal limits   PROCALCITONIN - Abnormal; Notable for the following components:    Procalcitonin 0.09 (*)     All other components within normal limits   TROPONIN   LACTIC ACID   D-DIMER, QUANTITATIVE       EMERGENCY DEPARTMENTCOURSE:         Vitals:    Vitals:    05/18/21 1341 05/18/21 1658   BP: (!) 158/107 (!) 163/95   Pulse: 100 82   Resp: 14 22   Temp: 98.2 °F (36.8 °C)    SpO2: 95% 97%   Weight: 165 lb (74.8 kg)        The patient was given the following medications while in the emergency department:  Orders Placed This Encounter   Medications    0.9 % sodium chloride bolus    iopamidol (ISOVUE-370) 76 % injection 75 mL    sodium chloride flush 0.9 % injection 10 mL    DISCONTD: oxyCODONE-acetaminophen (PERCOCET) 5-325 MG per tablet 1 tablet    oxyCODONE-acetaminophen (PERCOCET) 5-325 MG per tablet     Sig: Take 1 tablet by mouth every 6 hours as needed for Pain for up to 7 days. Intended supply: 3 days. Take lowest dose possible to manage pain     Dispense:  12 tablet     Refill:  0     CONSULTS:  None    FINAL IMPRESSION      1. Other chest pain    2. Pneumonia due to organism          DISPOSITION/PLAN   DISPOSITION Decision To Discharge 05/18/2021 07:00:24 PM      PATIENT REFERRED TO:  ROMANA Nina - [address] [phone]    Schedule an appointment as soon as possible for a visit in 1 week      DISCHARGE MEDICATIONS:  Discharge Medication List as of 5/18/2021  7:03 PM      START taking these medications    Details   oxyCODONE-acetaminophen (PERCOCET) 5-325 MG per tablet Take 1 tablet by mouth every 6 hours as needed for Pain for up to 7 days. Intended supply: 3 days.  Take lowest dose possible to manage pain, Disp-12 tablet, R-0Print           Bashir Hollingsworth MD  Attending Emergency Physician                 Evon Montejo MD  05/18/21 8720

## 2021-05-19 ENCOUNTER — TELEPHONE (OUTPATIENT)
Dept: OTHER | Facility: CLINIC | Age: 61
End: 2021-05-19

## 2021-05-19 NOTE — TELEPHONE ENCOUNTER
RN Access contacted Robina Zhang ([facility]) Office to schedule ED f/u appt. Spoke with Gin Wilson, she scheduled appt for Monday 5-24 @11:30.

## 2023-02-13 ENCOUNTER — APPOINTMENT (OUTPATIENT)
Dept: GENERAL RADIOLOGY | Age: 63
End: 2023-02-13
Payer: MEDICARE

## 2023-02-13 ENCOUNTER — HOSPITAL ENCOUNTER (EMERGENCY)
Age: 63
Discharge: HOME | End: 2023-02-13
Attending: EMERGENCY MEDICINE
Payer: MEDICARE

## 2023-02-13 VITALS
RESPIRATION RATE: 18 BRPM | OXYGEN SATURATION: 97 % | HEIGHT: 75 IN | DIASTOLIC BLOOD PRESSURE: 112 MMHG | WEIGHT: 150 LBS | BODY MASS INDEX: 18.65 KG/M2 | TEMPERATURE: 97.5 F | SYSTOLIC BLOOD PRESSURE: 165 MMHG

## 2023-02-13 DIAGNOSIS — M79.671 RIGHT FOOT PAIN: Primary | ICD-10-CM

## 2023-02-13 PROCEDURE — 99283 EMERGENCY DEPT VISIT LOW MDM: CPT

## 2023-02-13 PROCEDURE — 73630 X-RAY EXAM OF FOOT: CPT

## 2023-02-13 PROCEDURE — 6370000000 HC RX 637 (ALT 250 FOR IP): Performed by: STUDENT IN AN ORGANIZED HEALTH CARE EDUCATION/TRAINING PROGRAM

## 2023-02-13 PROCEDURE — 73610 X-RAY EXAM OF ANKLE: CPT

## 2023-02-13 RX ORDER — HYDROCODONE BITARTRATE AND ACETAMINOPHEN 5; 325 MG/1; MG/1
1 TABLET ORAL EVERY 4 HOURS PRN
Qty: 5 TABLET | Refills: 0 | Status: SHIPPED | OUTPATIENT
Start: 2023-02-13 | End: 2023-02-16

## 2023-02-13 RX ORDER — HYDROCODONE BITARTRATE AND ACETAMINOPHEN 5; 325 MG/1; MG/1
1 TABLET ORAL EVERY 6 HOURS PRN
Status: DISCONTINUED | OUTPATIENT
Start: 2023-02-13 | End: 2023-02-13 | Stop reason: HOSPADM

## 2023-02-13 RX ADMIN — HYDROCODONE BITARTRATE AND ACETAMINOPHEN 1 TABLET: 5; 325 TABLET ORAL at 15:10

## 2023-02-13 ASSESSMENT — PAIN DESCRIPTION - LOCATION: LOCATION: FOOT

## 2023-02-13 ASSESSMENT — PAIN DESCRIPTION - ORIENTATION: ORIENTATION: RIGHT

## 2023-02-13 ASSESSMENT — PAIN SCALES - GENERAL: PAINLEVEL_OUTOF10: 10

## 2023-02-13 ASSESSMENT — PAIN DESCRIPTION - FREQUENCY: FREQUENCY: CONTINUOUS

## 2023-02-13 ASSESSMENT — ENCOUNTER SYMPTOMS
BACK PAIN: 0
CHEST TIGHTNESS: 0
ABDOMINAL PAIN: 0
TROUBLE SWALLOWING: 0
ABDOMINAL DISTENTION: 0
FACIAL SWELLING: 0
VOMITING: 0
SHORTNESS OF BREATH: 0
NAUSEA: 0

## 2023-02-13 ASSESSMENT — PAIN - FUNCTIONAL ASSESSMENT: PAIN_FUNCTIONAL_ASSESSMENT: 0-10

## 2023-02-13 ASSESSMENT — PAIN DESCRIPTION - DESCRIPTORS: DESCRIPTORS: SHARP;STABBING;THROBBING

## 2023-02-13 NOTE — ED PROVIDER NOTES
[facility]     Emergency Department     Faculty Attestation    I performed a history and physical examination of the patient and discussed management with the resident. I have reviewed and agree with the residents findings including all diagnostic interpretations, and treatment plans as written at the time of my review. Any areas of disagreement are noted on the chart. I was personally present for the key portions of any procedures. I have documented in the chart those procedures where I was not present during the key portions. For Physician Assistant/ Nurse Practitioner cases/documentation I have personally evaluated this patient and have completed at least one if not all key elements of the E/M (history, physical exam, and MDM). Additional findings are as noted. Primary Care Physician: ROMANA Guerra - CNP    Note Started: 2:09 PM EST    History: This is a 63 y.o. male who presents to the Emergency Department with complaint of toe pain. Patient complaining of pain to the right great toe. Patient injured it last night. Physical:   height is 6' 3\" (1.905 m) and weight is 150 lb (68 kg). His oral temperature is 97.5 °F (36.4 °C). His blood pressure is 165/112 (abnormal). His respiration is 18 and oxygen saturation is 97%. Ecchymosis in the right great toe and dorsal aspect of the foot. There is no tenderness over the medial or lateral malleolus. Pedal pulses 2/4. Impression: Right great toe, foot pain    Plan: X-ray, analgesia      Medical Decision Making  Amount and/or Complexity of Data Reviewed  Radiology: ordered. Decision-making details documented in ED Course. Risk  OTC drugs. (Please note that portions of this note were completed with a voice recognition program.  Efforts were made to edit the dictations but occasionally words are mis-transcribed.)    Bess Lund.  Cesar Winn MD, [facility]  Attending Emergency Medicine Physician         Ronald Cohen MD  02/13/23 0072

## 2023-02-13 NOTE — DISCHARGE INSTRUCTIONS
Call today or tomorrow to follow up with ROMANA Contreras CNP  in 3 days. Use an ice pack or bag filled with ice and apply to the injured area 3 - 4 times a day for 15 - 20 minutes each time. Use ibuprofen or Tylenol (unless prescribed medications that have Tylenol in it) for pain. You can take over the counter Ibuprofen (advil) tablets (4 every 8 hours or 3 every 6 hours or 2 every 4 hours)    Use your crutches for the next several days until you are able to take 10 steps without pain. Return to the Emergency Department for worsening of pain, increase swelling to the ankle, inability to move your toes, any other care or concern.

## 2023-02-13 NOTE — ED NOTES
This patient was assessed by the doctor only. Nurse processed and completed the orders from this doctor ie labs, meds, and/or EKG.       Anand Lamas LPN  21/46/91 [id number]

## 2023-02-14 NOTE — ED PROVIDER NOTES
[facility] ED  Emergency Department Encounter  Emergency Medicine Resident     Pt Maria Elena Tolliver  MRN: [id number]  [place] 1960  Date of evaluation: 2/13/23  PCP:  ROMANA Mendoza CNP      CHIEF COMPLAINT       Right foot pain      HISTORY OF PRESENT ILLNESS  (Location/Symptom, Timing/Onset, Context/Setting, Quality, Duration, Modifying Factors, Severity.)      Ugo Schroeder is a 63 y.o. male who presents after he rolled out of bed and fell onto his right side. Patient denies striking his head or any loss of consciousness. Is not on any anticoagulation. Has been acting appropriately since the incident per family member who is in the room. Denies any headache, nausea vomiting or dizziness. Does have right-sided foot pain in the first digit and states has become increasingly swollen and tender. He is having difficulty ambulating at this time. Is concerned that he broke a bone. PAST MEDICAL / SURGICAL / SOCIAL / FAMILY HISTORY      has a past medical history of Abnormal results of liver function studies , Arthritis, Asthma, Attempted suicide ([address] Avascular necrosis of bone of right hip ([address] Bipolar affective ([address] Blood in stool, Cavitary pneumonia, CHF (congestive heart failure), NYHA class III ([address] COPD (chronic obstructive pulmonary disease) ([address] COPD exacerbation ([address] CVA (cerebral vascular accident) ([address] Depression, Difficult intubation, Erectile dysfunction, Folliculitis, Fracture of wrist, GERD (gastroesophageal reflux disease), Head injury, Hepatitis, HLD (hyperlipidemia), Hypertension, Insomnia, Left wrist pain, Pain, Schizoaffective disorder, depressive type ([address] Seasonal allergies, Seizures ([address] Spondylosis of cervical region without myelopathy or radiculopathy, Stroke ([address] Tobacco dependence, Tremor, Umbilical hernia, Vomiting, and Wears glasses.        has a past surgical history that includes fracture surgery; hernia repair; knee surgery; Toe Surgery (Right); Biceps tendon repair; Tibia fracture surgery (Left, 07/05/2013); Cardiac surgery; Umbilical hernia repair (04/08/2014); Colonoscopy (05/19/2014); Nerve Block (Left, 10/23/2017); Anesthesia Nerve Block (Left, 10/23/2017); Endoscopy, colon, diagnostic; Colonoscopy (N/A, 12/31/2020); Upper gastrointestinal endoscopy (N/A, 12/31/2020); and Upper gastrointestinal endoscopy (N/A, 03/25/2021). Social History     Socioeconomic History    Marital status: Single     Spouse name: Not on file    Number of children: Not on file    Years of education: Not on file    Highest education level: Not on file   Occupational History    Not on file   Tobacco Use    Smoking status: Some Days     Packs/day: 0.20     Years: 39.00     Pack years: 7.80     Types: Cigarettes, Cigars    Smokeless tobacco: Never   Vaping Use    Vaping Use: Never used   Substance and Sexual Activity    Alcohol use:  Yes     Alcohol/week: 9.0 standard drinks     Types: 5 Cans of beer, 4 Shots of liquor per week     Comment: 5 can/day / 4 shot day (the patient reports that his usual routine use to include a sixpack before work, a case of beer and a bottle of [employer] while at work, and then out to the bar in the evening for more drinking)    Drug use: Not Currently    Sexual activity: Not Currently   Other Topics Concern    Not on file   Social History Narrative    Not on file     Social Determinants of Health     Financial Resource Strain: Not on file   Food Insecurity: Not on file   Transportation Needs: Not on file   Physical Activity: Not on file   Stress: Not on file   Social Connections: Not on file   Intimate Partner Violence: Not on file   Housing Stability: Not on file       Family History   Problem Relation Age of Onset    Stroke Mother     Diabetes Mother     Heart Disease Father     High Blood Pressure Father     Diabetes Maternal Grandmother        Allergies:  Pcn [penicillins]    Home Medications:  Prior to Admission medications    Medication Sig Start Date End Date Taking? Authorizing Provider   HYDROcodone-acetaminophen (NORCO) 5-325 MG per tablet Take 1 tablet by mouth every 4 hours as needed for Pain for up to 3 days. Intended supply: 3 days.  Take lowest dose possible to manage pain Max Daily Amount: 6 tablets 2/13/23 2/16/23 Yes Melita Chi MD   predniSONE (DELTASONE) 20 MG tablet Take 2 tabs po daily x 4 days 5/12/21   [employer] Main MD   carvedilol (COREG) 25 MG tablet Take 1 tablet by mouth 2 times daily (with meals) 5/12/21   [employer] Main MD   lisinopril (PRINIVIL;ZESTRIL) 10 MG tablet Take 1 tablet by mouth 2 times daily 5/12/21   [employer] Main MD   Cholestyramine POWD by Does not apply route 1 scoop daily    Historical Provider, MD   famotidine (PEPCID) 40 MG tablet Take 40 mg by mouth daily    Historical Provider, MD   vitamin B-1 (THIAMINE) 100 MG tablet Take 100 mg by mouth daily    Historical Provider, MD   dicyclomine (BENTYL) 10 MG capsule Take 10 mg by mouth 3 times daily (before meals)    Historical Provider, MD   folic acid (FOLVITE) 1 MG tablet Take 1 mg by mouth daily    Historical Provider, MD   omeprazole (PRILOSEC) 20 MG delayed release capsule Take 40 mg by mouth daily    Historical Provider, MD   traZODone (DESYREL) 100 MG tablet Take 100 mg by mouth nightly    Historical Provider, MD   acetaminophen (TYLENOL) 325 MG tablet Take 650 mg by mouth every 6 hours as needed for Pain    Historical Provider, MD   zonisamide (ZONEGRAN) 100 MG capsule Take 100 mg by mouth daily    Historical Provider, MD   albuterol sulfate HFA (PROAIR HFA) 108 (90 Base) MCG/ACT inhaler Inhale 2 puffs into the lungs every 6 hours as needed for Wheezing 7/18/17   Valeria Justice MD   mometasone-formoterol (DULERA) 200-5 MCG/ACT inhaler Inhale 2 puffs into the lungs every 12 hours 7/18/17   Valeria Justice MD       REVIEW OF SYSTEMS    (2-9 systems for level 4, 10 or more for level 5)      Review of Systems Constitutional:  Negative for chills and fever. HENT:  Negative for facial swelling and trouble swallowing. Eyes:  Negative for visual disturbance. Respiratory:  Negative for chest tightness and shortness of breath. Cardiovascular:  Negative for chest pain. Gastrointestinal:  Negative for abdominal distention, abdominal pain, nausea and vomiting. Genitourinary:  Negative for difficulty urinating. Musculoskeletal:  Positive for gait problem and joint swelling. Negative for back pain and neck stiffness. Neurological:  Negative for headaches. Psychiatric/Behavioral:  Negative for agitation and hallucinations. PHYSICAL EXAM   (up to 7 for level 4, 8 or more for level 5)      INITIAL VITALS:   BP (!) 165/112   Temp 97.5 °F (36.4 °C) (Oral)   Resp 18   Ht 6' 3\" (1.905 m)   Wt 150 lb (68 kg)   SpO2 97%   BMI 18.75 kg/m²     Physical Exam  Constitutional:       General: He is awake. Appearance: Normal appearance. HENT:      Head: Normocephalic and atraumatic. Right Ear: External ear normal.      Left Ear: External ear normal.      Nose: Nose normal.   Eyes:      General: Lids are normal.      Extraocular Movements: Extraocular movements intact. Cardiovascular:      Rate and Rhythm: Normal rate. Pulses: Normal pulses. Heart sounds: Normal heart sounds. Pulmonary:      Effort: Pulmonary effort is normal.      Breath sounds: Normal breath sounds. Abdominal:      Comments: Soft, nontender, nonperitoneal.  Abrasion to the right side, no ecchymosis or tenderness to the area. Musculoskeletal:      Cervical back: Normal range of motion. Comments: Normal range of motion, strength and sensation intact in all extremities. Significant edema to the base of the big toe on the right foot, ecchymosis. Limited range of motion, sensation and pulses intact distally. Neurological:      Mental Status: He is alert and oriented to person, place, and time.       Sensory: Sensation is intact. Motor: Motor function is intact. Psychiatric:         Mood and Affect: Mood normal.         Behavior: Behavior is cooperative. DIFFERENTIAL  DIAGNOSIS/ MDM/ ED COURSE     PLAN (LABS / IMAGING / EKG):  Orders Placed This Encounter   Procedures    XR FOOT RIGHT (MIN 3 VIEWS)    XR ANKLE RIGHT (MIN 3 VIEWS)    [employer] Walker Boot, Air Select Low Top, Right; MD (M7-10/F8-11)    [employer] Crutches; Pair, Right Side Injury; Tall (5'10\"-6'6\")       MEDICATIONS ORDERED:  Orders Placed This Encounter   Medications    DISCONTD: HYDROcodone-acetaminophen (NORCO) 5-325 MG per tablet 1 tablet    HYDROcodone-acetaminophen (NORCO) 5-325 MG per tablet     Sig: Take 1 tablet by mouth every 4 hours as needed for Pain for up to 3 days. Intended supply: 3 days. Take lowest dose possible to manage pain Max Daily Amount: 6 tablets     Dispense:  5 tablet     Refill:  0       DDX: Fracture, dislocation, contusion    Medical Decision Making  53-year-old male who fell out of bed last night presented due to right-sided foot pain. Concerned that he might have broken a bone. No other signs of trauma does have a 5 cm abrasion to the right side of the abdomen, abdomen is soft, nonperitoneal and nondistended. Did not strike his head or lose consciousness. Will obtain x-ray of the foot and provide pain control. X-ray shows possible irregularity of the base of the phalanx, no discrete fracture. Due to significant tenderness and edema will place patient in an Aircast and discharged with PCP and orthopedic follow-up. Patient used a cane to ambulate at baseline, unable to do so with Aircast in place. Will order crutches as well. Amount and/or Complexity of Data Reviewed  Radiology: ordered. Risk  Prescription drug management. ED Course as of 02/13/23 1944 Mon Feb 13, 2023   1423 IMPRESSION:  1.   No acute osseous abnormality identified in the ankle.     2.  Soft tissue swelling of the great toe. Slight irregularity of the  lateral base of the distal phalanx is noted without discrete fracture  demonstrated. Clinical correlation is recommended to evaluate the  possibility of a nondisplaced fracture in this location. [SS]      ED Course User Index  [SS] Lobo Chapman MD              DIAGNOSTIC RESULTS    LAB RESULTS:  No results found for this visit on 02/13/23. RADIOLOGY:  XR FOOT RIGHT (MIN 3 VIEWS)   Final Result   1. No acute osseous abnormality identified in the ankle. 2.  Soft tissue swelling of the great toe. Slight irregularity of the   lateral base of the distal phalanx is noted without discrete fracture   demonstrated. Clinical correlation is recommended to evaluate the   possibility of a nondisplaced fracture in this location. XR ANKLE RIGHT (MIN 3 VIEWS)   Final Result   1. No acute osseous abnormality identified in the ankle. 2.  Soft tissue swelling of the great toe. Slight irregularity of the   lateral base of the distal phalanx is noted without discrete fracture   demonstrated. Clinical correlation is recommended to evaluate the   possibility of a nondisplaced fracture in this location. CONSULTS:  None    CRITICAL CARE:  There was significant risk of life threatening deterioration of patient's condition requiring my direct management. Critical care time 0 minutes, excluding any documented procedures. FINAL IMPRESSION      1.  Right foot pain          DISPOSITION / PLAN     DISPOSITION Decision To Discharge 02/13/2023 02:30:29 PM      PATIENT REFERRED TO:  ROMANA Knutson - [address], [address] [place] [address]  [phone]        DISCHARGE MEDICATIONS:  Discharge Medication List as of 2/13/2023  2:31 PM          Lobo Chapman MD  Emergency Medicine Resident    (Please note that portions of thisnote were completed with a voice recognition program.  Efforts were made to edit the dictations but occasionally words are mis-transcribed.)       Stephanie Cruz MD  Resident  02/13/23 [id number]

## 2023-03-10 ENCOUNTER — HOSPITAL ENCOUNTER (EMERGENCY)
Age: 63
Discharge: HOME | End: 2023-03-10
Attending: EMERGENCY MEDICINE
Payer: MEDICARE

## 2023-03-10 ENCOUNTER — APPOINTMENT (OUTPATIENT)
Dept: CT IMAGING | Age: 63
End: 2023-03-10
Payer: MEDICARE

## 2023-03-10 VITALS
HEIGHT: 75 IN | WEIGHT: 148 LBS | BODY MASS INDEX: 18.4 KG/M2 | RESPIRATION RATE: 18 BRPM | TEMPERATURE: 98 F | OXYGEN SATURATION: 95 % | DIASTOLIC BLOOD PRESSURE: 82 MMHG | SYSTOLIC BLOOD PRESSURE: 132 MMHG | HEART RATE: 87 BPM

## 2023-03-10 DIAGNOSIS — R10.9 UNDIFFERENTIATED ABDOMINAL PAIN: Primary | ICD-10-CM

## 2023-03-10 LAB
ABSOLUTE EOS #: 0.04 K/UL (ref 0–0.44)
ABSOLUTE IMMATURE GRANULOCYTE: 0.02 K/UL (ref 0–0.3)
ABSOLUTE LYMPH #: 1.79 K/UL (ref 1.1–3.7)
ABSOLUTE MONO #: 0.96 K/UL (ref 0.1–1.2)
ALBUMIN SERPL-MCNC: 4.2 G/DL (ref 3.5–5.2)
ALP SERPL-CCNC: 112 U/L (ref 40–129)
ALT SERPL-CCNC: 115 U/L (ref 5–41)
ANION GAP SERPL CALCULATED.3IONS-SCNC: 16 MMOL/L (ref 9–17)
AST SERPL-CCNC: 131 U/L
BASOPHILS # BLD: 1 % (ref 0–2)
BASOPHILS ABSOLUTE: 0.04 K/UL (ref 0–0.2)
BILIRUB SERPL-MCNC: 1.2 MG/DL (ref 0.3–1.2)
BILIRUBIN URINE: ABNORMAL
BUN SERPL-MCNC: 14 MG/DL (ref 8–23)
BUN/CREAT BLD: 26 (ref 9–20)
CALCIUM SERPL-MCNC: 9.6 MG/DL (ref 8.6–10.4)
CHLORIDE SERPL-SCNC: 99 MMOL/L (ref 98–107)
CO2 SERPL-SCNC: 22 MMOL/L (ref 20–31)
COLOR: ABNORMAL
CREAT SERPL-MCNC: 0.54 MG/DL (ref 0.7–1.2)
EOSINOPHILS RELATIVE PERCENT: 1 % (ref 1–4)
EPITHELIAL CELLS UA: ABNORMAL /HPF (ref 0–5)
GFR SERPL CREATININE-BSD FRML MDRD: >60 ML/MIN/1.73M2
GLUCOSE SERPL-MCNC: 91 MG/DL (ref 70–99)
GLUCOSE UR STRIP.AUTO-MCNC: NEGATIVE MG/DL
HCT VFR BLD AUTO: 55.9 % (ref 40.7–50.3)
HGB BLD-MCNC: 18.6 G/DL (ref 13–17)
IMMATURE GRANULOCYTES: 0 %
KETONES UR STRIP.AUTO-MCNC: ABNORMAL MG/DL
LEUKOCYTE ESTERASE UR QL STRIP.AUTO: NEGATIVE
LIPASE SERPL-CCNC: 25 U/L (ref 13–60)
LYMPHOCYTES # BLD: 22 % (ref 24–43)
MAGNESIUM SERPL-MCNC: 1.7 MG/DL (ref 1.6–2.6)
MCH RBC QN AUTO: 32.9 PG (ref 25.2–33.5)
MCHC RBC AUTO-ENTMCNC: 33.3 G/DL (ref 28.4–34.8)
MCV RBC AUTO: 98.8 FL (ref 82.6–102.9)
MONOCYTES # BLD: 12 % (ref 3–12)
MUCUS: ABNORMAL
NITRITE UR QL STRIP.AUTO: NEGATIVE
NRBC AUTOMATED: 0 PER 100 WBC
PDW BLD-RTO: 12.8 % (ref 11.8–14.4)
PLATELET # BLD AUTO: 153 K/UL (ref 138–453)
PMV BLD AUTO: 10.7 FL (ref 8.1–13.5)
POTASSIUM SERPL-SCNC: 3.9 MMOL/L (ref 3.7–5.3)
PROT SERPL-MCNC: 7.7 G/DL (ref 6.4–8.3)
PROT UR STRIP.AUTO-MCNC: 5.5 MG/DL (ref 5–8)
PROT UR STRIP.AUTO-MCNC: NEGATIVE MG/DL
RBC # BLD: 5.66 M/UL (ref 4.21–5.77)
RBC CLUMPS #/AREA URNS AUTO: ABNORMAL /HPF (ref 0–2)
SARS-COV-2 RDRP RESP QL NAA+PROBE: NOT DETECTED
SEG NEUTROPHILS: 64 % (ref 36–65)
SEGMENTED NEUTROPHILS ABSOLUTE COUNT: 5.22 K/UL (ref 1.5–8.1)
SODIUM SERPL-SCNC: 137 MMOL/L (ref 135–144)
SPECIFIC GRAVITY UA: 1.02 (ref 1–1.03)
SPECIMEN DESCRIPTION: NORMAL
TURBIDITY: CLEAR
URINE HGB: NEGATIVE
UROBILINOGEN, URINE: NORMAL
WBC # BLD AUTO: 8.1 K/UL (ref 3.5–11.3)
WBC UA: ABNORMAL /HPF (ref 0–5)

## 2023-03-10 PROCEDURE — 96375 TX/PRO/DX INJ NEW DRUG ADDON: CPT

## 2023-03-10 PROCEDURE — 83735 ASSAY OF MAGNESIUM: CPT

## 2023-03-10 PROCEDURE — 6360000002 HC RX W HCPCS: Performed by: EMERGENCY MEDICINE

## 2023-03-10 PROCEDURE — 81001 URINALYSIS AUTO W/SCOPE: CPT

## 2023-03-10 PROCEDURE — 96374 THER/PROPH/DIAG INJ IV PUSH: CPT

## 2023-03-10 PROCEDURE — 87635 SARS-COV-2 COVID-19 AMP PRB: CPT

## 2023-03-10 PROCEDURE — 74177 CT ABD & PELVIS W/CONTRAST: CPT

## 2023-03-10 PROCEDURE — 83690 ASSAY OF LIPASE: CPT

## 2023-03-10 PROCEDURE — 6360000004 HC RX CONTRAST MEDICATION: Performed by: EMERGENCY MEDICINE

## 2023-03-10 PROCEDURE — 80053 COMPREHEN METABOLIC PANEL: CPT

## 2023-03-10 PROCEDURE — 2580000003 HC RX 258: Performed by: EMERGENCY MEDICINE

## 2023-03-10 PROCEDURE — 99285 EMERGENCY DEPT VISIT HI MDM: CPT

## 2023-03-10 PROCEDURE — 85025 COMPLETE CBC W/AUTO DIFF WBC: CPT

## 2023-03-10 RX ORDER — LORAZEPAM 2 MG/ML
1 INJECTION INTRAMUSCULAR ONCE
Status: COMPLETED | OUTPATIENT
Start: 2023-03-10 | End: 2023-03-10

## 2023-03-10 RX ORDER — ONDANSETRON 4 MG/1
4 TABLET, FILM COATED ORAL 3 TIMES DAILY PRN
Qty: 10 TABLET | Refills: 0 | Status: SHIPPED | OUTPATIENT
Start: 2023-03-10

## 2023-03-10 RX ORDER — 0.9 % SODIUM CHLORIDE 0.9 %
80 INTRAVENOUS SOLUTION INTRAVENOUS ONCE
Status: COMPLETED | OUTPATIENT
Start: 2023-03-10 | End: 2023-03-10

## 2023-03-10 RX ORDER — SODIUM CHLORIDE 0.9 % (FLUSH) 0.9 %
10 SYRINGE (ML) INJECTION ONCE
Status: COMPLETED | OUTPATIENT
Start: 2023-03-10 | End: 2023-03-10

## 2023-03-10 RX ORDER — MORPHINE SULFATE 4 MG/ML
4 INJECTION, SOLUTION INTRAMUSCULAR; INTRAVENOUS ONCE
Status: COMPLETED | OUTPATIENT
Start: 2023-03-10 | End: 2023-03-10

## 2023-03-10 RX ORDER — HYDROCODONE BITARTRATE AND ACETAMINOPHEN 5; 325 MG/1; MG/1
1 TABLET ORAL EVERY 6 HOURS PRN
Qty: 12 TABLET | Refills: 0 | Status: SHIPPED | OUTPATIENT
Start: 2023-03-10 | End: 2023-03-13

## 2023-03-10 RX ORDER — ONDANSETRON 2 MG/ML
4 INJECTION INTRAMUSCULAR; INTRAVENOUS ONCE
Status: COMPLETED | OUTPATIENT
Start: 2023-03-10 | End: 2023-03-10

## 2023-03-10 RX ORDER — FAMOTIDINE 40 MG/1
40 TABLET, FILM COATED ORAL DAILY
Qty: 30 TABLET | Refills: 0 | Status: SHIPPED | OUTPATIENT
Start: 2023-03-10

## 2023-03-10 RX ORDER — 0.9 % SODIUM CHLORIDE 0.9 %
1000 INTRAVENOUS SOLUTION INTRAVENOUS ONCE
Status: COMPLETED | OUTPATIENT
Start: 2023-03-10 | End: 2023-03-10

## 2023-03-10 RX ADMIN — MORPHINE SULFATE 4 MG: 4 INJECTION, SOLUTION INTRAMUSCULAR; INTRAVENOUS at 19:53

## 2023-03-10 RX ADMIN — SODIUM CHLORIDE 80 ML: 9 INJECTION, SOLUTION INTRAVENOUS at 20:59

## 2023-03-10 RX ADMIN — SODIUM CHLORIDE, PRESERVATIVE FREE 10 ML: 5 INJECTION INTRAVENOUS at 20:58

## 2023-03-10 RX ADMIN — ONDANSETRON 4 MG: 2 INJECTION INTRAMUSCULAR; INTRAVENOUS at 19:52

## 2023-03-10 RX ADMIN — IOPAMIDOL 75 ML: 755 INJECTION, SOLUTION INTRAVENOUS at 20:58

## 2023-03-10 RX ADMIN — LORAZEPAM 1 MG: 2 INJECTION INTRAMUSCULAR at 19:53

## 2023-03-10 RX ADMIN — SODIUM CHLORIDE 1000 ML: 9 INJECTION, SOLUTION INTRAVENOUS at 19:54

## 2023-03-10 ASSESSMENT — PAIN DESCRIPTION - DESCRIPTORS: DESCRIPTORS: ACHING

## 2023-03-10 ASSESSMENT — PAIN - FUNCTIONAL ASSESSMENT: PAIN_FUNCTIONAL_ASSESSMENT: NONE - DENIES PAIN

## 2023-03-10 ASSESSMENT — PAIN SCALES - GENERAL
PAINLEVEL_OUTOF10: 0
PAINLEVEL_OUTOF10: 2

## 2023-03-10 ASSESSMENT — PAIN DESCRIPTION - LOCATION: LOCATION: ABDOMEN

## 2023-03-11 NOTE — DISCHARGE INSTRUCTIONS
Clear liquid diet for 24 hours and bland diet for the next 24 hours. Return to this emergency room immediately if your symptoms persist, worsen or if new ones form. Make sure you follow-up with your primary care doctor within the next 1-2 business days.

## 2023-06-20 ENCOUNTER — ANESTHESIA EVENT (OUTPATIENT)
Dept: OPERATING ROOM | Age: 63
End: 2023-06-20
Payer: MEDICARE

## 2023-06-21 ENCOUNTER — ANESTHESIA (OUTPATIENT)
Dept: OPERATING ROOM | Age: 63
End: 2023-06-21
Payer: MEDICARE

## 2023-06-21 ENCOUNTER — HOSPITAL ENCOUNTER (OUTPATIENT)
Age: 63
Setting detail: OUTPATIENT SURGERY
Discharge: HOME | End: 2023-06-21
Attending: INTERNAL MEDICINE | Admitting: INTERNAL MEDICINE
Payer: MEDICARE

## 2023-06-21 VITALS
BODY MASS INDEX: 18.77 KG/M2 | HEIGHT: 75 IN | OXYGEN SATURATION: 100 % | SYSTOLIC BLOOD PRESSURE: 169 MMHG | RESPIRATION RATE: 17 BRPM | WEIGHT: 151 LBS | HEART RATE: 78 BPM | TEMPERATURE: 97.2 F | DIASTOLIC BLOOD PRESSURE: 97 MMHG

## 2023-06-21 DIAGNOSIS — R13.10 DYSPHAGIA, UNSPECIFIED TYPE: ICD-10-CM

## 2023-06-21 DIAGNOSIS — R19.8 CHANGE IN BOWEL MOVEMENT: ICD-10-CM

## 2023-06-21 DIAGNOSIS — R63.4 WEIGHT LOSS: ICD-10-CM

## 2023-06-21 DIAGNOSIS — K62.5 RECTAL BLEEDING: ICD-10-CM

## 2023-06-21 DIAGNOSIS — R10.10 UPPER ABDOMINAL PAIN: ICD-10-CM

## 2023-06-21 DIAGNOSIS — R11.2 NAUSEA AND VOMITING, UNSPECIFIED VOMITING TYPE: ICD-10-CM

## 2023-06-21 PROCEDURE — 3609010600 HC COLONOSCOPY POLYPECTOMY SNARE/COLD BIOPSY: Performed by: INTERNAL MEDICINE

## 2023-06-21 PROCEDURE — 2709999900 HC NON-CHARGEABLE SUPPLY: Performed by: INTERNAL MEDICINE

## 2023-06-21 PROCEDURE — 3700000001 HC ADD 15 MINUTES (ANESTHESIA): Performed by: INTERNAL MEDICINE

## 2023-06-21 PROCEDURE — 88305 TISSUE EXAM BY PATHOLOGIST: CPT

## 2023-06-21 PROCEDURE — 2580000003 HC RX 258: Performed by: ANESTHESIOLOGY

## 2023-06-21 PROCEDURE — 2720000010 HC SURG SUPPLY STERILE: Performed by: INTERNAL MEDICINE

## 2023-06-21 PROCEDURE — 2500000003 HC RX 250 WO HCPCS: Performed by: NURSE ANESTHETIST, CERTIFIED REGISTERED

## 2023-06-21 PROCEDURE — 3609012400 HC EGD TRANSORAL BIOPSY SINGLE/MULTIPLE: Performed by: INTERNAL MEDICINE

## 2023-06-21 PROCEDURE — 7100000011 HC PHASE II RECOVERY - ADDTL 15 MIN: Performed by: INTERNAL MEDICINE

## 2023-06-21 PROCEDURE — 6360000002 HC RX W HCPCS: Performed by: NURSE ANESTHETIST, CERTIFIED REGISTERED

## 2023-06-21 PROCEDURE — 7100000010 HC PHASE II RECOVERY - FIRST 15 MIN: Performed by: INTERNAL MEDICINE

## 2023-06-21 PROCEDURE — 6370000000 HC RX 637 (ALT 250 FOR IP): Performed by: STUDENT IN AN ORGANIZED HEALTH CARE EDUCATION/TRAINING PROGRAM

## 2023-06-21 PROCEDURE — 3700000000 HC ANESTHESIA ATTENDED CARE: Performed by: INTERNAL MEDICINE

## 2023-06-21 RX ORDER — LIDOCAINE HYDROCHLORIDE 20 MG/ML
INJECTION, SOLUTION EPIDURAL; INFILTRATION; INTRACAUDAL; PERINEURAL PRN
Status: DISCONTINUED | OUTPATIENT
Start: 2023-06-21 | End: 2023-06-21 | Stop reason: SDUPTHER

## 2023-06-21 RX ORDER — GLYCOPYRROLATE 0.2 MG/ML
INJECTION INTRAMUSCULAR; INTRAVENOUS PRN
Status: DISCONTINUED | OUTPATIENT
Start: 2023-06-21 | End: 2023-06-21 | Stop reason: SDUPTHER

## 2023-06-21 RX ORDER — SODIUM CHLORIDE 0.9 % (FLUSH) 0.9 %
5-40 SYRINGE (ML) INJECTION PRN
Status: DISCONTINUED | OUTPATIENT
Start: 2023-06-21 | End: 2023-06-21 | Stop reason: HOSPADM

## 2023-06-21 RX ORDER — LIDOCAINE HYDROCHLORIDE 10 MG/ML
1 INJECTION, SOLUTION EPIDURAL; INFILTRATION; INTRACAUDAL; PERINEURAL
Status: DISCONTINUED | OUTPATIENT
Start: 2023-06-22 | End: 2023-06-21 | Stop reason: HOSPADM

## 2023-06-21 RX ORDER — ACETAMINOPHEN 325 MG/1
650 TABLET ORAL ONCE
Status: DISCONTINUED | OUTPATIENT
Start: 2023-06-21 | End: 2023-06-21 | Stop reason: HOSPADM

## 2023-06-21 RX ORDER — IPRATROPIUM BROMIDE AND ALBUTEROL SULFATE 2.5; .5 MG/3ML; MG/3ML
1 SOLUTION RESPIRATORY (INHALATION) ONCE
Status: COMPLETED | OUTPATIENT
Start: 2023-06-21 | End: 2023-06-21

## 2023-06-21 RX ORDER — SODIUM CHLORIDE 9 MG/ML
INJECTION, SOLUTION INTRAVENOUS PRN
Status: DISCONTINUED | OUTPATIENT
Start: 2023-06-21 | End: 2023-06-21 | Stop reason: HOSPADM

## 2023-06-21 RX ORDER — MIDAZOLAM HYDROCHLORIDE 1 MG/ML
INJECTION INTRAMUSCULAR; INTRAVENOUS PRN
Status: DISCONTINUED | OUTPATIENT
Start: 2023-06-21 | End: 2023-06-21 | Stop reason: SDUPTHER

## 2023-06-21 RX ORDER — SODIUM CHLORIDE 0.9 % (FLUSH) 0.9 %
5-40 SYRINGE (ML) INJECTION EVERY 12 HOURS SCHEDULED
Status: DISCONTINUED | OUTPATIENT
Start: 2023-06-21 | End: 2023-06-21 | Stop reason: HOSPADM

## 2023-06-21 RX ORDER — SODIUM CHLORIDE, SODIUM LACTATE, POTASSIUM CHLORIDE, CALCIUM CHLORIDE 600; 310; 30; 20 MG/100ML; MG/100ML; MG/100ML; MG/100ML
INJECTION, SOLUTION INTRAVENOUS CONTINUOUS
Status: DISCONTINUED | OUTPATIENT
Start: 2023-06-21 | End: 2023-06-21 | Stop reason: HOSPADM

## 2023-06-21 RX ORDER — PROPOFOL 10 MG/ML
INJECTION, EMULSION INTRAVENOUS PRN
Status: DISCONTINUED | OUTPATIENT
Start: 2023-06-21 | End: 2023-06-21 | Stop reason: SDUPTHER

## 2023-06-21 RX ORDER — SODIUM CHLORIDE 9 MG/ML
INJECTION, SOLUTION INTRAVENOUS CONTINUOUS
Status: DISCONTINUED | OUTPATIENT
Start: 2023-06-21 | End: 2023-06-21 | Stop reason: HOSPADM

## 2023-06-21 RX ADMIN — MIDAZOLAM 1 MG: 1 INJECTION INTRAMUSCULAR; INTRAVENOUS at 14:32

## 2023-06-21 RX ADMIN — MIDAZOLAM 1 MG: 1 INJECTION INTRAMUSCULAR; INTRAVENOUS at 14:35

## 2023-06-21 RX ADMIN — PROPOFOL 70 MG: 10 INJECTION, EMULSION INTRAVENOUS at 14:58

## 2023-06-21 RX ADMIN — PROPOFOL 50 MG: 10 INJECTION, EMULSION INTRAVENOUS at 14:40

## 2023-06-21 RX ADMIN — LIDOCAINE HYDROCHLORIDE 100 MG: 20 INJECTION, SOLUTION EPIDURAL; INFILTRATION; INTRACAUDAL; PERINEURAL at 14:36

## 2023-06-21 RX ADMIN — IPRATROPIUM BROMIDE AND ALBUTEROL SULFATE 1 DOSE: 2.5; .5 SOLUTION RESPIRATORY (INHALATION) at 14:09

## 2023-06-21 RX ADMIN — PROPOFOL 150 MG: 10 INJECTION, EMULSION INTRAVENOUS at 14:37

## 2023-06-21 RX ADMIN — PROPOFOL 50 MG: 10 INJECTION, EMULSION INTRAVENOUS at 14:50

## 2023-06-21 RX ADMIN — SODIUM CHLORIDE, POTASSIUM CHLORIDE, SODIUM LACTATE AND CALCIUM CHLORIDE: 600; 310; 30; 20 INJECTION, SOLUTION INTRAVENOUS at 13:39

## 2023-06-21 RX ADMIN — PROPOFOL 50 MG: 10 INJECTION, EMULSION INTRAVENOUS at 14:45

## 2023-06-21 RX ADMIN — GLYCOPYRROLATE 0.2 MG: 1 INJECTION INTRAMUSCULAR; INTRAVENOUS at 14:33

## 2023-06-21 RX ADMIN — PROPOFOL 30 MG: 10 INJECTION, EMULSION INTRAVENOUS at 15:03

## 2023-06-21 ASSESSMENT — PAIN - FUNCTIONAL ASSESSMENT
PAIN_FUNCTIONAL_ASSESSMENT: 0-10

## 2023-06-21 NOTE — OP NOTE
Operative Note      Patient: Flora Pack  YOB: 1960  MRN: [id number]    Date of Procedure: 6/21/2023    Pre-Op Diagnosis: Change in bowel movement [R19.8]  Rectal bleeding [K62.5]  Dysphagia, unspecified type [R13.10]  Upper abdominal pain [R10.10]  Nausea and vomiting, unspecified vomiting type [R11.2]  Weight loss [R63.4]    Post-Op Diagnosis: Gastritis. Otherwise normal EGD. Procedure(s):  EGD BIOPSY  COLONOSCOPY POLYPECTOMY SNARE/COLD BIOPSY WITH BANDING    Surgeon(s): Karan Bernabe MD    Assistant:   * No surgical staff found *    Informed consent: Risks, benefits, and alternatives of the procedure were explained to the patient prior to the procedure. Risks include but not limited to bleeding, perforation, aspiration, allergic reaction to medication or death. Patient was also informed about the risk of missing a lesion. Anesthesia: Monitor Anesthesia Care    Estimated Blood Loss (mL): Minimal    Complications: None    Specimens:   ID Type Source Tests Collected by Time Destination   A : STOMACH FOR H PYLORI  Tissue Stomach SURGICAL PATHOLOGY Karan Bernabe MD 6/21/2023 1441    B : DISTAL ESOPHAGUS FOR BARRETTS  Tissue Esophagus SURGICAL PATHOLOGY Karan Bernabe MD 6/21/2023 1442    C : TRANSVERSE COLON POLYP  Tissue Colon-Transverse SURGICAL PATHOLOGY Karan Bernabe MD 6/21/2023 1452    D : SIGMOID COLON POLYP  Tissue Sigmoid Colon SURGICAL PATHOLOGY Karan Bernabe MD 6/21/2023 1456        Implants:  * No implants in log *      Drains: * No LDAs found *    Findings: Patchy hyperemia of the stomach. Biopsies were taken from the antrum and gastric body with H. pylori. Detailed Description of Procedure:   Patient was placed in the left lateral decubitus position. The well-lubricated Olympus EGD scope was passed through the bite-block was advanced esophagus. Esophageal mucosa of the upper middle esophagus appeared normal.  Biopsies were taken.   The scope was then advanced into the

## 2023-06-21 NOTE — ANESTHESIA POSTPROCEDURE EVALUATION
Department of Anesthesiology  Postprocedure Note    Patient: Singh Ferrell  MRN: [id number]  [place]: 1960  Date of evaluation: 6/21/2023      Procedure Summary     Date: 06/21/23 Room / Location: [facility] - INPATIENT    Anesthesia Start: 1432 Anesthesia Stop: 1512    Procedures:       EGD BIOPSY (Mouth)      COLONOSCOPY POLYPECTOMY SNARE/COLD BIOPSY WITH BANDING (Rectum) Diagnosis:       Change in bowel movement      Rectal bleeding      Dysphagia, unspecified type      Upper abdominal pain      Nausea and vomiting, unspecified vomiting type      Weight loss      (Change in bowel movement [R19.8])      (Rectal bleeding [K62.5])      (Dysphagia, unspecified type [R13.10])      (Upper abdominal pain [R10.10])      (Nausea and vomiting, unspecified vomiting type [R11.2])      (Weight loss [R63.4])    Surgeons: Rosalio Hamlin MD Responsible Provider: Abena oPwer MD    Anesthesia Type: MAC ASA Status: 3          Anesthesia Type: No value filed.     Izaiah Phase I:      Izaiah Phase II: Izaiah Score: 6      Anesthesia Post Evaluation    Patient location during evaluation: PACU  Patient participation: complete - patient participated  Level of consciousness: awake  Airway patency: patent  Nausea & Vomiting: no nausea and no vomiting  Complications: no  Cardiovascular status: hemodynamically stable  Respiratory status: acceptable  Hydration status: stable  Multimodal analgesia pain management approach

## 2023-06-21 NOTE — ANESTHESIA PRE PROCEDURE
Department of Anesthesiology  Preprocedure Note       Name:  Felipe Ambrose   Age:  63 y.o.  :  1960                                          MRN:  [id number]         Date:  2023      Surgeon: Lenny Majano): Tanya Padilla MD    Procedure: Procedure(s):  EGD ESOPHAGOGASTRODUODENOSCOPY  COLONOSCOPY DIAGNOSTIC    Medications prior to admission:   Prior to Admission medications    Medication Sig Start Date End Date Taking?  Authorizing Provider   ondansetron (ZOFRAN) 4 MG tablet Take 1 tablet by mouth 3 times daily as needed for Nausea or Vomiting 3/10/23   Ant Lucas MD   famotidine (PEPCID) 40 MG tablet Take 1 tablet by mouth daily 3/10/23   Ant Lucas MD   carvedilol (COREG) 25 MG tablet Take 1 tablet by mouth 2 times daily (with meals) 21   Julieta Lo MD   lisinopril (PRINIVIL;ZESTRIL) 10 MG tablet Take 1 tablet by mouth 2 times daily 21   Julieta Lo MD   vitamin B-1 (THIAMINE) 100 MG tablet Take 1 tablet by mouth daily    Historical Provider, MD   dicyclomine (BENTYL) 10 MG capsule Take 1 capsule by mouth 3 times daily (before meals)    Historical Provider, MD   folic acid (FOLVITE) 1 MG tablet Take 1 tablet by mouth daily    Historical Provider, MD   omeprazole (PRILOSEC) 20 MG delayed release capsule Take 2 capsules by mouth daily    Historical Provider, MD   zonisamide (ZONEGRAN) 100 MG capsule Take 1 capsule by mouth daily    Historical Provider, MD   albuterol sulfate HFA (PROAIR HFA) 108 (90 Base) MCG/ACT inhaler Inhale 2 puffs into the lungs every 6 hours as needed for Wheezing 17   Sonal Kang MD   mometasone-formoterol [facility]) 200-5 MCG/ACT inhaler Inhale 2 puffs into the lungs every 12 hours 17   Sonal Kang MD       Current medications:    Current Facility-Administered Medications   Medication Dose Route Frequency Provider Last Rate Last Admin    [START ON 2023] lidocaine PF 1 % injection 1 mL  1 mL IntraDERmal Once PRN Huseyin Light

## 2023-06-21 NOTE — DISCHARGE INSTRUCTIONS
Upper GI Endoscopy: What to Expect at [address] [place] [address]  After you have an endoscopy, you will stay at the hospital or clinic for 1 to 2 hours. This will allow the medicine to wear off. You will be able to go home after your doctor or nurse checks to make sure you are not having any problems. You may have a sore throat for a day or two after the test.  This care sheet gives you a general idea about what to expect after the test.  How can you care for yourself at home? Activity  Rest as much as you need to after you go home. You should be able to go back to your usual activities the day after the test.  Diet  Follow your doctor's directions for eating after the test.  Drink plenty of fluids (unless your doctor has told you not to). Colonoscopy: What to Expect at [address] [place] [address]  After you have a colonoscopy, you will stay at the clinic for 1 to 2 hours until the medicines wear off. Then you can go home, but you will need to arrange for a ride. Your doctor will tell you when you can eat and do your other usual activities. Your doctor will talk to you about when you will need your next colonoscopy. The results of your test and your risk for colorectal cancer will help your doctor decide how often you need to be checked. After the test, you may be bloated or have gas pains. You may need to pass gas. If a biopsy was done or a polyp was removed, you may have streaks of blood in your stool (feces) for a few days. This care sheet gives you a general idea about how long it will take for you to recover. But each person recovers at a different pace. Follow the steps below to get better as quickly as possible. How can you care for yourself at home? Activity  Rest as much as you need to after you go home. You should be able to go back to your usual activities the day after the test.  Diet  Follow your doctors directions for eating.   Drink plenty of fluids (unless your doctor has told you not to) to replace

## 2023-06-21 NOTE — OP NOTE
Operative Note      Patient: Flora Pack  YOB: 1960  MRN: [id number]    Date of Procedure: 6/21/2023    Pre-Op Diagnosis: Change in bowel movement [R19.8]  Rectal bleeding [K62.5]  Dysphagia, unspecified type [R13.10]  Upper abdominal pain [R10.10]  Nausea and vomiting, unspecified vomiting type [R11.2]  Weight loss [R63.4]    Post-Op Diagnosis: Nonbleeding AVMs in the cecum and ascending colon, internal hemorrhoids s/p banding, colon polyps. Procedure(s):  EGD BIOPSY  COLONOSCOPY POLYPECTOMY SNARE/COLD BIOPSY WITH BANDING    Surgeon(s): Karan Bernabe MD    Assistant:   * No surgical staff found *    Informed consent: Risks, benefits, and alternatives of the procedure were explained to the patient prior to the procedure. Risks include but not limited to bleeding, perforation, aspiration, allergic reaction to medication or death. Patient was also informed about the risk of missing a lesion. Anesthesia: Monitor Anesthesia Care    Estimated Blood Loss (mL): Minimal    Complications: None    Specimens:   ID Type Source Tests Collected by Time Destination   A : STOMACH FOR H PYLORI  Tissue Stomach SURGICAL PATHOLOGY Karan Bernabe MD 6/21/2023 1441    B : DISTAL ESOPHAGUS FOR BARRETTS  Tissue Esophagus SURGICAL PATHOLOGY Karan Bernabe MD 6/21/2023 1442    C : TRANSVERSE COLON POLYP  Tissue Colon-Transverse SURGICAL PATHOLOGY Karan Bernabe MD 6/21/2023 1452    D : SIGMOID COLON POLYP  Tissue Sigmoid Colon SURGICAL PATHOLOGY Karan Bernabe MD 6/21/2023 1456        Implants:  * No implants in log *      Drains: * No LDAs found *    Findings: 1-2 nonbleeding AVMs in the cecum and ascending colon.   2-internal hemorrhoids s/p application of 5 hemorrhoidal bands  3-5 mm sessile polyp in the sigmoid colon that was removed by biopsy  4-5 mm sessile polyp in the transverse colon that was removed by biopsy  5-6 mm sessile polyp in the transverse colon that was removed by cold snare

## 2023-06-23 LAB — SURGICAL PATHOLOGY REPORT: NORMAL

## 2023-11-07 NOTE — PROGRESS NOTES
[facility]  Office: [address], DO, Denisa Patel, DO, Lizz Mitchell, DO, Jordana Aponte Blood, DO, Jeanette Duffy MD, Anais Moe MD, Rut Tuttle MD, Silvio Griggs MD, Jerica Lee MD, Nicola Prakash MD, Dudley Sofia MD, Sharonda Holguin MD, Archie Wilson DO, Aminata Harry MD, Pilar Smith DO, Fernando Costa MD,  Lesley Pelayo DO, Brandie Wynn MD, Fuad Guerra MD, Jesse Fontaine MD, Hieu Card MD, Torin Los, [facility], [facility], [facility], Che Luz, CNP, Devyn Montilla, CNS, Jacky Betts, CNP, Ervin Reynolds, CNP, Sunny Marc, CNP, Meghan Chicas, CNP, Harvinder Piedra, CNP, Misa Richards PA-C, Martha Erickson, [facility], James Kay, CNP, Cali Bliss, CNP, James Leary, CNP, Lidia Ortega, CNP, Le Hernandez, [facility], [facility]    Progress Note    5/11/2021    9:37 PM    Name:   Isabel Scott  MRN:     [id number]     [place]:      [de-identified]   Room:   2008/2008-02   Day:  3  Admit Date:  5/8/2021  2:39 PM    PCP:   Yaritza Zuniga MD  Code Status:  Full Code    Subjective:     C/C:   Chief Complaint   Patient presents with   [place]     about 2 weeks ago    Shortness of Breath     hx COPD     Interval History Status: improved. Patient's breathing is better. He still has a slight wheeze. Cough is improving. BP has been elevated today. He needed prn meds. Brief History:     Per my partner:  \"As documented in the medical record: \"The patient presents to the hospital with complaint of shortness of breath left sided rib pain. He states his pain is sharp, intermittent and aggravated with deep breathing. He utilized his inhaler without relief. He states his shortness of breath has progressed over the past few days. The patient states that he fell approximately 2 weeks ago and thinks he may have injured his left ribs. He denies loss of consciousness or head strike.  He Thank you for coming into clinic today!    Bring yellow slip downstairs to the lab and get labs collected  Our clinic will contact you with the results of your testing if there are abnormal results or changes in your care   your prescription from the pharmacy  Return to clinic within 1week if symptoms are not improving on antibiotics  Sign [facility] for [facility] to receive results, view appointments, and communicate with your healthcare team  Call [facility] at [phone] with questions or concerns    Take care,  Izabel Farrell MD        endorses chronic diffuse abdominal pain that he describes as dull and aching. He reports nausea and vomiting nearly ever morning. He has been following outpatient with GI for these issues. He denies fever, chills or additional symptomology. No further modifying factors. He has past medical history that includes COPD, GERD and CVA. The patient endorses daily alcohol consumption. He states he drinks 4-6, 24oz beers and 5-10 shots of liquor a day. His last alcoholic drink was today.     Rapid SARS-CoV-2 negative.     X-ray left ribs including chest shows suspicious cavitary lesion within the left upper lung, which may reflect either cavitary inflammation or infection versus a cavitary neoplasm.  Chest CT is necessary at this time.     CT chest with contrast shows:  1. Masslike airspace consolidative density with central cavitation noted in the superior segment of the left lower lobe about 4.7 x 4.0 cm.  While this could be pneumonia, it remains concerning for malignancy.  Please correlate clinically. Short interval 6 week follow-up is recommended if treated as pneumonia. 2. Severe diffuse patchy hepatic steatosis noted which could obscure liver lesion such as metastasis.  In light of the left lower lobe lesion, consideration may be given to contrast enhanced MRI to exclude metastasis particularly 8th malignancy remains a concern after clinical evaluation. 3. Emphysema.        EGD 3/25/2021 with Dr. Tara Garza hiatal hernia, gastritis, retained food in the stomach, irregular Z-line. Healing of previously seen esophagitis.  Duodenal polyp. Known history of Soliz's     Findings: 1-small hiatal hernia  2-retained food in the stomach  3-irregular Z-line.  Biopsies were taken to rule out Soliz's esophagus. 4-patchy hyperemia of the stomach.  Biopsies were taken to rule out H. Pylori. 5-4 mm sessile polyp in the duodenal bulb that was removed with biopsy. \"       Review of Systems: Constitutional:  negative for chills, fevers, sweats  Respiratory:  Positive for cough, dyspnea on exertion, shortness of breath, wheezing  Cardiovascular:  negative for chest pain, chest pressure/discomfort, lower extremity edema, palpitations  Gastrointestinal:  negative for abdominal pain, constipation, diarrhea, nausea, vomiting  Neurological:  negative for dizziness, headache    Medications: Allergies:     Allergies   Allergen Reactions    Pcn [Penicillins] Other (See Comments)     Pt unsure of reaction this is from childhood       Current Meds:   Scheduled Meds:    predniSONE  40 mg Oral Daily    lisinopril  10 mg Oral BID    carvedilol  12.5 mg Oral BID WC    folic acid  1 mg Oral Daily    traZODone  100 mg Oral Nightly    zonisamide  100 mg Oral Daily    pantoprazole  40 mg Oral QAM AC    budesonide-formoterol  2 puff Inhalation BID    sodium chloride flush  5-40 mL Intravenous 2 times per day    enoxaparin  40 mg Subcutaneous Daily    ipratropium-albuterol  1 ampule Inhalation Q4H WA    levofloxacin  750 mg Intravenous Q24H    clindamycin (CLEOCIN) IV  300 mg Intravenous Q8H    thiamine mononitrate  100 mg Oral Daily     Continuous Infusions:    sodium chloride 75 mL/hr at 05/11/21 1444    sodium chloride       PRN Meds: metoprolol, oxyCODONE-acetaminophen, sodium chloride flush, sodium chloride, nicotine, promethazine **OR** ondansetron, magnesium hydroxide, acetaminophen **OR** acetaminophen, albuterol, LORazepam    Data:     Past Medical History:   has a past medical history of Arthritis, Asthma, Attempted suicide ([facility] [address] Bipolar affective ([facility] [address] Blood in stool, CHF (congestive heart failure), NYHA class III ([facility] [address] COPD (chronic obstructive pulmonary disease) ([facility] [address] CVA (cerebral vascular accident) ([facility] [address] Depression, Difficult intubation, Erectile dysfunction, Fracture of wrist, GERD (gastroesophageal reflux disease), Head injury, Hepatitis, HLD (hyperlipidemia), Hypertension, 26.8 08/31/2012    NBEA NOT REPORTED 08/31/2012    PBEA 3 08/31/2012    SUV1BVP 28 08/31/2012    C2WDADLE 100 08/31/2012    FIO2 100.0 08/31/2012     Lab Results   Component Value Date/Time    SPECIAL NOT REPORTED 05/08/2021 05:40 PM     Lab Results   Component Value Date/Time    CULTURE NO GROWTH 3 DAYS 05/08/2021 05:40 PM       Radiology:  Dominique Hillar Chest (2 Vw)    Result Date: 5/9/2021  Stable left upper lobe cavitary lesion. Please refer to previous CT for further detail     Xr Ribs Left Include Chest (min 3 Views)    Result Date: 5/8/2021  Suspicious cavitary lesion within the left upper lung, which may reflect either cavitary inflammation or infection versus a cavitary neoplasm. Chest CT is necessary at this time. Ct Chest W Contrast    Result Date: 5/8/2021  1. Masslike airspace consolidative density with central cavitation noted in the superior segment of the left lower lobe about 4.7 x 4.0 cm. While this could be pneumonia, it remains concerning for malignancy. Please correlate clinically. Short interval 6 week follow-up is recommended if treated as pneumonia. 2. Severe diffuse patchy hepatic steatosis noted which could obscure liver lesion such as metastasis. In light of the left lower lobe lesion, consideration may be given to contrast enhanced MRI to exclude metastasis particularly 8th malignancy remains a concern after clinical evaluation. 3. Emphysema. Xr Chest Portable    Result Date: 5/11/2021  Slowly resolving left focal suprahilar opacity. Continued follow-up advised.      Xr Chest Portable    Result Date: 5/9/2021  Stable chest when compared to the earlier exam       Physical Examination:        General appearance:  alert, cooperative and no distress  Mental Status:  oriented to person, place and time and normal affect  Lungs:  + wheezes insp and exp right lung  Heart:  regular rate and rhythm, no murmur  Abdomen:  soft, nontender, nondistended, normal bowel sounds, no masses, hepatomegaly, splenomegaly  Extremities:  no edema, redness, tenderness in the calves  Skin:  no gross lesions, rashes, induration    Assessment:        Hospital Problems           Last Modified POA    * (Principal) Cavitary pneumonia 5/8/2021 Yes    GERD (gastroesophageal reflux disease) 5/8/2021 Yes    COPD (chronic obstructive pulmonary disease) ([facility] [address] 5/8/2021 Yes    Dyslipidemia 5/8/2021 Yes    Essential hypertension 5/8/2021 Yes    Alcohol abuse 5/8/2021 Yes    Tobacco dependence 5/8/2021 Yes    Soliz's esophagus with esophagitis 5/9/2021 Yes          Plan:        Pneumonia- IV Clinda and Levaquin, switch to Avelox x 3 weeks per ID  COPD- Nebulizer treatments, inhalers  Etoh abuse- Ciwa protocol, thiamine, give Beer with meals and at bedtime, pt refuses to quit drinking  Tobacco abuse- Nicotine patch, encouraged cessation  HTN- BP elevated all day despite increasing Coreg 25 mg. Will resume Lisinopril 10mg BID. PT/OT  Gi/ DVT proph     nurse, okay to DC home tomorrow morning if BP < 530 systolic.   Pt agreeable with plan    Stephani Roblero MD  5/11/2021  9:37 PM

## 2025-01-08 ENCOUNTER — APPOINTMENT (OUTPATIENT)
Dept: CT IMAGING | Age: 65
DRG: 432 | End: 2025-01-08
Payer: MEDICARE

## 2025-01-08 ENCOUNTER — APPOINTMENT (OUTPATIENT)
Dept: GENERAL RADIOLOGY | Age: 65
DRG: 432 | End: 2025-01-08
Payer: MEDICARE

## 2025-01-08 ENCOUNTER — HOSPITAL ENCOUNTER (INPATIENT)
Age: 65
LOS: 2 days | Discharge: HOME | DRG: 432 | End: 2025-01-10
Attending: EMERGENCY MEDICINE | Admitting: INTERNAL MEDICINE
Payer: MEDICARE

## 2025-01-08 DIAGNOSIS — K70.11 ASCITES DUE TO ALCOHOLIC HEPATITIS: Primary | ICD-10-CM

## 2025-01-08 DIAGNOSIS — F10.930 ALCOHOL WITHDRAWAL SYNDROME WITHOUT COMPLICATION (HCC): ICD-10-CM

## 2025-01-08 DIAGNOSIS — I50.22 CHRONIC SYSTOLIC CONGESTIVE HEART FAILURE, NYHA CLASS 3 (HCC): ICD-10-CM

## 2025-01-08 PROBLEM — K74.60 DECOMPENSATED CIRRHOSIS (HCC): Status: ACTIVE | Noted: 2025-01-08

## 2025-01-08 PROBLEM — K72.90 DECOMPENSATED CIRRHOSIS (HCC): Status: ACTIVE | Noted: 2025-01-08

## 2025-01-08 LAB
ALBUMIN FLD-MCNC: 0.8 G/DL
ALBUMIN SERPL-MCNC: 3.7 G/DL (ref 3.5–5.2)
ALBUMIN/GLOB SERPL: 0.9 {RATIO} (ref 1–2.5)
ALP SERPL-CCNC: 251 U/L (ref 40–129)
ALT SERPL-CCNC: 109 U/L (ref 10–50)
AMYLASE FLD-CCNC: 17 U/L
ANION GAP SERPL CALCULATED.3IONS-SCNC: 20 MMOL/L (ref 9–16)
AST SERPL-CCNC: 331 U/L (ref 10–50)
BASOPHILS # BLD: 0.08 K/UL (ref 0–0.2)
BASOPHILS NFR BLD: 1 % (ref 0–2)
BILIRUB DIRECT SERPL-MCNC: 2.8 MG/DL (ref 0–0.2)
BILIRUB INDIRECT SERPL-MCNC: 2.5 MG/DL (ref 0–1)
BILIRUB SERPL-MCNC: 5.3 MG/DL (ref 0–1.2)
BUN SERPL-MCNC: 9 MG/DL (ref 8–23)
CALCIUM SERPL-MCNC: 8.9 MG/DL (ref 8.6–10.4)
CHLORIDE SERPL-SCNC: 96 MMOL/L (ref 98–107)
CO2 SERPL-SCNC: 20 MMOL/L (ref 20–31)
CREAT SERPL-MCNC: 0.8 MG/DL (ref 0.7–1.2)
EOSINOPHIL # BLD: 0.04 K/UL (ref 0–0.44)
EOSINOPHILS RELATIVE PERCENT: 0 % (ref 1–4)
ERYTHROCYTE [DISTWIDTH] IN BLOOD BY AUTOMATED COUNT: 14.7 % (ref 11.8–14.4)
EST. AVERAGE GLUCOSE BLD GHB EST-MCNC: 59 MG/DL
GFR, ESTIMATED: >90 ML/MIN/1.73M2
GLOBULIN SER CALC-MCNC: 4 G/DL
GLUCOSE SERPL-MCNC: 163 MG/DL (ref 74–99)
HBA1C MFR BLD: 3.7 % (ref 4–6)
HCT VFR BLD AUTO: 43.5 % (ref 40.7–50.3)
HGB BLD-MCNC: 14.3 G/DL (ref 13–17)
IMM GRANULOCYTES # BLD AUTO: 0.08 K/UL (ref 0–0.3)
IMM GRANULOCYTES NFR BLD: 1 %
INR PPP: 1.3
LACTIC ACID, SEPSIS WHOLE BLOOD: 2.9 MMOL/L (ref 0.5–1.9)
LACTIC ACID, SEPSIS WHOLE BLOOD: 7 MMOL/L (ref 0.5–1.9)
LDH FLD L TO P-CCNC: 47 U/L
LDH SERPL-CCNC: 210 U/L (ref 135–225)
LIPASE SERPL-CCNC: 60 U/L (ref 13–60)
LYMPHOCYTES NFR BLD: 1.39 K/UL (ref 1.1–3.7)
LYMPHOCYTES RELATIVE PERCENT: 10 % (ref 24–43)
MCH RBC QN AUTO: 35.1 PG (ref 25.2–33.5)
MCHC RBC AUTO-ENTMCNC: 32.9 G/DL (ref 28.4–34.8)
MCV RBC AUTO: 106.9 FL (ref 82.6–102.9)
MONOCYTES NFR BLD: 1.41 K/UL (ref 0.1–1.2)
MONOCYTES NFR BLD: 10 % (ref 3–12)
NEUTROPHILS NFR BLD: 78 % (ref 36–65)
NEUTS SEG NFR BLD: 10.93 K/UL (ref 1.5–8.1)
NRBC BLD-RTO: 0 PER 100 WBC
PLATELET # BLD AUTO: 221 K/UL (ref 138–453)
PMV BLD AUTO: 10.5 FL (ref 8.1–13.5)
POTASSIUM SERPL-SCNC: 4.3 MMOL/L (ref 3.7–5.3)
PROT SERPL-MCNC: 7.7 G/DL (ref 6.6–8.7)
PROTHROMBIN TIME: 15.9 SEC (ref 11.7–14.9)
RBC # BLD AUTO: 4.07 M/UL (ref 4.21–5.77)
RBC # BLD: ABNORMAL 10*6/UL
RBC # BLD: ABNORMAL 10*6/UL
SODIUM SERPL-SCNC: 136 MMOL/L (ref 136–145)
SPECIMEN TYPE: NORMAL
TROPONIN I SERPL HS-MCNC: 10 NG/L (ref 0–22)
TROPONIN I SERPL HS-MCNC: 11 NG/L (ref 0–22)
WBC OTHER # BLD: 13.9 K/UL (ref 3.5–11.3)

## 2025-01-08 PROCEDURE — 88112 CYTOPATH CELL ENHANCE TECH: CPT

## 2025-01-08 PROCEDURE — 80179 DRUG ASSAY SALICYLATE: CPT

## 2025-01-08 PROCEDURE — 80048 BASIC METABOLIC PNL TOTAL CA: CPT

## 2025-01-08 PROCEDURE — 89051 BODY FLUID CELL COUNT: CPT

## 2025-01-08 PROCEDURE — 80074 ACUTE HEPATITIS PANEL: CPT

## 2025-01-08 PROCEDURE — 96376 TX/PRO/DX INJ SAME DRUG ADON: CPT

## 2025-01-08 PROCEDURE — 80076 HEPATIC FUNCTION PANEL: CPT

## 2025-01-08 PROCEDURE — 2060000000 HC ICU INTERMEDIATE R&B

## 2025-01-08 PROCEDURE — 83930 ASSAY OF BLOOD OSMOLALITY: CPT

## 2025-01-08 PROCEDURE — 99285 EMERGENCY DEPT VISIT HI MDM: CPT

## 2025-01-08 PROCEDURE — 82103 ALPHA-1-ANTITRYPSIN TOTAL: CPT

## 2025-01-08 PROCEDURE — 2500000003 HC RX 250 WO HCPCS

## 2025-01-08 PROCEDURE — 82105 ALPHA-FETOPROTEIN SERUM: CPT

## 2025-01-08 PROCEDURE — 87075 CULTR BACTERIA EXCEPT BLOOD: CPT

## 2025-01-08 PROCEDURE — 82728 ASSAY OF FERRITIN: CPT

## 2025-01-08 PROCEDURE — 82150 ASSAY OF AMYLASE: CPT

## 2025-01-08 PROCEDURE — 96365 THER/PROPH/DIAG IV INF INIT: CPT

## 2025-01-08 PROCEDURE — 71046 X-RAY EXAM CHEST 2 VIEWS: CPT

## 2025-01-08 PROCEDURE — 84484 ASSAY OF TROPONIN QUANT: CPT

## 2025-01-08 PROCEDURE — 86038 ANTINUCLEAR ANTIBODIES: CPT

## 2025-01-08 PROCEDURE — 83550 IRON BINDING TEST: CPT

## 2025-01-08 PROCEDURE — 86645 CMV ANTIBODY IGM: CPT

## 2025-01-08 PROCEDURE — 83516 IMMUNOASSAY NONANTIBODY: CPT

## 2025-01-08 PROCEDURE — 85610 PROTHROMBIN TIME: CPT

## 2025-01-08 PROCEDURE — 82805 BLOOD GASES W/O2 SATURATION: CPT

## 2025-01-08 PROCEDURE — 2580000003 HC RX 258

## 2025-01-08 PROCEDURE — 88305 TISSUE EXAM BY PATHOLOGIST: CPT

## 2025-01-08 PROCEDURE — 87205 SMEAR GRAM STAIN: CPT

## 2025-01-08 PROCEDURE — 82607 VITAMIN B-12: CPT

## 2025-01-08 PROCEDURE — 80143 DRUG ASSAY ACETAMINOPHEN: CPT

## 2025-01-08 PROCEDURE — 6360000002 HC RX W HCPCS

## 2025-01-08 PROCEDURE — 86644 CMV ANTIBODY: CPT

## 2025-01-08 PROCEDURE — 82042 OTHER SOURCE ALBUMIN QUAN EA: CPT

## 2025-01-08 PROCEDURE — 85025 COMPLETE CBC W/AUTO DIFF WBC: CPT

## 2025-01-08 PROCEDURE — 6360000004 HC RX CONTRAST MEDICATION

## 2025-01-08 PROCEDURE — 82784 ASSAY IGA/IGD/IGG/IGM EACH: CPT

## 2025-01-08 PROCEDURE — 83605 ASSAY OF LACTIC ACID: CPT

## 2025-01-08 PROCEDURE — 86225 DNA ANTIBODY NATIVE: CPT

## 2025-01-08 PROCEDURE — 83615 LACTATE (LD) (LDH) ENZYME: CPT

## 2025-01-08 PROCEDURE — 93005 ELECTROCARDIOGRAM TRACING: CPT

## 2025-01-08 PROCEDURE — 87040 BLOOD CULTURE FOR BACTERIA: CPT

## 2025-01-08 PROCEDURE — G0480 DRUG TEST DEF 1-7 CLASSES: HCPCS

## 2025-01-08 PROCEDURE — 99223 1ST HOSP IP/OBS HIGH 75: CPT | Performed by: INTERNAL MEDICINE

## 2025-01-08 PROCEDURE — 82746 ASSAY OF FOLIC ACID SERUM: CPT

## 2025-01-08 PROCEDURE — 87070 CULTURE OTHR SPECIMN AEROBIC: CPT

## 2025-01-08 PROCEDURE — 96375 TX/PRO/DX INJ NEW DRUG ADDON: CPT

## 2025-01-08 PROCEDURE — 83690 ASSAY OF LIPASE: CPT

## 2025-01-08 PROCEDURE — 86665 EPSTEIN-BARR CAPSID VCA: CPT

## 2025-01-08 PROCEDURE — 74177 CT ABD & PELVIS W/CONTRAST: CPT

## 2025-01-08 PROCEDURE — 36415 COLL VENOUS BLD VENIPUNCTURE: CPT

## 2025-01-08 PROCEDURE — 83540 ASSAY OF IRON: CPT

## 2025-01-08 PROCEDURE — 83036 HEMOGLOBIN GLYCOSYLATED A1C: CPT

## 2025-01-08 RX ORDER — SODIUM CHLORIDE 0.9 % (FLUSH) 0.9 %
5-40 SYRINGE (ML) INJECTION EVERY 12 HOURS SCHEDULED
Status: DISCONTINUED | OUTPATIENT
Start: 2025-01-08 | End: 2025-01-10 | Stop reason: HOSPADM

## 2025-01-08 RX ORDER — LORAZEPAM 2 MG/ML
3 INJECTION INTRAMUSCULAR
Status: DISCONTINUED | OUTPATIENT
Start: 2025-01-08 | End: 2025-01-08 | Stop reason: SDUPTHER

## 2025-01-08 RX ORDER — IOPAMIDOL 755 MG/ML
75 INJECTION, SOLUTION INTRAVASCULAR
Status: COMPLETED | OUTPATIENT
Start: 2025-01-08 | End: 2025-01-08

## 2025-01-08 RX ORDER — ONDANSETRON 2 MG/ML
4 INJECTION INTRAMUSCULAR; INTRAVENOUS EVERY 6 HOURS PRN
Status: DISCONTINUED | OUTPATIENT
Start: 2025-01-08 | End: 2025-01-10 | Stop reason: HOSPADM

## 2025-01-08 RX ORDER — IPRATROPIUM BROMIDE AND ALBUTEROL SULFATE 2.5; .5 MG/3ML; MG/3ML
1 SOLUTION RESPIRATORY (INHALATION) EVERY 4 HOURS PRN
Status: DISCONTINUED | OUTPATIENT
Start: 2025-01-08 | End: 2025-01-10 | Stop reason: HOSPADM

## 2025-01-08 RX ORDER — MORPHINE SULFATE 4 MG/ML
4 INJECTION INTRAVENOUS
Status: DISCONTINUED | OUTPATIENT
Start: 2025-01-08 | End: 2025-01-10 | Stop reason: HOSPADM

## 2025-01-08 RX ORDER — FOLIC ACID 1 MG/1
1 TABLET ORAL DAILY
Status: DISCONTINUED | OUTPATIENT
Start: 2025-01-09 | End: 2025-01-10 | Stop reason: HOSPADM

## 2025-01-08 RX ORDER — MORPHINE SULFATE 4 MG/ML
4 INJECTION INTRAVENOUS ONCE
Status: COMPLETED | OUTPATIENT
Start: 2025-01-08 | End: 2025-01-08

## 2025-01-08 RX ORDER — FOLIC ACID 1 MG/1
1 TABLET ORAL DAILY
Status: DISCONTINUED | OUTPATIENT
Start: 2025-01-09 | End: 2025-01-08 | Stop reason: SDUPTHER

## 2025-01-08 RX ORDER — ALBUTEROL SULFATE 90 UG/1
2 INHALANT RESPIRATORY (INHALATION) EVERY 6 HOURS PRN
Status: DISCONTINUED | OUTPATIENT
Start: 2025-01-08 | End: 2025-01-10 | Stop reason: HOSPADM

## 2025-01-08 RX ORDER — POTASSIUM CHLORIDE 1500 MG/1
40 TABLET, EXTENDED RELEASE ORAL PRN
Status: DISCONTINUED | OUTPATIENT
Start: 2025-01-08 | End: 2025-01-10 | Stop reason: HOSPADM

## 2025-01-08 RX ORDER — LORAZEPAM 0.5 MG/1
2 TABLET ORAL
Status: DISCONTINUED | OUTPATIENT
Start: 2025-01-08 | End: 2025-01-08 | Stop reason: SDUPTHER

## 2025-01-08 RX ORDER — CARVEDILOL 25 MG/1
25 TABLET ORAL 2 TIMES DAILY WITH MEALS
Status: DISCONTINUED | OUTPATIENT
Start: 2025-01-09 | End: 2025-01-09

## 2025-01-08 RX ORDER — ACETAMINOPHEN 325 MG/1
650 TABLET ORAL EVERY 6 HOURS PRN
Status: CANCELLED | OUTPATIENT
Start: 2025-01-08

## 2025-01-08 RX ORDER — BUDESONIDE AND FORMOTEROL FUMARATE DIHYDRATE 80; 4.5 UG/1; UG/1
2 AEROSOL RESPIRATORY (INHALATION)
Status: DISCONTINUED | OUTPATIENT
Start: 2025-01-08 | End: 2025-01-10 | Stop reason: HOSPADM

## 2025-01-08 RX ORDER — ENOXAPARIN SODIUM 100 MG/ML
40 INJECTION SUBCUTANEOUS DAILY
Status: DISCONTINUED | OUTPATIENT
Start: 2025-01-09 | End: 2025-01-10 | Stop reason: HOSPADM

## 2025-01-08 RX ORDER — 0.9 % SODIUM CHLORIDE 0.9 %
1000 INTRAVENOUS SOLUTION INTRAVENOUS ONCE
Status: COMPLETED | OUTPATIENT
Start: 2025-01-08 | End: 2025-01-08

## 2025-01-08 RX ORDER — SODIUM CHLORIDE 9 MG/ML
INJECTION, SOLUTION INTRAVENOUS CONTINUOUS
Status: DISCONTINUED | OUTPATIENT
Start: 2025-01-08 | End: 2025-01-09

## 2025-01-08 RX ORDER — DICYCLOMINE HYDROCHLORIDE 10 MG/1
10 CAPSULE ORAL
Status: DISCONTINUED | OUTPATIENT
Start: 2025-01-09 | End: 2025-01-10 | Stop reason: HOSPADM

## 2025-01-08 RX ORDER — SODIUM CHLORIDE 0.9 % (FLUSH) 0.9 %
10 SYRINGE (ML) INJECTION PRN
Status: DISCONTINUED | OUTPATIENT
Start: 2025-01-08 | End: 2025-01-10 | Stop reason: HOSPADM

## 2025-01-08 RX ORDER — GAUZE BANDAGE 2" X 2"
100 BANDAGE TOPICAL DAILY
Status: DISCONTINUED | OUTPATIENT
Start: 2025-01-09 | End: 2025-01-08

## 2025-01-08 RX ORDER — LANOLIN ALCOHOL/MO/W.PET/CERES
100 CREAM (GRAM) TOPICAL DAILY
Status: DISCONTINUED | OUTPATIENT
Start: 2025-01-09 | End: 2025-01-10 | Stop reason: HOSPADM

## 2025-01-08 RX ORDER — MORPHINE SULFATE 4 MG/ML
2 INJECTION INTRAVENOUS
Status: DISCONTINUED | OUTPATIENT
Start: 2025-01-08 | End: 2025-01-10 | Stop reason: HOSPADM

## 2025-01-08 RX ORDER — LORAZEPAM 2 MG/1
4 TABLET ORAL
Status: DISCONTINUED | OUTPATIENT
Start: 2025-01-08 | End: 2025-01-08 | Stop reason: SDUPTHER

## 2025-01-08 RX ORDER — LORAZEPAM 2 MG/ML
4 INJECTION INTRAMUSCULAR
Status: DISCONTINUED | OUTPATIENT
Start: 2025-01-08 | End: 2025-01-08 | Stop reason: SDUPTHER

## 2025-01-08 RX ORDER — SODIUM CHLORIDE 9 MG/ML
INJECTION, SOLUTION INTRAVENOUS PRN
Status: DISCONTINUED | OUTPATIENT
Start: 2025-01-08 | End: 2025-01-10 | Stop reason: HOSPADM

## 2025-01-08 RX ORDER — ONDANSETRON 4 MG/1
4 TABLET, ORALLY DISINTEGRATING ORAL EVERY 8 HOURS PRN
Status: DISCONTINUED | OUTPATIENT
Start: 2025-01-08 | End: 2025-01-10 | Stop reason: HOSPADM

## 2025-01-08 RX ORDER — LORAZEPAM 0.5 MG/1
1 TABLET ORAL
Status: DISCONTINUED | OUTPATIENT
Start: 2025-01-08 | End: 2025-01-08 | Stop reason: SDUPTHER

## 2025-01-08 RX ORDER — LORAZEPAM 2 MG/ML
1 INJECTION INTRAMUSCULAR
Status: DISCONTINUED | OUTPATIENT
Start: 2025-01-08 | End: 2025-01-08 | Stop reason: SDUPTHER

## 2025-01-08 RX ORDER — SODIUM CHLORIDE 0.9 % (FLUSH) 0.9 %
5-40 SYRINGE (ML) INJECTION PRN
Status: DISCONTINUED | OUTPATIENT
Start: 2025-01-08 | End: 2025-01-10 | Stop reason: HOSPADM

## 2025-01-08 RX ORDER — KETOROLAC TROMETHAMINE 15 MG/ML
15 INJECTION, SOLUTION INTRAMUSCULAR; INTRAVENOUS ONCE
Status: COMPLETED | OUTPATIENT
Start: 2025-01-08 | End: 2025-01-08

## 2025-01-08 RX ORDER — ACETAMINOPHEN 325 MG/1
325 TABLET ORAL EVERY 6 HOURS PRN
Status: DISCONTINUED | OUTPATIENT
Start: 2025-01-08 | End: 2025-01-10 | Stop reason: HOSPADM

## 2025-01-08 RX ORDER — LORAZEPAM 1 MG/1
1 TABLET ORAL
Status: DISCONTINUED | OUTPATIENT
Start: 2025-01-08 | End: 2025-01-10 | Stop reason: HOSPADM

## 2025-01-08 RX ORDER — POTASSIUM CHLORIDE 7.45 MG/ML
10 INJECTION INTRAVENOUS PRN
Status: DISCONTINUED | OUTPATIENT
Start: 2025-01-08 | End: 2025-01-10 | Stop reason: HOSPADM

## 2025-01-08 RX ORDER — HALOPERIDOL 5 MG/ML
5 INJECTION INTRAMUSCULAR EVERY 4 HOURS PRN
Status: DISCONTINUED | OUTPATIENT
Start: 2025-01-08 | End: 2025-01-10 | Stop reason: HOSPADM

## 2025-01-08 RX ORDER — LORAZEPAM 2 MG/1
4 TABLET ORAL
Status: DISCONTINUED | OUTPATIENT
Start: 2025-01-08 | End: 2025-01-10 | Stop reason: HOSPADM

## 2025-01-08 RX ORDER — LORAZEPAM 2 MG/ML
2 INJECTION INTRAMUSCULAR
Status: DISCONTINUED | OUTPATIENT
Start: 2025-01-08 | End: 2025-01-08 | Stop reason: SDUPTHER

## 2025-01-08 RX ORDER — LORAZEPAM 2 MG/ML
3 INJECTION INTRAMUSCULAR
Status: DISCONTINUED | OUTPATIENT
Start: 2025-01-08 | End: 2025-01-10 | Stop reason: HOSPADM

## 2025-01-08 RX ORDER — LORAZEPAM 2 MG/ML
4 INJECTION INTRAMUSCULAR
Status: DISCONTINUED | OUTPATIENT
Start: 2025-01-08 | End: 2025-01-10 | Stop reason: HOSPADM

## 2025-01-08 RX ORDER — ACETAMINOPHEN 650 MG/1
650 SUPPOSITORY RECTAL EVERY 6 HOURS PRN
Status: CANCELLED | OUTPATIENT
Start: 2025-01-08

## 2025-01-08 RX ORDER — LORAZEPAM 2 MG/ML
1 INJECTION INTRAMUSCULAR
Status: DISCONTINUED | OUTPATIENT
Start: 2025-01-08 | End: 2025-01-10 | Stop reason: HOSPADM

## 2025-01-08 RX ORDER — MAGNESIUM SULFATE 1 G/100ML
1000 INJECTION INTRAVENOUS PRN
Status: DISCONTINUED | OUTPATIENT
Start: 2025-01-08 | End: 2025-01-10 | Stop reason: HOSPADM

## 2025-01-08 RX ORDER — LORAZEPAM 2 MG/ML
2 INJECTION INTRAMUSCULAR
Status: DISCONTINUED | OUTPATIENT
Start: 2025-01-08 | End: 2025-01-10 | Stop reason: HOSPADM

## 2025-01-08 RX ORDER — FAMOTIDINE 20 MG/1
40 TABLET, FILM COATED ORAL DAILY
Status: DISCONTINUED | OUTPATIENT
Start: 2025-01-09 | End: 2025-01-10 | Stop reason: HOSPADM

## 2025-01-08 RX ORDER — LISINOPRIL 10 MG/1
10 TABLET ORAL 2 TIMES DAILY
Status: DISCONTINUED | OUTPATIENT
Start: 2025-01-08 | End: 2025-01-09

## 2025-01-08 RX ORDER — POLYETHYLENE GLYCOL 3350 17 G/17G
17 POWDER, FOR SOLUTION ORAL DAILY PRN
Status: DISCONTINUED | OUTPATIENT
Start: 2025-01-08 | End: 2025-01-10 | Stop reason: HOSPADM

## 2025-01-08 RX ORDER — LORAZEPAM 2 MG/1
2 TABLET ORAL
Status: DISCONTINUED | OUTPATIENT
Start: 2025-01-08 | End: 2025-01-10 | Stop reason: HOSPADM

## 2025-01-08 RX ADMIN — LORAZEPAM 1 MG: 2 INJECTION INTRAMUSCULAR; INTRAVENOUS at 18:16

## 2025-01-08 RX ADMIN — THIAMINE HYDROCHLORIDE: 100 INJECTION, SOLUTION INTRAMUSCULAR; INTRAVENOUS at 20:02

## 2025-01-08 RX ADMIN — IOPAMIDOL 75 ML: 755 INJECTION, SOLUTION INTRAVENOUS at 19:20

## 2025-01-08 RX ADMIN — KETOROLAC TROMETHAMINE 15 MG: 15 INJECTION, SOLUTION INTRAMUSCULAR; INTRAVENOUS at 20:50

## 2025-01-08 RX ADMIN — CEFTRIAXONE SODIUM 1000 MG: 1 INJECTION, POWDER, FOR SOLUTION INTRAMUSCULAR; INTRAVENOUS at 20:01

## 2025-01-08 RX ADMIN — SODIUM CHLORIDE 1000 ML: 9 INJECTION, SOLUTION INTRAVENOUS at 19:16

## 2025-01-08 RX ADMIN — WATER 1000 MG: 1 INJECTION INTRAMUSCULAR; INTRAVENOUS; SUBCUTANEOUS at 17:46

## 2025-01-08 RX ADMIN — MORPHINE SULFATE 4 MG: 4 INJECTION INTRAVENOUS at 20:49

## 2025-01-08 RX ADMIN — LORAZEPAM 2 MG: 2 INJECTION INTRAMUSCULAR; INTRAVENOUS at 19:53

## 2025-01-08 ASSESSMENT — PAIN - FUNCTIONAL ASSESSMENT
PAIN_FUNCTIONAL_ASSESSMENT: 0-10
PAIN_FUNCTIONAL_ASSESSMENT: 0-10

## 2025-01-08 ASSESSMENT — PAIN DESCRIPTION - DESCRIPTORS: DESCRIPTORS: TIGHTNESS

## 2025-01-08 ASSESSMENT — LIFESTYLE VARIABLES
HOW MANY STANDARD DRINKS CONTAINING ALCOHOL DO YOU HAVE ON A TYPICAL DAY: PATIENT DOES NOT DRINK
HOW OFTEN DO YOU HAVE A DRINK CONTAINING ALCOHOL: NEVER

## 2025-01-08 ASSESSMENT — PAIN SCALES - GENERAL: PAINLEVEL_OUTOF10: 10

## 2025-01-08 ASSESSMENT — PAIN DESCRIPTION - LOCATION: LOCATION: ABDOMEN

## 2025-01-08 NOTE — ED PROVIDER NOTES
[facility] EMERGENCY DEPARTMENT     Emergency Department     Faculty Attestation    I performed a history and physical examination of the patient and discussed management with the resident. I reviewed the resident’s note and agree with the documented findings and plan of care. Any areas of disagreement are noted on the chart. I was personally present for the key portions of any procedures. I have documented in the chart those procedures where I was not present during the key portions. I have reviewed the emergency nurses triage note. I agree with the chief complaint, past medical history, past surgical history, allergies, medications, social and family history as documented unless otherwise noted below. For Physician Assistant/ Nurse Practitioner cases/documentation I have personally evaluated this patient and have completed at least one if not all key elements of the E/M (history, physical exam, and MDM). Additional findings are as noted.    Note Started: 4:48 PM EST    Patient here with abdominal discomfort and bloating.  History of alcoholism but no history of cirrhosis or hepatitis.  States for the last week and a half has had significantly progressively worsening abdominal distention.  Independent history from his friend and caretaker.  No blood in the stools no headache some shortness of breath with the of distention.  On exam patient appears chronically ill but not toxic.  Is tachycardic speaking full sentences but mild dyspnea.  Abdomen is significantly distended tympanitic to percussion and does have fluid wave on palpation.  Skin jaundiced mildly with scleral icterus.  Will do full workup, diagnostic paracentesis, admit    EKG interpretation: Sinus tachycardia 123 normal intervals normal axis no acute ST or T changes.  No acute finding    Critical Care     none    Darrius العراقي MD, FACEP, FAAEM  Attending Emergency  Physician           Darrius العراقي MD  01/08/25 2726

## 2025-01-08 NOTE — ED NOTES
search:     Persons present during search:   Results of search and disposition:       Searchers Name: Raeann RAJENDRA     These items or items similar should be removed from the room:   [] Chairs   [] Telephone   [x] Trash cans and liners   [] Plastic utensils (order Patient Safety tray)   [] Empty or remove Sharps containers   [x] All personal clothing/belongings removed   [x] All unnecessary lead wires, electrical cords, draw cords, etc.   [] Flowers and plants   [x] Double check for lighters, matches, razors, any glass items etc that can be used as weapons.    Person completing Checklist: Karis Rangel RN       GENERAL INFORMATION     Y  N     [x] [] Has the patient been informed that they are on a watch and what that means?   [x] [] Can the patient get out of Bed without nursing assistance?   [x] [] Can the patient use the restroom without nursing assistance?   [x] [] Can the patient walk the halls to \"stretch their legs?\"   [] [x] Does the patient have metal utensils?   [x] [] Have the patient's belongings been placed out of control of the patient?   [x] [] Have the patient and his/her belongings been checked for contraband?   [x] [] Is the patient under any visitor restrictions?   If Yes, explain:   [] [x] Is the patient under an alias?  Alias Name:   Authorized visitors (no more than two are to be on the list)   Name/Relationship:   Name/Relationship:    Name of Staff member that you  Received this information from?: Self    General Description:    Dariel K Tunde [birth year] male 64 y.o. Admission weight: 68 kg (150 lb) Height: 190.5 cm (6' 3\")  Race: [x]  [] Black  []   []   []  [] Other  Facial Hair:  [x] Yes  [] No  If yes, please describe:  Identifying Marks (i.e. Visible tattoos, scars, etc...):     NURSING CARE PLAN    Nursing Diagnosis: Risk of Self Directed Harm  [] Actual  [] Potential  Date Started:1/8/25    Etiological Factors: (related to)  [] Expressed or implied

## 2025-01-08 NOTE — ED NOTES
Writer and Dr. Brewster to discuss pt's suicidal thoughts. Pt is not suicidal at this time and has no plan to harm himself. Per Dr. Brewster SI watch and guard D/C.

## 2025-01-08 NOTE — ED NOTES
Pt presents to ED with c/o abdominal distention, chest pain, short of breath and suicidal thoughts.  Pt reports he's had suicidal thoughts for 10+yrs without a plan.  Pt reports he's noticed increased abdominal bloating for 3 weeks and became concerned. Pt reports the chest pain started yesterday and is non radiating.  Pt reports drinking 10 shots of tequila daily and a 24oz beer for the last 20 years. Pt reports his last drink was 2 day ago.  Pt has visible tremors.  Patient alert and oriented x4, talking in complete sentences. Respirations even and unlabored. Call light in reach, all needs met at this time.

## 2025-01-08 NOTE — ED PROVIDER NOTES
[facility] EMERGENCY DEPARTMENT  Emergency Department Encounter  Emergency Medicine Resident     Pt Name:Dariel Griffiths  MRN: [id number]  Birthdate 1960  Date of evaluation: 1/8/25  PCP:  Sheri Ackerman APRN - CNP  Note Started: 6:12 PM EST    CHIEF COMPLAINT       Chief Complaint   Patient presents with    Bloated    Chest Pain     HISTORY OF PRESENT ILLNESS  (Location/Symptom, Timing/Onset, Context/Setting, Quality, Duration, Modifying Factors, Severity.)      Dariel Griffiths is a 64 y.o. male with history of alcohol misuse, alcoholic hepatitis, CHF, COPD, HTN, HLD, presents with 2-week history of progressively worsening abdominal distention, intermittent nausea, decreased oral intake.  Patient has associated generalized abdominal pain.  He has had decreased oral intake secondary to malaise.  There are no reported episodes of vomiting, however he does have intermittent nausea.  He has been taking medications for bloating as well as laxatives with no relief of his symptoms.  He is a daily drinker and normally consumes several beers as well as 10 shots of tequila daily.  He last drink tequila 3 days prior.  He was only able to consume half a beer yesterday as he was not feeling well.  He denies recent sick contacts, no reported fever, chills, myalgias night sweats.  He believes his abdominal pain and distention is causing pressure on his lungs and heart making it difficult for him to breathe.  He denies any overt chest pain, however he does endorse intermittent uncomfortable feeling in his chest with associate shortness of breath.  There is no associated diaphoresis or palpitations.  His roommate is present for examination and expresses concerns for jaundice.  She states this has occurred intermittently for the last several months and spontaneously resolves.    PAST MEDICAL / SURGICAL / SOCIAL / FAMILY HISTORY      has a past medical history of Abnormal results of liver function studies ,

## 2025-01-09 ENCOUNTER — APPOINTMENT (OUTPATIENT)
Dept: MRI IMAGING | Age: 65
DRG: 432 | End: 2025-01-09
Payer: MEDICARE

## 2025-01-09 ENCOUNTER — APPOINTMENT (OUTPATIENT)
Age: 65
DRG: 432 | End: 2025-01-09
Attending: INTERNAL MEDICINE
Payer: MEDICARE

## 2025-01-09 ENCOUNTER — APPOINTMENT (OUTPATIENT)
Dept: CT IMAGING | Age: 65
DRG: 432 | End: 2025-01-09
Payer: MEDICARE

## 2025-01-09 PROBLEM — E44.0 MODERATE MALNUTRITION (HCC): Status: ACTIVE | Noted: 2025-01-09

## 2025-01-09 PROBLEM — Z71.89 GOALS OF CARE, COUNSELING/DISCUSSION: Status: ACTIVE | Noted: 2025-01-09

## 2025-01-09 PROBLEM — F10.20 ALCOHOLISM (HCC): Status: ACTIVE | Noted: 2025-01-09

## 2025-01-09 PROBLEM — K70.11 ASCITES DUE TO ALCOHOLIC HEPATITIS: Status: ACTIVE | Noted: 2025-01-09

## 2025-01-09 PROBLEM — Z51.5 ENCOUNTER FOR PALLIATIVE CARE: Status: ACTIVE | Noted: 2025-01-09

## 2025-01-09 LAB
A1AT SERPL-MCNC: 182 MG/DL (ref 90–200)
AFP SERPL-MCNC: 2.6 UG/L
ALBUMIN SERPL-MCNC: 2.8 G/DL (ref 3.5–5.2)
ALBUMIN/GLOB SERPL: 1 {RATIO} (ref 1–2.5)
ALP SERPL-CCNC: 164 U/L (ref 40–129)
ALT SERPL-CCNC: 71 U/L (ref 10–50)
ANION GAP SERPL CALCULATED.3IONS-SCNC: 8 MMOL/L (ref 9–16)
APAP SERPL-MCNC: <5 UG/ML (ref 10–30)
APAP SERPL-MCNC: <5 UG/ML (ref 10–30)
APPEARANCE FLD: CLEAR
AST SERPL-CCNC: 209 U/L (ref 10–50)
BACTERIA URNS QL MICRO: NORMAL
BILIRUB DIRECT SERPL-MCNC: 2 MG/DL (ref 0–0.2)
BILIRUB INDIRECT SERPL-MCNC: 0.8 MG/DL (ref 0–1)
BILIRUB SERPL-MCNC: 2.8 MG/DL (ref 0–1.2)
BILIRUB UR QL STRIP: ABNORMAL
BODY FLD TYPE: NORMAL
BODY TEMPERATURE: 37
BUN SERPL-MCNC: 7 MG/DL (ref 8–23)
CALCIUM SERPL-MCNC: 8.1 MG/DL (ref 8.6–10.4)
CASE NUMBER:: NORMAL
CASTS #/AREA URNS LPF: NORMAL /LPF (ref 0–8)
CHLORIDE SERPL-SCNC: 103 MMOL/L (ref 98–107)
CHOLEST SERPL-MCNC: 86 MG/DL (ref 0–199)
CHOLESTEROL/HDL RATIO: 6.1
CLARITY UR: ABNORMAL
CLOT CHECK: NORMAL
CMV IGG SERPL QL IA: 664
CMV IGM SERPL QL IA: 0.5
CO2 SERPL-SCNC: 22 MMOL/L (ref 20–31)
COHGB MFR BLD: 2 % (ref 0–5)
COLOR FLD: YELLOW
COLOR UR: ABNORMAL
CREAT SERPL-MCNC: 0.5 MG/DL (ref 0.7–1.2)
EBV VCA IGM SER-ACNC: 56 U/ML
ECHO AO ROOT DIAM: 3.5 CM
ECHO AO ROOT INDEX: 1.72 CM/M2
ECHO AV AREA PEAK VELOCITY: 2.6 CM2
ECHO AV AREA VTI: 2.4 CM2
ECHO AV AREA/BSA PEAK VELOCITY: 1.3 CM2/M2
ECHO AV AREA/BSA VTI: 1.2 CM2/M2
ECHO AV MEAN GRADIENT: 3 MMHG
ECHO AV MEAN VELOCITY: 0.8 M/S
ECHO AV PEAK GRADIENT: 6 MMHG
ECHO AV PEAK VELOCITY: 1.2 M/S
ECHO AV VELOCITY RATIO: 0.83
ECHO AV VTI: 20.1 CM
ECHO BSA: 2 M2
ECHO LA AREA 2C: 18.6 CM2
ECHO LA AREA 4C: 18.6 CM2
ECHO LA DIAMETER INDEX: 1.77 CM/M2
ECHO LA DIAMETER: 3.6 CM
ECHO LA MAJOR AXIS: 5.2 CM
ECHO LA MINOR AXIS: 5.6 CM
ECHO LA TO AORTIC ROOT RATIO: 1.03
ECHO LA VOL BP: 52 ML (ref 18–58)
ECHO LA VOL MOD A2C: 49 ML (ref 18–58)
ECHO LA VOL MOD A4C: 51 ML (ref 18–58)
ECHO LA VOL/BSA BIPLANE: 26 ML/M2 (ref 16–34)
ECHO LA VOLUME INDEX MOD A2C: 24 ML/M2 (ref 16–34)
ECHO LA VOLUME INDEX MOD A4C: 25 ML/M2 (ref 16–34)
ECHO LV E' LATERAL VELOCITY: 14.3 CM/S
ECHO LV EDV A2C: 68 ML
ECHO LV EDV A4C: 64 ML
ECHO LV EDV INDEX A4C: 32 ML/M2
ECHO LV EDV NDEX A2C: 33 ML/M2
ECHO LV EJECTION FRACTION A2C: 57 %
ECHO LV EJECTION FRACTION A4C: 59 %
ECHO LV EJECTION FRACTION BIPLANE: 57 % (ref 55–100)
ECHO LV ESV A2C: 29 ML
ECHO LV ESV A4C: 26 ML
ECHO LV ESV INDEX A2C: 14 ML/M2
ECHO LV ESV INDEX A4C: 13 ML/M2
ECHO LV FRACTIONAL SHORTENING: 31 % (ref 28–44)
ECHO LV INTERNAL DIMENSION DIASTOLE INDEX: 2.41 CM/M2
ECHO LV INTERNAL DIMENSION DIASTOLIC: 4.9 CM (ref 4.2–5.9)
ECHO LV INTERNAL DIMENSION SYSTOLIC INDEX: 1.67 CM/M2
ECHO LV INTERNAL DIMENSION SYSTOLIC: 3.4 CM
ECHO LV IVSD: 0.9 CM (ref 0.6–1)
ECHO LV MASS 2D: 120.8 G (ref 88–224)
ECHO LV MASS INDEX 2D: 59.5 G/M2 (ref 49–115)
ECHO LV POSTERIOR WALL DIASTOLIC: 0.6 CM (ref 0.6–1)
ECHO LV RELATIVE WALL THICKNESS RATIO: 0.24
ECHO LVOT AREA: 3.1 CM2
ECHO LVOT AV VTI INDEX: 0.76
ECHO LVOT DIAM: 2 CM
ECHO LVOT MEAN GRADIENT: 2 MMHG
ECHO LVOT PEAK GRADIENT: 4 MMHG
ECHO LVOT PEAK VELOCITY: 1 M/S
ECHO LVOT STROKE VOLUME INDEX: 23.5 ML/M2
ECHO LVOT SV: 47.7 ML
ECHO LVOT VTI: 15.2 CM
ECHO MV A VELOCITY: 0.63 M/S
ECHO MV E DECELERATION TIME (DT): 137 MS
ECHO MV E VELOCITY: 0.82 M/S
ECHO MV E/A RATIO: 1.3
ECHO MV E/E' LATERAL: 5.73
ECHO PV MAX VELOCITY: 0.9 M/S
ECHO PV PEAK GRADIENT: 3 MMHG
ECHO RA AREA 4C: 18.6 CM2
ECHO RA END SYSTOLIC VOLUME APICAL 4 CHAMBER INDEX BSA: 28 ML/M2
ECHO RA VOLUME: 56 ML
ECHO RV BASAL DIMENSION: 4.3 CM
ECHO RV FREE WALL PEAK S': 16.8 CM/S
ECHO RV TAPSE: 2.8 CM (ref 1.7–?)
EPI CELLS #/AREA URNS HPF: NORMAL /HPF (ref 0–5)
ETHANOL PERCENT: <0.01 %
ETHANOLAMINE SERPL-MCNC: <10 MG/DL (ref 0–0.08)
FERRITIN SERPL-MCNC: 1135 NG/ML
FIO2 ON VENT: ABNORMAL %
FOLATE SERPL-MCNC: 3.4 NG/ML (ref 4.8–24.2)
GFR, ESTIMATED: >90 ML/MIN/1.73M2
GLOBULIN SER CALC-MCNC: 2.7 G/DL
GLUCOSE SERPL-MCNC: 111 MG/DL (ref 74–99)
GLUCOSE UR STRIP-MCNC: NEGATIVE MG/DL
HAV IGM SERPL QL IA: NONREACTIVE
HBV CORE IGM SERPL QL IA: NONREACTIVE
HBV SURFACE AG SERPL QL IA: NONREACTIVE
HCO3 VENOUS: 21.2 MMOL/L (ref 24–30)
HCV AB SERPL QL IA: NONREACTIVE
HDLC SERPL-MCNC: 14 MG/DL
HGB UR QL STRIP.AUTO: NEGATIVE
IRON SATN MFR SERPL: 59 % (ref 20–55)
IRON SERPL-MCNC: 60 UG/DL (ref 61–157)
KETONES UR STRIP-MCNC: ABNORMAL MG/DL
LACTIC ACID, WHOLE BLOOD: 1.5 MMOL/L (ref 0.7–2.1)
LACTIC ACID, WHOLE BLOOD: 1.8 MMOL/L (ref 0.7–2.1)
LACTIC ACID, WHOLE BLOOD: 4.3 MMOL/L (ref 0.7–2.1)
LDLC SERPL CALC-MCNC: 57 MG/DL (ref 0–100)
LEUKOCYTE ESTERASE UR QL STRIP: ABNORMAL
LYMPHOCYTES NFR FLD: 22 %
MAGNESIUM SERPL-MCNC: 1.7 MG/DL (ref 1.6–2.4)
MESOTHELIAL CELLS BODY FLUID: 26 %
MONOCYTES NFR FLD: 44 %
NEGATIVE BASE EXCESS, VEN: 2.1 MMOL/L (ref 0–2)
NEUTROPHILS NFR FLD: 8 %
NITRITE UR QL STRIP: POSITIVE
NUC CELL # FLD: 154 CELLS/UL
O2 SAT, VEN: 99 % (ref 60–85)
OSMOLALITY SERPL: 286 MOSM/KG (ref 275–295)
PARTIAL THROMBOPLASTIN TIME: 33.1 SEC (ref 23–36.5)
PCO2 VENOUS: 31.6 MM HG (ref 39–55)
PH UR STRIP: 5.5 [PH] (ref 5–8)
PH VENOUS: 7.44 (ref 7.32–7.42)
PHOSPHATE SERPL-MCNC: 2.2 MG/DL (ref 2.5–4.5)
PO2 VENOUS: 146.8 MM HG (ref 30–50)
POTASSIUM SERPL-SCNC: 3.8 MMOL/L (ref 3.7–5.3)
PROT SERPL-MCNC: 5.5 G/DL (ref 6.6–8.7)
PROT UR STRIP-MCNC: ABNORMAL MG/DL
RBC # FLD: <3000 CELLS/UL
RBC #/AREA URNS HPF: NORMAL /HPF (ref 0–4)
SALICYLATES SERPL-MCNC: <0.5 MG/DL (ref 0–10)
SODIUM SERPL-SCNC: 133 MMOL/L (ref 136–145)
SP GR UR STRIP: 1.06 (ref 1–1.03)
SPECIMEN DESCRIPTION: NORMAL
TIBC SERPL-MCNC: 102 UG/DL (ref 250–450)
TRIGL SERPL-MCNC: 76 MG/DL
UNSATURATED IRON BINDING CAPACITY: 42 UG/DL (ref 112–347)
UROBILINOGEN UR STRIP-ACNC: ABNORMAL EU/DL (ref 0–1)
VIT B12 SERPL-MCNC: 1114 PG/ML (ref 232–1245)
VLDLC SERPL CALC-MCNC: 15 MG/DL (ref 1–30)
WBC #/AREA URNS HPF: NORMAL /HPF (ref 0–5)

## 2025-01-09 PROCEDURE — 6360000002 HC RX W HCPCS

## 2025-01-09 PROCEDURE — 81001 URINALYSIS AUTO W/SCOPE: CPT

## 2025-01-09 PROCEDURE — 99233 SBSQ HOSP IP/OBS HIGH 50: CPT | Performed by: INTERNAL MEDICINE

## 2025-01-09 PROCEDURE — 80143 DRUG ASSAY ACETAMINOPHEN: CPT

## 2025-01-09 PROCEDURE — 83605 ASSAY OF LACTIC ACID: CPT

## 2025-01-09 PROCEDURE — 2580000003 HC RX 258: Performed by: INTERNAL MEDICINE

## 2025-01-09 PROCEDURE — 80076 HEPATIC FUNCTION PANEL: CPT

## 2025-01-09 PROCEDURE — 80074 ACUTE HEPATITIS PANEL: CPT

## 2025-01-09 PROCEDURE — 6360000002 HC RX W HCPCS: Performed by: INTERNAL MEDICINE

## 2025-01-09 PROCEDURE — 6370000000 HC RX 637 (ALT 250 FOR IP): Performed by: INTERNAL MEDICINE

## 2025-01-09 PROCEDURE — 94761 N-INVAS EAR/PLS OXIMETRY MLT: CPT

## 2025-01-09 PROCEDURE — 99223 1ST HOSP IP/OBS HIGH 75: CPT | Performed by: STUDENT IN AN ORGANIZED HEALTH CARE EDUCATION/TRAINING PROGRAM

## 2025-01-09 PROCEDURE — 85730 THROMBOPLASTIN TIME PARTIAL: CPT

## 2025-01-09 PROCEDURE — 2500000003 HC RX 250 WO HCPCS: Performed by: INTERNAL MEDICINE

## 2025-01-09 PROCEDURE — A9579 GAD-BASE MR CONTRAST NOS,1ML: HCPCS | Performed by: INTERNAL MEDICINE

## 2025-01-09 PROCEDURE — 94640 AIRWAY INHALATION TREATMENT: CPT

## 2025-01-09 PROCEDURE — 84100 ASSAY OF PHOSPHORUS: CPT

## 2025-01-09 PROCEDURE — 2500000003 HC RX 250 WO HCPCS

## 2025-01-09 PROCEDURE — 80048 BASIC METABOLIC PNL TOTAL CA: CPT

## 2025-01-09 PROCEDURE — 36415 COLL VENOUS BLD VENIPUNCTURE: CPT

## 2025-01-09 PROCEDURE — 82746 ASSAY OF FOLIC ACID SERUM: CPT

## 2025-01-09 PROCEDURE — 70450 CT HEAD/BRAIN W/O DYE: CPT

## 2025-01-09 PROCEDURE — 83735 ASSAY OF MAGNESIUM: CPT

## 2025-01-09 PROCEDURE — 99222 1ST HOSP IP/OBS MODERATE 55: CPT

## 2025-01-09 PROCEDURE — 80061 LIPID PANEL: CPT

## 2025-01-09 PROCEDURE — 82105 ALPHA-FETOPROTEIN SERUM: CPT

## 2025-01-09 PROCEDURE — 93306 TTE W/DOPPLER COMPLETE: CPT

## 2025-01-09 PROCEDURE — 93306 TTE W/DOPPLER COMPLETE: CPT | Performed by: INTERNAL MEDICINE

## 2025-01-09 PROCEDURE — 2060000000 HC ICU INTERMEDIATE R&B

## 2025-01-09 PROCEDURE — 6360000004 HC RX CONTRAST MEDICATION: Performed by: INTERNAL MEDICINE

## 2025-01-09 PROCEDURE — 82607 VITAMIN B-12: CPT

## 2025-01-09 PROCEDURE — 6370000000 HC RX 637 (ALT 250 FOR IP)

## 2025-01-09 PROCEDURE — 74183 MRI ABD W/O CNTR FLWD CNTR: CPT

## 2025-01-09 PROCEDURE — 2580000003 HC RX 258

## 2025-01-09 RX ORDER — SODIUM CHLORIDE 9 MG/ML
10 INJECTION, SOLUTION INTRAMUSCULAR; INTRAVENOUS; SUBCUTANEOUS 2 TIMES DAILY
Status: DISCONTINUED | OUTPATIENT
Start: 2025-01-09 | End: 2025-01-10 | Stop reason: HOSPADM

## 2025-01-09 RX ORDER — MULTIVIT-MIN/FERROUS GLUCONATE 9 MG/15 ML
15 LIQUID (ML) ORAL DAILY
Status: DISCONTINUED | OUTPATIENT
Start: 2025-01-09 | End: 2025-01-10 | Stop reason: HOSPADM

## 2025-01-09 RX ORDER — ERGOCALCIFEROL 1.25 MG/1
50000 CAPSULE, LIQUID FILLED ORAL WEEKLY
Status: DISCONTINUED | OUTPATIENT
Start: 2025-01-09 | End: 2025-01-10 | Stop reason: HOSPADM

## 2025-01-09 RX ORDER — CARVEDILOL 6.25 MG/1
3.12 TABLET ORAL 2 TIMES DAILY WITH MEALS
Status: DISCONTINUED | OUTPATIENT
Start: 2025-01-09 | End: 2025-01-10 | Stop reason: HOSPADM

## 2025-01-09 RX ADMIN — Medication 15 ML: at 18:11

## 2025-01-09 RX ADMIN — SODIUM CHLORIDE, PRESERVATIVE FREE 10 ML: 5 INJECTION INTRAVENOUS at 01:08

## 2025-01-09 RX ADMIN — SODIUM CHLORIDE, PRESERVATIVE FREE 40 MG: 5 INJECTION INTRAVENOUS at 01:08

## 2025-01-09 RX ADMIN — BUDESONIDE AND FORMOTEROL FUMARATE DIHYDRATE 2 PUFF: 80; 4.5 AEROSOL RESPIRATORY (INHALATION) at 08:23

## 2025-01-09 RX ADMIN — Medication 100 MG: at 09:58

## 2025-01-09 RX ADMIN — FOLIC ACID 1 MG: 1 TABLET ORAL at 09:58

## 2025-01-09 RX ADMIN — THIAMINE HYDROCHLORIDE: 100 INJECTION, SOLUTION INTRAMUSCULAR; INTRAVENOUS at 10:04

## 2025-01-09 RX ADMIN — CARVEDILOL 3.12 MG: 6.25 TABLET, FILM COATED ORAL at 09:58

## 2025-01-09 RX ADMIN — ENOXAPARIN SODIUM 40 MG: 100 INJECTION SUBCUTANEOUS at 10:03

## 2025-01-09 RX ADMIN — BUDESONIDE AND FORMOTEROL FUMARATE DIHYDRATE 2 PUFF: 80; 4.5 AEROSOL RESPIRATORY (INHALATION) at 20:03

## 2025-01-09 RX ADMIN — LORAZEPAM 2 MG: 2 TABLET ORAL at 01:08

## 2025-01-09 RX ADMIN — SODIUM CHLORIDE 10 ML: 9 INJECTION INTRAMUSCULAR; INTRAVENOUS; SUBCUTANEOUS at 14:42

## 2025-01-09 RX ADMIN — SODIUM CHLORIDE, PRESERVATIVE FREE 10 ML: 5 INJECTION INTRAVENOUS at 21:57

## 2025-01-09 RX ADMIN — SODIUM CHLORIDE, PRESERVATIVE FREE 10 ML: 5 INJECTION INTRAVENOUS at 10:04

## 2025-01-09 RX ADMIN — ERGOCALCIFEROL 50000 UNITS: 1.25 CAPSULE ORAL at 18:12

## 2025-01-09 RX ADMIN — SODIUM CHLORIDE, PRESERVATIVE FREE 40 MG: 5 INJECTION INTRAVENOUS at 11:43

## 2025-01-09 RX ADMIN — SODIUM CHLORIDE: 9 INJECTION, SOLUTION INTRAVENOUS at 01:11

## 2025-01-09 RX ADMIN — CARVEDILOL 3.12 MG: 6.25 TABLET, FILM COATED ORAL at 18:11

## 2025-01-09 RX ADMIN — GADOTERIDOL 14 ML: 279.3 INJECTION, SOLUTION INTRAVENOUS at 14:41

## 2025-01-09 RX ADMIN — SODIUM CHLORIDE, PRESERVATIVE FREE 40 MG: 5 INJECTION INTRAVENOUS at 21:57

## 2025-01-09 RX ADMIN — WATER 2000 MG: 1 INJECTION INTRAMUSCULAR; INTRAVENOUS; SUBCUTANEOUS at 18:12

## 2025-01-09 RX ADMIN — MORPHINE SULFATE 4 MG: 4 INJECTION INTRAVENOUS at 22:39

## 2025-01-09 ASSESSMENT — PAIN SCALES - GENERAL
PAINLEVEL_OUTOF10: 9
PAINLEVEL_OUTOF10: 7
PAINLEVEL_OUTOF10: 0
PAINLEVEL_OUTOF10: 0

## 2025-01-09 ASSESSMENT — PAIN - FUNCTIONAL ASSESSMENT: PAIN_FUNCTIONAL_ASSESSMENT: ACTIVITIES ARE NOT PREVENTED

## 2025-01-09 ASSESSMENT — PAIN DESCRIPTION - ORIENTATION: ORIENTATION: OTHER (COMMENT)

## 2025-01-09 ASSESSMENT — PAIN DESCRIPTION - FREQUENCY: FREQUENCY: CONTINUOUS

## 2025-01-09 ASSESSMENT — PAIN DESCRIPTION - PAIN TYPE: TYPE: ACUTE PAIN

## 2025-01-09 ASSESSMENT — PAIN DESCRIPTION - LOCATION: LOCATION: ABDOMEN

## 2025-01-09 ASSESSMENT — PAIN DESCRIPTION - DESCRIPTORS: DESCRIPTORS: DISCOMFORT;TIGHTNESS

## 2025-01-09 ASSESSMENT — PAIN DESCRIPTION - ONSET: ONSET: ON-GOING

## 2025-01-09 NOTE — ED PROVIDER NOTES
inflammation, but there are no other acute findings in the abdomen or pelvis. 4. Moderate hiatal hernia. 5. 3 mm right intrarenal calculus.. 6. Mild prostatomegaly.     XR CHEST (2 VW)    Result Date: 1/8/2025  EXAMINATION: TWO XRAY VIEWS OF THE CHEST 1/8/2025 6:04 pm COMPARISON: 05/18/2021. HISTORY: ORDERING SYSTEM PROVIDED HISTORY: chest pain TECHNOLOGIST PROVIDED HISTORY: chest pain FINDINGS: The lungs and costophrenic angles are clear.  The cardiomediastinal silhouette, pulmonary vessels and interstitium appear normal.     No radiographic evidence of an acute cardiopulmonary process.       RECENT VITALS:     Temp: 97.9 °F (36.6 °C),  Pulse: (!) 106, Respirations: 22, BP: 129/81, SpO2: 95 %      This patient is a 64 y.o. Male with abdominal distention and chest pain.  Does have a ascites secondary to alcoholic liver cirrhosis.  Patient is currently on CIWA protocol, is a daily drinker.  Some concern for SBP, paracentesis was performed, he did receive 1 g IV Rocephin, will give second gram now.    I did discuss with the patient reevaluated him.  I did discuss with him that his alcohol use is leading to worsening liver function and this is likely going to result in his death.  He states that no matter what he will not stop drinking.  I benefit from palliative/psych evaluation in-patient.      ED Course as of 01/08/25 2042 Wed Jan 08, 2025   1833 AST(!): 331 [BG]   1833 ALT(!): 109 [BG]   1833 Total Bilirubin(!): 5.3 [BG]   1833 INR: 1.3 [BG]   1833 WBC(!): 13.9 [BG]   1834 Lactic Acid, Sepsis, Whole Blood(!): 7.0 [BG]   1834 MELD 17 points, 6% [BG]   1852 Patient's lactate is 7, however he is not hypotensive, remains afebrile, mildly tachycardic.  Will proceed with 1 L fluid bolus, I believe further fluid administration may be detrimental to patient as he has a significant amount of ascites and history of CHF. [BG]   1902 XR CHEST (2 VW)  IMPRESSION:  No radiographic evidence of an acute cardiopulmonary  process.   [BG]   1918 Care assumed, patient presents with abdominal distention and chest pain.  Does have a ascites secondary to alcoholic liver cirrhosis.  Patient is currently on CIWA protocol, is a daily drinker.  Some concern for SBP, paracentesis was performed, he did receive 1 g IV Rocephin, will give second gram now.  [KJ]   1923 Dispo pending CT abdomen pelvis, likely admission [KJ]   2007 CT ABDOMEN PELVIS W IV CONTRAST Additional Contrast? None  IMPRESSION:  1. New nodular contour of the liver and hypertrophy of the left hepatic lobe  suggesting hepatic cirrhosis.  There is also a large amount of ascites and  diffuse fat stranding in the abdomen and pelvis suggesting portal  hypertension.  2. Diffuse heterogeneity of the liver with multifocal areas of decreased  attenuation which could represent heterogeneous hepatic steatosis the.  The  largest of these areas measures up to 3.6 cm between segments 4 and 5.  Underlying masses cannot be excluded.  Given the presence of cirrhosis,  further evaluation with MRI with without contrast is recommended.  3. Presence of ascites and diffuse fat stranding limits evaluation of  inflammation, but there are no other acute findings in the abdomen or pelvis.  4. Moderate hiatal hernia.  5. 3 mm right intrarenal calculus..  6. Mild prostatomegaly.   [KJ]   2025 Lactic Acid, Sepsis, Whole Blood(!): 2.9 [KJ]   2030 Discussed with Intermed attending, will admit. [KJ]      ED Course User Index  [BG] Kemar Brewster MD  [KJ] Mitchell Culp MD       OUTSTANDING TASKS / RECOMMENDATIONS:    CT abdomen pelvis  This is a global admission for acute worsening of liver function     FINAL IMPRESSION:     1. Ascites due to alcoholic hepatitis    2. Alcohol withdrawal syndrome without complication (HCC)        DISPOSITION:         DISPOSITION:  []  Discharge   []  Transfer -    [x]  Admission -     []  Against Medical Advice   []  Eloped   FOLLOW-UP: No follow-up provider specified.

## 2025-01-09 NOTE — CARE COORDINATION
Consult : [facility]  Met with pt this a.m.. admits to drinking everyday, beers and tequila.  Pt states he has no plans to stop drinking only when he is sick.  No previous rehab. Pt declines rehab and treatment resources at this time.

## 2025-01-09 NOTE — PROGRESS NOTES
Physical Therapy        Physical Therapy Cancel Note      DATE: 2025    NAME: Dariel Griffiths  MRN: [id number]   : 1960      Patient not seen this date for Physical Therapy due to:    Unavailable; at MRI      Electronically signed by Clarice Rogers PT on 2025 at 2:08 PM

## 2025-01-09 NOTE — PLAN OF CARE
Problem: Chronic Conditions and Co-morbidities  Goal: Patient's chronic conditions and co-morbidity symptoms are monitored and maintained or improved  Outcome: Progressing  Flowsheets (Taken 1/9/2025 1842)  Care Plan - Patient's Chronic Conditions and Co-Morbidity Symptoms are Monitored and Maintained or Improved:   Monitor and assess patient's chronic conditions and comorbid symptoms for stability, deterioration, or improvement   Collaborate with multidisciplinary team to address chronic and comorbid conditions and prevent exacerbation or deterioration   Update acute care plan with appropriate goals if chronic or comorbid symptoms are exacerbated and prevent overall improvement and discharge     Problem: Discharge Planning  Goal: Discharge to home or other facility with appropriate resources  Outcome: Progressing  Flowsheets (Taken 1/9/2025 1842)  Discharge to home or other facility with appropriate resources:   Identify barriers to discharge with patient and caregiver   Identify discharge learning needs (meds, wound care, etc)     Problem: Pain  Goal: Verbalizes/displays adequate comfort level or baseline comfort level  Outcome: Progressing  Flowsheets (Taken 1/9/2025 1842)  Verbalizes/displays adequate comfort level or baseline comfort level:   Encourage patient to monitor pain and request assistance   Administer analgesics based on type and severity of pain and evaluate response   Assess pain using appropriate pain scale   Consider cultural and social influences on pain and pain management     Problem: Safety - Adult  Goal: Free from fall injury  Outcome: Progressing  Flowsheets (Taken 1/9/2025 1842)  Free From Fall Injury: Instruct family/caregiver on patient safety     Problem: Nutrition Deficit:  Goal: Optimize nutritional status  Outcome: Progressing  Flowsheets (Taken 1/9/2025 1842)  Nutrient intake appropriate for improving, restoring, or maintaining nutritional needs:   Assess nutritional status and

## 2025-01-09 NOTE — PROGRESS NOTES
Comprehensive Nutrition Assessment    Type and Reason for Visit:  Initial, Positive nutrition screen    Nutrition Recommendations/Plan:   Continue NPO as medically necessary  Recommend replacing phosphorus as able  When able to advance diet, recommend regular with frozen ONS BID with lunch and dinner in berry   Monitor intake, wt, meds, labs, diet advancement      Malnutrition Assessment:  Malnutrition Status:  Moderate malnutrition (01/09/25 1414)    Context:  Social/Environmental Circumstances     Findings of the 6 clinical characteristics of malnutrition:  Energy Intake:  Mild decrease in energy intake  Weight Loss:  No weight loss     Body Fat Loss:  Mild body fat loss (to moderate) Orbital, Triceps, Fat Overlying Ribs, Buccal region   Muscle Mass Loss:  Mild muscle mass loss (to moderate) Temples (temporalis), Clavicles (pectoralis & deltoids), Hand (interosseous)  Fluid Accumulation:  No fluid accumulation     Strength:  Not Performed    Nutrition Assessment:    Positive nutrition screen for reported significant wt loss of 24-33 lbs and poor appetite PTA. Spoke with pt who reported wt loss of 30-40lbs in the last 9 months and reported a UBW of 150-190 lbs. Usual intake at home is 1 meal because he states he is unable to  the kitchen to make his own, and \"the ladies at home cook between 4 and 9 pm\". Today he reports being hungry and asking the writer when he can eat. Would like to try the mixed berry frozen ONS (Magic cup) when diet advances. Per wt hx, pt has had wt gain of 17 lbs since 3/29/2023. Adm for abdominal distention with associated abdominal pain, intermittent nausea, decreased oral intake. PMH of alcohol abuse, alcoholic hepatitis, CHF, COPD, HTN, and HLD. No reports of vomiting. Daily drinker of several beers as well as 10 shots of tequila daily. Pt currently NPO. Phos 2.2 mg/dL - no updated labs today thus far. Meds: Protonix, thiamine, folic acid. No edema, skin is jaundiced. LBM

## 2025-01-09 NOTE — CONSULTS
CALCIUM 8.9       LFTS  Recent Labs     01/08/25  1714 01/09/25  0247   ALKPHOS 251* 164*   BILITOT 5.3* 2.8*   BILIDIR 2.8* 2.0*   * 209*   * 71*   ALBUMIN 3.7 2.8*       AMYLASE/LIPASE/AMMONIA  Recent Labs     01/08/25  1714   LIPASE 60       PT/INR  Recent Labs     01/08/25  1714   PROTIME 15.9*   INR 1.3       ANEMIA STUDIES  Recent Labs     01/08/25  2347 01/09/25  0000   IRON 60*  --    IRONPERSAT 59*  --    TIBC 102*  --    UIBC 42*  --    FERRITIN 1,135  --    [id number]  --  1114   FOLATE  --  3.4*         LIVER WORK UP:    Acute Hepatitis Panel   Lab Results   Component Value Date/Time    HEPBSAG NONREACTIVE 01/09/2025 12:00 AM    HEPCAB NONREACTIVE 01/09/2025 12:00 AM    HEPBIGM NONREACTIVE 01/09/2025 12:00 AM    HEPAIGM NONREACTIVE 01/09/2025 12:00 AM       HCV Genotype   No results found for: \"HCVGENOTYP\"    HCV Quantitative   No results found for: \"HEPATITISCRNAPCRQUANT\"      AFP  Lab Results   Component Value Date/Time    AFP 2.6 01/09/2025 12:00 AM       Alpha 1 antitrypsin   Lab Results   Component Value Date/Time    A1A 182 01/08/2025 11:47 PM       Anti - Liver/Kidney Ab  No results found for: \"LIVER-KIDNEYMICROSOMALAB\"    TEMO  No results found for: \"TEMO\"    AMA  No results found for: \"MITOAB\"    ASMA  No results found for: \"SMOOTHMUSCAB\"    Ceruloplasmin  No results found for: \"CERULOPLSM\"    Celiac panel  Lab Results   Component Value Date/Time    TISSTRNTIIGG PENDING 01/08/2025 11:47 PM    TTGIGA PENDING 01/08/2025 11:47 PM     01/08/2025 11:47 PM       IgG  No results found for: \"IGG\"    IgM  No results found for: \"IGM\"    GGT   No results found for: \"GGT\"    PT/INR  Recent Labs     01/08/25  1714   PROTIME 15.9*   INR 1.3       Cancer Markers:  CEA:  No results found for: \"CEA\"  Ca 125:  No results found for: \"\"  Ca 19-9:   No results found for: \"\"  AFP:   Lab Results   Component Value Date/Time    AFP 2.6 01/09/2025 12:00 AM       Lactic acid:  Recent Labs     addendum. Mechanical DVT prophylaxis.     More than 50% of the time was spent taking care of this patient in addition to the nurse practitioner time.  That also included history taking follow-up physical examination and review of system.    Electronically signed by Braulio Navas MD

## 2025-01-09 NOTE — CARE COORDINATION
Case Management Assessment  Initial Evaluation    Date/Time of Evaluation: 1/9/2025 5:07 PM  Assessment Completed by: AMRITA DELCID    If patient is discharged prior to next notation, then this note serves as note for discharge by case management.    Patient Name: Dariel Griffiths                   YOB: 1960  Diagnosis: Chronic systolic congestive heart failure, NYHA class 3 (HCC) [I50.22]  Alcohol withdrawal syndrome without complication (HCC) [F10.930]  Ascites due to alcoholic hepatitis [K70.11]  Decompensated cirrhosis (HCC) [K72.90, K74.60]                   Date / Time: 1/8/2025  4:38 PM    Patient Admission Status: Inpatient   Readmission Risk (Low < 19, Mod (19-27), High > 27): Readmission Risk Score: 12.8    Current PCP: Sheri Ackerman APRN - CNP  PCP verified by CM? Yes    Chart Reviewed: Yes      History Provided by: Patient  Patient Orientation: Alert and Oriented    Patient Cognition: Alert    Hospitalization in the last 30 days (Readmission):  No    If yes, Readmission Assessment in CM Navigator will be completed.    Advance Directives:      Code Status: Full Code   Patient's Primary Decision Maker is: Named in Scanned ACP Document      Discharge Planning:    Patient lives with: (P) Other (Comment) (states he lives with a lady who takes care of him and another person) Type of Home: (P) House  Primary Care Giver: Self  Patient Support Systems include: Friends/Neighbors   Current Financial resources: (P) Medicare  Current community resources: (P) None  Current services prior to admission: (P) Durable Medical Equipment            Current DME: (P) Cane            Type of Home Care services:  (P) None    ADLS  Prior functional level: (P) Assistance with the following:, Housework, Cooking, Shopping  Current functional level: (P) Assistance with the following:, Mobility, Toileting    PT AM-PAC:   /24  OT AM-PAC:   /24    Family can provide assistance at DC: (P) No  Would you like Case  Patient Representative Name:       The Patient and/or Patient Representative Agree with the Discharge Plan? (P) Yes    AMRITA DELCID  Case Management Department  Ph: [phone] Fax:

## 2025-01-09 NOTE — H&P
[facility]  Office: [phone]  Chris Waldron DO, Danny Valencia DO, Rich Kamara DO, Fran Espinoza DO, Bonnie Alamo MD, Shana Duffy MD, Norris Hope MD, Ramonita Jaimes MD,  Kemar Rosenberg MD, Hayley Blunt MD, Danna Thao MD,  Alex Villar DO, Shazia Yost MD, Abelardo Shearer MD, Darrius Waldron DO, Jen Ellington MD,  Miguel Tripp DO, Elizabeth Robison MD, Elida Ma MD, Keira Martin MD, Cristal Moreno MD,  Luis Stevens MD, Licha Kessler MD, Bety Cuellar MD, Crissy Cruz MD, Bayron Rizo MD, Nirmal Greco MD, Alvarado Green DO, Dane Covarrubias MD, Alex Valle MD, Mohsin Reza, MD, Shirley Waterhouse, CNP,  Faith Forrest CNP, Alvarado Cain, GINI,  Lin Hargrove, BRY, Candace Sotelo, GINI, Opal Mir, GINI, Avani Roberson CNP, Dania Sung CNP, Kindra Steele PA-C, Malgorzata Shipley PA-C, Arina Rodriguez, GINI, Ramon Crowder, GINI,  Sabrina Cm, GINI, Isa Cowan CNP, Winifred Saba CNP,  Vicky Jorgensen CNP, Danisha Vaz, GINI         [facility]   IN-PATIENT SERVICE   [facility]    HISTORY AND PHYSICAL EXAMINATION            Date:   1/8/2025  Patient name:  Dariel Griffiths  Date of admission:  1/8/2025  4:38 PM  MRN:   [id number]  Account:  [id number]  YOB: 1960  PCP:    Sheri Ackerman APRN - CNP  Room:   10/10  Code Status:    Prior    Chief Complaint:     Chief Complaint   Patient presents with    Bloated    Chest Pain   \" I just need to someone to fix my stomach. . . If you could give me a popsicle it would be better. \"    History Obtained From:     Patient, caretaker    History of Present Illness:     Dariel Griffiths is a 64 y.o.  male w/ NICMP w/ history of HFrEF (w/ improved EF - LHC 2013 EF 45%, nonobst CAD/ echo 2021 EF 55-60%), ETOH hepatitis w/ ETOH dependence (EGD/colon 06/2023 w/ patchy gastritis, colonic AVMs, hemorrhoids s/p banding), prior seizures, COPD, bipolar (w/ history of multiple  Indirect 0.00 - 1.00 mg/dL  0.25 0.18    Lipase 13 - 60 U/L    25   Total Protein 6.4 - 8.3 g/dL  7.6 5.7 (L) 7.7   Triglycerides <150 mg/dL 163 (H)  65    (L): Data is abnormally low  (H): Data is abnormally high    Imaging/Diagnostics:  CT ABDOMEN PELVIS W IV CONTRAST Additional Contrast? None    Result Date: 1/8/2025  1. New nodular contour of the liver and hypertrophy of the left hepatic lobe suggesting hepatic cirrhosis.  There is also a large amount of ascites and diffuse fat stranding in the abdomen and pelvis suggesting portal hypertension. 2. Diffuse heterogeneity of the liver with multifocal areas of decreased attenuation which could represent heterogeneous hepatic steatosis the.  The largest of these areas measures up to 3.6 cm between segments 4 and 5. Underlying masses cannot be excluded.  Given the presence of cirrhosis, further evaluation with MRI with without contrast is recommended. 3. Presence of ascites and diffuse fat stranding limits evaluation of inflammation, but there are no other acute findings in the abdomen or pelvis. 4. Moderate hiatal hernia. 5. 3 mm right intrarenal calculus.. 6. Mild prostatomegaly.     EKG reviewed independently by myself sinus mechanism with nonspecific ST-T changes corrected     XR CHEST (2 VW)    Result Date: 1/8/2025  No radiographic evidence of an acute cardiopulmonary process.     EGD/colonoscopy 06/2023  Post-Op Diagnosis: Gastritis.  Otherwise normal EGD.   Post-Op Diagnosis: Nonbleeding AVMs in the cecum and ascending colon, internal hemorrhoids s/p banding, colon polyps.    Findings:   Patchy hyperemia of the stomach.  Biopsies were taken from the antrum and gastric body with H. pylori.      Findings: 1-2 nonbleeding AVMs in the cecum and ascending colon.  2-internal hemorrhoids s/p application of 5 hemorrhoidal bands  3-5 mm sessile polyp in the sigmoid colon that was removed by biopsy  4-5 mm sessile polyp in the transverse colon that was removed by

## 2025-01-09 NOTE — CONSULTS
COLONOSCOPY  05/19/2014    COLONOSCOPY N/A 12/31/2020    COLONOSCOPY POLYPECTOMY SNARE/COLD BIOPSY WITH CLIP APPLICATION AND CONTROL OF BLEEDING performed by Raoul Daly MD at [facility] OR    COLONOSCOPY N/A 6/21/2023    COLONOSCOPY POLYPECTOMY SNARE/COLD BIOPSY WITH BANDING performed by Raoul Daly MD at [facility] OR    ENDOSCOPY, COLON, DIAGNOSTIC      FRACTURE SURGERY      right arm, left knee, l ankle pins placed    HERNIA REPAIR      KNEE SURGERY      NERVE BLOCK Left 10/23/2017    medial branch block, cervical    TIBIA FRACTURE SURGERY Left 07/05/2013    IM nailing    TOE SURGERY Right     UMBILICAL HERNIA REPAIR  04/08/2014    with mesh    UPPER GASTROINTESTINAL ENDOSCOPY N/A 12/31/2020    EGD BIOPSY performed by Raoul Daly MD at [facility] OR    UPPER GASTROINTESTINAL ENDOSCOPY N/A 03/25/2021    EGD BIOPSY performed by Raoul Daly MD at [facility] OR    UPPER GASTROINTESTINAL ENDOSCOPY N/A 6/21/2023    EGD BIOPSY performed by Raoul Daly MD at [facility] OR       SOCIAL HISTORY  Social History     Tobacco Use    Smoking status: Every Day     Current packs/day: 0.20     Average packs/day: 0.2 packs/day for 1 year (0.2 ttl pk-yrs)     Types: Cigarettes, Cigars    Smokeless tobacco: Never   Vaping Use    Vaping status: Never Used   Substance Use Topics    Alcohol use: Yes     Alcohol/week: 9.0 standard drinks of alcohol     Types: 5 Cans of beer, 4 Shots of liquor per week     Comment: 5 beers and 10 shots daily per patient    Drug use: Not Currently       FAMILY HISTORY  Family History   Problem Relation Age of Onset    Stroke Mother     Diabetes Mother     Heart Disease Father     High Blood Pressure Father     Diabetes Maternal Grandmother        ALLERGIES  Allergies   Allergen Reactions    Pcn [Penicillins] Other (See Comments)     Pt unsure of reaction this is from childhood       MEDICATIONS  Current Medications    carvedilol  3.125 mg Oral BID WC    sodium chloride flush  5-40 mL IntraVENous 2 times per day    folic acid 1       zonisamide (ZONEGRAN) 100 MG capsule Take 1 capsule by mouth daily      albuterol sulfate HFA (PROAIR HFA) 108 (90 Base) MCG/ACT inhaler Inhale 2 puffs into the lungs every 6 hours as needed for Wheezing 1 Inhaler 0    mometasone-formoterol (DULERA) 200-5 MCG/ACT inhaler Inhale 2 puffs into the lungs every 12 hours 1 Inhaler 0       Data         /82   Pulse 99   Temp 97.6 °F (36.4 °C) (Oral)   Resp 19   Ht 1.905 m (6' 3\")   Wt 76.1 kg (167 lb 12.3 oz)   SpO2 97%   BMI 20.97 kg/m²     Wt Readings from Last 3 Encounters:   01/09/25 76.1 kg (167 lb 12.3 oz)   06/21/23 68.5 kg (151 lb)   03/29/23 68.3 kg (150 lb 9.6 oz)        Code Status: Full Code     ADVANCE CARE PLANNING:  Patient has capacity for medical decisions: yes  Health Care Power of : no  Living Will: no     Personal, Social, and Family History  Marital Status: single  Living situation: with family:  Friend   Importance of deonte/Spiritism/spiritual beliefs: [] Very [] Somewhat [] Not   Psychological Distress: severe  Does patient understand diagnosis/treatment?  Somewhat   Does caregiver understand diagnosis/treatment? not asked    Assessment        REVIEW OF SYSTEMS  CONSTITUTIONAL:  negative for fevers, chills, sweats, fatigue, weight loss  HEENT:  negative for vision, hearing changes, runny nose, throat pain  RESPIRATORY:  negative for shortness of breath, cough, congestion, wheezing  CARDIOVASCULAR:  negative for chest pain, palpitations  GASTROINTESTINAL:  distention, abdominal pain   GENITOURINARY:  negative for difficulty of urination, burning with urination, frequency   INTEGUMENT:  negative for rash, skin lesions, easy bruising   HEMATOLOGIC/LYMPHATIC:  negative for swelling/edema   ALLERGIC/IMMUNOLOGIC:  negative for urticaria , itching  ENDOCRINE:  negative increase in drinking, increase in urination, hot or cold intolerance  MUSCULOSKELETAL:  negative joint pains, muscle aches, swelling of joints  NEUROLOGICAL:  negative

## 2025-01-09 NOTE — ED NOTES
ED to inpatient nurses report      Chief Complaint:  Chief Complaint   Patient presents with   • Bloated   • Chest Pain     Present to ED from: Triage    MOA:     LOC: alert and orientated to name, place, date  Mobility: Requires assistance * 1  Oxygen Baseline: 95    Current needs required: none   Pending ED orders: none  Present condition: fair    Why did the patient come to the ED? Pt presents to ED with c/o abdominal distention, chest pain, short of breath and suicidal thoughts.  Pt reports he's had suicidal thoughts for 10+yrs without a plan.  Pt reports he's noticed increased abdominal bloating for 3 weeks and became concerned. Pt reports the chest pain started yesterday and is non radiating.  Pt reports drinking 10 shots of tequila daily and a 24oz beer for the last 20 years. Pt reports his last drink was 2 day ago.  Pt has visible tremors.  What is the plan? Admission, cirrhosis treatment, antibiotics  Any procedures or intervention occur? Paracentesis  Any safety concerns?? Reported suicidal ideation on arrival, was cleared of suicidal ideation, no longer suicidal.     Mental Status:  Level of Consciousness: Alert (0)    Psych Assessment:   Psychosocial  Psychosocial (WDL): Exceptions to WDL  Patient Behaviors: Anxious  Vital signs   Vitals:    01/08/25 1930 01/08/25 1946 01/08/25 2000 01/08/25 2020   BP: (!) 155/92 (!) 155/92 129/81    Pulse: (!) 110 (!) 110 (!) 112 (!) 106   Resp: 18  16 22   Temp:       TempSrc:       SpO2: 98%  95% 95%   Weight:       Height:            Vitals:  Patient Vitals for the past 24 hrs:   BP Temp Temp src Pulse Resp SpO2 Height Weight   01/08/25 2020 -- -- -- (!) 106 22 95 % -- --   01/08/25 2000 129/81 -- -- (!) 112 16 95 % -- --   01/08/25 1946 (!) 155/92 -- -- (!) 110 -- -- -- --   01/08/25 1930 (!) 155/92 -- -- (!) 110 18 98 % -- --   01/08/25 1900 (!) 148/96 -- -- (!) 113 25 93 % -- --   01/08/25 1817 (!) 143/73 -- -- (!) 111 23 97 % -- --   01/08/25 3068 -- -- -- (!)  (*)      (*)     Total Bilirubin 5.3 (*)     Bilirubin, Direct 2.8 (*)     Bilirubin, Indirect 2.5 (*)     Albumin/Globulin Ratio 0.9 (*)     All other components within normal limits   BASIC METABOLIC PANEL - Abnormal; Notable for the following components:    Chloride 96 (*)     Anion Gap 20 (*)     Glucose 163 (*)     All other components within normal limits   LACTATE, SEPSIS - Abnormal; Notable for the following components:    Lactic Acid, Sepsis, Whole Blood 7.0 (*)     All other components within normal limits   LACTATE, SEPSIS - Abnormal; Notable for the following components:    Lactic Acid, Sepsis, Whole Blood 2.9 (*)     All other components within normal limits   PROTIME-INR - Abnormal; Notable for the following components:    Protime 15.9 (*)     All other components within normal limits   CULTURE, BLOOD 1   CULTURE, BLOOD 1   CULTURE, BODY FLUID (WITH GRAM STAIN)   LIPASE   TROPONIN   TROPONIN   AMYLASE, BODY FLUID   ALBUMIN, FLUID   LACTATE DEHYDROGENASE, BODY FLUID   LACTATE DEHYDROGENASE   CELL COUNT WITH DIFFERENTIAL, BODY FLUID   CYTOLOGY, NON-GYN   HEPATITIS PANEL, ACUTE       Electronically signed by Joe Henriquez RN on 1/8/2025 at 8:29 PM

## 2025-01-09 NOTE — PROGRESS NOTES
[facility]  Office: [phone]  Chris Waldron DO, Danny Valencia DO, Rich Kamara DO, Fran Espinoza DO, Bonnie Alamo MD, Shana Duffy MD, Norris Hope MD, Ramonita Jaimes MD,  Kemar Rosenberg MD, Hayley Blunt MD, Danna Thao MD,  Alex Villar DO, Shazia Yost MD, Abelardo Shearer MD, Darrius Waldron DO, Jen Ellington MD,  Miguel Tripp DO, Elizabeth Robison MD, Elida Ma MD, Keira Martin MD, Cristal Moreno MD,  Luis Stevens MD, Licha Kessler MD, Bety Cuellar MD, Crissy Cruz MD, Bayron Rizo MD, Nirmal Greco MD, Alvarado Green DO, Dane Covarrubias MD, Alex Valle MD, Mohsin Reza, MD, Shirley Waterhouse, CNP,  Faith Forrest CNP, Alvarado Cain, GINI,  Lin Hargrove, BRY, Candace Sotelo, CNP, Opal Mir, CNP, Avani Roberson CNP, Dania Sung CNP, Kindra Steele, PA-C, Malgorzata Shipley PA-C, Arina Rodriguez, CNP, Ramon Crowder, CNP,  Sabrina Cm, CNP, Isa Cowan, CNP, Winifred Saba, CNP,  Vicky Jorgensen CNP, Danisha Vaz, CNP         [facility]   IN-PATIENT SERVICE   [facility]    Progress Note    1/9/2025    1:32 PM    Name:   Dariel Griffiths  MRN:     [id number]     Acct:      [id number]   Room:   0104/0104-01   Day:  1  Admit Date:  1/8/2025  4:38 PM    PCP:   Sheri Ackerman APRN - CNP  Code Status:  Full Code    Subjective:     C/C:   Chief Complaint   Patient presents with    Bloated    Chest Pain     Interval History Status: improved.     He is comfortable today   Denies pain or bloating  Abd is soft , distended but non tender     Brief History:     Dariel Griffiths is a 64 y.o.  male w/ NICMP w/ history of HFrEF (w/ improved EF - LHC 2013 EF 45%, nonobst CAD/ echo 2021 EF 55-60%), ETOH hepatitis w/ ETOH dependence (EGD/colon 06/2023 w/ patchy gastritis, colonic AVMs, hemorrhoids s/p banding), prior seizures, COPD, bipolar (w/ history of multiple suicidal attempts) who presents with Bloated and Chest Pain

## 2025-01-09 NOTE — PROGRESS NOTES
[facility]  Speech Language Pathology    Date: 1/9/2025  Patient Name: Dariel Griffiths  YOB: 1960   AGE: 64 y.o.  MRN: [id number]        Patient Not Available for Speech Therapy     Due to:  [] Testing  [] Hemodialysis  [] Cancelled by RN  [] Surgery   [] Intubation/Sedation/Pain Medication  [] Medical instability  [x] Other:  Spoke with RN.  Pt took pills without overt s/s of aspiration this morning. Pt with no c/o swallowing difficulty.  Pt is currently NPO for testing.  RN to complete nursing swallow screen and contact should ST BSSE continue to ne needed.    Next scheduled treatment: will await call from RN  Completed by: Elsa Cohn, SLP, M.S. CCC-SLP

## 2025-01-10 ENCOUNTER — APPOINTMENT (OUTPATIENT)
Dept: ULTRASOUND IMAGING | Age: 65
DRG: 432 | End: 2025-01-10
Payer: MEDICARE

## 2025-01-10 VITALS
DIASTOLIC BLOOD PRESSURE: 69 MMHG | SYSTOLIC BLOOD PRESSURE: 142 MMHG | RESPIRATION RATE: 18 BRPM | HEART RATE: 98 BPM | BODY MASS INDEX: 19.54 KG/M2 | OXYGEN SATURATION: 94 % | TEMPERATURE: 97.7 F | WEIGHT: 157.19 LBS | HEIGHT: 75 IN

## 2025-01-10 LAB
ANA SER QL IA: NEGATIVE
APPEARANCE FLD: CLEAR
BODY FLD TYPE: NORMAL
CASE NUMBER:: NORMAL
CLOT CHECK: NORMAL
COLOR FLD: YELLOW
DSDNA IGG SER QL IA: 1.3 IU/ML
EKG ATRIAL RATE: 123 BPM
EKG P AXIS: 79 DEGREES
EKG P-R INTERVAL: 174 MS
EKG Q-T INTERVAL: 308 MS
EKG QRS DURATION: 76 MS
EKG QTC CALCULATION (BAZETT): 440 MS
EKG R AXIS: 55 DEGREES
EKG T AXIS: 78 DEGREES
EKG VENTRICULAR RATE: 123 BPM
GLIADIN IGA SER IA-ACNC: 2.8 U/ML
GLIADIN IGG SER IA-ACNC: 1 U/ML
IGA SERPL-MCNC: 461 MG/DL (ref 70–400)
LYMPHOCYTES NFR FLD: 18 %
MANUAL DIF COMMENT FLD-IMP: NORMAL
MESOTHELIAL CELLS BODY FLUID: 24 %
MITOCHONDRIA M2 IGG SER-ACNC: 1.2 U/ML (ref 0–4)
MONOCYTES NFR FLD: 20 %
NEUTROPHILS NFR FLD: 8 %
NUC CELL # FLD: 160 CELLS/UL
NUCLEAR IGG SER IA-RTO: 0.4 U/ML
PROT FLD-MCNC: 1.3 G/DL
RBC # FLD: <3000 CELLS/UL
SPECIMEN DESCRIPTION: NORMAL
SPECIMEN TYPE: NORMAL
SURGICAL PATHOLOGY REPORT: NORMAL
TISSUE TRANSGLUTAMINASE ANTIBODY IGG: 1.1 U/ML
TTG IGA SER IA-ACNC: 1 U/ML

## 2025-01-10 PROCEDURE — 94640 AIRWAY INHALATION TREATMENT: CPT

## 2025-01-10 PROCEDURE — 87070 CULTURE OTHR SPECIMN AEROBIC: CPT

## 2025-01-10 PROCEDURE — 93010 ELECTROCARDIOGRAM REPORT: CPT | Performed by: INTERNAL MEDICINE

## 2025-01-10 PROCEDURE — 88305 TISSUE EXAM BY PATHOLOGIST: CPT

## 2025-01-10 PROCEDURE — 6370000000 HC RX 637 (ALT 250 FOR IP): Performed by: INTERNAL MEDICINE

## 2025-01-10 PROCEDURE — 87205 SMEAR GRAM STAIN: CPT

## 2025-01-10 PROCEDURE — 89051 BODY FLUID CELL COUNT: CPT

## 2025-01-10 PROCEDURE — 82042 OTHER SOURCE ALBUMIN QUAN EA: CPT

## 2025-01-10 PROCEDURE — 2709999900 US GUIDED PARACENTESIS

## 2025-01-10 PROCEDURE — 6360000002 HC RX W HCPCS: Performed by: INTERNAL MEDICINE

## 2025-01-10 PROCEDURE — 99239 HOSP IP/OBS DSCHRG MGMT >30: CPT | Performed by: INTERNAL MEDICINE

## 2025-01-10 PROCEDURE — 0W9G3ZZ DRAINAGE OF PERITONEAL CAVITY, PERCUTANEOUS APPROACH: ICD-10-PCS | Performed by: RADIOLOGY

## 2025-01-10 PROCEDURE — 2580000003 HC RX 258: Performed by: INTERNAL MEDICINE

## 2025-01-10 PROCEDURE — 84157 ASSAY OF PROTEIN OTHER: CPT

## 2025-01-10 PROCEDURE — 87075 CULTR BACTERIA EXCEPT BLOOD: CPT

## 2025-01-10 PROCEDURE — 94761 N-INVAS EAR/PLS OXIMETRY MLT: CPT

## 2025-01-10 PROCEDURE — 88112 CYTOPATH CELL ENHANCE TECH: CPT

## 2025-01-10 PROCEDURE — 97116 GAIT TRAINING THERAPY: CPT

## 2025-01-10 PROCEDURE — 2500000003 HC RX 250 WO HCPCS: Performed by: INTERNAL MEDICINE

## 2025-01-10 PROCEDURE — 97161 PT EVAL LOW COMPLEX 20 MIN: CPT

## 2025-01-10 PROCEDURE — 99233 SBSQ HOSP IP/OBS HIGH 50: CPT | Performed by: STUDENT IN AN ORGANIZED HEALTH CARE EDUCATION/TRAINING PROGRAM

## 2025-01-10 RX ORDER — FUROSEMIDE 10 MG/ML
20 INJECTION INTRAMUSCULAR; INTRAVENOUS DAILY
Status: DISCONTINUED | OUTPATIENT
Start: 2025-01-10 | End: 2025-01-10 | Stop reason: HOSPADM

## 2025-01-10 RX ORDER — FOLIC ACID 1 MG/1
1 TABLET ORAL DAILY
Qty: 90 TABLET | Refills: 1 | Status: SHIPPED | OUTPATIENT
Start: 2025-01-10

## 2025-01-10 RX ORDER — ERGOCALCIFEROL 1.25 MG/1
50000 CAPSULE ORAL WEEKLY
Qty: 5 CAPSULE | Refills: 0 | Status: SHIPPED | OUTPATIENT
Start: 2025-01-16

## 2025-01-10 RX ORDER — BUDESONIDE AND FORMOTEROL FUMARATE DIHYDRATE 80; 4.5 UG/1; UG/1
2 AEROSOL RESPIRATORY (INHALATION)
Qty: 10.2 G | Refills: 3 | Status: SHIPPED | OUTPATIENT
Start: 2025-01-10

## 2025-01-10 RX ORDER — SPIRONOLACTONE 50 MG/1
50 TABLET, FILM COATED ORAL DAILY
Qty: 30 TABLET | Refills: 3 | Status: SHIPPED | OUTPATIENT
Start: 2025-01-11

## 2025-01-10 RX ORDER — LANOLIN ALCOHOL/MO/W.PET/CERES
100 CREAM (GRAM) TOPICAL DAILY
Qty: 30 TABLET | Refills: 3 | Status: SHIPPED | OUTPATIENT
Start: 2025-01-11

## 2025-01-10 RX ORDER — SPIRONOLACTONE 25 MG/1
50 TABLET ORAL DAILY
Status: DISCONTINUED | OUTPATIENT
Start: 2025-01-10 | End: 2025-01-10 | Stop reason: HOSPADM

## 2025-01-10 RX ORDER — MULTIVIT-MIN/FERROUS GLUCONATE 9 MG/15 ML
15 LIQUID (ML) ORAL DAILY
Qty: 480 ML | Refills: 0 | Status: SHIPPED | OUTPATIENT
Start: 2025-01-11

## 2025-01-10 RX ORDER — FUROSEMIDE 20 MG/1
20 TABLET ORAL DAILY
Qty: 60 TABLET | Refills: 3 | Status: SHIPPED | OUTPATIENT
Start: 2025-01-10

## 2025-01-10 RX ADMIN — FOLIC ACID 1 MG: 1 TABLET ORAL at 08:04

## 2025-01-10 RX ADMIN — Medication 15 ML: at 08:04

## 2025-01-10 RX ADMIN — FUROSEMIDE 20 MG: 10 INJECTION, SOLUTION INTRAMUSCULAR; INTRAVENOUS at 11:03

## 2025-01-10 RX ADMIN — Medication 100 MG: at 08:04

## 2025-01-10 RX ADMIN — BUDESONIDE AND FORMOTEROL FUMARATE DIHYDRATE 2 PUFF: 80; 4.5 AEROSOL RESPIRATORY (INHALATION) at 08:12

## 2025-01-10 RX ADMIN — SODIUM CHLORIDE, PRESERVATIVE FREE 40 MG: 5 INJECTION INTRAVENOUS at 11:04

## 2025-01-10 RX ADMIN — SPIRONOLACTONE 50 MG: 25 TABLET, FILM COATED ORAL at 11:03

## 2025-01-10 RX ADMIN — SODIUM CHLORIDE, PRESERVATIVE FREE 10 ML: 5 INJECTION INTRAVENOUS at 08:03

## 2025-01-10 RX ADMIN — CARVEDILOL 3.12 MG: 6.25 TABLET, FILM COATED ORAL at 08:04

## 2025-01-10 ASSESSMENT — PAIN SCALES - GENERAL
PAINLEVEL_OUTOF10: 0
PAINLEVEL_OUTOF10: 6

## 2025-01-10 ASSESSMENT — PAIN DESCRIPTION - LOCATION: LOCATION: ABDOMEN

## 2025-01-10 ASSESSMENT — PAIN DESCRIPTION - DESCRIPTORS: DESCRIPTORS: DISCOMFORT;TIGHTNESS

## 2025-01-10 ASSESSMENT — PAIN DESCRIPTION - ORIENTATION: ORIENTATION: ANTERIOR

## 2025-01-10 NOTE — PROGRESS NOTES
Patient to US for paracentesis.  JR DIA and ROGELIO RDMS at bedside.  Site prepped and draped, area numbed with lidocaine.  2.2L of clear yellow fluid drained.  Specimen collected.  Dry sterile drsg with tegaderm placed to site.  Patient tolerated well.

## 2025-01-10 NOTE — PROGRESS NOTES
2.8 mg/dL at 1/9/2025  2:47 AM  Serum Albumin: 2.8 g/dL at 1/9/2025  2:47 AM  INR(ratio): 1.3 at 1/8/2025  5:14 PM  Age at listing (hypothetical): 64 years  Sex: Male at 1/9/2025  2:47 AM      Recommendations:  1.  Continue Protonix 40 mg daily.  If hemoglobin remains stable, recommend weaning off as it can increase chance of SBP  2.  Patient will need eventual EGD for variceal screening which she is overdue for.  This can be completed by his established gastroenterologist TC at discharge  3.  Recommend low-sodium, high-protein diet  4.  Strongly recommend alcohol abstinence-discussed with patient however patient has no plans on stopping drinking.    5.  Recommend establishing with IR for outpatient regular paracentesis  6. Okay to be discharged from GI perspective  7.  GI will sign off      Time spent reviewing chart, seeing patient, documentation and coordination of care for the above conditions, and discussing with attending: Around 45 minutes    This plan was formulated in collaboration with Dr. Navas  Please feel free to contact me with any questions or concerns.   Thank you for allowing me to participate in the care of your patient.      Pat Upton, APRN - CNP on 1/10/2025 at 6:19 AM   [facility]    Please note that this note was generated using a voice recognition dictation software.  Although every effort was made to ensure the accuracy of this automated transcription, some errors in transcription may have occurred.    Attending Attestation:    I have discussed the care of Dariel Griffiths and I have examined the patient myselft and taken ros and hpi , including pertinent history and exam findings,  with the author of this note . I have reviewed the key elements of all parts of the encounter with the nurse practitioner/resident.  I agree with the assessment, plan and orders as documented by the above health care provider with the following addendum. Mechanical DVT prophylaxis.      More than 50% of the time was spent taking care of this patient in addition to the nurse practitioner time.  That also included history taking follow-up physical examination and review of system.    Electronically signed by Braulio Navas MD

## 2025-01-10 NOTE — PROGRESS NOTES
Physical Therapy  Facility/Department: [address] NEURO   Physical Therapy Initial Evaluation    Patient Name: Dariel Griffiths        MRN: [id number]    : 1960    Date of Service: 1/10/2025    Chief Complaint   Patient presents with    Bloated    Chest Pain     Past Medical History:  has a past medical history of Abnormal results of liver function studies , Arthritis, Asthma, Attempted suicide (HCC), Avascular necrosis of bone of right hip, Bipolar affective ([facility]), Blood in stool, Cavitary pneumonia, Chest pain, CHF (congestive heart failure), NYHA class III ([facility]), COPD (chronic obstructive pulmonary disease) ([facility]), COPD exacerbation ([facility]), CVA (cerebral vascular accident) ([facility]), Depression, Difficult intubation, Erectile dysfunction, Folliculitis, Fracture of wrist, GERD (gastroesophageal reflux disease), Head injury, Hepatitis, HLD (hyperlipidemia), Hypertension, Insomnia, Left wrist pain, Pain, Schizoaffective disorder, depressive type ([facility]), Seasonal allergies, Seizures ([facility]), Spondylosis of cervical region without myelopathy or radiculopathy, Stroke ([facility]), Tobacco dependence, Tremor, Umbilical hernia, Vomiting, and Wears glasses.  Past Surgical History:  has a past surgical history that includes fracture surgery; hernia repair; knee surgery; Toe Surgery (Right); Biceps tendon repair; Tibia fracture surgery (Left, 2013); Cardiac surgery; Umbilical hernia repair (2014); Colonoscopy (2014); Nerve Block (Left, 10/23/2017); Anesthesia Nerve Block (Left, 10/23/2017); Endoscopy, colon, diagnostic; Colonoscopy (N/A, 2020); Upper gastrointestinal endoscopy (N/A, 2020); Upper gastrointestinal endoscopy (N/A, 2021); Upper gastrointestinal endoscopy (N/A, 2023); and Colonoscopy (N/A, 2023).    Discharge Recommendations  Discharge Recommendations: Patient would benefit from continued therapy after discharge  PT Equipment Recommendations  Equipment Needed:  Stairs - Rails: Left  Home Equipment: Cane  Has the patient had two or more falls in the past year or any fall with injury in the past year?: Yes (Falls in a month? 25/30 days.  Says he often falls over the cat.)  Prior Level of Assist for ADLs: Independent  Prior Level of Assist for Homemaking: Independent  Prior Level of Assist for Ambulation: Independent household ambulator, with or without device  Prior Level of Assist for Transfers: Independent  Additional Comments: Says he gets tired on his steps, doesn't access shower very often. Takes a \"spit bath\".    Vision/Hearing  Vision  Vision: Within Functional Limits  Hearing  Hearing: Within functional limits    Objective  Orientation  Orientation Level: Oriented X4  Cognition  Overall Cognitive Status: Exceptions  Arousal/Alertness: Appears intact  Following Commands: Follows one step commands with repetition, Follows one step commands with increased time  Attention Span: Appears intact  Memory: Appears intact  Safety Judgement:  (Refused gait belt. Educated that therapist will not be able to steady him in case of a loss of balance without the gait belt.  Patient acknowledges this.)  Problem Solving: Assistance required to generate solutions, Assistance required to correct errors made, Assistance required to identify errors made  Insights: Impaired  Initiation: Appears intact  Sequencing: Appears intact    Observation/Palpation  Observation: Sensation in feet and legs is intact.               Strength RLE  Strength RLE: Exception  R Ankle Dorsiflexion: 4+/5  R Ankle Plantar flexion: 4+/5  Strength LLE  Strength LLE: Exception  L Ankle Dorsiflexion: 4+/5  L Ankle Plantar Flexion: 4+/5    Mobility   Bed mobility  Supine to Sit: Stand by assistance  Sit to Supine: Stand by assistance         Transfers  Sit to Stand: Stand by assistance  Stand to Sit: Stand by assistance    Ambulation  Surface: Level tile  Device: Single point cane  Assistance: Contact guard

## 2025-01-10 NOTE — BRIEF OP NOTE
Brief Postoperative Note for Paracentesis    Dariel Griffiths  YOB: 1960  [id number]    Pre-operative Diagnosis:  Ascites     Post-operative Diagnosis: Same    Procedure: Ultrasound guided paracentesis     Anesthesia: 1% Lidocaine     Surgeons/Assistants: Wade Spaulding PA-C    Complications: none    EBL: Minimal    Specimens: Were obtained    Ultrasound guided paracentesis performed. 2200 ml clear yellow fluid obtained.  Dressing applied.     Electronically signed by SOUTH [place] on 1/10/2025 at 9:12 AM

## 2025-01-10 NOTE — PROGRESS NOTES
Patient discharged at 1543 via wheelchair. Patient educated and given discharge instructions. All questions answered. IV removed. Patient left with all belongings.

## 2025-01-10 NOTE — CARE COORDINATION
Spoke w pt , role explained. Pt aware of d/c order, plans to return home w Friend Astrid who is able to pick him up and take home, pt denies any needs at this time.     Discharge Report    [facility]  Clinical Case Management Department  Written by: TIMA ELLIOTT    Patient Name: Dariel Griffiths  Attending Provider: Elida Ma MD  Admit Date: 2025  4:38 PM  MRN: [id number]  Account: [id number]                     : 1960  Discharge Date:       Disposition: home    TIMA ELLIOTT

## 2025-01-10 NOTE — DISCHARGE SUMMARY
Significant Diagnostic Studies:    Recent Labs:  Admission on 01/08/2025  No results displayed because visit has over 200 results.    ------------    Radiology last 7 days:  MRI ABDOMEN W WO CONTRAST MRCP    Result Date: 1/9/2025  1. Cirrhotic liver with no suspicious liver lesion identified. 2. Moderate to large volume ascites. 3. Large hiatal hernia.     CT HEAD WO CONTRAST    Result Date: 1/9/2025  No acute intracranial hemorrhage, mass effect, midline shift, or hydrocephalus. Mild chronic ischemic changes in the white matter and chronic appearing lacunar infarct at the left thalamus.  No convincing area of acute territorial infarct.     CT ABDOMEN PELVIS W IV CONTRAST Additional Contrast? None    Result Date: 1/8/2025  1. New nodular contour of the liver and hypertrophy of the left hepatic lobe suggesting hepatic cirrhosis.  There is also a large amount of ascites and diffuse fat stranding in the abdomen and pelvis suggesting portal hypertension. 2. Diffuse heterogeneity of the liver with multifocal areas of decreased attenuation which could represent heterogeneous hepatic steatosis the.  The largest of these areas measures up to 3.6 cm between segments 4 and 5. Underlying masses cannot be excluded.  Given the presence of cirrhosis, further evaluation with MRI with without contrast is recommended. 3. Presence of ascites and diffuse fat stranding limits evaluation of inflammation, but there are no other acute findings in the abdomen or pelvis. 4. Moderate hiatal hernia. 5. 3 mm right intrarenal calculus.. 6. Mild prostatomegaly.     XR CHEST (2 VW)    Result Date: 1/8/2025  No radiographic evidence of an acute cardiopulmonary process.        Pending Labs     Order Current Status    TEMO SCREEN WITH REFLEX In process    Albumin, fluid In process    Culture, Body Fluid (with Gram Stain) In process    MITOCHONDRIAL ANTIBODIES, M2, IGG In process    Smooth Muscle Antibody Quant In process    Body fluid cell count  with differential Preliminary result    Celiac Reflex Panel Preliminary result    Culture, Blood 1 Preliminary result    Culture, Blood 1 Preliminary result    Culture, Body Fluid (with Gram Stain) Preliminary result        Discharge Medications    Current Discharge Medication List    START taking these medications    Multiple Vitamins-Minerals (CENTRUM/CERTA-ESPERANZA WITH MINERALS ORAL) solution  Take 15 mLs by mouth daily  Qty: 480 mL Refills: 0    budesonide-formoterol (SYMBICORT) 80-4.5 MCG/ACT AERO  Inhale 2 puffs into the lungs in the morning and 2 puffs in the evening.  Qty: 10.2 g Refills: 3    spironolactone (ALDACTONE) 50 MG tablet  Take 1 tablet by mouth daily  Qty: 30 tablet Refills: 3    thiamine 100 MG tablet  Take 1 tablet by mouth daily  Qty: 30 tablet Refills: 3    Ergocalciferol (VITAMIN D) 73596 units CAPS  Take 50,000 Units by mouth once a week  Qty: 5 capsule Refills: 0    furosemide (LASIX) 20 MG tablet  Take 1 tablet by mouth daily  Qty: 60 tablet Refills: 3          Current Discharge Medication List    CONTINUE these medications which have CHANGED    folic acid (FOLVITE) 1 MG tablet  Take 1 tablet by mouth daily  Qty: 90 tablet Refills: 1          Current Discharge Medication List    CONTINUE these medications which have NOT CHANGED    omeprazole (PRILOSEC) 20 MG delayed release capsule  Take 2 capsules by mouth daily    zonisamide (ZONEGRAN) 100 MG capsule  Take 1 capsule by mouth daily    albuterol sulfate HFA (PROAIR HFA) 108 (90 Base) MCG/ACT inhaler  Inhale 2 puffs into the lungs every 6 hours as needed for Wheezing  Qty: 1 Inhaler Refills: 0          Current Discharge Medication List    STOP taking these medications    ondansetron (ZOFRAN) 4 MG tablet  Comments:  Reason for Stopping:    famotidine (PEPCID) 40 MG tablet  Comments:  Reason for Stopping:    carvedilol (COREG) 25 MG tablet  Comments:  Reason for Stopping:    lisinopril (PRINIVIL;ZESTRIL) 10 MG tablet  Comments:  Reason for

## 2025-01-11 LAB
ALBUMIN FLD-MCNC: 0.9 G/DL
SMOOTH MUSCLE ANTIBODY: 15 UNITS (ref 0–19)
SPECIMEN TYPE: NORMAL

## 2025-01-12 LAB
MICROORGANISM SPEC CULT: NORMAL
MICROORGANISM/AGENT SPEC: NORMAL
SERVICE CMNT-IMP: NORMAL
SPECIMEN DESCRIPTION: NORMAL

## 2025-01-13 LAB
MICROORGANISM SPEC CULT: NORMAL
MICROORGANISM SPEC CULT: NORMAL
SERVICE CMNT-IMP: NORMAL
SERVICE CMNT-IMP: NORMAL
SPECIMEN DESCRIPTION: NORMAL
SPECIMEN DESCRIPTION: NORMAL
SURGICAL PATHOLOGY REPORT: NORMAL

## 2025-01-21 ENCOUNTER — HOSPITAL ENCOUNTER (OUTPATIENT)
Dept: INTERVENTIONAL RADIOLOGY/VASCULAR | Age: 65
Discharge: HOME | End: 2025-01-23
Payer: MEDICARE

## 2025-01-21 VITALS — SYSTOLIC BLOOD PRESSURE: 143 MMHG | HEART RATE: 88 BPM | DIASTOLIC BLOOD PRESSURE: 85 MMHG

## 2025-01-21 DIAGNOSIS — F10.20 ACUTE ALCOHOLISM (HCC): ICD-10-CM

## 2025-01-21 LAB
FOLATE: 11.1 NG/ML
VITAMIN B-12: >1000 PG/ML

## 2025-01-21 NOTE — FLOWSHEET NOTE
Pt tolerated procedure without distress. 6750 ml of yellow ascitic fluid removed  Dressing applied to procedure site. Pt refused albumin stating he will consider it next time if he needs it  Discharge instructions reviewed understanding verbalized, pt released ambulatory in nad.

## 2025-01-21 NOTE — BRIEF OP NOTE
Brief Postoperative Note for Paracentesis    Dariel Griffiths  YOB: 1960  [id number]    Pre-operative Diagnosis:  Ascites     Post-operative Diagnosis: Same    Procedure: Ultrasound guided paracentesis     Anesthesia: 1% Lidocaine     Surgeons/Assistants: Janet Wan PA-C and Dr. Acevedo    Complications: none    EBL: Minimal    Specimens: Were obtained    Ultrasound guided paracentesis performed along his right abdomen. 6750 ml clear yellow fluid obtained.  Dressing applied. Patient tolerated procedure well. Patient declined IV Albumin at this time.    Electronically signed by Janet Wan PA-C on 1/21/2025 at 2:16 PM

## 2025-01-22 PROCEDURE — 49083 ABD PARACENTESIS W/IMAGING: CPT

## 2025-01-28 ENCOUNTER — HOSPITAL ENCOUNTER (OUTPATIENT)
Dept: INTERVENTIONAL RADIOLOGY/VASCULAR | Age: 65
Discharge: HOME | End: 2025-01-30

## 2025-01-30 ENCOUNTER — HOSPITAL ENCOUNTER (OUTPATIENT)
Age: 65
Setting detail: OBSERVATION
Discharge: HOME | End: 2025-02-02
Attending: EMERGENCY MEDICINE | Admitting: INTERNAL MEDICINE
Payer: MEDICARE

## 2025-01-30 ENCOUNTER — APPOINTMENT (OUTPATIENT)
Dept: GENERAL RADIOLOGY | Age: 65
End: 2025-01-30
Payer: MEDICARE

## 2025-01-30 ENCOUNTER — APPOINTMENT (OUTPATIENT)
Dept: CT IMAGING | Age: 65
End: 2025-01-30
Payer: MEDICARE

## 2025-01-30 DIAGNOSIS — R06.02 SHORTNESS OF BREATH: Primary | ICD-10-CM

## 2025-01-30 DIAGNOSIS — K70.31 ALCOHOLIC CIRRHOSIS OF LIVER WITH ASCITES (HCC): ICD-10-CM

## 2025-01-30 DIAGNOSIS — K70.31 ASCITES DUE TO ALCOHOLIC CIRRHOSIS (HCC): ICD-10-CM

## 2025-01-30 PROBLEM — M79.89 SWELLING OF BOTH LOWER EXTREMITIES: Status: ACTIVE | Noted: 2025-01-30

## 2025-01-30 PROBLEM — D72.829 LEUKOCYTOSIS: Status: ACTIVE | Noted: 2025-01-30

## 2025-01-30 LAB
ALBUMIN SERPL-MCNC: 2.9 G/DL (ref 3.5–5.2)
ALBUMIN/GLOB SERPL: 0.7 {RATIO} (ref 1–2.5)
ALP SERPL-CCNC: 188 U/L (ref 40–129)
ALT SERPL-CCNC: 60 U/L (ref 10–50)
ANION GAP SERPL CALCULATED.3IONS-SCNC: 15 MMOL/L (ref 9–16)
AST SERPL-CCNC: 125 U/L (ref 10–50)
BASOPHILS # BLD: 0.1 K/UL (ref 0–0.2)
BASOPHILS NFR BLD: 1 % (ref 0–2)
BILIRUB SERPL-MCNC: 5 MG/DL (ref 0–1.2)
BUN SERPL-MCNC: 11 MG/DL (ref 8–23)
CALCIUM SERPL-MCNC: 8.3 MG/DL (ref 8.8–10.2)
CHLORIDE SERPL-SCNC: 101 MMOL/L (ref 98–107)
CO2 SERPL-SCNC: 19 MMOL/L (ref 20–31)
CREAT SERPL-MCNC: 0.8 MG/DL (ref 0.7–1.2)
D DIMER PPP FEU-MCNC: 6.94 UG/ML FEU (ref 0–0.59)
EOSINOPHIL # BLD: 0.08 K/UL (ref 0–0.44)
EOSINOPHILS RELATIVE PERCENT: 0 % (ref 1–4)
ERYTHROCYTE [DISTWIDTH] IN BLOOD BY AUTOMATED COUNT: 13.2 % (ref 11.8–14.4)
GFR, ESTIMATED: >90 ML/MIN/1.73M2
GLUCOSE SERPL-MCNC: 150 MG/DL (ref 82–115)
HCT VFR BLD AUTO: 40.2 % (ref 40.7–50.3)
HGB BLD-MCNC: 13.5 G/DL (ref 13–17)
IMM GRANULOCYTES # BLD AUTO: 0.09 K/UL (ref 0–0.3)
IMM GRANULOCYTES NFR BLD: 1 %
INR PPP: 1.4
LYMPHOCYTES NFR BLD: 1.91 K/UL (ref 1.1–3.7)
LYMPHOCYTES RELATIVE PERCENT: 11 % (ref 24–43)
MCH RBC QN AUTO: 35.6 PG (ref 25.2–33.5)
MCHC RBC AUTO-ENTMCNC: 33.6 G/DL (ref 28.4–34.8)
MCV RBC AUTO: 106.1 FL (ref 82.6–102.9)
MONOCYTES NFR BLD: 1.68 K/UL (ref 0.1–1.2)
MONOCYTES NFR BLD: 9 % (ref 3–12)
NEUTROPHILS NFR BLD: 79 % (ref 36–65)
NEUTS SEG NFR BLD: 14.16 K/UL (ref 1.5–8.1)
NRBC BLD-RTO: 0 PER 100 WBC
PARTIAL THROMBOPLASTIN TIME: 36.3 SEC (ref 23.9–33.8)
PLATELET # BLD AUTO: 269 K/UL (ref 138–453)
PMV BLD AUTO: 9.9 FL (ref 8.1–13.5)
POTASSIUM SERPL-SCNC: 4 MMOL/L (ref 3.7–5.3)
PROT SERPL-MCNC: 7 G/DL (ref 6.6–8.7)
PROTHROMBIN TIME: 17 SEC (ref 11.5–14.2)
RBC # BLD AUTO: 3.79 M/UL (ref 4.21–5.77)
RBC # BLD: ABNORMAL 10*6/UL
SODIUM SERPL-SCNC: 134 MMOL/L (ref 136–145)
TROPONIN I SERPL HS-MCNC: 13 NG/L (ref 0–22)
TROPONIN I SERPL HS-MCNC: 14 NG/L (ref 0–22)
WBC OTHER # BLD: 18 K/UL (ref 3.5–11.3)

## 2025-01-30 PROCEDURE — 6360000002 HC RX W HCPCS: Performed by: EMERGENCY MEDICINE

## 2025-01-30 PROCEDURE — 2580000003 HC RX 258: Performed by: EMERGENCY MEDICINE

## 2025-01-30 PROCEDURE — 85025 COMPLETE CBC W/AUTO DIFF WBC: CPT

## 2025-01-30 PROCEDURE — 84484 ASSAY OF TROPONIN QUANT: CPT

## 2025-01-30 PROCEDURE — 71045 X-RAY EXAM CHEST 1 VIEW: CPT

## 2025-01-30 PROCEDURE — G0378 HOSPITAL OBSERVATION PER HR: HCPCS

## 2025-01-30 PROCEDURE — 85610 PROTHROMBIN TIME: CPT

## 2025-01-30 PROCEDURE — 6370000000 HC RX 637 (ALT 250 FOR IP)

## 2025-01-30 PROCEDURE — 96374 THER/PROPH/DIAG INJ IV PUSH: CPT

## 2025-01-30 PROCEDURE — 6360000004 HC RX CONTRAST MEDICATION: Performed by: EMERGENCY MEDICINE

## 2025-01-30 PROCEDURE — 2500000003 HC RX 250 WO HCPCS: Performed by: EMERGENCY MEDICINE

## 2025-01-30 PROCEDURE — 36415 COLL VENOUS BLD VENIPUNCTURE: CPT

## 2025-01-30 PROCEDURE — 85730 THROMBOPLASTIN TIME PARTIAL: CPT

## 2025-01-30 PROCEDURE — 71260 CT THORAX DX C+: CPT

## 2025-01-30 PROCEDURE — 6370000000 HC RX 637 (ALT 250 FOR IP): Performed by: NURSE PRACTITIONER

## 2025-01-30 PROCEDURE — 93005 ELECTROCARDIOGRAM TRACING: CPT | Performed by: EMERGENCY MEDICINE

## 2025-01-30 PROCEDURE — 80053 COMPREHEN METABOLIC PANEL: CPT

## 2025-01-30 PROCEDURE — 99285 EMERGENCY DEPT VISIT HI MDM: CPT

## 2025-01-30 PROCEDURE — 94761 N-INVAS EAR/PLS OXIMETRY MLT: CPT

## 2025-01-30 PROCEDURE — 94640 AIRWAY INHALATION TREATMENT: CPT

## 2025-01-30 PROCEDURE — 84145 PROCALCITONIN (PCT): CPT

## 2025-01-30 PROCEDURE — 99222 1ST HOSP IP/OBS MODERATE 55: CPT

## 2025-01-30 PROCEDURE — 96375 TX/PRO/DX INJ NEW DRUG ADDON: CPT

## 2025-01-30 PROCEDURE — 85379 FIBRIN DEGRADATION QUANT: CPT

## 2025-01-30 RX ORDER — MAGNESIUM SULFATE IN WATER 40 MG/ML
2000 INJECTION, SOLUTION INTRAVENOUS PRN
Status: DISCONTINUED | OUTPATIENT
Start: 2025-01-30 | End: 2025-02-02 | Stop reason: HOSPADM

## 2025-01-30 RX ORDER — LORAZEPAM 1 MG/1
3 TABLET ORAL
Status: DISCONTINUED | OUTPATIENT
Start: 2025-01-30 | End: 2025-02-02 | Stop reason: HOSPADM

## 2025-01-30 RX ORDER — LORAZEPAM 2 MG/ML
4 INJECTION INTRAMUSCULAR
Status: DISCONTINUED | OUTPATIENT
Start: 2025-01-30 | End: 2025-02-02 | Stop reason: HOSPADM

## 2025-01-30 RX ORDER — ERGOCALCIFEROL 1.25 MG/1
50000 CAPSULE, LIQUID FILLED ORAL WEEKLY
Status: DISCONTINUED | OUTPATIENT
Start: 2025-02-01 | End: 2025-02-02 | Stop reason: HOSPADM

## 2025-01-30 RX ORDER — FOLIC ACID 1 MG/1
1 TABLET ORAL DAILY
Status: DISCONTINUED | OUTPATIENT
Start: 2025-01-31 | End: 2025-02-02 | Stop reason: HOSPADM

## 2025-01-30 RX ORDER — LORAZEPAM 1 MG/1
1 TABLET ORAL
Status: DISCONTINUED | OUTPATIENT
Start: 2025-01-30 | End: 2025-02-02 | Stop reason: HOSPADM

## 2025-01-30 RX ORDER — LORAZEPAM 2 MG/ML
3 INJECTION INTRAMUSCULAR
Status: DISCONTINUED | OUTPATIENT
Start: 2025-01-30 | End: 2025-02-02 | Stop reason: HOSPADM

## 2025-01-30 RX ORDER — ONDANSETRON 4 MG/1
4 TABLET, ORALLY DISINTEGRATING ORAL EVERY 8 HOURS PRN
Status: DISCONTINUED | OUTPATIENT
Start: 2025-01-30 | End: 2025-02-02 | Stop reason: HOSPADM

## 2025-01-30 RX ORDER — LORAZEPAM 2 MG/ML
2 INJECTION INTRAMUSCULAR
Status: DISCONTINUED | OUTPATIENT
Start: 2025-01-30 | End: 2025-02-02 | Stop reason: HOSPADM

## 2025-01-30 RX ORDER — SODIUM CHLORIDE 0.9 % (FLUSH) 0.9 %
5-40 SYRINGE (ML) INJECTION PRN
Status: DISCONTINUED | OUTPATIENT
Start: 2025-01-30 | End: 2025-02-02 | Stop reason: HOSPADM

## 2025-01-30 RX ORDER — ALBUTEROL SULFATE 90 UG/1
2 INHALANT RESPIRATORY (INHALATION)
Status: DISCONTINUED | OUTPATIENT
Start: 2025-01-31 | End: 2025-02-02 | Stop reason: HOSPADM

## 2025-01-30 RX ORDER — FUROSEMIDE 10 MG/ML
40 INJECTION INTRAMUSCULAR; INTRAVENOUS 2 TIMES DAILY
Status: DISCONTINUED | OUTPATIENT
Start: 2025-01-31 | End: 2025-02-01

## 2025-01-30 RX ORDER — SODIUM CHLORIDE 0.9 % (FLUSH) 0.9 %
10 SYRINGE (ML) INJECTION PRN
Status: DISCONTINUED | OUTPATIENT
Start: 2025-01-30 | End: 2025-01-30 | Stop reason: SDUPTHER

## 2025-01-30 RX ORDER — ALBUTEROL SULFATE 90 UG/1
2 INHALANT RESPIRATORY (INHALATION) EVERY 6 HOURS PRN
Status: DISCONTINUED | OUTPATIENT
Start: 2025-01-30 | End: 2025-01-30

## 2025-01-30 RX ORDER — POTASSIUM CHLORIDE 1500 MG/1
40 TABLET, EXTENDED RELEASE ORAL PRN
Status: DISCONTINUED | OUTPATIENT
Start: 2025-01-30 | End: 2025-02-02 | Stop reason: HOSPADM

## 2025-01-30 RX ORDER — POLYETHYLENE GLYCOL 3350 17 G/17G
17 POWDER, FOR SOLUTION ORAL DAILY PRN
Status: DISCONTINUED | OUTPATIENT
Start: 2025-01-30 | End: 2025-02-02 | Stop reason: HOSPADM

## 2025-01-30 RX ORDER — ONDANSETRON 2 MG/ML
4 INJECTION INTRAMUSCULAR; INTRAVENOUS EVERY 6 HOURS PRN
Status: DISCONTINUED | OUTPATIENT
Start: 2025-01-30 | End: 2025-02-02 | Stop reason: HOSPADM

## 2025-01-30 RX ORDER — ENOXAPARIN SODIUM 100 MG/ML
40 INJECTION SUBCUTANEOUS DAILY
Status: DISCONTINUED | OUTPATIENT
Start: 2025-01-31 | End: 2025-02-02 | Stop reason: HOSPADM

## 2025-01-30 RX ORDER — PANTOPRAZOLE SODIUM 40 MG/1
40 TABLET, DELAYED RELEASE ORAL
Status: DISCONTINUED | OUTPATIENT
Start: 2025-01-31 | End: 2025-02-02 | Stop reason: HOSPADM

## 2025-01-30 RX ORDER — LORAZEPAM 1 MG/1
4 TABLET ORAL
Status: DISCONTINUED | OUTPATIENT
Start: 2025-01-30 | End: 2025-02-02 | Stop reason: HOSPADM

## 2025-01-30 RX ORDER — BUDESONIDE AND FORMOTEROL FUMARATE DIHYDRATE 80; 4.5 UG/1; UG/1
2 AEROSOL RESPIRATORY (INHALATION)
Status: DISCONTINUED | OUTPATIENT
Start: 2025-01-30 | End: 2025-01-30

## 2025-01-30 RX ORDER — ZONISAMIDE 100 MG/1
100 CAPSULE ORAL DAILY
Status: DISCONTINUED | OUTPATIENT
Start: 2025-01-31 | End: 2025-02-02 | Stop reason: HOSPADM

## 2025-01-30 RX ORDER — 0.9 % SODIUM CHLORIDE 0.9 %
100 INTRAVENOUS SOLUTION INTRAVENOUS ONCE
Status: COMPLETED | OUTPATIENT
Start: 2025-01-30 | End: 2025-01-30

## 2025-01-30 RX ORDER — ACETAMINOPHEN 325 MG/1
650 TABLET ORAL EVERY 6 HOURS PRN
Status: DISCONTINUED | OUTPATIENT
Start: 2025-01-30 | End: 2025-02-02 | Stop reason: HOSPADM

## 2025-01-30 RX ORDER — BUDESONIDE AND FORMOTEROL FUMARATE DIHYDRATE 160; 4.5 UG/1; UG/1
2 AEROSOL RESPIRATORY (INHALATION)
Status: DISCONTINUED | OUTPATIENT
Start: 2025-01-31 | End: 2025-02-02 | Stop reason: HOSPADM

## 2025-01-30 RX ORDER — ALBUTEROL SULFATE 90 UG/1
2 INHALANT RESPIRATORY (INHALATION) EVERY 4 HOURS PRN
Status: DISCONTINUED | OUTPATIENT
Start: 2025-01-30 | End: 2025-02-02 | Stop reason: HOSPADM

## 2025-01-30 RX ORDER — IOPAMIDOL 755 MG/ML
75 INJECTION, SOLUTION INTRAVASCULAR
Status: COMPLETED | OUTPATIENT
Start: 2025-01-30 | End: 2025-01-30

## 2025-01-30 RX ORDER — POTASSIUM CHLORIDE 7.45 MG/ML
10 INJECTION INTRAVENOUS PRN
Status: DISCONTINUED | OUTPATIENT
Start: 2025-01-30 | End: 2025-02-02 | Stop reason: HOSPADM

## 2025-01-30 RX ORDER — MORPHINE SULFATE 4 MG/ML
4 INJECTION, SOLUTION INTRAMUSCULAR; INTRAVENOUS ONCE
Status: COMPLETED | OUTPATIENT
Start: 2025-01-30 | End: 2025-01-30

## 2025-01-30 RX ORDER — LORAZEPAM 1 MG/1
2 TABLET ORAL
Status: DISCONTINUED | OUTPATIENT
Start: 2025-01-30 | End: 2025-02-02 | Stop reason: HOSPADM

## 2025-01-30 RX ORDER — LORAZEPAM 2 MG/ML
1 INJECTION INTRAMUSCULAR
Status: DISCONTINUED | OUTPATIENT
Start: 2025-01-30 | End: 2025-02-02 | Stop reason: HOSPADM

## 2025-01-30 RX ORDER — ACETAMINOPHEN 650 MG/1
650 SUPPOSITORY RECTAL EVERY 6 HOURS PRN
Status: DISCONTINUED | OUTPATIENT
Start: 2025-01-30 | End: 2025-02-02 | Stop reason: HOSPADM

## 2025-01-30 RX ORDER — SODIUM CHLORIDE 9 MG/ML
INJECTION, SOLUTION INTRAVENOUS PRN
Status: DISCONTINUED | OUTPATIENT
Start: 2025-01-30 | End: 2025-02-02 | Stop reason: HOSPADM

## 2025-01-30 RX ORDER — FUROSEMIDE 20 MG/1
20 TABLET ORAL DAILY
Status: DISCONTINUED | OUTPATIENT
Start: 2025-01-31 | End: 2025-02-02 | Stop reason: HOSPADM

## 2025-01-30 RX ORDER — SODIUM CHLORIDE 0.9 % (FLUSH) 0.9 %
5-40 SYRINGE (ML) INJECTION EVERY 12 HOURS SCHEDULED
Status: DISCONTINUED | OUTPATIENT
Start: 2025-01-30 | End: 2025-02-02 | Stop reason: HOSPADM

## 2025-01-30 RX ORDER — GAUZE BANDAGE 2" X 2"
100 BANDAGE TOPICAL DAILY
Status: DISCONTINUED | OUTPATIENT
Start: 2025-01-31 | End: 2025-02-02 | Stop reason: HOSPADM

## 2025-01-30 RX ORDER — SPIRONOLACTONE 25 MG/1
50 TABLET ORAL DAILY
Status: DISCONTINUED | OUTPATIENT
Start: 2025-01-31 | End: 2025-01-31

## 2025-01-30 RX ADMIN — ALBUTEROL SULFATE 2 PUFF: 90 AEROSOL, METERED RESPIRATORY (INHALATION) at 23:24

## 2025-01-30 RX ADMIN — BUDESONIDE AND FORMOTEROL FUMARATE DIHYDRATE 2 PUFF: 160; 4.5 AEROSOL RESPIRATORY (INHALATION) at 23:30

## 2025-01-30 RX ADMIN — SODIUM CHLORIDE 100 ML: 9 INJECTION, SOLUTION INTRAVENOUS at 19:33

## 2025-01-30 RX ADMIN — MORPHINE SULFATE 4 MG: 4 INJECTION, SOLUTION INTRAMUSCULAR; INTRAVENOUS at 19:20

## 2025-01-30 RX ADMIN — IOPAMIDOL 75 ML: 755 INJECTION, SOLUTION INTRAVENOUS at 19:33

## 2025-01-30 RX ADMIN — SODIUM CHLORIDE, PRESERVATIVE FREE 10 ML: 5 INJECTION INTRAVENOUS at 19:33

## 2025-01-30 ASSESSMENT — PAIN SCALES - GENERAL
PAINLEVEL_OUTOF10: 8
PAINLEVEL_OUTOF10: 8
PAINLEVEL_OUTOF10: 10

## 2025-01-30 ASSESSMENT — PAIN DESCRIPTION - LOCATION: LOCATION: ABDOMEN

## 2025-01-30 NOTE — ED NOTES
Pt to ED accompanied by partner.  Pt to ED c/o shortness of breath and leg / abdominal swelling.  Pt states that he has hx of alcohol abuse and liver failure.  Pt reports that he was admitted to [facility] earlier this month for abdominal swelling when he had his first paracentesis done.  Pt reports that he has had one other paracentesis done since then and reports that he is scheduled for paracentesis here tomorrow.  Pt states that over the past couple of days he has had worsening bilateral leg swelling.  Pt states that just PTA to ED he was at appointment with Dr. Daly when he was told to start to increase his home lasix dose and was told to come to ED for further evaluation and workup prior to paracentesis tomorrow.  Pt reports hx of alcohol abuse- states that he has been sober for about 2 weeks, reports hx of cirrhosis, reports hx of COPD.

## 2025-01-31 ENCOUNTER — APPOINTMENT (OUTPATIENT)
Dept: INTERVENTIONAL RADIOLOGY/VASCULAR | Age: 65
End: 2025-01-31
Payer: MEDICARE

## 2025-01-31 LAB
ALBUMIN SERPL-MCNC: 2.3 G/DL (ref 3.5–5.2)
ALBUMIN/GLOB SERPL: 0.7 {RATIO} (ref 1–2.5)
ALP SERPL-CCNC: 153 U/L (ref 40–129)
ALT SERPL-CCNC: 48 U/L (ref 10–50)
ANION GAP SERPL CALCULATED.3IONS-SCNC: 9 MMOL/L (ref 9–16)
AST SERPL-CCNC: 108 U/L (ref 10–50)
BASOPHILS # BLD: 0.15 K/UL (ref 0–0.2)
BASOPHILS NFR BLD: 1 %
BILIRUB SERPL-MCNC: 3.8 MG/DL (ref 0–1.2)
BUN SERPL-MCNC: 10 MG/DL (ref 8–23)
CALCIUM SERPL-MCNC: 8 MG/DL (ref 8.8–10.2)
CHLORIDE SERPL-SCNC: 102 MMOL/L (ref 98–107)
CO2 SERPL-SCNC: 22 MMOL/L (ref 20–31)
CREAT SERPL-MCNC: 0.6 MG/DL (ref 0.7–1.2)
EKG ATRIAL RATE: 120 BPM
EKG P AXIS: 82 DEGREES
EKG P-R INTERVAL: 174 MS
EKG Q-T INTERVAL: 302 MS
EKG QRS DURATION: 74 MS
EKG QTC CALCULATION (BAZETT): 426 MS
EKG R AXIS: 54 DEGREES
EKG T AXIS: 82 DEGREES
EKG VENTRICULAR RATE: 120 BPM
EOSINOPHIL # BLD: 0.15 K/UL (ref 0–0.4)
EOSINOPHILS RELATIVE PERCENT: 1 % (ref 1–4)
ERYTHROCYTE [DISTWIDTH] IN BLOOD BY AUTOMATED COUNT: 13 % (ref 11.8–14.4)
GFR, ESTIMATED: >90 ML/MIN/1.73M2
GLUCOSE SERPL-MCNC: 108 MG/DL (ref 82–115)
HCT VFR BLD AUTO: 33.2 % (ref 40.7–50.3)
HGB BLD-MCNC: 11.4 G/DL (ref 13–17)
IMM GRANULOCYTES # BLD AUTO: 0.15 K/UL (ref 0–0.3)
IMM GRANULOCYTES NFR BLD: 1 %
LYMPHOCYTES NFR BLD: 2.3 K/UL (ref 1–4.8)
LYMPHOCYTES RELATIVE PERCENT: 15 % (ref 24–44)
MAGNESIUM SERPL-MCNC: 1.2 MG/DL (ref 1.6–2.4)
MCH RBC QN AUTO: 35.7 PG (ref 25.2–33.5)
MCHC RBC AUTO-ENTMCNC: 34.3 G/DL (ref 28.4–34.8)
MCV RBC AUTO: 104.1 FL (ref 82.6–102.9)
MONOCYTES NFR BLD: 1.68 K/UL (ref 0.2–0.8)
MONOCYTES NFR BLD: 11 % (ref 1–7)
NEUTROPHILS NFR BLD: 71 % (ref 36–66)
NEUTS SEG NFR BLD: 10.87 K/UL (ref 1.8–7.7)
NRBC BLD-RTO: 0 PER 100 WBC
PLATELET # BLD AUTO: 215 K/UL (ref 138–453)
PMV BLD AUTO: 9.8 FL (ref 8.1–13.5)
POTASSIUM SERPL-SCNC: 3.3 MMOL/L (ref 3.7–5.3)
POTASSIUM SERPL-SCNC: 3.6 MMOL/L (ref 3.7–5.3)
PROCALCITONIN SERPL-MCNC: 0.22 NG/ML (ref 0–0.09)
PROT SERPL-MCNC: 5.8 G/DL (ref 6.6–8.7)
RBC # BLD AUTO: 3.19 M/UL (ref 4.21–5.77)
SODIUM SERPL-SCNC: 133 MMOL/L (ref 136–145)
TROPONIN I SERPL HS-MCNC: 19 NG/L (ref 0–22)
WBC OTHER # BLD: 15.3 K/UL (ref 3.5–11.3)

## 2025-01-31 PROCEDURE — 2580000003 HC RX 258

## 2025-01-31 PROCEDURE — 96375 TX/PRO/DX INJ NEW DRUG ADDON: CPT

## 2025-01-31 PROCEDURE — 2500000003 HC RX 250 WO HCPCS

## 2025-01-31 PROCEDURE — 96365 THER/PROPH/DIAG IV INF INIT: CPT

## 2025-01-31 PROCEDURE — G0378 HOSPITAL OBSERVATION PER HR: HCPCS

## 2025-01-31 PROCEDURE — 6370000000 HC RX 637 (ALT 250 FOR IP): Performed by: NURSE PRACTITIONER

## 2025-01-31 PROCEDURE — 36415 COLL VENOUS BLD VENIPUNCTURE: CPT

## 2025-01-31 PROCEDURE — 6370000000 HC RX 637 (ALT 250 FOR IP)

## 2025-01-31 PROCEDURE — 84484 ASSAY OF TROPONIN QUANT: CPT

## 2025-01-31 PROCEDURE — 93010 ELECTROCARDIOGRAM REPORT: CPT | Performed by: INTERNAL MEDICINE

## 2025-01-31 PROCEDURE — 6360000002 HC RX W HCPCS

## 2025-01-31 PROCEDURE — 99232 SBSQ HOSP IP/OBS MODERATE 35: CPT | Performed by: INTERNAL MEDICINE

## 2025-01-31 PROCEDURE — 6360000002 HC RX W HCPCS: Performed by: RADIOLOGY

## 2025-01-31 PROCEDURE — 85025 COMPLETE CBC W/AUTO DIFF WBC: CPT

## 2025-01-31 PROCEDURE — 49083 ABD PARACENTESIS W/IMAGING: CPT

## 2025-01-31 PROCEDURE — 83735 ASSAY OF MAGNESIUM: CPT

## 2025-01-31 PROCEDURE — 94761 N-INVAS EAR/PLS OXIMETRY MLT: CPT

## 2025-01-31 PROCEDURE — 6360000002 HC RX W HCPCS: Performed by: NURSE PRACTITIONER

## 2025-01-31 PROCEDURE — 80053 COMPREHEN METABOLIC PANEL: CPT

## 2025-01-31 PROCEDURE — 6360000002 HC RX W HCPCS: Performed by: INTERNAL MEDICINE

## 2025-01-31 PROCEDURE — C1729 CATH, DRAINAGE: HCPCS

## 2025-01-31 PROCEDURE — P9047 ALBUMIN (HUMAN), 25%, 50ML: HCPCS | Performed by: RADIOLOGY

## 2025-01-31 PROCEDURE — 96372 THER/PROPH/DIAG INJ SC/IM: CPT

## 2025-01-31 PROCEDURE — 94640 AIRWAY INHALATION TREATMENT: CPT

## 2025-01-31 PROCEDURE — 84132 ASSAY OF SERUM POTASSIUM: CPT

## 2025-01-31 PROCEDURE — 93005 ELECTROCARDIOGRAM TRACING: CPT | Performed by: NURSE PRACTITIONER

## 2025-01-31 PROCEDURE — 96376 TX/PRO/DX INJ SAME DRUG ADON: CPT

## 2025-01-31 PROCEDURE — 6370000000 HC RX 637 (ALT 250 FOR IP): Performed by: INTERNAL MEDICINE

## 2025-01-31 PROCEDURE — 96366 THER/PROPH/DIAG IV INF ADDON: CPT

## 2025-01-31 RX ORDER — PROCHLORPERAZINE EDISYLATE 5 MG/ML
10 INJECTION INTRAMUSCULAR; INTRAVENOUS EVERY 6 HOURS PRN
Status: DISCONTINUED | OUTPATIENT
Start: 2025-01-31 | End: 2025-02-02 | Stop reason: HOSPADM

## 2025-01-31 RX ORDER — PROPRANOLOL HYDROCHLORIDE 10 MG/1
10 TABLET ORAL 3 TIMES DAILY
Status: DISCONTINUED | OUTPATIENT
Start: 2025-01-31 | End: 2025-02-02

## 2025-01-31 RX ORDER — ALBUMIN (HUMAN) 12.5 G/50ML
50 SOLUTION INTRAVENOUS ONCE
Status: COMPLETED | OUTPATIENT
Start: 2025-01-31 | End: 2025-01-31

## 2025-01-31 RX ORDER — BUDESONIDE, GLYCOPYRROLATE, AND FORMOTEROL FUMARATE 160; 9; 4.8 UG/1; UG/1; UG/1
AEROSOL, METERED RESPIRATORY (INHALATION) 2 TIMES DAILY
COMMUNITY

## 2025-01-31 RX ORDER — SPIRONOLACTONE 25 MG/1
50 TABLET ORAL 2 TIMES DAILY
Status: DISCONTINUED | OUTPATIENT
Start: 2025-01-31 | End: 2025-02-02 | Stop reason: HOSPADM

## 2025-01-31 RX ORDER — MORPHINE SULFATE 2 MG/ML
2 INJECTION, SOLUTION INTRAMUSCULAR; INTRAVENOUS EVERY 4 HOURS PRN
Status: DISCONTINUED | OUTPATIENT
Start: 2025-01-31 | End: 2025-02-02 | Stop reason: HOSPADM

## 2025-01-31 RX ADMIN — ALBUMIN (HUMAN) 50 G: 0.25 INJECTION, SOLUTION INTRAVENOUS at 10:58

## 2025-01-31 RX ADMIN — ALBUTEROL SULFATE 2 PUFF: 90 AEROSOL, METERED RESPIRATORY (INHALATION) at 20:45

## 2025-01-31 RX ADMIN — ENOXAPARIN SODIUM 40 MG: 100 INJECTION SUBCUTANEOUS at 11:04

## 2025-01-31 RX ADMIN — SPIRONOLACTONE 50 MG: 25 TABLET ORAL at 20:02

## 2025-01-31 RX ADMIN — ZONISAMIDE 100 MG: 100 CAPSULE ORAL at 13:17

## 2025-01-31 RX ADMIN — PANTOPRAZOLE SODIUM 40 MG: 40 TABLET, DELAYED RELEASE ORAL at 06:20

## 2025-01-31 RX ADMIN — SODIUM CHLORIDE, PRESERVATIVE FREE 10 ML: 5 INJECTION INTRAVENOUS at 00:41

## 2025-01-31 RX ADMIN — SODIUM CHLORIDE: 9 INJECTION, SOLUTION INTRAVENOUS at 21:01

## 2025-01-31 RX ADMIN — FUROSEMIDE 40 MG: 10 INJECTION, SOLUTION INTRAMUSCULAR; INTRAVENOUS at 17:43

## 2025-01-31 RX ADMIN — POTASSIUM CHLORIDE 40 MEQ: 1500 TABLET, EXTENDED RELEASE ORAL at 21:17

## 2025-01-31 RX ADMIN — FUROSEMIDE 40 MG: 10 INJECTION, SOLUTION INTRAMUSCULAR; INTRAVENOUS at 07:45

## 2025-01-31 RX ADMIN — ALBUTEROL SULFATE 2 PUFF: 90 AEROSOL, METERED RESPIRATORY (INHALATION) at 14:32

## 2025-01-31 RX ADMIN — MAGNESIUM SULFATE HEPTAHYDRATE 2000 MG: 40 INJECTION, SOLUTION INTRAVENOUS at 21:03

## 2025-01-31 RX ADMIN — MAGNESIUM SULFATE HEPTAHYDRATE 2000 MG: 40 INJECTION, SOLUTION INTRAVENOUS at 23:09

## 2025-01-31 RX ADMIN — FOLIC ACID 1 MG: 1 TABLET ORAL at 13:16

## 2025-01-31 RX ADMIN — MORPHINE SULFATE 2 MG: 2 INJECTION, SOLUTION INTRAMUSCULAR; INTRAVENOUS at 00:41

## 2025-01-31 RX ADMIN — SODIUM CHLORIDE, PRESERVATIVE FREE 10 ML: 5 INJECTION INTRAVENOUS at 21:00

## 2025-01-31 RX ADMIN — SODIUM CHLORIDE, PRESERVATIVE FREE 10 ML: 5 INJECTION INTRAVENOUS at 07:45

## 2025-01-31 RX ADMIN — BUDESONIDE AND FORMOTEROL FUMARATE DIHYDRATE 2 PUFF: 160; 4.5 AEROSOL RESPIRATORY (INHALATION) at 20:45

## 2025-01-31 RX ADMIN — ONDANSETRON 4 MG: 2 INJECTION INTRAMUSCULAR; INTRAVENOUS at 17:44

## 2025-01-31 RX ADMIN — PROCHLORPERAZINE EDISYLATE 10 MG: 5 INJECTION INTRAMUSCULAR; INTRAVENOUS at 19:09

## 2025-01-31 RX ADMIN — MORPHINE SULFATE 2 MG: 2 INJECTION, SOLUTION INTRAMUSCULAR; INTRAVENOUS at 06:19

## 2025-01-31 RX ADMIN — Medication 100 MG: at 13:17

## 2025-01-31 RX ADMIN — SODIUM CHLORIDE, PRESERVATIVE FREE 10 ML: 5 INJECTION INTRAVENOUS at 20:04

## 2025-01-31 RX ADMIN — POLYETHYLENE GLYCOL (3350) 17 G: 17 POWDER, FOR SOLUTION ORAL at 13:17

## 2025-01-31 RX ADMIN — PROPRANOLOL HYDROCHLORIDE 10 MG: 10 TABLET ORAL at 20:01

## 2025-01-31 RX ADMIN — SODIUM CHLORIDE, PRESERVATIVE FREE 10 ML: 5 INJECTION INTRAVENOUS at 06:20

## 2025-01-31 ASSESSMENT — PAIN DESCRIPTION - DESCRIPTORS
DESCRIPTORS: SHARP;ACHING
DESCRIPTORS: ACHING;SHARP

## 2025-01-31 ASSESSMENT — PAIN SCALES - GENERAL
PAINLEVEL_OUTOF10: 7
PAINLEVEL_OUTOF10: 4
PAINLEVEL_OUTOF10: 8
PAINLEVEL_OUTOF10: 6

## 2025-01-31 ASSESSMENT — PAIN DESCRIPTION - LOCATION
LOCATION: ABDOMEN;LEG
LOCATION: ABDOMEN;LEG

## 2025-01-31 ASSESSMENT — PAIN DESCRIPTION - ORIENTATION
ORIENTATION: LEFT;RIGHT
ORIENTATION: RIGHT;LEFT

## 2025-01-31 NOTE — ED NOTES
Pt returned from CT.  Pt reports pain improvement at this time s/p administration of morphine.  Pt denies needs or concerns and partner remains at bedside

## 2025-01-31 NOTE — PROGRESS NOTES
[x] Medication Reconciliation was completed and the patient's home medication list was verified. The Med List Status has been marked \"Complete\". The following sources were used to assist with Medication Reconciliation:    [] Patient had a list of medications which was transcribed into the EHR.    [] Patient provided bottles of their medications    [x] Home medications reviewed and confirmed with Astrid (his caretaker) over the phone    [] Contacted patient's pharmacy to confirm home medications    [] Contacted patient's physician office to confirm home medications    [] Medical Records from another facility and/or Care Everywhere were reviewed    OR    [] There are one or more home medications that need clarification before Medication Reconciliation can be completed. The Med List Status has been marked as In Progress. To assist with Home Medication Reconciliation the following actions have been taken:    [] Pharmacy medication reconciliation service requested. (Note: This can be done by sending a Perfect Serve message to The [facility] Rec Pharmacist or by phoning [phone].)    [] Family requested to bring medications into the hospital    [] Family requested to call hospital with medication list    [] Message left with physician office    [] Request for medical records made to     [] Other

## 2025-01-31 NOTE — PLAN OF CARE
Problem: Respiratory - Adult  Goal: Able to breathe comfortably  1/31/2025 0859 by Vale Corea RCP  Outcome: Progressing  1/30/2025 2337 by Anamika Madrid RCP  Outcome: Progressing  Goal: Patient's breath sounds will be clear and equal  1/31/2025 0859 by Vale Corea RCP  Outcome: Progressing  1/30/2025 2337 by Anamika Madrid RCP  Outcome: Progressing

## 2025-01-31 NOTE — PROGRESS NOTES
[] Medication Reconciliation was completed and the patient's home medication list was verified. The Med List Status has been marked \"Complete\". The following sources were used to assist with Medication Reconciliation:    [] Patient had a list of medications which was transcribed into the EHR.    [] Patient provided bottles of their medications    [] Home medications reviewed and confirmed with     [] Contacted patient's pharmacy to confirm home medications    [] Contacted patient's physician office to confirm home medications    [] Medical Records from another facility and/or Care Everywhere were reviewed    OR    [x] There are one or more home medications that need clarification before Medication Reconciliation can be completed. The Med List Status has been marked as In Progress. To assist with Home Medication Reconciliation the following actions have been taken:    [] Pharmacy medication reconciliation service requested. (Note: This can be done by sending a Perfect Serve message to The [facility] Rec Pharmacist or by phoning [phone].)    [] Family requested to bring medications into the hospital    [] Family requested to call hospital with medication list    [] Message left with physician office    [] Request for medical records made to     [x] Other pt to have med list brought in from home; he does not know meds

## 2025-01-31 NOTE — PROGRESS NOTES
Spiritual Health History and Assessment/Progress Note  [facility]    (P) Loneliness/Social Isolation,  ,  ,      Name: Dariel Griffiths MRN: [id number]    Age: 64 y.o.     Sex: male   Language: English   Hinduism: Christianity   Alcoholic cirrhosis of liver with ascites (HCC)     Date: 1/31/2025            Total Time Calculated: (P) 13 min              Spiritual Assessment began in [facility]        Referral/Consult From: (P) Friend, Nurse   Encounter Overview/Reason: (P) Loneliness/Social Isolation  Service Provided For: (P) Patient    Carissa, Belief, Meaning:   Patient has beliefs or practices that help with coping during difficult times  Family/Friends No family/friends present      Importance and Influence:  Patient has spiritual/personal beliefs that influence decisions regarding their health  Family/Friends No family/friends present    Community:  Patient feels well-supported. Support system includes: Friends  Family/Friends No family/friends present    Assessment and Plan of Care:     Patient Interventions include: Facilitated expression of thoughts and feelings, Facilitated life review and/ or legacy, and Assisted in Advance Care Planning conversation  Family/Friends Interventions include: No family/friends present    Patient Plan of Care: Spiritual Care available upon further referral  Family/Friends Plan of Care: Spiritual Care available upon further referral    Electronically signed by Chaplain Jenn on 1/31/2025 at 2:50 PM

## 2025-01-31 NOTE — ED PROVIDER NOTES
EMERGENCY DEPARTMENT ENCOUNTER    Pt Name: Dariel Griffiths  MRN: [id number]  Birthdate 1960  Date of evaluation: 1/30/25  CHIEF COMPLAINT       Chief Complaint   Patient presents with    Chest Pain     Px arrives complaining of chest pain/SOB that initiated around 1/8/24. Px notes that the SOB and chest pain has progressed w time. Px has paracentesis scheduled for tomorrow      HISTORY OF PRESENT ILLNESS   64-year-old male              REVIEW OF SYSTEMS     Review of Systems   Constitutional:  Positive for appetite change.   Respiratory:  Positive for shortness of breath.    Cardiovascular:  Positive for chest pain.   Gastrointestinal:  Positive for abdominal pain.     PASTMEDICAL HISTORY     Past Medical History:   Diagnosis Date    Abnormal results of liver function studies  10/20/2016    Arthritis     Asthma     Attempted suicide ([facility])     attempted 7 times    Avascular necrosis of bone of right hip 10/26/2017    Bipolar affective ([facility])     Blood in stool     Cavitary pneumonia     Chest pain     Daily    CHF (congestive heart failure), NYHA class III ([facility]) 08/22/2014    COPD (chronic obstructive pulmonary disease) ([facility])     [facility] pulmonology    COPD exacerbation ([facility]) 08/22/2014    CVA (cerebral vascular accident) ([facility])     x2 in 2006    Depression 07/26/2015    Difficult intubation 07/05/2013    Needed Glidescope, only epiglottis seen, easy mask ventilation    Erectile dysfunction 05/23/2014    Folliculitis 02/19/2014    Fracture of wrist     left    GERD (gastroesophageal reflux disease) 02/19/2014    Head injury     Hepatitis     HLD (hyperlipidemia) 02/19/2014    Hypertension     Insomnia     Left wrist pain     Pain     LEFT KNEE, RIGHT HIP    Schizoaffective disorder, depressive type ([facility]) 07/14/2017    Seasonal allergies 07/15/2014    Seizures ([facility])     Spondylosis of cervical region without myelopathy or radiculopathy     Stroke ([facility]) 10/31/2011    pt states a total of 4-5strokes; sees

## 2025-01-31 NOTE — PLAN OF CARE
Problem: Chronic Conditions and Co-morbidities  Goal: Patient's chronic conditions and co-morbidity symptoms are monitored and maintained or improved  1/31/2025 0032 by Mavis Davidson RN  Outcome: Progressing  1/31/2025 0032 by Mavis Davidson RN  Outcome: Progressing     Problem: Discharge Planning  Goal: Discharge to home or other facility with appropriate resources  1/31/2025 0032 by Mavis Davidson RN  Outcome: Progressing  1/31/2025 0032 by Mavis Davidson RN  Outcome: Progressing  Flowsheets (Taken 1/30/2025 2310)  Discharge to home or other facility with appropriate resources: Identify barriers to discharge with patient and caregiver     Problem: Pain  Goal: Verbalizes/displays adequate comfort level or baseline comfort level  1/31/2025 0032 by Mavis Davidson RN  Outcome: Progressing  1/31/2025 0032 by Mavis Davidson RN  Outcome: Progressing     Problem: Safety - Adult  Goal: Free from fall injury  1/31/2025 0032 by Mavis Davidson RN  Outcome: Progressing  1/31/2025 0032 by Mavis Davidson RN  Outcome: Progressing     Problem: Respiratory - Adult  Goal: Able to breathe comfortably  1/30/2025 2337 by Anamika Madrid RCP  Outcome: Progressing  Goal: Patient's breath sounds will be clear and equal  1/30/2025 2337 by Anamika Madrid RCP  Outcome: Progressing

## 2025-01-31 NOTE — CONSULTS
GASTROENTEROLOGY  CONSULTATION    Patient: Dariel Griffiths   : 1960  Med Rec#: [id number]      REASON FOR CONSULTATION: Cirrhosis    HISTORY OF PRESENT ILLNESS:     This is a 64 y.o. White male who presents with alcoholic cirrhosis with ascites.  Paracentesis was performed today; 6.7 mL of fluid were drained.  As per the patient he was only recently diagnosed with cirrhosis.  He began experiencing abdominal girth less than 1 month ago.  He said 3 paracenteses since then, including today's.  He is also complaining of bilateral lower extremity edema.  He has some abdominal tightness, but denies abdominal pain.  He is having some issues with constipation.  He denies diarrhea.  No melena no large-volume hematochezia; he will see occasional low-volume bright red blood per rectum on the toilet paper..  His endoscopies are up-to-date.  He is a patient of Dr. Daly.  As per the patient, his last outpatient meeting with Dr. Daly was on ; at that time, as per the patient, the plan was to \" double his water pills.\"  As per the patient, he has never been counseled regarding a low sodium diet.  Patient has not had a drink of alcohol in approximately 2 weeks.  Most recent EGD failed to reveal signs of portal hypertension.  He does have a history of adenomatous colon polyps.      PAST MEDICAL HISTORY:  Past Medical History:   Diagnosis Date    Abnormal results of liver function studies  10/20/2016    Arthritis     Asthma     Attempted suicide (HCC)     attempted 7 times    Avascular necrosis of bone of right hip 10/26/2017    Bipolar affective (HCC)     Blood in stool     Cavitary pneumonia     Chest pain     Daily    CHF (congestive heart failure), NYHA class III (HCC) 2014    COPD (chronic obstructive pulmonary disease) ([facility])     [facility] pulmonology    COPD exacerbation (HCC) 2014    CVA (cerebral vascular accident) ([facility])     x2 in     Depression 2015    Difficult intubation

## 2025-01-31 NOTE — PROGRESS NOTES
Patient's care taker Astrid, states patient needs a new script for Zonegran and so he hasn't been taking it for the last two months. She would like him to get a new script for that if possible.

## 2025-01-31 NOTE — PROGRESS NOTES
Adm to room 2106; instructed on call light; bed controls; phone; safety diet; activity; npo at mid.; hospital routine; rounding; need for home med list

## 2025-01-31 NOTE — PROGRESS NOTES
[facility]  Office: [phone]  Chris Waldron DO, Danny Valencia DO, Rich Kamara DO, Fran Espinoza DO, Bonnie Alamo MD, Shana Duffy MD, Norris Hope MD, Ramonita Jaimes MD,  Kemar Rosenberg MD, Hayley Blunt MD, Danna Thao MD,  Alex Villar DO, Shazia Yost MD, Abelardo Shearer MD, Darrius Waldron DO, Jen Ellington MD,  Miguel Tripp DO, Elizabeth Robison MD, Elida Ma MD, Keira Martin MD, Cristal Moreno MD,  Luis Stevens MD, Licha Kessler MD, Bety Cuellar MD, Crissy Cruz MD, Bayron Rizo MD, Nirmal Greco MD, Alvarado Green DO, Dane Covarrubias MD, Alex Valle MD, Mohsin Reza, MD, Shirley Waterhouse, CNP,  Faith Forrest CNP, Alvarado Cain, CNP,  Lin Hargrove, DNP, Candace Sotelo, CNP, Opal Mir, CNP, Avani Roberson, CNP, Dania Sung, CNP, Kindra Steele, PA-C, Malgorzata Shipley, PA-C, Arina Rodriguez, CNP, Ramon Crowder, CNP,  Sabrina Cm, CNP, Isa Cowan, CNP, Winifred Saba, CNP,  Mague Melo, CNS, Shikha Medeiros CNP, Vicky Jorgensen, CNP,   Danisha Vaz, CNP         [facility]   IN-PATIENT SERVICE   [facility]    Progress Note    1/31/2025    12:21 PM    Name:   Dariel Griffiths  MRN:     [id number]     Acct:      [id number]   Room:   2106/2106-01   Day:  0  Admit Date:  1/30/2025  5:57 PM    PCP:   No primary care provider on file.  Code Status:  Full Code    Subjective:     C/C:   Chief Complaint   Patient presents with   • Chest Pain     Px arrives complaining of chest pain/SOB that initiated around 1/8/24. Px notes that the SOB and chest pain has progressed w time. Px has paracentesis scheduled for tomorrow      Interval History Status: improved.     Patient underwent paracentesis with drainage of approximately 7 L of fluid today.  Continues to have chest pressure and shortness of breath but slightly improved after procedure.  Denies any nausea or vomiting, fevers or chills or acute complaints.    Brief History:

## 2025-01-31 NOTE — H&P
[facility]  Office: [phone]  Chris Waldron DO, Danny Valencia DO, Rich Kamara DO, Fran Espinoza DO, Bonnie Alamo MD, Shana Duffy MD, Norris Hope MD, Ramonita Jaimes MD,  Kemar Rosenberg MD, Hayley Blunt MD, Danna Thao MD,  Alex Villar DO, Shazia Yost MD, Abelardo Shearer MD, Darrius Waldron DO, Jen Ellington MD,  Miguel Tripp DO, Elizabeth Robison MD, Elida Ma MD, Keira Martin MD, Cristal Moreno MD,  Luis Stevens MD, Licha Kessler MD, Bety Cuellar MD, Crissy Cruz MD, Bayron Rizo MD, Nirmal Greco MD, Alvarado Green DO, Dane Covarrubias MD, Alex Valle MD, Mohsin Reza, MD, Shirley Waterhouse, CNP,  Faith Forrest CNP, Alvarado Cain, CNP,  Lin Hargrove, DNP, Candace Sotelo, CNP, Opal Mir, CNP, Avani Roberson, CNP, Dania Sung, CNP, Kindra Steele, PA-C, Malgorzata Shipley PA-C, Arina Rodriguez, CNP, Ramon Crowder, CNP,  Sabrina mC, CNP, Isa Cowan, CNP, Winifred Saba, CNP,  Mague Melo, CNS, Shikha Medeiros, CNP, Vicky Jorgensen, CNP,   Danisha Vaz, CNP         [facility]   IN-PATIENT SERVICE   [facility]    HISTORY AND PHYSICAL EXAMINATION            Date:   1/30/2025  Patient name:  Dariel Griffiths  Date of admission:  1/30/2025  5:57 PM  MRN:   [id number]  Account:  [id number]  YOB: 1960  PCP:    No primary care provider on file.  Room:   [place] [address]  Code Status:    Prior    Chief Complaint:     Chief Complaint   Patient presents with    Chest Pain     Px arrives complaining of chest pain/SOB that initiated around 1/8/24. Px notes that the SOB and chest pain has progressed w time. Px has paracentesis scheduled for tomorrow      History Obtained From:     patient, electronic medical record, caregiver    History of Present Illness:     Dariel BEGUM Tunde is a 64 y.o. Non- / non  male who presents with Chest Pain (Px arrives complaining of chest pain/SOB that initiated around 1/8/24.

## 2025-01-31 NOTE — CARE COORDINATION
Case Management Assessment  Initial Evaluation    Date/Time of Evaluation: 1/31/2025 1:24 PM  Assessment Completed by: RICKEY GARCIA RN    If patient is discharged prior to next notation, then this note serves as note for discharge by case management.    Patient Name: Dariel Griffiths                   YOB: 1960  Diagnosis: Shortness of breath [R06.02]  Alcoholic cirrhosis of liver with ascites (HCC) [K70.31]  Ascites due to alcoholic cirrhosis (HCC) [K70.31]                   Date / Time: 1/30/2025  5:57 PM    Patient Admission Status: Observation   Readmission Risk (Low < 19, Mod (19-27), High > 27): Readmission Risk Score: 13.5    Current PCP: No primary care provider on file.  PCP verified by CM? No (PCP list provided.)    Chart Reviewed: Yes      History Provided by: Patient  Patient Orientation: Alert and Oriented, Person, Place, Situation, Self    Patient Cognition: Alert    Hospitalization in the last 30 days (Readmission):  Yes    If yes, Readmission Assessment in  Navigator will be completed.    Advance Directives:      Code Status: Full Code   Patient's Primary Decision Maker is: Legal Next of Kin      Discharge Planning:    Patient lives with: Other (Comment) (caregiver) Type of Home: House  Primary Care Giver: Private caregiver  Patient Support Systems include: Friends/Neighbors   Current Financial resources: None  Current community resources: None  Current services prior to admission: Durable Medical Equipment            Current DME: Cane            Type of Home Care services:  None    ADLS  Prior functional level: Assistance with the following:, Housework, Cooking, Shopping  Current functional level: Assistance with the following:, Toileting, Cooking, Housework, Shopping, Mobility    PT AM-PAC:   /24  OT AM-PAC:   /24    Family can provide assistance at DC: No  Would you like Case Management to discuss the discharge plan with any other family members/significant others, and if

## 2025-01-31 NOTE — FLOWSHEET NOTE
Pt tolerated procedure without distress. 7650 ml of yellow ascitic fluid removed Dressing applied to procedure site. Pt returned to room in nad

## 2025-02-01 PROBLEM — E53.8 FOLATE DEFICIENCY: Status: ACTIVE | Noted: 2025-02-01

## 2025-02-01 LAB
ALBUMIN SERPL-MCNC: 2.5 G/DL (ref 3.5–5.2)
ALBUMIN/GLOB SERPL: 1 {RATIO} (ref 1–2.5)
ALP SERPL-CCNC: 120 U/L (ref 40–129)
ALT SERPL-CCNC: 35 U/L (ref 10–50)
ANION GAP SERPL CALCULATED.3IONS-SCNC: 7 MMOL/L (ref 9–16)
AST SERPL-CCNC: 81 U/L (ref 10–50)
BILIRUB DIRECT SERPL-MCNC: 1.8 MG/DL (ref 0–0.2)
BILIRUB INDIRECT SERPL-MCNC: 1.1 MG/DL
BILIRUB SERPL-MCNC: 2.8 MG/DL (ref 0–1.2)
BUN SERPL-MCNC: 7 MG/DL (ref 8–23)
CALCIUM SERPL-MCNC: 7.8 MG/DL (ref 8.8–10.2)
CHLORIDE SERPL-SCNC: 104 MMOL/L (ref 98–107)
CO2 SERPL-SCNC: 24 MMOL/L (ref 20–31)
CREAT SERPL-MCNC: 0.6 MG/DL (ref 0.7–1.2)
ERYTHROCYTE [DISTWIDTH] IN BLOOD BY AUTOMATED COUNT: 12.9 % (ref 11.8–14.4)
GFR, ESTIMATED: >90 ML/MIN/1.73M2
GLUCOSE SERPL-MCNC: 115 MG/DL (ref 82–115)
HCT VFR BLD AUTO: 30.4 % (ref 40.7–50.3)
HGB BLD-MCNC: 10.4 G/DL (ref 13–17)
INR PPP: 1.7
MAGNESIUM SERPL-MCNC: 2.2 MG/DL (ref 1.6–2.4)
MCH RBC QN AUTO: 35.4 PG (ref 25.2–33.5)
MCHC RBC AUTO-ENTMCNC: 34.2 G/DL (ref 28.4–34.8)
MCV RBC AUTO: 103.4 FL (ref 82.6–102.9)
NRBC BLD-RTO: 0 PER 100 WBC
PLATELET # BLD AUTO: 196 K/UL (ref 138–453)
PMV BLD AUTO: 9.8 FL (ref 8.1–13.5)
POTASSIUM SERPL-SCNC: 4 MMOL/L (ref 3.7–5.3)
PROT SERPL-MCNC: 5 G/DL (ref 6.6–8.7)
PROTHROMBIN TIME: 19.8 SEC (ref 11.5–14.2)
RBC # BLD AUTO: 2.94 M/UL (ref 4.21–5.77)
SODIUM SERPL-SCNC: 135 MMOL/L (ref 136–145)
WBC OTHER # BLD: 14.6 K/UL (ref 3.5–11.3)

## 2025-02-01 PROCEDURE — 80076 HEPATIC FUNCTION PANEL: CPT

## 2025-02-01 PROCEDURE — 6370000000 HC RX 637 (ALT 250 FOR IP): Performed by: INTERNAL MEDICINE

## 2025-02-01 PROCEDURE — G0378 HOSPITAL OBSERVATION PER HR: HCPCS

## 2025-02-01 PROCEDURE — 85027 COMPLETE CBC AUTOMATED: CPT

## 2025-02-01 PROCEDURE — 36415 COLL VENOUS BLD VENIPUNCTURE: CPT

## 2025-02-01 PROCEDURE — 94640 AIRWAY INHALATION TREATMENT: CPT

## 2025-02-01 PROCEDURE — 85610 PROTHROMBIN TIME: CPT

## 2025-02-01 PROCEDURE — 83735 ASSAY OF MAGNESIUM: CPT

## 2025-02-01 PROCEDURE — 6370000000 HC RX 637 (ALT 250 FOR IP)

## 2025-02-01 PROCEDURE — 96366 THER/PROPH/DIAG IV INF ADDON: CPT

## 2025-02-01 PROCEDURE — 2500000003 HC RX 250 WO HCPCS

## 2025-02-01 PROCEDURE — 6370000000 HC RX 637 (ALT 250 FOR IP): Performed by: NURSE PRACTITIONER

## 2025-02-01 PROCEDURE — 99232 SBSQ HOSP IP/OBS MODERATE 35: CPT | Performed by: INTERNAL MEDICINE

## 2025-02-01 PROCEDURE — 80048 BASIC METABOLIC PNL TOTAL CA: CPT

## 2025-02-01 RX ORDER — FUROSEMIDE 20 MG/1
20 TABLET ORAL ONCE
Status: COMPLETED | OUTPATIENT
Start: 2025-02-01 | End: 2025-02-01

## 2025-02-01 RX ADMIN — FUROSEMIDE 20 MG: 20 TABLET ORAL at 14:23

## 2025-02-01 RX ADMIN — SODIUM CHLORIDE, PRESERVATIVE FREE 10 ML: 5 INJECTION INTRAVENOUS at 10:14

## 2025-02-01 RX ADMIN — ALBUTEROL SULFATE 2 PUFF: 90 AEROSOL, METERED RESPIRATORY (INHALATION) at 18:29

## 2025-02-01 RX ADMIN — ERGOCALCIFEROL 50000 UNITS: 1.25 CAPSULE ORAL at 09:04

## 2025-02-01 RX ADMIN — PROPRANOLOL HYDROCHLORIDE 10 MG: 10 TABLET ORAL at 21:17

## 2025-02-01 RX ADMIN — ZONISAMIDE 100 MG: 100 CAPSULE ORAL at 09:04

## 2025-02-01 RX ADMIN — SODIUM CHLORIDE, PRESERVATIVE FREE 10 ML: 5 INJECTION INTRAVENOUS at 21:17

## 2025-02-01 RX ADMIN — Medication 100 MG: at 09:04

## 2025-02-01 RX ADMIN — SPIRONOLACTONE 50 MG: 25 TABLET ORAL at 21:17

## 2025-02-01 RX ADMIN — FOLIC ACID 1 MG: 1 TABLET ORAL at 09:04

## 2025-02-01 RX ADMIN — SPIRONOLACTONE 50 MG: 25 TABLET ORAL at 10:38

## 2025-02-01 RX ADMIN — BUDESONIDE AND FORMOTEROL FUMARATE DIHYDRATE 2 PUFF: 160; 4.5 AEROSOL RESPIRATORY (INHALATION) at 18:29

## 2025-02-01 RX ADMIN — POLYETHYLENE GLYCOL (3350) 17 G: 17 POWDER, FOR SOLUTION ORAL at 13:28

## 2025-02-01 RX ADMIN — PANTOPRAZOLE SODIUM 40 MG: 40 TABLET, DELAYED RELEASE ORAL at 06:32

## 2025-02-01 RX ADMIN — BUDESONIDE AND FORMOTEROL FUMARATE DIHYDRATE 2 PUFF: 160; 4.5 AEROSOL RESPIRATORY (INHALATION) at 07:48

## 2025-02-01 RX ADMIN — PROPRANOLOL HYDROCHLORIDE 10 MG: 10 TABLET ORAL at 13:36

## 2025-02-01 RX ADMIN — ALBUTEROL SULFATE 2 PUFF: 90 AEROSOL, METERED RESPIRATORY (INHALATION) at 15:27

## 2025-02-01 RX ADMIN — ALBUTEROL SULFATE 2 PUFF: 90 AEROSOL, METERED RESPIRATORY (INHALATION) at 07:48

## 2025-02-01 NOTE — PLAN OF CARE
Problem: Respiratory - Adult  Goal: Able to breathe comfortably  1/31/2025 2046 by Isaiah Go RCP  Outcome: Progressing     Problem: Respiratory - Adult  Goal: Patient's breath sounds will be clear and equal  1/31/2025 2046 by Isaiah Go RCP  Outcome: Progressing

## 2025-02-01 NOTE — PROGRESS NOTES
[facility] - GI Progress Note    Patient:   Dariel Griffiths   :    1960   Med Rec#:                 [id number]   Date:     2025  Consultant:   ALEXUS RODRIGUEZ MD        SUBJECTIVE:     Patient diuresing as per nursing.  Patient has no new complaints.    CURRENT MEDICATIONS:  .  Scheduled Meds:   spironolactone  50 mg Oral BID    propranolol  10 mg Oral TID    zonisamide  100 mg Oral Daily    thiamine mononitrate  100 mg Oral Daily    pantoprazole  40 mg Oral QAM AC    furosemide  20 mg Oral Daily    folic acid  1 mg Oral Daily    vitamin D  50,000 Units Oral Weekly    sodium chloride flush  5-40 mL IntraVENous 2 times per day    enoxaparin  40 mg SubCUTAneous Daily    albuterol sulfate HFA  2 puff Inhalation TID RT    budesonide-formoterol  2 puff Inhalation BID RT     .  Continuous Infusions:   sodium chloride Stopped (25 0253)     .  PRN Meds:morphine, prochlorperazine, sodium chloride flush, sodium chloride, potassium chloride **OR** potassium alternative oral replacement **OR** potassium chloride, magnesium sulfate, ondansetron **OR** ondansetron, polyethylene glycol, acetaminophen **OR** acetaminophen, LORazepam **OR** LORazepam **OR** LORazepam **OR** LORazepam **OR** LORazepam **OR** LORazepam **OR** LORazepam **OR** LORazepam, albuterol sulfate HFA  .      PHYSICAL EXAM:   .    Temp (24hrs), Av °F (36.7 °C), Min:97.7 °F (36.5 °C), Max:98.2 °F (36.8 °C)    BP 96/71   Pulse 98   Temp 97.9 °F (36.6 °C)   Resp 20   Ht 1.905 m (6' 3\")   Wt 75.1 kg (165 lb 8 oz)   SpO2 96%   BMI 20.69 kg/m²    .  General:    Alert, NAD     Lungs:   Wheezing bilaterally    Heart:   RRR  Abdomen:   Soft, nontender, distended with ascites, shifting dullness present, fluid wave present,, bowel sounds normal, no masses  Extremities:   No clubbing, No cyanosis, slightly improved bilateral edema  Skin:    No jaundice       LABS and IMAGING:     CBC  Recent Labs     25  1801 25  0533

## 2025-02-01 NOTE — PLAN OF CARE
Problem: Discharge Planning  Goal: Discharge to home or other facility with appropriate resources  2/1/2025 0552 by Tamiko Alba, RN  Outcome: Progressing  Flowsheets (Taken 2/1/2025 0552)  Discharge to home or other facility with appropriate resources:   Identify barriers to discharge with patient and caregiver   Arrange for needed discharge resources and transportation as appropriate   Identify discharge learning needs (meds, wound care, etc)   Arrange for interpreters to assist at discharge as needed   Refer to discharge planning if patient needs post-hospital services based on physician order or complex needs related to functional status, cognitive ability or social support system     Problem: Safety - Adult  Goal: Free from fall injury  2/1/2025 0552 by Tamiko Alba, RN  Outcome: Progressing  Flowsheets (Taken 2/1/2025 0552)  Free From Fall Injury: Instruct family/caregiver on patient safety

## 2025-02-01 NOTE — PROGRESS NOTES
12 beat run of vtach. Patient complains of pain to upper gastric area, bp/pulse assessed and reported to Calfsavi NP  2002- orders placed for stat EKG stat labs including mag, potassium, and troponin  2053- Calfee NP made aware Trop 19  mag 1.2 prn sliding scale already in place.  potassium 3.3 prn sliding scale already in place.

## 2025-02-01 NOTE — PLAN OF CARE
Problem: Chronic Conditions and Co-morbidities  Goal: Patient's chronic conditions and co-morbidity symptoms are monitored and maintained or improved  Outcome: Progressing     Problem: Discharge Planning  Goal: Discharge to home or other facility with appropriate resources  2/1/2025 1219 by Shruti Guzman RN  Outcome: Progressing  2/1/2025 0552 by Tamiko Alba RN  Outcome: Progressing  Flowsheets (Taken 2/1/2025 0552)  Discharge to home or other facility with appropriate resources:   Identify barriers to discharge with patient and caregiver   Arrange for needed discharge resources and transportation as appropriate   Identify discharge learning needs (meds, wound care, etc)   Arrange for interpreters to assist at discharge as needed   Refer to discharge planning if patient needs post-hospital services based on physician order or complex needs related to functional status, cognitive ability or social support system     Problem: Pain  Goal: Verbalizes/displays adequate comfort level or baseline comfort level  Outcome: Progressing     Problem: Safety - Adult  Goal: Free from fall injury  2/1/2025 1219 by Shruti Guzman RN  Outcome: Progressing  2/1/2025 0552 by Tamiko Alba RN  Outcome: Progressing  Flowsheets (Taken 2/1/2025 0552)  Free From Fall Injury: Instruct family/caregiver on patient safety

## 2025-02-01 NOTE — PROGRESS NOTES
[facility]  Office: [phone]  Chris Waldron DO, Danny Valencia DO, Rich Kamara DO, Fran Espinoza DO, Bonnie Alamo MD, Shana Duffy MD, Norris Hope MD, Ramonita Jaimes MD,  Kemar Rosenberg MD, Hayley Blunt MD, Danna Thao MD,  Alex Villar DO, Shazia Yost MD, Abelardo Shearer MD, Darrius Waldron DO, Jen Ellington MD,  Miguel Tripp DO, Elizabeth Robison MD, Elida Ma MD, Keira Martin MD, Cristal Moreno MD,  Luis Stevens MD, Licha Kessler MD, Bety Cuellar MD, Crissy Cruz MD, Bayron Rizo MD, Nirmal Greco MD, Alvarado Green DO, Dane Covarrubias MD, Alex Valle MD, Mohsin Reza, MD, Shirley Waterhouse, CNP,  Faith Forrest CNP, Alvarado Cain, CNP,  Lin Hargrove, DNP, Candace Sotelo, CNP, Opal Mir, CNP, Avani Roberson, CNP, Dania Sung, CNP, Kindra Steele, PA-C, Malgorzata Shipley, PA-C, Arina Rodriguez, CNP, Ramon Crowder, CNP,  Sabrina Cm, CNP, Isa Cowan, CNP, Winifred Saba, CNP,  Mague Melo, CNS, Shikha Medeiros CNP, Vicky Jorgensen, CNP,   Danisha Vaz, CNP         [facility]   IN-PATIENT SERVICE   [facility]    Progress Note    2/1/2025    8:45 AM    Name:   Dariel Griffiths  MRN:     [id number]     Acct:      [id number]   Room:   2106/2106-01   Day:  0  Admit Date:  1/30/2025  5:57 PM    PCP:   No primary care provider on file.  Code Status:  Full Code    Subjective:     C/C:   Chief Complaint   Patient presents with   • Chest Pain     Px arrives complaining of chest pain/SOB that initiated around 1/8/24. Px notes that the SOB and chest pain has progressed w time. Px has paracentesis scheduled for tomorrow      Interval History Status: improved.     Patient continues to have abdominal distention, feels like fluid is reaccumulating.  Denies any chest pain, shortness of breath, nausea or vomiting, fevers or chills.  Continues to have poor appetite.    Brief History:     This is a 64-year-old male that presents with

## 2025-02-01 NOTE — PLAN OF CARE
Problem: Chronic Conditions and Co-morbidities  Goal: Patient's chronic conditions and co-morbidity symptoms are monitored and maintained or improved  Outcome: Progressing     Problem: Discharge Planning  Goal: Discharge to home or other facility with appropriate resources  Outcome: Progressing     Problem: Pain  Goal: Verbalizes/displays adequate comfort level or baseline comfort level  Outcome: Progressing     Problem: Safety - Adult  Goal: Free from fall injury  Outcome: Progressing     Problem: Respiratory - Adult  Goal: Able to breathe comfortably  1/31/2025 0859 by Vale Corea RCP  Outcome: Progressing  Goal: Patient's breath sounds will be clear and equal  1/31/2025 0859 by Vale Corea RCP  Outcome: Progressing

## 2025-02-02 VITALS
BODY MASS INDEX: 20.58 KG/M2 | OXYGEN SATURATION: 96 % | RESPIRATION RATE: 18 BRPM | HEART RATE: 100 BPM | SYSTOLIC BLOOD PRESSURE: 118 MMHG | DIASTOLIC BLOOD PRESSURE: 81 MMHG | WEIGHT: 165.5 LBS | TEMPERATURE: 97.5 F | HEIGHT: 75 IN

## 2025-02-02 LAB
EKG ATRIAL RATE: 99 BPM
EKG P AXIS: 76 DEGREES
EKG P-R INTERVAL: 184 MS
EKG Q-T INTERVAL: 392 MS
EKG QRS DURATION: 82 MS
EKG QTC CALCULATION (BAZETT): 503 MS
EKG R AXIS: 49 DEGREES
EKG T AXIS: 77 DEGREES
EKG VENTRICULAR RATE: 99 BPM

## 2025-02-02 PROCEDURE — 6370000000 HC RX 637 (ALT 250 FOR IP)

## 2025-02-02 PROCEDURE — 94761 N-INVAS EAR/PLS OXIMETRY MLT: CPT

## 2025-02-02 PROCEDURE — 6370000000 HC RX 637 (ALT 250 FOR IP): Performed by: NURSE PRACTITIONER

## 2025-02-02 PROCEDURE — 6370000000 HC RX 637 (ALT 250 FOR IP): Performed by: INTERNAL MEDICINE

## 2025-02-02 PROCEDURE — 93010 ELECTROCARDIOGRAM REPORT: CPT | Performed by: INTERNAL MEDICINE

## 2025-02-02 PROCEDURE — 94640 AIRWAY INHALATION TREATMENT: CPT

## 2025-02-02 PROCEDURE — G0378 HOSPITAL OBSERVATION PER HR: HCPCS

## 2025-02-02 PROCEDURE — 99238 HOSP IP/OBS DSCHRG MGMT 30/<: CPT | Performed by: INTERNAL MEDICINE

## 2025-02-02 PROCEDURE — 2500000003 HC RX 250 WO HCPCS

## 2025-02-02 RX ORDER — SPIRONOLACTONE 50 MG/1
50 TABLET, FILM COATED ORAL 2 TIMES DAILY
Qty: 60 TABLET | Refills: 3 | Status: SHIPPED | OUTPATIENT
Start: 2025-02-02

## 2025-02-02 RX ORDER — GUAIFENESIN/DEXTROMETHORPHAN 100-10MG/5
5 SYRUP ORAL EVERY 4 HOURS PRN
Status: DISCONTINUED | OUTPATIENT
Start: 2025-02-02 | End: 2025-02-02 | Stop reason: HOSPADM

## 2025-02-02 RX ADMIN — FOLIC ACID 1 MG: 1 TABLET ORAL at 08:22

## 2025-02-02 RX ADMIN — PROPRANOLOL HYDROCHLORIDE 10 MG: 10 TABLET ORAL at 08:22

## 2025-02-02 RX ADMIN — SODIUM CHLORIDE, PRESERVATIVE FREE 10 ML: 5 INJECTION INTRAVENOUS at 08:23

## 2025-02-02 RX ADMIN — ALBUTEROL SULFATE 2 PUFF: 90 AEROSOL, METERED RESPIRATORY (INHALATION) at 10:08

## 2025-02-02 RX ADMIN — PANTOPRAZOLE SODIUM 40 MG: 40 TABLET, DELAYED RELEASE ORAL at 06:31

## 2025-02-02 RX ADMIN — Medication 100 MG: at 08:22

## 2025-02-02 RX ADMIN — BUDESONIDE AND FORMOTEROL FUMARATE DIHYDRATE 2 PUFF: 160; 4.5 AEROSOL RESPIRATORY (INHALATION) at 10:08

## 2025-02-02 RX ADMIN — FUROSEMIDE 20 MG: 20 TABLET ORAL at 08:22

## 2025-02-02 RX ADMIN — ZONISAMIDE 100 MG: 100 CAPSULE ORAL at 08:22

## 2025-02-02 RX ADMIN — GUAIFENESIN AND DEXTROMETHORPHAN 5 ML: 100; 10 SYRUP ORAL at 02:00

## 2025-02-02 NOTE — CARE COORDINATION
Discharge planning    Patient to have standing order with IR at [facility] for paracentesis. Added the following discharge instructions:    [facility] HAS A STANDING ORDER FOR PARACENTESIS. CALL IF HAVE SYMPTOMS OF ABDOMINAL DISTENTION AND SHORTNESS OF BREATH    CALL INTERVENTIONAL RADIOLOGY [phone] OPTION 1 TO ARRANGE

## 2025-02-02 NOTE — PROGRESS NOTES
Patient discharged home with caretaker Astrid. Discharge instructions given and all questions answered. All belongings with pt.

## 2025-02-02 NOTE — PROGRESS NOTES
[facility] - GI Progress Note    Patient:   Dariel Griffiths   :    1960   Med Rec#:                 [id number]   Date:     2025  Consultant:   ALEXUS RODRIGUEZ MD        SUBJECTIVE:     Patient has no GI complaints at this time.          CURRENT MEDICATIONS:  .  Scheduled Meds:   spironolactone  50 mg Oral BID    zonisamide  100 mg Oral Daily    thiamine mononitrate  100 mg Oral Daily    pantoprazole  40 mg Oral QAM AC    furosemide  20 mg Oral Daily    folic acid  1 mg Oral Daily    vitamin D  50,000 Units Oral Weekly    sodium chloride flush  5-40 mL IntraVENous 2 times per day    enoxaparin  40 mg SubCUTAneous Daily    albuterol sulfate HFA  2 puff Inhalation TID RT    budesonide-formoterol  2 puff Inhalation BID RT     .  Continuous Infusions:   sodium chloride Stopped (25 0253)     .  PRN Meds:guaiFENesin-dextromethorphan, morphine, prochlorperazine, sodium chloride flush, sodium chloride, potassium chloride **OR** potassium alternative oral replacement **OR** potassium chloride, magnesium sulfate, ondansetron **OR** ondansetron, polyethylene glycol, acetaminophen **OR** acetaminophen, LORazepam **OR** LORazepam **OR** LORazepam **OR** LORazepam **OR** LORazepam **OR** LORazepam **OR** LORazepam **OR** LORazepam, albuterol sulfate HFA  .      PHYSICAL EXAM:   .    Temp (24hrs), Av °F (36.7 °C), Min:97.5 °F (36.4 °C), Max:98.6 °F (37 °C)    /81   Pulse 100   Temp 97.5 °F (36.4 °C) (Oral)   Resp 18   Ht 1.905 m (6' 3\")   Wt 75.1 kg (165 lb 8 oz)   SpO2 96%   BMI 20.69 kg/m²    .  General:    Alert, NAD     Lungs: Bilateral wheezing  Heart:   Regular  Abdomen:   Soft, nontender, shifting dullness and fluid wave are present, bowel sounds normal, no masses  Extremities:   No clubbing, No cyanosis, IMPROVED bilateral edema  Skin:    No jaundice       LABS and IMAGING:     CBC  Recent Labs     25  1801 25  0533 25  0521   WBC 18.0* 15.3* 14.6*   HGB 13.5

## 2025-02-02 NOTE — DISCHARGE SUMMARY
[facility]  Office: [phone]  Chris Waldron DO, Danny Valencia DO, Rich Kamara DO, Fran Espinoza DO, Bonnie Alamo MD, Shana Duffy MD, Norris Hope MD, Ramonita Jaimes MD,  Kemar Rosenberg MD, Hayley Blunt MD, Danna Thao MD,  Alex Villar DO, Shazia Yost MD, Abelardo Shearer MD, Darrius Waldron DO, Jen Ellington MD,  Miguel Tripp DO, Elizabeth Robison MD, Elida Ma MD, Keira Martin MD, Cristal Moreno MD,  Luis Stevens MD, Licha Kessler MD, Bety Cuellar MD, Crissy Cruz MD, Bayron Rizo MD, Nirmal Greco MD, Alvarado Green DO, Dane Covarrubias MD, Alex Valle MD, Mohsin Reza, MD, Shirley Waterhouse, CNP,  Faith Forrest CNP, Alvarado Cain, CNP,  Lin Hargrove, BRY, Candace Sotelo, CNP, Opal Mir, CNP, Avani Roberson, CNP, aDnia Sung, CNP, Kindra Steele, PA-C, Malgorzata Shipley PA-C, Arina Rodriguez, CNP, Ramon Crowder, CNP,  Sabrina Cm, CNP, Isa Cowan, CNP, Winifred Saba, CNP,  Mague Melo, CNS, Shikha Medeiros, CNP, Vicky Jorgensen, CNP,   Danisha Vaz, CNP         [facility]   IN-PATIENT SERVICE   [facility]    Discharge Summary     Patient ID: Dariel Griffiths  :  1960   MRN: [id number]     ACCOUNT:  [id number]   Patient's PCP: No primary care provider on file.  Admit Date: 2025   Discharge Date: 2025     Length of Stay: 0  Code Status:  Full Code  Admitting Physician: iRch Kamara DO  Discharge Physician: Rich Kamara DO     Active Discharge Diagnoses:     Hospital Problem Lists:  Principal Problem:    Alcoholic cirrhosis of liver with ascites (HCC)  Active Problems:    COPD without exacerbation (HCC)    Atypical chest pain    Abnormal results of liver function studies     Tobacco dependence    Decompensated cirrhosis (HCC)    Leukocytosis    Swelling of both lower extremities    Folate deficiency  Resolved Problems:    * No resolved hospital problems. *      Admission Condition:

## 2025-02-02 NOTE — PROGRESS NOTES
[facility]  Office: [phone]  Chris Waldron DO, Danny Valencia DO, Rich Kamara DO, Fran Espinoza DO, Bonnie Alamo MD, Shana Duffy MD, Norris Hope MD, Ramonita Jaimes MD,  Kemar Rosenberg MD, Hayley Blunt MD, Danna Thao MD,  Alex Villar DO, Shazia Yost MD, Abelardo Shearer MD, Darrius Waldron DO, Jen Ellington MD,  Miguel Tripp DO, Elizabeth Robison MD, Elida Ma MD, Keira Martin MD, Cristal Moreno MD,  Luis Stevens MD, Licha Kessler MD, Bety Cuellar MD, Crissy Cruz MD, Bayron Rizo MD, Nirmal Greco MD, Alvarado Green DO, Dane Covarrubias MD, Alex Valle MD, Mohsin Reza, MD, Shirley Waterhouse, CNP,  Faith Forrest CNP, Alvarado Cain, CNP,  Lin Hargrove, DNP, Candace Sotelo, CNP, Opal Mir, CNP, Avani Roberson, CNP, Dania Sung, CNP, Kindra Steele, PA-C, Malgorzata Shipley, PA-C, Arina Rodriguez, CNP, Ramon Crwoder, CNP,  Sabrina Cm, CNP, Isa Cowan, CNP, Winifred Saba, CNP,  Mague Melo, CNS, hSikha Medeiros CNP, Vicky Jorgensen, CNP,   Danisha Vaz, CNP         [facility]   IN-PATIENT SERVICE   [facility]    Progress Note    2/2/2025    8:31 AM    Name:   Dariel Griffiths  MRN:     [id number]     Acct:      [id number]   Room:   2106/2106-01   Day:  0  Admit Date:  1/30/2025  5:57 PM    PCP:   No primary care provider on file.  Code Status:  Full Code    Subjective:     C/C:   Chief Complaint   Patient presents with   • Chest Pain     Px arrives complaining of chest pain/SOB that initiated around 1/8/24. Px notes that the SOB and chest pain has progressed w time. Px has paracentesis scheduled for tomorrow      Interval History Status: improved.     Patient is resting in bed and denies any complaints of chest pain, shortness of breath, nausea vomiting, fevers chills or acute complaints.    Brief History:     This is a 64-year-old male that presents with increasing chest pressure and shortness of breath with

## 2025-02-02 NOTE — PLAN OF CARE
Problem: Chronic Conditions and Co-morbidities  Goal: Patient's chronic conditions and co-morbidity symptoms are monitored and maintained or improved  Outcome: Progressing  Flowsheets (Taken 2/2/2025 0544)  Care Plan - Patient's Chronic Conditions and Co-Morbidity Symptoms are Monitored and Maintained or Improved:   Monitor and assess patient's chronic conditions and comorbid symptoms for stability, deterioration, or improvement   Collaborate with multidisciplinary team to address chronic and comorbid conditions and prevent exacerbation or deterioration   Update acute care plan with appropriate goals if chronic or comorbid symptoms are exacerbated and prevent overall improvement and discharge     Problem: Safety - Adult  Goal: Free from fall injury  Outcome: Progressing  Flowsheets (Taken 2/2/2025 0544)  Free From Fall Injury: Instruct family/caregiver on patient safety

## 2025-02-02 NOTE — DISCHARGE INSTRUCTIONS
[facility] HAS A STANDING ORDER FOR PARACENTESIS. CALL IF HAVE SYMPTOMS OF ABDOMINAL DISTENTION AND SHORTNESS OF BREATH    CALL INTERVENTIONAL RADIOLOGY -[phone] OPTION 1 TO ARRANGE

## 2025-02-02 NOTE — RT PROTOCOL NOTE
RT Inhaler-Nebulizer Bronchodilator Protocol Note    There is a bronchodilator order in the chart from a provider indicating to follow the RT Bronchodilator Protocol and there is an “Initiate RT Inhaler-Nebulizer Bronchodilator Protocol” order as well (see protocol at bottom of note).    CXR Findings:  No results found.    The findings from the last RT Protocol Assessment were as follows:   History Pulmonary Disease: Chronic pulmonary disease  Respiratory Pattern: Regular pattern and RR 12-20 bpm  Breath Sounds: Inspiratory and expiratory or bilateral wheezing and/or rhonchi  Cough: Strong, spontaneous, non-productive  Indication for Bronchodilator Therapy: Wheezing associated with pulm disorder, On home bronchodilators  Bronchodilator Assessment Score: 8    Aerosolized bronchodilator medication orders have been revised according to the RT Inhaler-Nebulizer Bronchodilator Protocol below.    Respiratory Therapist to perform RT Therapy Protocol Assessment initially then follow the protocol.  Repeat RT Therapy Protocol Assessment PRN for score 0-3 or on second treatment, BID, and PRN for scores above 3.    No Indications - adjust the frequency to every 6 hours PRN wheezing or bronchospasm, if no treatments needed after 48 hours then discontinue using Per Protocol order mode.     If indication present, adjust the RT bronchodilator orders based on the Bronchodilator Assessment Score as indicated below.  Use Inhaler orders unless patient has one or more of the following: on home nebulizer, not able to hold breath for 10 seconds, is not alert and oriented, cannot activate and use MDI correctly, or respiratory rate 25 breaths per minute or more, then use the equivalent nebulizer order(s) with same Frequency and PRN reasons based on the score.  If a patient is on this medication at home then do not decrease Frequency below that used at home.    0-3 - enter or revise RT bronchodilator order(s) to equivalent RT Bronchodilator 
RT Inhaler-Nebulizer Bronchodilator Protocol Note    There is a bronchodilator order in the chart from a provider indicating to follow the RT Bronchodilator Protocol and there is an “Initiate RT Inhaler-Nebulizer Bronchodilator Protocol” order as well (see protocol at bottom of note).    CXR Findings:  XR CHEST PORTABLE    Result Date: 1/30/2025  No acute process.       The findings from the last RT Protocol Assessment were as follows:   History Pulmonary Disease: Chronic pulmonary disease  Respiratory Pattern: Regular pattern and RR 12-20 bpm  Breath Sounds: Inspiratory and expiratory or bilateral wheezing and/or rhonchi  Cough: Strong, spontaneous, non-productive  Indication for Bronchodilator Therapy: Decreased or absent breath sounds, Wheezing associated with pulm disorder  Bronchodilator Assessment Score: 8    Aerosolized bronchodilator medication orders have been revised according to the RT Inhaler-Nebulizer Bronchodilator Protocol below.    Respiratory Therapist to perform RT Therapy Protocol Assessment initially then follow the protocol.  Repeat RT Therapy Protocol Assessment PRN for score 0-3 or on second treatment, BID, and PRN for scores above 3.    No Indications - adjust the frequency to every 6 hours PRN wheezing or bronchospasm, if no treatments needed after 48 hours then discontinue using Per Protocol order mode.     If indication present, adjust the RT bronchodilator orders based on the Bronchodilator Assessment Score as indicated below.  Use Inhaler orders unless patient has one or more of the following: on home nebulizer, not able to hold breath for 10 seconds, is not alert and oriented, cannot activate and use MDI correctly, or respiratory rate 25 breaths per minute or more, then use the equivalent nebulizer order(s) with same Frequency and PRN reasons based on the score.  If a patient is on this medication at home then do not decrease Frequency below that used at home.    0-3 - enter or revise 
RT Inhaler-Nebulizer Bronchodilator Protocol Note    There is a bronchodilator order in the chart from a provider indicating to follow the RT Bronchodilator Protocol and there is an “Initiate RT Inhaler-Nebulizer Bronchodilator Protocol” order as well (see protocol at bottom of note).    CXR Findings:  XR CHEST PORTABLE    Result Date: 1/30/2025  No acute process.       The findings from the last RT Protocol Assessment were as follows:   History Pulmonary Disease: Chronic pulmonary disease  Respiratory Pattern: Regular pattern and RR 12-20 bpm  Breath Sounds: Inspiratory and expiratory or bilateral wheezing and/or rhonchi  Cough: Strong, spontaneous, non-productive  Indication for Bronchodilator Therapy: On home bronchodilators, Wheezing associated with pulm disorder  Bronchodilator Assessment Score: 8    Aerosolized bronchodilator medication orders have been revised according to the RT Inhaler-Nebulizer Bronchodilator Protocol below.    Respiratory Therapist to perform RT Therapy Protocol Assessment initially then follow the protocol.  Repeat RT Therapy Protocol Assessment PRN for score 0-3 or on second treatment, BID, and PRN for scores above 3.    No Indications - adjust the frequency to every 6 hours PRN wheezing or bronchospasm, if no treatments needed after 48 hours then discontinue using Per Protocol order mode.     If indication present, adjust the RT bronchodilator orders based on the Bronchodilator Assessment Score as indicated below.  Use Inhaler orders unless patient has one or more of the following: on home nebulizer, not able to hold breath for 10 seconds, is not alert and oriented, cannot activate and use MDI correctly, or respiratory rate 25 breaths per minute or more, then use the equivalent nebulizer order(s) with same Frequency and PRN reasons based on the score.  If a patient is on this medication at home then do not decrease Frequency below that used at home.    0-3 - enter or revise RT 
bronchodilator order(s) to equivalent RT Bronchodilator order with Frequency of every 4 hours PRN for wheezing or increased work of breathing using Per Protocol order mode.        4-6 - enter or revise RT Bronchodilator order(s) to two equivalent RT bronchodilator orders with one order with BID Frequency and one order with Frequency of every 4 hours PRN wheezing or increased work of breathing using Per Protocol order mode.        7-10 - enter or revise RT Bronchodilator order(s) to two equivalent RT bronchodilator orders with one order with TID Frequency and one order with Frequency of every 4 hours PRN wheezing or increased work of breathing using Per Protocol order mode.       11-13 - enter or revise RT Bronchodilator order(s) to one equivalent RT bronchodilator order with QID Frequency and an Albuterol order with Frequency of every 4 hours PRN wheezing or increased work of breathing using Per Protocol order mode.      Greater than 13 - enter or revise RT Bronchodilator order(s) to one equivalent RT bronchodilator order with every 4 hours Frequency and an Albuterol order with Frequency of every 2 hours PRN wheezing or increased work of breathing using Per Protocol order mode.     RT to enter RT Home Evaluation for COPD & MDI Assessment order using Per Protocol order mode.    Electronically signed by LEENA GUADARRAMA RCP on 1/30/2025 at 11:18 PM

## 2025-02-02 NOTE — PLAN OF CARE
Problem: Chronic Conditions and Co-morbidities  Goal: Patient's chronic conditions and co-morbidity symptoms are monitored and maintained or improved  2/2/2025 1105 by Shruti Guzman RN  Outcome: Progressing  2/2/2025 0544 by Tamiko Alba RN  Outcome: Progressing  Flowsheets (Taken 2/2/2025 0544)  Care Plan - Patient's Chronic Conditions and Co-Morbidity Symptoms are Monitored and Maintained or Improved:   Monitor and assess patient's chronic conditions and comorbid symptoms for stability, deterioration, or improvement   Collaborate with multidisciplinary team to address chronic and comorbid conditions and prevent exacerbation or deterioration   Update acute care plan with appropriate goals if chronic or comorbid symptoms are exacerbated and prevent overall improvement and discharge     Problem: Discharge Planning  Goal: Discharge to home or other facility with appropriate resources  Outcome: Progressing     Problem: Pain  Goal: Verbalizes/displays adequate comfort level or baseline comfort level  Outcome: Progressing     Problem: Safety - Adult  Goal: Free from fall injury  2/2/2025 1105 by Shruti Guzman RN  Outcome: Progressing  2/2/2025 0544 by Tamiko Alba RN  Outcome: Progressing  Flowsheets (Taken 2/2/2025 0544)  Free From Fall Injury: Instruct family/caregiver on patient safety

## 2025-02-03 ENCOUNTER — HOSPITAL ENCOUNTER (OUTPATIENT)
Dept: ULTRASOUND IMAGING | Age: 65
Discharge: HOME | End: 2025-02-05
Payer: MEDICARE

## 2025-02-03 VITALS — HEART RATE: 88 BPM | DIASTOLIC BLOOD PRESSURE: 68 MMHG | SYSTOLIC BLOOD PRESSURE: 124 MMHG

## 2025-02-03 DIAGNOSIS — K70.31 ALCOHOLIC CIRRHOSIS OF LIVER WITH ASCITES (HCC): ICD-10-CM

## 2025-02-03 PROCEDURE — 49083 ABD PARACENTESIS W/IMAGING: CPT

## 2025-02-03 NOTE — BRIEF OP NOTE
Brief Postoperative Note    Darieleric Griffiths  YOB: 1960  [id number]    Pre-operative Diagnosis: ascites    Post-operative Diagnosis: Same    Procedure: para    Anesthesia: Local    Surgeons/Assistants: Margarito    Estimated Blood Loss: 0    Complications: None    Specimens: Was Not Obtained    Findings: straw color fluid    Electronically signed by Robb Pimentel MD on 2/3/2025 at 1:14 PM

## 2025-02-03 NOTE — FLOWSHEET NOTE
Pt tolerated procedure without distress. 6.5 liters of yellow ascitic fluid removed Dressing applied to procedure site. Pt did not want to stay for albumin r/t transportation availability. Discharge instructions reviewed understanding verbalized, pt released ambulatory in nad.

## 2025-02-13 ENCOUNTER — HOSPITAL ENCOUNTER (OUTPATIENT)
Dept: INTERVENTIONAL RADIOLOGY/VASCULAR | Age: 65
Discharge: HOME | End: 2025-02-15
Payer: MEDICARE

## 2025-02-13 DIAGNOSIS — K70.31 ALCOHOLIC CIRRHOSIS OF LIVER WITH ASCITES (HCC): ICD-10-CM

## 2025-02-13 DIAGNOSIS — R18.8 ASCITES OF LIVER: ICD-10-CM

## 2025-02-13 PROCEDURE — 49083 ABD PARACENTESIS W/IMAGING: CPT

## 2025-02-13 PROCEDURE — 6360000002 HC RX W HCPCS: Performed by: RADIOLOGY

## 2025-02-13 PROCEDURE — P9047 ALBUMIN (HUMAN), 25%, 50ML: HCPCS | Performed by: RADIOLOGY

## 2025-02-13 RX ORDER — ALBUMIN (HUMAN) 12.5 G/50ML
50 SOLUTION INTRAVENOUS ONCE
Status: COMPLETED | OUTPATIENT
Start: 2025-02-13 | End: 2025-02-13

## 2025-02-13 RX ADMIN — ALBUMIN (HUMAN) 50 G: 0.25 INJECTION, SOLUTION INTRAVENOUS at 09:00

## 2025-02-13 NOTE — BRIEF OP NOTE
Brief Postoperative Note for Paracentesis    Dariel Griffiths  YOB: 1960  [id number]    Pre-operative Diagnosis: Ascites      Post-operative Diagnosis: Same    Procedure: Ultrasound guided Paracentesis     Anesthesia: 1% Lidocaine     Surgeons/Assistants: ALECIA VERGARA MD    Complications: none    Specimens: were not obtained    Ultrasound guided paracentesis performed. 6800 ml clear yellow fluid obtained from RLQ.  Dressing applied.  Vital signs were reviewed and were stable after the procedure.  Discharged to home, post Albumin 50 gms IV.      Electronically signed by ALECIA VERGARA MD on 2/13/2025 at 9:44 AM

## 2025-02-20 ENCOUNTER — APPOINTMENT (OUTPATIENT)
Dept: GENERAL RADIOLOGY | Age: 65
DRG: 871 | End: 2025-02-20
Payer: MEDICARE

## 2025-02-20 ENCOUNTER — HOSPITAL ENCOUNTER (INPATIENT)
Age: 65
LOS: 5 days | Discharge: HOME | DRG: 871 | End: 2025-02-26
Attending: EMERGENCY MEDICINE | Admitting: HOSPITALIST
Payer: MEDICARE

## 2025-02-20 ENCOUNTER — APPOINTMENT (OUTPATIENT)
Dept: CT IMAGING | Age: 65
DRG: 871 | End: 2025-02-20
Payer: MEDICARE

## 2025-02-20 DIAGNOSIS — J18.9 PNEUMONIA DUE TO INFECTIOUS ORGANISM, UNSPECIFIED LATERALITY, UNSPECIFIED PART OF LUNG: Primary | ICD-10-CM

## 2025-02-20 LAB
ALBUMIN SERPL-MCNC: 2.6 G/DL (ref 3.5–5.2)
ALBUMIN/GLOB SERPL: 0.6 {RATIO} (ref 1–2.5)
ALP SERPL-CCNC: 302 U/L (ref 40–129)
ALT SERPL-CCNC: 96 U/L (ref 10–50)
ANION GAP SERPL CALCULATED.3IONS-SCNC: 13 MMOL/L (ref 9–16)
AST SERPL-CCNC: 193 U/L (ref 10–50)
BASOPHILS # BLD: 0 K/UL (ref 0–0.2)
BASOPHILS NFR BLD: 0 %
BILIRUB SERPL-MCNC: 4.1 MG/DL (ref 0–1.2)
BUN SERPL-MCNC: 9 MG/DL (ref 8–23)
CALCIUM SERPL-MCNC: 8.4 MG/DL (ref 8.8–10.2)
CHLORIDE SERPL-SCNC: 101 MMOL/L (ref 98–107)
CO2 SERPL-SCNC: 19 MMOL/L (ref 20–31)
CREAT SERPL-MCNC: 0.6 MG/DL (ref 0.7–1.2)
EOSINOPHIL # BLD: 0 K/UL (ref 0–0.4)
EOSINOPHILS RELATIVE PERCENT: 0 % (ref 1–4)
ERYTHROCYTE [DISTWIDTH] IN BLOOD BY AUTOMATED COUNT: 13.6 % (ref 11.8–14.4)
GFR, ESTIMATED: >90 ML/MIN/1.73M2
GLUCOSE SERPL-MCNC: 102 MG/DL (ref 82–115)
HCT VFR BLD AUTO: 41.5 % (ref 40.7–50.3)
HGB BLD-MCNC: 13.7 G/DL (ref 13–17)
IMM GRANULOCYTES # BLD AUTO: 0 K/UL (ref 0–0.3)
IMM GRANULOCYTES NFR BLD: 0 %
LACTATE BLDV-SCNC: 3.6 MMOL/L (ref 0.5–1.9)
LIPASE SERPL-CCNC: 32 U/L (ref 13–60)
LYMPHOCYTES NFR BLD: 2.58 K/UL (ref 1–4.8)
LYMPHOCYTES RELATIVE PERCENT: 15 % (ref 24–44)
MAGNESIUM SERPL-MCNC: 1.6 MG/DL (ref 1.6–2.4)
MCH RBC QN AUTO: 33.5 PG (ref 25.2–33.5)
MCHC RBC AUTO-ENTMCNC: 33 G/DL (ref 28.4–34.8)
MCV RBC AUTO: 101.5 FL (ref 82.6–102.9)
MONOCYTES NFR BLD: 1.38 K/UL (ref 0.2–0.8)
MONOCYTES NFR BLD: 8 % (ref 1–7)
MORPHOLOGY: ABNORMAL
NEUTROPHILS NFR BLD: 77 % (ref 36–66)
NEUTS SEG NFR BLD: 13.24 K/UL (ref 1.8–7.7)
NRBC BLD-RTO: 0 PER 100 WBC
PLATELET # BLD AUTO: 240 K/UL (ref 138–453)
PMV BLD AUTO: 10.3 FL (ref 8.1–13.5)
POTASSIUM SERPL-SCNC: 3.5 MMOL/L (ref 3.7–5.3)
PROT SERPL-MCNC: 7.3 G/DL (ref 6.6–8.7)
RBC # BLD AUTO: 4.09 M/UL (ref 4.21–5.77)
SODIUM SERPL-SCNC: 134 MMOL/L (ref 136–145)
T4 FREE SERPL-MCNC: 1.6 NG/DL (ref 0.93–1.7)
TSH SERPL DL<=0.05 MIU/L-ACNC: 2.89 UIU/ML (ref 0.27–4.2)
WBC OTHER # BLD: 17.2 K/UL (ref 3.5–11.3)

## 2025-02-20 PROCEDURE — 85025 COMPLETE CBC W/AUTO DIFF WBC: CPT

## 2025-02-20 PROCEDURE — 93005 ELECTROCARDIOGRAM TRACING: CPT | Performed by: EMERGENCY MEDICINE

## 2025-02-20 PROCEDURE — 74177 CT ABD & PELVIS W/CONTRAST: CPT

## 2025-02-20 PROCEDURE — 83690 ASSAY OF LIPASE: CPT

## 2025-02-20 PROCEDURE — 94761 N-INVAS EAR/PLS OXIMETRY MLT: CPT

## 2025-02-20 PROCEDURE — 2500000003 HC RX 250 WO HCPCS: Performed by: EMERGENCY MEDICINE

## 2025-02-20 PROCEDURE — 96375 TX/PRO/DX INJ NEW DRUG ADDON: CPT

## 2025-02-20 PROCEDURE — 84439 ASSAY OF FREE THYROXINE: CPT

## 2025-02-20 PROCEDURE — 83605 ASSAY OF LACTIC ACID: CPT

## 2025-02-20 PROCEDURE — 80053 COMPREHEN METABOLIC PANEL: CPT

## 2025-02-20 PROCEDURE — 99285 EMERGENCY DEPT VISIT HI MDM: CPT

## 2025-02-20 PROCEDURE — 87040 BLOOD CULTURE FOR BACTERIA: CPT

## 2025-02-20 PROCEDURE — 96374 THER/PROPH/DIAG INJ IV PUSH: CPT

## 2025-02-20 PROCEDURE — 6360000002 HC RX W HCPCS: Performed by: EMERGENCY MEDICINE

## 2025-02-20 PROCEDURE — 96361 HYDRATE IV INFUSION ADD-ON: CPT

## 2025-02-20 PROCEDURE — 74022 RADEX COMPL AQT ABD SERIES: CPT

## 2025-02-20 PROCEDURE — 84443 ASSAY THYROID STIM HORMONE: CPT

## 2025-02-20 PROCEDURE — 2580000003 HC RX 258: Performed by: EMERGENCY MEDICINE

## 2025-02-20 PROCEDURE — 96365 THER/PROPH/DIAG IV INF INIT: CPT

## 2025-02-20 PROCEDURE — 83735 ASSAY OF MAGNESIUM: CPT

## 2025-02-20 PROCEDURE — 6360000004 HC RX CONTRAST MEDICATION: Performed by: EMERGENCY MEDICINE

## 2025-02-20 RX ORDER — 0.9 % SODIUM CHLORIDE 0.9 %
1000 INTRAVENOUS SOLUTION INTRAVENOUS ONCE
Status: COMPLETED | OUTPATIENT
Start: 2025-02-20 | End: 2025-02-20

## 2025-02-20 RX ORDER — ONDANSETRON 2 MG/ML
4 INJECTION INTRAMUSCULAR; INTRAVENOUS ONCE
Status: COMPLETED | OUTPATIENT
Start: 2025-02-20 | End: 2025-02-20

## 2025-02-20 RX ORDER — VANCOMYCIN 1.5 G/300ML
25 INJECTION, SOLUTION INTRAVENOUS ONCE
Status: COMPLETED | OUTPATIENT
Start: 2025-02-20 | End: 2025-02-21

## 2025-02-20 RX ORDER — SODIUM CHLORIDE 0.9 % (FLUSH) 0.9 %
10 SYRINGE (ML) INJECTION PRN
Status: DISCONTINUED | OUTPATIENT
Start: 2025-02-20 | End: 2025-02-26 | Stop reason: HOSPADM

## 2025-02-20 RX ORDER — 0.9 % SODIUM CHLORIDE 0.9 %
1000 INTRAVENOUS SOLUTION INTRAVENOUS ONCE
Status: COMPLETED | OUTPATIENT
Start: 2025-02-20 | End: 2025-02-21

## 2025-02-20 RX ORDER — METRONIDAZOLE 500 MG/100ML
500 INJECTION, SOLUTION INTRAVENOUS ONCE
Status: COMPLETED | OUTPATIENT
Start: 2025-02-20 | End: 2025-02-20

## 2025-02-20 RX ORDER — 0.9 % SODIUM CHLORIDE 0.9 %
80 INTRAVENOUS SOLUTION INTRAVENOUS ONCE
Status: DISCONTINUED | OUTPATIENT
Start: 2025-02-20 | End: 2025-02-26 | Stop reason: HOSPADM

## 2025-02-20 RX ORDER — IOPAMIDOL 755 MG/ML
75 INJECTION, SOLUTION INTRAVASCULAR
Status: COMPLETED | OUTPATIENT
Start: 2025-02-20 | End: 2025-02-20

## 2025-02-20 RX ADMIN — IOPAMIDOL 75 ML: 755 INJECTION, SOLUTION INTRAVENOUS at 20:31

## 2025-02-20 RX ADMIN — METRONIDAZOLE 500 MG: 500 INJECTION, SOLUTION INTRAVENOUS at 21:29

## 2025-02-20 RX ADMIN — ONDANSETRON 4 MG: 2 INJECTION, SOLUTION INTRAMUSCULAR; INTRAVENOUS at 18:37

## 2025-02-20 RX ADMIN — SODIUM CHLORIDE 1000 ML: 0.9 INJECTION, SOLUTION INTRAVENOUS at 18:37

## 2025-02-20 RX ADMIN — SODIUM CHLORIDE 1000 ML: 9 INJECTION, SOLUTION INTRAVENOUS at 21:15

## 2025-02-20 RX ADMIN — SODIUM CHLORIDE, PRESERVATIVE FREE 10 ML: 5 INJECTION INTRAVENOUS at 20:31

## 2025-02-20 RX ADMIN — WATER 1000 MG: 1 INJECTION INTRAMUSCULAR; INTRAVENOUS; SUBCUTANEOUS at 21:28

## 2025-02-20 RX ADMIN — Medication 80 ML: at 20:31

## 2025-02-20 ASSESSMENT — PAIN DESCRIPTION - FREQUENCY: FREQUENCY: CONTINUOUS

## 2025-02-20 ASSESSMENT — PAIN SCALES - GENERAL: PAINLEVEL_OUTOF10: 10

## 2025-02-20 ASSESSMENT — PAIN DESCRIPTION - DESCRIPTORS: DESCRIPTORS: STABBING

## 2025-02-20 ASSESSMENT — PAIN DESCRIPTION - ORIENTATION: ORIENTATION: LEFT;RIGHT

## 2025-02-20 ASSESSMENT — PAIN - FUNCTIONAL ASSESSMENT: PAIN_FUNCTIONAL_ASSESSMENT: 0-10

## 2025-02-20 ASSESSMENT — LIFESTYLE VARIABLES: HOW MANY STANDARD DRINKS CONTAINING ALCOHOL DO YOU HAVE ON A TYPICAL DAY: PATIENT DOES NOT DRINK

## 2025-02-20 NOTE — ED PROVIDER NOTES
[facility] EMERGENCY DEPARTMENT  eMERGENCY dEPARTMENT eNCOUnter    Pt Name: Dariel Griffiths  MRN: [id number]  Birthdate 1960  Date of evaluation: 2/20/25  CHIEF COMPLAINT       Chief Complaint   Patient presents with    Abnormal Lab     Labs done last Mon, has a paracentesis weekly due next Wed    Shortness of Breath    Tachycardia     HISTORY OF PRESENT ILLNESS   HPI  Patient is a 64-year-old male who was having labs that were done on Monday reviewed by his GI doctor today.  Patient was tachycardic at his GI doctors and his GI doctor sent him into the hospital to be further evaluated.  As per family the GI doctor had told him that the hemoglobin was low and the white count has been elevated.  Patient reports that over the past few months he has had shoulder pain arm pain difficulty sleeping coughing chills decreased appetite nausea abdominal pain.  Patient got the flu 2 weeks ago and all symptoms are now getting worse.  REVIEW OF SYSTEMS     Constitutional: No fever  Eye: No visual changes  Ear/Nose/Mouth/Throat: No sore throat  Respiratory: No shortness of breath, positive cough  Cardiovascular: No chest pain  Gastrointestinal: See above  Genitourinary: No dysuria  Musculoskeletal: As above  Integumentary: No rash  Neurologic: No dizziness  Psychiatric: No anxiety, no depression  All systems otherwise negative.        PAST MEDICAL HISTORY     Past Medical History:   Diagnosis Date    Abnormal results of liver function studies  10/20/2016    Arthritis     Asthma     Attempted suicide ([facility])     attempted 7 times    Avascular necrosis of bone of right hip ([facility]) 10/26/2017    Bipolar affective ([facility])     Blood in stool     Cavitary pneumonia     Chest pain     Daily    CHF (congestive heart failure), NYHA class III ([facility]) 08/22/2014    COPD (chronic obstructive pulmonary disease) ([facility])     [facility] pulmonology    COPD exacerbation ([facility]) 08/22/2014    CVA (cerebral vascular accident) ([facility])     x2 in 2006

## 2025-02-21 PROBLEM — J18.9 ACUTE PNEUMONIA: Status: ACTIVE | Noted: 2025-02-21

## 2025-02-21 PROBLEM — E43 SEVERE MALNUTRITION: Status: ACTIVE | Noted: 2025-02-21

## 2025-02-21 LAB
ALBUMIN SERPL-MCNC: 2.1 G/DL (ref 3.5–5.2)
ALBUMIN/GLOB SERPL: 0.6 {RATIO} (ref 1–2.5)
ALP SERPL-CCNC: 228 U/L (ref 40–129)
ALT SERPL-CCNC: 79 U/L (ref 10–50)
ANION GAP SERPL CALCULATED.3IONS-SCNC: 6 MMOL/L (ref 9–16)
AST SERPL-CCNC: 161 U/L (ref 10–50)
B PARAP IS1001 DNA NPH QL NAA+NON-PROBE: NOT DETECTED
B PERT DNA SPEC QL NAA+PROBE: NOT DETECTED
BILIRUB DIRECT SERPL-MCNC: 1.5 MG/DL (ref 0–0.2)
BILIRUB INDIRECT SERPL-MCNC: 1 MG/DL
BILIRUB SERPL-MCNC: 2.4 MG/DL (ref 0–1.2)
BILIRUB UR QL STRIP: ABNORMAL
BUN SERPL-MCNC: 8 MG/DL (ref 8–23)
C PNEUM DNA NPH QL NAA+NON-PROBE: NOT DETECTED
CALCIUM SERPL-MCNC: 7.3 MG/DL (ref 8.8–10.2)
CHLORIDE SERPL-SCNC: 108 MMOL/L (ref 98–107)
CLARITY UR: CLEAR
CO2 SERPL-SCNC: 22 MMOL/L (ref 20–31)
COLOR UR: ABNORMAL
CREAT SERPL-MCNC: 0.6 MG/DL (ref 0.7–1.2)
EKG ATRIAL RATE: 135 BPM
EKG P AXIS: 80 DEGREES
EKG P-R INTERVAL: 140 MS
EKG Q-T INTERVAL: 274 MS
EKG QRS DURATION: 76 MS
EKG QTC CALCULATION (BAZETT): 411 MS
EKG R AXIS: 80 DEGREES
EKG T AXIS: 90 DEGREES
EKG VENTRICULAR RATE: 135 BPM
EPI CELLS #/AREA URNS HPF: NORMAL /HPF (ref 0–5)
FLUAV RNA NPH QL NAA+NON-PROBE: NOT DETECTED
FLUBV RNA NPH QL NAA+NON-PROBE: NOT DETECTED
GFR, ESTIMATED: >90 ML/MIN/1.73M2
GLUCOSE SERPL-MCNC: 94 MG/DL (ref 82–115)
GLUCOSE UR STRIP-MCNC: ABNORMAL MG/DL
HADV DNA NPH QL NAA+NON-PROBE: NOT DETECTED
HCOV 229E RNA NPH QL NAA+NON-PROBE: NOT DETECTED
HCOV HKU1 RNA NPH QL NAA+NON-PROBE: NOT DETECTED
HCOV NL63 RNA NPH QL NAA+NON-PROBE: NOT DETECTED
HCOV OC43 RNA NPH QL NAA+NON-PROBE: NOT DETECTED
HGB UR QL STRIP.AUTO: NEGATIVE
HMPV RNA NPH QL NAA+NON-PROBE: NOT DETECTED
HPIV1 RNA NPH QL NAA+NON-PROBE: NOT DETECTED
HPIV2 RNA NPH QL NAA+NON-PROBE: NOT DETECTED
HPIV3 RNA NPH QL NAA+NON-PROBE: NOT DETECTED
HPIV4 RNA NPH QL NAA+NON-PROBE: NOT DETECTED
KETONES UR STRIP-MCNC: ABNORMAL MG/DL
LEUKOCYTE ESTERASE UR QL STRIP: ABNORMAL
M PNEUMO DNA NPH QL NAA+NON-PROBE: NOT DETECTED
NITRITE UR QL STRIP: POSITIVE
PH UR STRIP: 6.5 [PH] (ref 5–8)
POTASSIUM SERPL-SCNC: 4.1 MMOL/L (ref 3.7–5.3)
PROT SERPL-MCNC: 5.6 G/DL (ref 6.6–8.7)
PROT UR STRIP-MCNC: ABNORMAL MG/DL
RBC #/AREA URNS HPF: NORMAL /HPF (ref 0–2)
RSV RNA NPH QL NAA+NON-PROBE: NOT DETECTED
RV+EV RNA NPH QL NAA+NON-PROBE: NOT DETECTED
SARS-COV-2 RNA NPH QL NAA+NON-PROBE: NOT DETECTED
SODIUM SERPL-SCNC: 135 MMOL/L (ref 136–145)
SP GR UR STRIP: 1.01 (ref 1–1.03)
SPECIMEN DESCRIPTION: NORMAL
UROBILINOGEN UR STRIP-ACNC: ABNORMAL EU/DL (ref 0–1)
WBC #/AREA URNS HPF: NORMAL /HPF (ref 0–5)

## 2025-02-21 PROCEDURE — 6360000002 HC RX W HCPCS: Performed by: INTERNAL MEDICINE

## 2025-02-21 PROCEDURE — 6360000002 HC RX W HCPCS: Performed by: EMERGENCY MEDICINE

## 2025-02-21 PROCEDURE — 6370000000 HC RX 637 (ALT 250 FOR IP): Performed by: FAMILY MEDICINE

## 2025-02-21 PROCEDURE — 96367 TX/PROPH/DG ADDL SEQ IV INF: CPT

## 2025-02-21 PROCEDURE — 36415 COLL VENOUS BLD VENIPUNCTURE: CPT

## 2025-02-21 PROCEDURE — 94640 AIRWAY INHALATION TREATMENT: CPT

## 2025-02-21 PROCEDURE — 97530 THERAPEUTIC ACTIVITIES: CPT

## 2025-02-21 PROCEDURE — 80076 HEPATIC FUNCTION PANEL: CPT

## 2025-02-21 PROCEDURE — 97535 SELF CARE MNGMENT TRAINING: CPT

## 2025-02-21 PROCEDURE — 2700000000 HC OXYGEN THERAPY PER DAY

## 2025-02-21 PROCEDURE — 87641 MR-STAPH DNA AMP PROBE: CPT

## 2025-02-21 PROCEDURE — 97166 OT EVAL MOD COMPLEX 45 MIN: CPT

## 2025-02-21 PROCEDURE — 81001 URINALYSIS AUTO W/SCOPE: CPT

## 2025-02-21 PROCEDURE — 2580000003 HC RX 258: Performed by: EMERGENCY MEDICINE

## 2025-02-21 PROCEDURE — 1200000000 HC SEMI PRIVATE

## 2025-02-21 PROCEDURE — 94761 N-INVAS EAR/PLS OXIMETRY MLT: CPT

## 2025-02-21 PROCEDURE — 6370000000 HC RX 637 (ALT 250 FOR IP): Performed by: NURSE PRACTITIONER

## 2025-02-21 PROCEDURE — 2500000003 HC RX 250 WO HCPCS: Performed by: NURSE PRACTITIONER

## 2025-02-21 PROCEDURE — 80048 BASIC METABOLIC PNL TOTAL CA: CPT

## 2025-02-21 PROCEDURE — 6360000002 HC RX W HCPCS: Performed by: NURSE PRACTITIONER

## 2025-02-21 PROCEDURE — 0202U NFCT DS 22 TRGT SARS-COV-2: CPT

## 2025-02-21 RX ORDER — ALBUTEROL SULFATE 90 UG/1
2 INHALANT RESPIRATORY (INHALATION) EVERY 6 HOURS PRN
Status: DISCONTINUED | OUTPATIENT
Start: 2025-02-21 | End: 2025-02-26 | Stop reason: HOSPADM

## 2025-02-21 RX ORDER — BUDESONIDE AND FORMOTEROL FUMARATE DIHYDRATE 160; 4.5 UG/1; UG/1
2 AEROSOL RESPIRATORY (INHALATION)
Status: DISCONTINUED | OUTPATIENT
Start: 2025-02-21 | End: 2025-02-26 | Stop reason: HOSPADM

## 2025-02-21 RX ORDER — SPIRONOLACTONE 25 MG/1
50 TABLET ORAL 2 TIMES DAILY
Status: DISCONTINUED | OUTPATIENT
Start: 2025-02-21 | End: 2025-02-26 | Stop reason: HOSPADM

## 2025-02-21 RX ORDER — VANCOMYCIN 1.75 G/350ML
1250 INJECTION, SOLUTION INTRAVENOUS EVERY 12 HOURS
Status: DISCONTINUED | OUTPATIENT
Start: 2025-02-21 | End: 2025-02-22

## 2025-02-21 RX ORDER — IPRATROPIUM BROMIDE AND ALBUTEROL SULFATE 2.5; .5 MG/3ML; MG/3ML
1 SOLUTION RESPIRATORY (INHALATION) EVERY 4 HOURS PRN
Status: DISCONTINUED | OUTPATIENT
Start: 2025-02-21 | End: 2025-02-21

## 2025-02-21 RX ORDER — FUROSEMIDE 20 MG/1
20 TABLET ORAL DAILY
Status: DISCONTINUED | OUTPATIENT
Start: 2025-02-21 | End: 2025-02-25

## 2025-02-21 RX ORDER — ACETAMINOPHEN 325 MG/1
650 TABLET ORAL EVERY 6 HOURS PRN
Status: DISCONTINUED | OUTPATIENT
Start: 2025-02-21 | End: 2025-02-26 | Stop reason: HOSPADM

## 2025-02-21 RX ORDER — GAUZE BANDAGE 2" X 2"
100 BANDAGE TOPICAL DAILY
Status: DISCONTINUED | OUTPATIENT
Start: 2025-02-21 | End: 2025-02-26 | Stop reason: HOSPADM

## 2025-02-21 RX ORDER — BENZONATATE 100 MG/1
200 CAPSULE ORAL 3 TIMES DAILY
Status: DISCONTINUED | OUTPATIENT
Start: 2025-02-21 | End: 2025-02-26 | Stop reason: HOSPADM

## 2025-02-21 RX ORDER — ACETAMINOPHEN 650 MG/1
650 SUPPOSITORY RECTAL EVERY 6 HOURS PRN
Status: DISCONTINUED | OUTPATIENT
Start: 2025-02-21 | End: 2025-02-26 | Stop reason: HOSPADM

## 2025-02-21 RX ORDER — ALBUTEROL SULFATE 0.83 MG/ML
2.5 SOLUTION RESPIRATORY (INHALATION)
Status: DISCONTINUED | OUTPATIENT
Start: 2025-02-21 | End: 2025-02-22

## 2025-02-21 RX ORDER — ONDANSETRON 2 MG/ML
4 INJECTION INTRAMUSCULAR; INTRAVENOUS EVERY 6 HOURS PRN
Status: DISCONTINUED | OUTPATIENT
Start: 2025-02-21 | End: 2025-02-26 | Stop reason: HOSPADM

## 2025-02-21 RX ORDER — PANTOPRAZOLE SODIUM 40 MG/1
40 TABLET, DELAYED RELEASE ORAL
Status: DISCONTINUED | OUTPATIENT
Start: 2025-02-22 | End: 2025-02-26 | Stop reason: HOSPADM

## 2025-02-21 RX ORDER — ONDANSETRON 4 MG/1
4 TABLET, ORALLY DISINTEGRATING ORAL EVERY 8 HOURS PRN
Status: DISCONTINUED | OUTPATIENT
Start: 2025-02-21 | End: 2025-02-26 | Stop reason: HOSPADM

## 2025-02-21 RX ORDER — SODIUM CHLORIDE 0.9 % (FLUSH) 0.9 %
10 SYRINGE (ML) INJECTION EVERY 12 HOURS SCHEDULED
Status: DISCONTINUED | OUTPATIENT
Start: 2025-02-21 | End: 2025-02-26 | Stop reason: HOSPADM

## 2025-02-21 RX ORDER — FAMOTIDINE 20 MG/1
20 TABLET, FILM COATED ORAL 2 TIMES DAILY
Status: DISCONTINUED | OUTPATIENT
Start: 2025-02-21 | End: 2025-02-21

## 2025-02-21 RX ORDER — SODIUM CHLORIDE 9 MG/ML
INJECTION, SOLUTION INTRAVENOUS PRN
Status: DISCONTINUED | OUTPATIENT
Start: 2025-02-21 | End: 2025-02-26 | Stop reason: HOSPADM

## 2025-02-21 RX ORDER — ZONISAMIDE 100 MG/1
100 CAPSULE ORAL DAILY
Status: DISCONTINUED | OUTPATIENT
Start: 2025-02-21 | End: 2025-02-21

## 2025-02-21 RX ORDER — SODIUM CHLORIDE 0.9 % (FLUSH) 0.9 %
10 SYRINGE (ML) INJECTION PRN
Status: DISCONTINUED | OUTPATIENT
Start: 2025-02-21 | End: 2025-02-26 | Stop reason: HOSPADM

## 2025-02-21 RX ORDER — HYDROCODONE BITARTRATE AND ACETAMINOPHEN 5; 325 MG/1; MG/1
1 TABLET ORAL EVERY 6 HOURS PRN
Status: DISCONTINUED | OUTPATIENT
Start: 2025-02-21 | End: 2025-02-25

## 2025-02-21 RX ORDER — METRONIDAZOLE 500 MG/100ML
500 INJECTION, SOLUTION INTRAVENOUS EVERY 8 HOURS
Status: DISCONTINUED | OUTPATIENT
Start: 2025-02-21 | End: 2025-02-25

## 2025-02-21 RX ORDER — FOLIC ACID 1 MG/1
1 TABLET ORAL DAILY
Status: DISCONTINUED | OUTPATIENT
Start: 2025-02-21 | End: 2025-02-21

## 2025-02-21 RX ADMIN — ALBUTEROL SULFATE 2.5 MG: 2.5 SOLUTION RESPIRATORY (INHALATION) at 20:40

## 2025-02-21 RX ADMIN — SODIUM CHLORIDE 1000 ML: 9 INJECTION, SOLUTION INTRAVENOUS at 00:09

## 2025-02-21 RX ADMIN — SODIUM CHLORIDE, PRESERVATIVE FREE 10 ML: 5 INJECTION INTRAVENOUS at 07:49

## 2025-02-21 RX ADMIN — FUROSEMIDE 20 MG: 20 TABLET ORAL at 12:16

## 2025-02-21 RX ADMIN — SPIRONOLACTONE 50 MG: 25 TABLET ORAL at 23:23

## 2025-02-21 RX ADMIN — METRONIDAZOLE 500 MG: 500 INJECTION, SOLUTION INTRAVENOUS at 14:33

## 2025-02-21 RX ADMIN — FAMOTIDINE 20 MG: 20 TABLET, FILM COATED ORAL at 07:49

## 2025-02-21 RX ADMIN — ONDANSETRON 4 MG: 2 INJECTION INTRAMUSCULAR; INTRAVENOUS at 23:09

## 2025-02-21 RX ADMIN — VANCOMYCIN 1250 MG: 1.75 INJECTION, SOLUTION INTRAVENOUS at 12:22

## 2025-02-21 RX ADMIN — SODIUM CHLORIDE, PRESERVATIVE FREE 10 ML: 5 INJECTION INTRAVENOUS at 23:23

## 2025-02-21 RX ADMIN — BENZONATATE 200 MG: 100 CAPSULE ORAL at 14:32

## 2025-02-21 RX ADMIN — BENZONATATE 200 MG: 100 CAPSULE ORAL at 23:22

## 2025-02-21 RX ADMIN — Medication 100 MG: at 12:16

## 2025-02-21 RX ADMIN — ALBUTEROL SULFATE 2 PUFF: 90 AEROSOL, METERED RESPIRATORY (INHALATION) at 16:13

## 2025-02-21 RX ADMIN — WATER 1000 MG: 1 INJECTION INTRAMUSCULAR; INTRAVENOUS; SUBCUTANEOUS at 23:12

## 2025-02-21 RX ADMIN — METRONIDAZOLE 500 MG: 500 INJECTION, SOLUTION INTRAVENOUS at 05:45

## 2025-02-21 RX ADMIN — HYDROCODONE BITARTRATE AND ACETAMINOPHEN 1 TABLET: 5; 325 TABLET ORAL at 23:37

## 2025-02-21 RX ADMIN — VANCOMYCIN 1500 MG: 1.5 INJECTION, SOLUTION INTRAVENOUS at 00:11

## 2025-02-21 RX ADMIN — SPIRONOLACTONE 50 MG: 25 TABLET ORAL at 12:16

## 2025-02-21 RX ADMIN — ONDANSETRON 4 MG: 2 INJECTION INTRAMUSCULAR; INTRAVENOUS at 07:49

## 2025-02-21 RX ADMIN — METRONIDAZOLE 500 MG: 500 INJECTION, SOLUTION INTRAVENOUS at 23:30

## 2025-02-21 ASSESSMENT — PAIN SCALES - GENERAL: PAINLEVEL_OUTOF10: 10

## 2025-02-21 ASSESSMENT — LIFESTYLE VARIABLES: HOW OFTEN DO YOU HAVE A DRINK CONTAINING ALCOHOL: MONTHLY OR LESS

## 2025-02-21 ASSESSMENT — PAIN DESCRIPTION - FREQUENCY: FREQUENCY: CONTINUOUS

## 2025-02-21 ASSESSMENT — PAIN DESCRIPTION - DESCRIPTORS: DESCRIPTORS: SHARP;STABBING

## 2025-02-21 ASSESSMENT — PAIN DESCRIPTION - LOCATION: LOCATION: ABDOMEN

## 2025-02-21 NOTE — PROGRESS NOTES
Occupational Therapy  Facility/Department: [facility]  Occupational Therapy Initial Assessment    Name: Dariel Griffiths  : 1960  MRN: [id number]  Date of Service: 2025    Discharge Recommendations:  Patient would benefit from continued therapy after discharge       Pt currently functioning below baseline.  Recommend daily inpatient skilled therapy at time of discharge to maximize long term outcomes and prevent re-admission. Please refer to AM-PAC score for current level of function.    RN reports patient is medically stable for therapy treatment this date.    Chart reviewed prior to treatment and patient is agreeable for therapy.  All lines intact and patient positioned comfortably at end of treatment.  All patient needs addressed prior to ending therapy session.           Patient Diagnosis(es): The encounter diagnosis was Pneumonia due to infectious organism, unspecified laterality, unspecified part of lung.  Past Medical History:  has a past medical history of Abnormal results of liver function studies , Arthritis, Asthma, Attempted suicide (HCC), Avascular necrosis of bone of right hip (HCC), Bipolar affective (HCC), Blood in stool, Cavitary pneumonia, Chest pain, CHF (congestive heart failure), NYHA class III (HCC), COPD (chronic obstructive pulmonary disease) (HCC), COPD exacerbation (HCC), CVA (cerebral vascular accident) (HCC), Depression, Difficult intubation, Erectile dysfunction, Folliculitis, Fracture of wrist, GERD (gastroesophageal reflux disease), Head injury, Hepatitis, HLD (hyperlipidemia), Hypertension, Insomnia, Left wrist pain, Pain, Schizoaffective disorder, depressive type (HCC), Seasonal allergies, Seizures (HCC), Spondylosis of cervical region without myelopathy or radiculopathy, Stroke (HCC), Tobacco dependence, Tremor, Umbilical hernia, Vomiting, and Wears glasses.  Past Surgical History:  has a past surgical history that includes fracture surgery; hernia repair; knee

## 2025-02-21 NOTE — ED NOTES
Unable to obtain blood cultures and lactic, patient refusing additional attempts at this time. Dr Jurado updated.

## 2025-02-21 NOTE — CONSULTS
[facility]   Pharmacy Pharmacokinetic Monitoring Service - Vancomycin     Dariel Griffiths is a 64 y.o. male starting on vancomycin therapy for sepsis/pneumonia. Pharmacy consulted by Adela Virgen CNP for monitoring and adjustment.    Target Concentration: Goal AUC/CHERELLE 400-600 mg*hr/L    Additional Antimicrobials: rocephin, flagyl    Pertinent Laboratory Values:   Wt Readings from Last 1 Encounters:   02/20/25 61.2 kg (135 lb)     Temp Readings from Last 1 Encounters:   02/20/25 98.2 °F (36.8 °C) (Oral)     Estimated Creatinine Clearance: 111 mL/min (A) (based on SCr of 0.6 mg/dL (L)).  Recent Labs     02/20/25  1839   CREATININE 0.6*   BUN 9   WBC 17.2*     Procalcitonin: none    Pertinent Cultures:  Culture Date Source Results   2/20/25 Blood x 2 No growth < 24h   MRSA Nasal Swab: not ordered. Order placed by pharmacy.    Plan:  Dosing recommendations based on Bayesian software  Vancomycin 1500mg x 1 given as a bolus dose, will continue vancomycin 1250mg q12h  Anticipated AUC of 492 and trough concentration of 11 at steady state  Renal labs as indicated   Vancomycin concentration ordered for 2/22/25 @ 1000   Pharmacy will continue to monitor patient and adjust therapy as indicated    Thank you for the consult,  Jj Sanchez RPH  2/21/2025 3:50 AM

## 2025-02-21 NOTE — RT PROTOCOL NOTE
RT Inhaler-Nebulizer Bronchodilator Protocol Note    There is a bronchodilator order in the chart from a provider indicating to follow the RT Bronchodilator Protocol and there is an “Initiate RT Inhaler-Nebulizer Bronchodilator Protocol” order as well (see protocol at bottom of note).    CXR Findings:  No results found.    The findings from the last RT Protocol Assessment were as follows:   History Pulmonary Disease: Chronic pulmonary disease  Respiratory Pattern: Mild dyspnea at rest, irregular pattern, or RR 21-25 bpm  Breath Sounds: Inspiratory and expiratory or bilateral wheezing and/or rhonchi  Cough: Strong, productive  Indication for Bronchodilator Therapy:    Bronchodilator Assessment Score: 13    Aerosolized bronchodilator medication orders have been revised according to the RT Inhaler-Nebulizer Bronchodilator Protocol below.    Respiratory Therapist to perform RT Therapy Protocol Assessment initially then follow the protocol.  Repeat RT Therapy Protocol Assessment PRN for score 0-3 or on second treatment, BID, and PRN for scores above 3.    No Indications - adjust the frequency to every 6 hours PRN wheezing or bronchospasm, if no treatments needed after 48 hours then discontinue using Per Protocol order mode.     If indication present, adjust the RT bronchodilator orders based on the Bronchodilator Assessment Score as indicated below.  Use Inhaler orders unless patient has one or more of the following: on home nebulizer, not able to hold breath for 10 seconds, is not alert and oriented, cannot activate and use MDI correctly, or respiratory rate 25 breaths per minute or more, then use the equivalent nebulizer order(s) with same Frequency and PRN reasons based on the score.  If a patient is on this medication at home then do not decrease Frequency below that used at home.    0-3 - enter or revise RT bronchodilator order(s) to equivalent RT Bronchodilator order with Frequency of every 4 hours PRN for wheezing

## 2025-02-21 NOTE — PROGRESS NOTES
Pt transferred from ER per cart; pt assisted to bed.  Oriented to room, bed mechanics and call light.  Reviewed data base and home medications with pt; pt unsure of all medications and dosages that he takes at home; home meds in progress.  Pt c/o poor appetite; states recent 30lb wt loss.  States he stopped drinking alcohol January 2025.  Bed alarm on for safety.

## 2025-02-21 NOTE — ED NOTES
Patient arrives to ED from his GI doctors office appointment today. Patient was found to be tachycardic in the office and per patient report the doctor didn't like the way the patient looked and was concerned about his hemoglobin being low. Patient with complaints of nausea and abdominal pain. Has scheduled weekly paracentesis done, states that he did have one done this week.

## 2025-02-21 NOTE — PROGRESS NOTES
Comprehensive Nutrition Assessment    Type and Reason for Visit:  Positive nutrition screen    Nutrition Recommendations/Plan:   Recommend new weight on patient to confirm weight loss.   Continue current diet  Monitor labs, GI status, po intake, supplement intake, fluid status     Malnutrition Assessment:  Malnutrition Status:  Severe malnutrition (02/21/25 1709)    Context:  Acute Illness     Findings of the 6 clinical characteristics of malnutrition:  Energy Intake:  Mild decrease in energy intake  Weight Loss:  Unable to assess     Body Fat Loss:  Moderate body fat loss Buccal region, Orbital, Triceps   Muscle Mass Loss:  Moderate muscle mass loss Temples (temporalis), Clavicles (pectoralis & deltoids)  Fluid Accumulation:  Unable to assess     Strength:  Not Performed    Nutrition Assessment:    Pt seen for positive nutrition screen for weight loss. Pt reports losing 30# in over the last 2 1/2 weeks. Pt chart reports his current weight at 135# (stated) and his weight [id number]# during his last visit 1/10/25. Pt has noticeable muscle and fat loss. Pt also presents with gross ascites and edema. He reports eating 75% of breakfast, but only about 10% of lunch due to be nauseous. Pt states often feeling hungry but feeling full and bloated at the same time due to ascites. He is receiving Ensure 3x/day. Pt had 2 on the table that he had not attempted to drink yet stating he feels full. Pt to continue current diet. Monitor fluid status and po intake.    Nutrition Related Findings:    Edema +1, ascites, active bowel sounds Wound Type: None       Current Nutrition Intake & Therapies:    Average Meal Intake: 1-25%  Average Supplements Intake: 0%  ADULT DIET; Regular  ADULT ORAL NUTRITION SUPPLEMENT; Breakfast, Lunch, Dinner; Standard High Calorie/High Protein Oral Supplement    Anthropometric Measures:  Height: 190.5 cm (6' 3\")  Ideal Body Weight (IBW): 196 lbs (89 kg)    Admission Body Weight: 61.2 kg (134 lb 14.7

## 2025-02-21 NOTE — CONSULTS
Pulmonary Medicine and Critical Care Consult    Patient - Dariel Griffiths   MRN -  [id number]   [facility] # - [id number]   - 1960      Date of Admission -  2025  6:13 PM  Date of evaluation -  2025  Room -    Hospital Day - 0  Consulting - Zach Orr MD Primary Care Physician - Evelia Gillespie MD     Reason for Consult      Hypoxia/shortness of breath    Assessment/recommendations   Acute hypoxic respiratory insufficiency suspect chronic respiratory failure  Oxygen by nasal cannula  Incentive spirometer every hour while awake  Home O2 eval before discharge    COPD/smoker/left lung base cavitary lesion  Albuterol by nebulizer  Symbicort/Spiriva  CT chest no contrast evaluate entire chest  PFT outpatient  Smoking cessation    Possible sepsis   On Rocephin Vanco and Flagyl IV/check cultures    alcoholic liver cirrhosis/ascites status post paracentesis/hyponatremia  Consider repeat paracentesis  Lasix monitor sodium  Repeat liver function    Pulmonary edema/chronic diastolic heart failure   Check BNP Lasix as above      history of seizures   DVT prophylaxis  Problem List      Patient Active Problem List   Diagnosis    Fracture tibia/fibula    Schizoaffective disorder (HCC)    Umbilical hernia    Gastroenteritis    Folliculitis    GERD (gastroesophageal reflux disease)    COPD without exacerbation (HCC)    Dyslipidemia    Foot pain, right    Hematochezia    Hemorrhoids    Erectile dysfunction    Seasonal allergies    COPD exacerbation (HCC)    CHF (congestive heart failure), NYHA class III (HCC)    Dermoid cyst of scalp    Pneumonitis    Syncope    Depression    Chest pain    Syncope and collapse    Hypokalemia    Seizures (HCC)    Steroid-induced hyperglycemia    Need for vaccination    Tremor    Atypical chest pain    Elevated LFTs    Abnormal results of liver function studies     Essential hypertension    Schizoaffective disorder, depressive type (HCC)    Seizure (HCC)

## 2025-02-21 NOTE — CARE COORDINATION
Case Management Assessment  Initial Evaluation    Date/Time of Evaluation: 2/21/2025 11:13 AM  Assessment Completed by: RICKEY GARCIA RN    If patient is discharged prior to next notation, then this note serves as note for discharge by case management.    Patient Name: Dariel Griffiths                   YOB: 1960  Diagnosis: Pneumonia due to infectious organism, unspecified laterality, unspecified part of lung [J18.9]  Acute pneumonia [J18.9]                   Date / Time: 2/20/2025  6:13 PM    Patient Admission Status: Inpatient   Readmission Risk (Low < 19, Mod (19-27), High > 27): Readmission Risk Score: 20.4    Current PCP: Evelia Gillespie MD  PCP verified by CM? No (has PCP list)    Chart Reviewed: Yes      History Provided by: Patient  Patient Orientation: Alert and Oriented, Person, Place, Situation, Self    Patient Cognition: Alert    Hospitalization in the last 30 days (Readmission):  Yes    If yes, Readmission Assessment in  Navigator will be completed.    Advance Directives:      Code Status: Full Code   Patient's Primary Decision Maker is: Legal Next of Kin      Discharge Planning:    Patient lives with: Friends Type of Home: House  Primary Care Giver: Private caregiver  Patient Support Systems include: Friends/Neighbors   Current Financial resources: None  Current community resources: None  Current services prior to admission: Durable Medical Equipment            Current DME: Cane            Type of Home Care services:  OT, PT, Skilled Therapy, Nursing Services    ADLS  Prior functional level: Assistance with the following:, Cooking, Housework, Shopping  Current functional level: Assistance with the following:, Toileting, Cooking, Housework, Shopping, Mobility    PT AM-PAC:   /24  OT AM-PAC: 16 /24    Family can provide assistance at DC: No  Would you like Case Management to discuss the discharge plan with any other family members/significant others, and if so, who? Yes

## 2025-02-21 NOTE — CARE COORDINATION
02/21/25 1109   Readmission Assessment   Number of Days since last admission? 8-30 days   Previous Disposition Home with Family   Who is being Interviewed Patient   What was the patient's/caregiver's perception as to why they think they needed to return back to the hospital? Did not realize care needs would be so extensive   Did you visit your Primary Care Physician after you left the hospital, before you returned this time? No   Why weren't you able to visit your PCP? Did not have an appointment   Did you see a specialist, such as Cardiac, Pulmonary, Orthopedic Physician, etc. after you left the hospital? No   Who advised the patient to return to the hospital? Self-referral   Does the patient report anything that got in the way of taking their medications? No   In our efforts to provide the best possible care to you and others like you, can you think of anything that we could have done to help you after you left the hospital the first time, so that you might not have needed to return so soon? Discharge instructions that are concise, clear, and non contradictory

## 2025-02-21 NOTE — H&P
History & Physical  [facility].,    Adult Hospitalist      Name: Dariel Griffiths  MRN: [id number]     Acct: [id number]  Room: [address] [place] [address]    Admit Date: 2/20/2025  6:13 PM  PCP: Evelia Gillespie MD    Primary Problem  Principal Problem:    Acute pneumonia  Resolved Problems:    * No resolved hospital problems. *        Assesment/ plan:       Acute bacterial pneumonia/leukocytosis/sepsis:  IV Rocephin  IV Flagyl  IV vancomycin pulmonology consult  Blood cultures  Respiratory pathogen panel  Pulmonology consult    Abdominal pain:  Patient has history of alcoholic liver cirrhosis with ascites  Continue with diuretics Lasix/Aldactone    CT abdomen is concerning for ascites/liver cirrhosis  Consult gastroenterology    Mild hyponatremia:  Fluid hydration    Hypokalemia:  Replace electrolytes    Acute on chronic COPD:  Currently stable  Oxygen has been more than 90%  Continue his inhalers    Chronic congestive heart failure, diastolic:  Continue with oral Lasix  Monitor input output    History of seizure disorder:  Continue zonisamide    Tobacco abuse:  Suggested to quit smoking      Continue to monitor/telemetry/CBC with differential daily/BMP daily  DVT and GI prophylaxis.  Continue medications as below      Scheduled Meds:   cefTRIAXone (ROCEPHIN) IV  1,000 mg IntraVENous Q24H    metroNIDAZOLE  500 mg IntraVENous Q8H    sodium chloride flush  10 mL IntraVENous 2 times per day    vancomycin (VANCOCIN) intermittent dosing (placeholder)   Other RX Placeholder    vancomycin  1,250 mg IntraVENous Q12H    budesonide-formoterol  2 puff Inhalation BID RT    And    tiotropium  2 puff Inhalation Daily RT    furosemide  20 mg Oral Daily    [START ON 2/22/2025] pantoprazole  40 mg Oral QAM AC    spironolactone  50 mg Oral BID    thiamine  100 mg Oral Daily    zonisamide  100 mg Oral Daily    folic acid  1 mg Oral Daily    sodium chloride  80 mL IntraVENous Once     Continuous Infusions:   sodium chloride       PRN  240   MPV 10.3     Chemistry:  Recent Labs     02/20/25  1839 02/21/25  0609   * 135*   K 3.5* 4.1    108*   CO2 19* 22   GLUCOSE 102 94   BUN 9 8   CREATININE 0.6* 0.6*   MG 1.6  --    ANIONGAP 13 6*   LABGLOM >90 >90   CALCIUM 8.4* 7.3*     Recent Labs     02/20/25  1839   TSH 2.89   *   ALT 96*   ALKPHOS 302*   BILITOT 4.1*   LIPASE 32       Lab Results   Component Value Date    INR 1.3 (H) 02/17/2025    INR 1.7 02/01/2025    INR 1.4 01/30/2025    PROTIME 13.0 (H) 02/17/2025    PROTIME 19.8 (H) 02/01/2025    PROTIME 17.0 (H) 01/30/2025       Lab Results   Component Value Date/Time    SPECIAL LT HAND, 1.5 ML 02/20/2025 08:22 PM     Lab Results   Component Value Date/Time    CULTURE NO GROWTH <24 HRS 02/20/2025 08:22 PM       Lab Results   Component Value Date/Time    PHART 7.44 08/31/2012 10:09 PM    NUG6KRR 40 08/31/2012 10:09 PM    PO2ART 287 08/31/2012 10:09 PM    RDY2QSE 26.8 08/31/2012 10:09 PM    NBEA NOT REPORTED 08/31/2012 10:09 PM    PBEA 3 08/31/2012 10:09 PM    EDU4ETD 28 08/31/2012 10:09 PM    M8IBSOIP 100 08/31/2012 10:09 PM    FIO2 INFORMATION NOT PROVIDED 01/08/2025 11:47 PM       Radiology:    CT ABDOMEN PELVIS W IV CONTRAST Additional Contrast? None    Result Date: 2/20/2025  Scattered hypodensities in the liver with lobular contour suggesting cirrhosis.  The degree of internal heterogeneity is more pronounced on the previous study with differences in the contrast phase ascites in all 4 quadrants with volume similar to previous exam. Urinary bladder wall does appear mildly thickened and correlate for cystitis. Scattered air-fluid levels in the small bowel and general thickening of bowel wall likely due to the degree of edema and ascites.  Developing ileus is not excluded.  No pneumoperitoneum. Increased small opacities in the lower lungs may represent early pneumonia or pneumonitis..     XR ACUTE ABD SERIES CHEST 1 VW    Result Date: 2/20/2025  1. Mild curvilinear opacity at the

## 2025-02-22 ENCOUNTER — APPOINTMENT (OUTPATIENT)
Dept: CT IMAGING | Age: 65
DRG: 871 | End: 2025-02-22
Payer: MEDICARE

## 2025-02-22 LAB
ANION GAP SERPL CALCULATED.3IONS-SCNC: 8 MMOL/L (ref 9–16)
BASOPHILS # BLD: 0.03 K/UL (ref 0–0.2)
BASOPHILS NFR BLD: 0 % (ref 0–2)
BNP SERPL-MCNC: 214 PG/ML (ref 0–125)
BUN SERPL-MCNC: 9 MG/DL (ref 8–23)
CALCIUM SERPL-MCNC: 7.9 MG/DL (ref 8.8–10.2)
CHLORIDE SERPL-SCNC: 106 MMOL/L (ref 98–107)
CO2 SERPL-SCNC: 21 MMOL/L (ref 20–31)
CREAT SERPL-MCNC: 0.6 MG/DL (ref 0.7–1.2)
EOSINOPHIL # BLD: 0.07 K/UL (ref 0–0.44)
EOSINOPHILS RELATIVE PERCENT: 1 % (ref 1–4)
ERYTHROCYTE [DISTWIDTH] IN BLOOD BY AUTOMATED COUNT: 13.7 % (ref 11.8–14.4)
GFR, ESTIMATED: >90 ML/MIN/1.73M2
GLUCOSE SERPL-MCNC: 114 MG/DL (ref 82–115)
HCT VFR BLD AUTO: 30.5 % (ref 40.7–50.3)
HGB BLD-MCNC: 10.3 G/DL (ref 13–17)
IMM GRANULOCYTES # BLD AUTO: 0.06 K/UL (ref 0–0.3)
IMM GRANULOCYTES NFR BLD: 1 %
LYMPHOCYTES NFR BLD: 1.57 K/UL (ref 1.1–3.7)
LYMPHOCYTES RELATIVE PERCENT: 15 % (ref 24–43)
MCH RBC QN AUTO: 33.4 PG (ref 25.2–33.5)
MCHC RBC AUTO-ENTMCNC: 33.8 G/DL (ref 28.4–34.8)
MCV RBC AUTO: 99 FL (ref 82.6–102.9)
MONOCYTES NFR BLD: 1.08 K/UL (ref 0.1–1.2)
MONOCYTES NFR BLD: 10 % (ref 3–12)
MRSA, DNA, NASAL: NEGATIVE
NEUTROPHILS NFR BLD: 74 % (ref 36–65)
NEUTS SEG NFR BLD: 7.98 K/UL (ref 1.5–8.1)
NRBC BLD-RTO: 0 PER 100 WBC
PLATELET # BLD AUTO: 165 K/UL (ref 138–453)
PMV BLD AUTO: 9.8 FL (ref 8.1–13.5)
POTASSIUM SERPL-SCNC: 4 MMOL/L (ref 3.7–5.3)
RBC # BLD AUTO: 3.08 M/UL (ref 4.21–5.77)
SODIUM SERPL-SCNC: 134 MMOL/L (ref 136–145)
SPECIMEN DESCRIPTION: NORMAL
WBC OTHER # BLD: 10.8 K/UL (ref 3.5–11.3)

## 2025-02-22 PROCEDURE — 80048 BASIC METABOLIC PNL TOTAL CA: CPT

## 2025-02-22 PROCEDURE — 2500000003 HC RX 250 WO HCPCS: Performed by: NURSE PRACTITIONER

## 2025-02-22 PROCEDURE — 6360000002 HC RX W HCPCS: Performed by: INTERNAL MEDICINE

## 2025-02-22 PROCEDURE — 6360000002 HC RX W HCPCS: Performed by: HOSPITALIST

## 2025-02-22 PROCEDURE — 94640 AIRWAY INHALATION TREATMENT: CPT

## 2025-02-22 PROCEDURE — 6370000000 HC RX 637 (ALT 250 FOR IP): Performed by: HOSPITALIST

## 2025-02-22 PROCEDURE — 6370000000 HC RX 637 (ALT 250 FOR IP): Performed by: FAMILY MEDICINE

## 2025-02-22 PROCEDURE — 6370000000 HC RX 637 (ALT 250 FOR IP): Performed by: NURSE PRACTITIONER

## 2025-02-22 PROCEDURE — 97116 GAIT TRAINING THERAPY: CPT

## 2025-02-22 PROCEDURE — 99222 1ST HOSP IP/OBS MODERATE 55: CPT | Performed by: INTERNAL MEDICINE

## 2025-02-22 PROCEDURE — 36415 COLL VENOUS BLD VENIPUNCTURE: CPT

## 2025-02-22 PROCEDURE — 6360000002 HC RX W HCPCS: Performed by: NURSE PRACTITIONER

## 2025-02-22 PROCEDURE — 97163 PT EVAL HIGH COMPLEX 45 MIN: CPT

## 2025-02-22 PROCEDURE — 71250 CT THORAX DX C-: CPT

## 2025-02-22 PROCEDURE — 83880 ASSAY OF NATRIURETIC PEPTIDE: CPT

## 2025-02-22 PROCEDURE — 85025 COMPLETE CBC W/AUTO DIFF WBC: CPT

## 2025-02-22 PROCEDURE — 1200000000 HC SEMI PRIVATE

## 2025-02-22 RX ORDER — ALBUTEROL SULFATE 0.83 MG/ML
2.5 SOLUTION RESPIRATORY (INHALATION)
Status: DISCONTINUED | OUTPATIENT
Start: 2025-02-22 | End: 2025-02-26 | Stop reason: HOSPADM

## 2025-02-22 RX ORDER — PROMETHAZINE HYDROCHLORIDE 25 MG/1
25 TABLET ORAL ONCE
Status: COMPLETED | OUTPATIENT
Start: 2025-02-22 | End: 2025-02-22

## 2025-02-22 RX ADMIN — BUDESONIDE AND FORMOTEROL FUMARATE DIHYDRATE 2 PUFF: 160; 4.5 AEROSOL RESPIRATORY (INHALATION) at 05:37

## 2025-02-22 RX ADMIN — SODIUM CHLORIDE, PRESERVATIVE FREE 10 ML: 5 INJECTION INTRAVENOUS at 08:40

## 2025-02-22 RX ADMIN — HYDROCODONE BITARTRATE AND ACETAMINOPHEN 1 TABLET: 5; 325 TABLET ORAL at 15:14

## 2025-02-22 RX ADMIN — BENZONATATE 200 MG: 100 CAPSULE ORAL at 21:29

## 2025-02-22 RX ADMIN — TIOTROPIUM BROMIDE INHALATION SPRAY 2 PUFF: 3.12 SPRAY, METERED RESPIRATORY (INHALATION) at 05:37

## 2025-02-22 RX ADMIN — ALBUTEROL SULFATE 2.5 MG: 2.5 SOLUTION RESPIRATORY (INHALATION) at 05:37

## 2025-02-22 RX ADMIN — Medication 100 MG: at 08:39

## 2025-02-22 RX ADMIN — BENZONATATE 200 MG: 100 CAPSULE ORAL at 15:12

## 2025-02-22 RX ADMIN — WATER 1000 MG: 1 INJECTION INTRAMUSCULAR; INTRAVENOUS; SUBCUTANEOUS at 21:28

## 2025-02-22 RX ADMIN — ALBUTEROL SULFATE 2.5 MG: 2.5 SOLUTION RESPIRATORY (INHALATION) at 18:14

## 2025-02-22 RX ADMIN — METRONIDAZOLE 500 MG: 500 INJECTION, SOLUTION INTRAVENOUS at 23:42

## 2025-02-22 RX ADMIN — METRONIDAZOLE 500 MG: 500 INJECTION, SOLUTION INTRAVENOUS at 09:10

## 2025-02-22 RX ADMIN — HYDROCODONE BITARTRATE AND ACETAMINOPHEN 1 TABLET: 5; 325 TABLET ORAL at 05:31

## 2025-02-22 RX ADMIN — PROMETHAZINE HYDROCHLORIDE 25 MG: 25 TABLET ORAL at 19:28

## 2025-02-22 RX ADMIN — HYDROCODONE BITARTRATE AND ACETAMINOPHEN 1 TABLET: 5; 325 TABLET ORAL at 21:29

## 2025-02-22 RX ADMIN — PANTOPRAZOLE SODIUM 40 MG: 40 TABLET, DELAYED RELEASE ORAL at 05:31

## 2025-02-22 RX ADMIN — FUROSEMIDE 20 MG: 20 TABLET ORAL at 08:39

## 2025-02-22 RX ADMIN — SPIRONOLACTONE 50 MG: 25 TABLET ORAL at 21:29

## 2025-02-22 RX ADMIN — ONDANSETRON 4 MG: 2 INJECTION INTRAMUSCULAR; INTRAVENOUS at 15:14

## 2025-02-22 RX ADMIN — ALBUTEROL SULFATE 2.5 MG: 2.5 SOLUTION RESPIRATORY (INHALATION) at 10:42

## 2025-02-22 RX ADMIN — VANCOMYCIN 1250 MG: 1.75 INJECTION, SOLUTION INTRAVENOUS at 01:30

## 2025-02-22 RX ADMIN — METRONIDAZOLE 500 MG: 500 INJECTION, SOLUTION INTRAVENOUS at 15:16

## 2025-02-22 RX ADMIN — BUDESONIDE AND FORMOTEROL FUMARATE DIHYDRATE 2 PUFF: 160; 4.5 AEROSOL RESPIRATORY (INHALATION) at 18:14

## 2025-02-22 RX ADMIN — SPIRONOLACTONE 50 MG: 25 TABLET ORAL at 08:39

## 2025-02-22 RX ADMIN — BENZONATATE 200 MG: 100 CAPSULE ORAL at 08:39

## 2025-02-22 ASSESSMENT — PAIN SCALES - GENERAL
PAINLEVEL_OUTOF10: 8
PAINLEVEL_OUTOF10: 6
PAINLEVEL_OUTOF10: 6
PAINLEVEL_OUTOF10: 5
PAINLEVEL_OUTOF10: 7

## 2025-02-22 ASSESSMENT — PAIN DESCRIPTION - DESCRIPTORS
DESCRIPTORS: ACHING
DESCRIPTORS: SHARP
DESCRIPTORS: SHARP;STABBING

## 2025-02-22 ASSESSMENT — PAIN DESCRIPTION - LOCATION
LOCATION: ABDOMEN;LEG
LOCATION: ABDOMEN
LOCATION: HEAD

## 2025-02-22 ASSESSMENT — PAIN DESCRIPTION - FREQUENCY: FREQUENCY: CONTINUOUS

## 2025-02-22 NOTE — PROGRESS NOTES
Pulmonary Critical Care Progress Note    Patient seen for the follow up of Acute pneumonia     Subjective:    He has been weaned off oxygen.  He is complaining about lower extremity discomfort and abdominal distention.  He has slight improved shortness of breath.  He has occasional cough.  He mentions right upper back lesion nodularity that he noted over a year ago    Examination:    Vitals: /71   Pulse (!) 103   Temp 97.3 °F (36.3 °C) (Oral)   Resp 16   Ht 1.905 m (6' 3\")   Wt 61.2 kg (135 lb)   SpO2 95%   BMI 16.87 kg/m²   SpO2  Av %  Min: 95 %  Max: 97 %  General appearance: alert and cooperative with exam  Neck: No JVD  Lungs: Decreased breath sound no crackles or wheeze  Heart: regular rate and rhythm, S1, S2 normal, no gallop  Abdomen: Soft, non tender, tense ascites  Extremities: no cyanosis or clubbing. No significant edema  Skin 1/2  inch nodularity right upper back?  Fibroma    LABs:    CBC:   Recent Labs     25   WBC 17.2*   HGB 13.7   HCT 41.5        BMP:   Recent Labs     25  0609   * 135*   K 3.5* 4.1   CO2 19* 22   BUN 9 8   CREATININE 0.6* 0.6*   LABGLOM >90 >90   GLUCOSE 102 94     PT/INR: No results for input(s): \"PROTIME\", \"INR\" in the last 72 hours.  APTT:No results for input(s): \"APTT\" in the last 72 hours.  LIVER PROFILE:  Recent Labs     25  0609   * 161*   ALT 96* 79*       Radiology:    CT chest  Multifocal solid and subsolid pulmonary nodules noted throughout both lungs  with bronchial wall thickening and patchy ground-glass opacities.  Some of  the smaller nodules may demonstrate central cavitation.  These are all new  since recent 2025 CT.  Findings likely represent an atypical pneumonia.  Recommend close interval follow-up to resolution.     Cirrhosis with abdominal ascites.     Small to moderate size hiatal hernia.         Left Ventricle: Normal left ventricular systolic  function with a visually estimated EF of 55 - 60%. EF by 2D Simpsons Biplane is 57%. Left ventricle size is normal. Normal wall thickness. Normal wall motion.    Right Ventricle: Right ventricle is mildly dilated.    Right Atrium: Right atrium is mildly dilated.    Image quality is adequate.    Impression/recommendations;    Acute hypoxic respiratory insufficiency suspect chronic respiratory failure  Oxygen by nasal cannula  Incentive spirometer every hour while awake  Home O2 eval before discharge     COPD/smoker/left lung base cavitary lesion/atypical pneumonia/nodules  Albuterol by nebulizer  Symbicort/Spiriva  On Rocephin Vanco and Flagyl  PFT outpatient  Smoking cessation  CT chest outpatient in 6 weeks and follow-up     Possible sepsis   On Rocephin Vanco and Flagyl IV/check cultures     alcoholic liver cirrhosis/ascites status post paracentesis/hyponatremia  Consider repeat paracentesis  Lasix monitor sodium  Repeat liver function     Pulmonary edema/chronic diastolic heart failure   Check BNP Lasix as above        Right upper back skin lesion   Advised dermatology follow-up    history of seizures   DVT prophylaxis      Edward Harmon MD, MD, [facility]  Pulmonary Critical Care and Sleep Medicine,  [facility]  Cell: [phone]  Office: [phone]

## 2025-02-22 NOTE — CONSULTS
[facility] - Initial GI Consult Note  Today's Date and Time: 2/22/2025, 12:50 PM      Chief complain/Reason for consultation:   Ascites with history of alcoholic cirrhosis    History of Present Illness:   Dariel Griffiths is a 64 y.o.-year-old  male who was initially admitted on 2/20/2025. Patient seen at the request of Dr. Zach GONZALES.  64-year-old  American male with past medical history significant for alcoholic cirrhosis complicated with ascites on Lasix and spironolactone  at home getting weekly paracentesis, history of bipolar disorder, GERD, hyperlipidemia, stroke, who was sent to [facility] ED when his blood work done at the GI office showed leukocytosis and low hemoglobin.  Patient was evaluated in the ED was and was found to have pneumonia.  Patient had leukocytosis but his hemoglobin was within normal limits.  Patient denies any nausea vomiting melena hematochezia with above complaints.  Patient follows with Dr. Daly at [facility].  Not sure patient is taking his water pills at home correctly.  Difficult to obtain correct history from him.  Last alcohol intake as per patient in January this year    I have personally reviewed the past medical history, past surgical history, medications, social history, and family history, and I have updated the database accordingly.  Past Medical History:     Past Medical History:   Diagnosis Date    Abnormal results of liver function studies  10/20/2016    Arthritis     Asthma     Attempted suicide ([facility])     attempted 7 times    Avascular necrosis of bone of right hip ([facility]) 10/26/2017    Bipolar affective ([facility])     Blood in stool     Cavitary pneumonia     Chest pain     Daily    CHF (congestive heart failure), NYHA class III ([facility]) 08/22/2014    COPD (chronic obstructive pulmonary disease) ([facility])     [facility] pulmonology    COPD exacerbation ([facility]) 08/22/2014    CVA (cerebral vascular accident) ([facility])     x2 in

## 2025-02-22 NOTE — PROGRESS NOTES
Physical Therapy  Facility/Department: [facility]   Physical Therapy Initial Evaluation    Patient Name: Dariel Griffiths        MRN: [id number]    : 1960    Date of Service: 2025    HPI copied from chart:Dariel Griffiths is a 64 y.o.  male who presents with Abnormal Lab (Labs done last Mon, has a paracentesis weekly due next Wed), Shortness of Breath, and Tachycardia  This is 64-year-old gentleman has been admitted by emergency room, patient came to the ER with a complaint of having abnormal blood work, patient has past medical history significant for CHF/COPD/CVA/liver cirrhosis alcohol induced, and other medical problems listed below, patient had some blood work done on Monday which was reviewed by the GI doctor yesterday, patient was also noticed to be tachycardic, and hemoglobin was low with elevated white blood cell count, sent to the emergency room, patient does have some coughing/chills and abdominal pain, patient stated that he got the flu 2 weeks ago and symptoms are now progressing worse, initial testing in the emergency room is concerning for pneumonia along with cirrhosis/ascites also noticed to have leukocytosis and electrolyte derangements, admitted for further management    Chief Complaint   Patient presents with    Abnormal Lab     Labs done last Mon, has a paracentesis weekly due next Wed    Shortness of Breath    Tachycardia     Past Medical History:  has a past medical history of Abnormal results of liver function studies , Arthritis, Asthma, Attempted suicide (HCC), Avascular necrosis of bone of right hip (HCC), Bipolar affective (HCC), Blood in stool, Cavitary pneumonia, Chest pain, CHF (congestive heart failure), NYHA class III (HCC), COPD (chronic obstructive pulmonary disease) (HCC), COPD exacerbation (HCC), CVA (cerebral vascular accident) (HCC), Depression, Difficult intubation, Erectile dysfunction, Folliculitis, Fracture of wrist, GERD (gastroesophageal reflux

## 2025-02-22 NOTE — PROGRESS NOTES
Progress note  [facility].,    Adult Hospitalist      Name: Dariel Griffiths  MRN: [id number]     Acct: [id number]  Room: [address] [place] [address]    Admit Date: 2/20/2025  6:13 PM  PCP: Evelia Gillespie MD    Primary Problem  Principal Problem:    Acute pneumonia  Active Problems:    Severe malnutrition  Resolved Problems:    * No resolved hospital problems. *        Assesment/ plan:       Acute bacterial pneumonia/leukocytosis/sepsis:  IV Rocephin  IV Flagyl  IV vancomycin pulmonology consult  Blood cultures  Respiratory pathogen panel  Pulmonology consult    Abdominal pain:  Patient has history of alcoholic liver cirrhosis with ascites  Continue with diuretics Lasix/Aldactone    CT abdomen is concerning for ascites/liver cirrhosis  Consult gastroenterology, they are recommending paracentesis along with likely an EGD on Monday    Mild hyponatremia:  Fluid hydration    Hypokalemia:  Replace electrolytes    Acute on chronic COPD:  Currently stable  Oxygen has been more than 90%  Continue his inhalers    Chronic congestive heart failure, diastolic:  Continue with oral Lasix  Monitor input output    History of seizure disorder:  Continue zonisamide    Tobacco abuse:  Suggested to quit smoking      Continue to monitor/telemetry/CBC with differential daily/BMP daily  DVT and GI prophylaxis.  Continue medications as below      Scheduled Meds:   albuterol  2.5 mg Nebulization TID RT    cefTRIAXone (ROCEPHIN) IV  1,000 mg IntraVENous Q24H    metroNIDAZOLE  500 mg IntraVENous Q8H    sodium chloride flush  10 mL IntraVENous 2 times per day    budesonide-formoterol  2 puff Inhalation BID RT    And    tiotropium  2 puff Inhalation Daily RT    furosemide  20 mg Oral Daily    pantoprazole  40 mg Oral QAM AC    spironolactone  50 mg Oral BID    thiamine mononitrate  100 mg Oral Daily    benzonatate  200 mg Oral TID    sodium chloride  80 mL IntraVENous Once     Continuous Infusions:   sodium chloride       PRN Meds:  sodium

## 2025-02-23 VITALS
HEART RATE: 113 BPM | TEMPERATURE: 97.2 F | BODY MASS INDEX: 16.78 KG/M2 | SYSTOLIC BLOOD PRESSURE: 115 MMHG | RESPIRATION RATE: 20 BRPM | OXYGEN SATURATION: 93 % | HEIGHT: 75 IN | WEIGHT: 135 LBS | DIASTOLIC BLOOD PRESSURE: 83 MMHG

## 2025-02-23 LAB
ALBUMIN SERPL-MCNC: 2 G/DL (ref 3.5–5.2)
ALBUMIN/GLOB SERPL: 0.6 {RATIO} (ref 1–2.5)
ALP SERPL-CCNC: 237 U/L (ref 40–129)
ALT SERPL-CCNC: 58 U/L (ref 10–50)
ANION GAP SERPL CALCULATED.3IONS-SCNC: 7 MMOL/L (ref 9–16)
AST SERPL-CCNC: 112 U/L (ref 10–50)
BASOPHILS # BLD: 0.04 K/UL (ref 0–0.2)
BASOPHILS NFR BLD: 0 % (ref 0–2)
BILIRUB DIRECT SERPL-MCNC: 1.1 MG/DL (ref 0–0.2)
BILIRUB INDIRECT SERPL-MCNC: 0.6 MG/DL
BILIRUB SERPL-MCNC: 1.7 MG/DL (ref 0–1.2)
BUN SERPL-MCNC: 10 MG/DL (ref 8–23)
CALCIUM SERPL-MCNC: 8.1 MG/DL (ref 8.8–10.2)
CHLORIDE SERPL-SCNC: 105 MMOL/L (ref 98–107)
CO2 SERPL-SCNC: 21 MMOL/L (ref 20–31)
CREAT SERPL-MCNC: 0.7 MG/DL (ref 0.7–1.2)
EOSINOPHIL # BLD: 0.12 K/UL (ref 0–0.44)
EOSINOPHILS RELATIVE PERCENT: 1 % (ref 1–4)
ERYTHROCYTE [DISTWIDTH] IN BLOOD BY AUTOMATED COUNT: 13.7 % (ref 11.8–14.4)
GFR, ESTIMATED: >90 ML/MIN/1.73M2
GLUCOSE SERPL-MCNC: 99 MG/DL (ref 82–115)
HCT VFR BLD AUTO: 31.4 % (ref 40.7–50.3)
HGB BLD-MCNC: 10.7 G/DL (ref 13–17)
IMM GRANULOCYTES # BLD AUTO: 0.07 K/UL (ref 0–0.3)
IMM GRANULOCYTES NFR BLD: 1 %
LIPASE SERPL-CCNC: 31 U/L (ref 13–60)
LYMPHOCYTES NFR BLD: 2.19 K/UL (ref 1.1–3.7)
LYMPHOCYTES RELATIVE PERCENT: 19 % (ref 24–43)
MCH RBC QN AUTO: 33.5 PG (ref 25.2–33.5)
MCHC RBC AUTO-ENTMCNC: 34.1 G/DL (ref 28.4–34.8)
MCV RBC AUTO: 98.4 FL (ref 82.6–102.9)
MICROORGANISM SPEC CULT: NORMAL
MICROORGANISM SPEC CULT: NORMAL
MONOCYTES NFR BLD: 1.3 K/UL (ref 0.1–1.2)
MONOCYTES NFR BLD: 12 % (ref 3–12)
NEUTROPHILS NFR BLD: 67 % (ref 36–65)
NEUTS SEG NFR BLD: 7.54 K/UL (ref 1.5–8.1)
NRBC BLD-RTO: 0 PER 100 WBC
PLATELET # BLD AUTO: 172 K/UL (ref 138–453)
PMV BLD AUTO: 10.3 FL (ref 8.1–13.5)
POTASSIUM SERPL-SCNC: 4.2 MMOL/L (ref 3.7–5.3)
PROT SERPL-MCNC: 5.6 G/DL (ref 6.6–8.7)
RBC # BLD AUTO: 3.19 M/UL (ref 4.21–5.77)
SERVICE CMNT-IMP: NORMAL
SERVICE CMNT-IMP: NORMAL
SODIUM SERPL-SCNC: 133 MMOL/L (ref 136–145)
SPECIMEN DESCRIPTION: NORMAL
SPECIMEN DESCRIPTION: NORMAL
WBC OTHER # BLD: 11.3 K/UL (ref 3.5–11.3)

## 2025-02-23 PROCEDURE — 83690 ASSAY OF LIPASE: CPT

## 2025-02-23 PROCEDURE — 94640 AIRWAY INHALATION TREATMENT: CPT

## 2025-02-23 PROCEDURE — 6370000000 HC RX 637 (ALT 250 FOR IP): Performed by: NURSE PRACTITIONER

## 2025-02-23 PROCEDURE — 94761 N-INVAS EAR/PLS OXIMETRY MLT: CPT

## 2025-02-23 PROCEDURE — 99232 SBSQ HOSP IP/OBS MODERATE 35: CPT | Performed by: INTERNAL MEDICINE

## 2025-02-23 PROCEDURE — 6360000002 HC RX W HCPCS: Performed by: HOSPITALIST

## 2025-02-23 PROCEDURE — 85025 COMPLETE CBC W/AUTO DIFF WBC: CPT

## 2025-02-23 PROCEDURE — 2500000003 HC RX 250 WO HCPCS: Performed by: NURSE PRACTITIONER

## 2025-02-23 PROCEDURE — 80076 HEPATIC FUNCTION PANEL: CPT

## 2025-02-23 PROCEDURE — 36415 COLL VENOUS BLD VENIPUNCTURE: CPT

## 2025-02-23 PROCEDURE — 6370000000 HC RX 637 (ALT 250 FOR IP): Performed by: FAMILY MEDICINE

## 2025-02-23 PROCEDURE — 80048 BASIC METABOLIC PNL TOTAL CA: CPT

## 2025-02-23 PROCEDURE — 1200000000 HC SEMI PRIVATE

## 2025-02-23 PROCEDURE — 6360000002 HC RX W HCPCS: Performed by: NURSE PRACTITIONER

## 2025-02-23 RX ORDER — PROCHLORPERAZINE MALEATE 10 MG
10 TABLET ORAL ONCE
Status: COMPLETED | OUTPATIENT
Start: 2025-02-23 | End: 2025-02-23

## 2025-02-23 RX ADMIN — METRONIDAZOLE 500 MG: 500 INJECTION, SOLUTION INTRAVENOUS at 16:35

## 2025-02-23 RX ADMIN — SPIRONOLACTONE 50 MG: 25 TABLET ORAL at 22:42

## 2025-02-23 RX ADMIN — PROCHLORPERAZINE MALEATE 10 MG: 10 TABLET ORAL at 15:00

## 2025-02-23 RX ADMIN — TIOTROPIUM BROMIDE INHALATION SPRAY 2 PUFF: 3.12 SPRAY, METERED RESPIRATORY (INHALATION) at 07:58

## 2025-02-23 RX ADMIN — METRONIDAZOLE 500 MG: 500 INJECTION, SOLUTION INTRAVENOUS at 09:22

## 2025-02-23 RX ADMIN — BENZONATATE 200 MG: 100 CAPSULE ORAL at 08:26

## 2025-02-23 RX ADMIN — FUROSEMIDE 20 MG: 20 TABLET ORAL at 11:12

## 2025-02-23 RX ADMIN — HYDROCODONE BITARTRATE AND ACETAMINOPHEN 1 TABLET: 5; 325 TABLET ORAL at 22:44

## 2025-02-23 RX ADMIN — SPIRONOLACTONE 50 MG: 25 TABLET ORAL at 11:12

## 2025-02-23 RX ADMIN — METRONIDAZOLE 500 MG: 500 INJECTION, SOLUTION INTRAVENOUS at 22:51

## 2025-02-23 RX ADMIN — ALBUTEROL SULFATE 2.5 MG: 2.5 SOLUTION RESPIRATORY (INHALATION) at 07:57

## 2025-02-23 RX ADMIN — ALBUTEROL SULFATE 2.5 MG: 2.5 SOLUTION RESPIRATORY (INHALATION) at 14:59

## 2025-02-23 RX ADMIN — ONDANSETRON 4 MG: 4 TABLET, ORALLY DISINTEGRATING ORAL at 08:26

## 2025-02-23 RX ADMIN — Medication 100 MG: at 08:26

## 2025-02-23 RX ADMIN — SODIUM CHLORIDE, PRESERVATIVE FREE 10 ML: 5 INJECTION INTRAVENOUS at 22:42

## 2025-02-23 RX ADMIN — BENZONATATE 200 MG: 100 CAPSULE ORAL at 22:42

## 2025-02-23 RX ADMIN — ONDANSETRON 4 MG: 2 INJECTION INTRAMUSCULAR; INTRAVENOUS at 03:02

## 2025-02-23 RX ADMIN — SODIUM CHLORIDE, PRESERVATIVE FREE 10 ML: 5 INJECTION INTRAVENOUS at 08:26

## 2025-02-23 RX ADMIN — ALBUTEROL SULFATE 2.5 MG: 2.5 SOLUTION RESPIRATORY (INHALATION) at 21:40

## 2025-02-23 RX ADMIN — PANTOPRAZOLE SODIUM 40 MG: 40 TABLET, DELAYED RELEASE ORAL at 05:34

## 2025-02-23 RX ADMIN — HYDROCODONE BITARTRATE AND ACETAMINOPHEN 1 TABLET: 5; 325 TABLET ORAL at 16:36

## 2025-02-23 RX ADMIN — BUDESONIDE AND FORMOTEROL FUMARATE DIHYDRATE 2 PUFF: 160; 4.5 AEROSOL RESPIRATORY (INHALATION) at 21:40

## 2025-02-23 RX ADMIN — WATER 1000 MG: 1 INJECTION INTRAMUSCULAR; INTRAVENOUS; SUBCUTANEOUS at 22:42

## 2025-02-23 RX ADMIN — BUDESONIDE AND FORMOTEROL FUMARATE DIHYDRATE 2 PUFF: 160; 4.5 AEROSOL RESPIRATORY (INHALATION) at 07:58

## 2025-02-23 ASSESSMENT — PAIN DESCRIPTION - LOCATION
LOCATION: BACK;LEG
LOCATION: ABDOMEN

## 2025-02-23 ASSESSMENT — PAIN DESCRIPTION - DESCRIPTORS
DESCRIPTORS: ACHING
DESCRIPTORS: ACHING

## 2025-02-23 ASSESSMENT — PAIN DESCRIPTION - ORIENTATION: ORIENTATION: RIGHT;LEFT

## 2025-02-23 ASSESSMENT — PAIN SCALES - GENERAL
PAINLEVEL_OUTOF10: 7
PAINLEVEL_OUTOF10: 4
PAINLEVEL_OUTOF10: 5

## 2025-02-23 NOTE — PROGRESS NOTES
Pulmonary Critical Care Progress Note    Patient seen for the follow up of Acute pneumonia     Subjective:    He still reports having headache and nausea (improved.  Slight improvement in shortness of breath.  He has occasional cough.  He has no chest pain    Examination:    Vitals: /70   Pulse (!) 114   Temp 97.7 °F (36.5 °C) (Oral)   Resp 20   Ht 1.905 m (6' 3\")   Wt 61.2 kg (135 lb)   SpO2 94%   BMI 16.87 kg/m²   SpO2  Av.5 %  Min: 94 %  Max: 95 %  General appearance: alert and cooperative with exam  Neck: No JVD  Lungs: Decreased breath sound no crackles or wheeze  Heart: regular rate and rhythm, S1, S2 normal, no gallop  Abdomen: Soft, non tender, tense ascites  Extremities: no cyanosis or clubbing. No significant edema  Skin 1/2  inch nodularity right upper back?  Fibroma    LABs:    CBC:   Recent Labs     25  1537 25  0556   WBC 10.8 11.3   HGB 10.3* 10.7*   HCT 30.5* 31.4*    172     BMP:   Recent Labs     25  1537 25  0556   * 133*   K 4.0 4.2   CO2 21 21   BUN 9 10   CREATININE 0.6* 0.7   LABGLOM >90 >90   GLUCOSE 114 99     PT/INR: No results for input(s): \"PROTIME\", \"INR\" in the last 72 hours.  APTT:No results for input(s): \"APTT\" in the last 72 hours.  LIVER PROFILE:  Recent Labs     25  1839 25  0609   * 161*   ALT 96* 79*       Radiology:    CT chest  Multifocal solid and subsolid pulmonary nodules noted throughout both lungs  with bronchial wall thickening and patchy ground-glass opacities.  Some of  the smaller nodules may demonstrate central cavitation.  These are all new  since recent 2025 CT.  Findings likely represent an atypical pneumonia.  Recommend close interval follow-up to resolution.     Cirrhosis with abdominal ascites.     Small to moderate size hiatal hernia.         Left Ventricle: Normal left ventricular systolic function with a visually estimated EF of 55 - 60%. EF by 2D Simpsons Biplane is

## 2025-02-23 NOTE — PROGRESS NOTES
PATIENT NAME: Dariel Griffiths   YOB: 1960  ADMISSION DATE: 2025  6:13 PM   TODAY'S DATE: 2025      Presenting complaint:   Patient with no complaints.  Eating food at the time of examination      Objective:   Vitals:  BP (!) 92/59   Pulse (!) 118   Temp 97.7 °F (36.5 °C) (Oral)   Resp 20   Ht 1.905 m (6' 3\")   Wt 61.2 kg (135 lb)   SpO2 94%   BMI 16.87 kg/m²    Temp (24hrs), Av.5 °F (36.4 °C), Min:97.3 °F (36.3 °C), Max:97.7 °F (36.5 °C)      EXAM:  General: Alert, Appropriate. Not in acute distress.      LUNGS: Resp unlabored; Clear to auscultation     CV:  Normal heart Sounds, No murmurs.     ABD: Soft, non tender and distended positive for ascites   EXT : Trace bilateral pedal edema  Skin:No skin rash/ lesions.      Data Review:  CBC:   Recent Labs     25  1839 25  1537 25  0556   WBC 17.2* 10.8 11.3   HGB 13.7 10.3* 10.7*    165 172     BMP:    Recent Labs     25  0609 25  1537 25  0556   * 134* 133*   K 4.1 4.0 4.2   * 106 105   CO2 22 21 21   BUN 8 9 10   CREATININE 0.6* 0.6* 0.7   GLUCOSE 94 114 99     Hepatic:   Recent Labs     25  1839 25  0609   * 161*   ALT 96* 79*   BILITOT 4.1* 2.4*   ALKPHOS 302* 228*         ACTIVE  MEDICATIONS   albuterol  2.5 mg Nebulization TID RT    cefTRIAXone (ROCEPHIN) IV  1,000 mg IntraVENous Q24H    metroNIDAZOLE  500 mg IntraVENous Q8H    sodium chloride flush  10 mL IntraVENous 2 times per day    budesonide-formoterol  2 puff Inhalation BID RT    And    tiotropium  2 puff Inhalation Daily RT    furosemide  20 mg Oral Daily    pantoprazole  40 mg Oral QAM AC    spironolactone  50 mg Oral BID    thiamine mononitrate  100 mg Oral Daily    benzonatate  200 mg Oral TID    sodium chloride  80 mL IntraVENous Once       Active Problems:   Patient Active Problem List:     Fracture tibia/fibula     Schizoaffective disorder (HCC)     Umbilical hernia     Gastroenteritis

## 2025-02-23 NOTE — RT PROTOCOL NOTE
RT Inhaler-Nebulizer Bronchodilator Protocol Note    There is a bronchodilator order in the chart from a provider indicating to follow the RT Bronchodilator Protocol and there is an “Initiate RT Inhaler-Nebulizer Bronchodilator Protocol” order as well (see protocol at bottom of note).    CXR Findings:  No results found.    The findings from the last RT Protocol Assessment were as follows:   History Pulmonary Disease: Chronic pulmonary disease  Respiratory Pattern: Regular pattern and RR 12-20 bpm  Breath Sounds: Inspiratory and expiratory or bilateral wheezing and/or rhonchi  Cough: Strong, spontaneous, non-productive  Indication for Bronchodilator Therapy: Wheezing associated with pulm disorder  Bronchodilator Assessment Score: 8    Aerosolized bronchodilator medication orders have been revised according to the RT Inhaler-Nebulizer Bronchodilator Protocol below.    Respiratory Therapist to perform RT Therapy Protocol Assessment initially then follow the protocol.  Repeat RT Therapy Protocol Assessment PRN for score 0-3 or on second treatment, BID, and PRN for scores above 3.    No Indications - adjust the frequency to every 6 hours PRN wheezing or bronchospasm, if no treatments needed after 48 hours then discontinue using Per Protocol order mode.     If indication present, adjust the RT bronchodilator orders based on the Bronchodilator Assessment Score as indicated below.  Use Inhaler orders unless patient has one or more of the following: on home nebulizer, not able to hold breath for 10 seconds, is not alert and oriented, cannot activate and use MDI correctly, or respiratory rate 25 breaths per minute or more, then use the equivalent nebulizer order(s) with same Frequency and PRN reasons based on the score.  If a patient is on this medication at home then do not decrease Frequency below that used at home.    0-3 - enter or revise RT bronchodilator order(s) to equivalent RT Bronchodilator order with Frequency of

## 2025-02-23 NOTE — PROGRESS NOTES
Progress note  [facility].,    Adult Hospitalist      Name: Dariel Griffiths  MRN: [id number]     Acct: [id number]  Room: [address] [place] [address]    Admit Date: 2/20/2025  6:13 PM  PCP: Evelia Gillespie MD    Primary Problem  Principal Problem:    Acute pneumonia  Active Problems:    Severe malnutrition  Resolved Problems:    * No resolved hospital problems. *        Assesment/ plan:       Acute bacterial pneumonia/leukocytosis/sepsis:  IV Rocephin  IV Flagyl  IV vancomycin pulmonology consult  Blood cultures  Respiratory pathogen panel  Pulmonology consult    Abdominal pain/ascites:  Patient has history of alcoholic liver cirrhosis with ascites  Continue with diuretics Lasix/Aldactone  Plan for IR para centesis tomorrow    CT abdomen is concerning for ascites/liver cirrhosis  Consult gastroenterology, they are recommending paracentesis along with likely an EGD on Monday    Mild hyponatremia:  Fluid hydration    Hypokalemia:  Replace electrolytes    Acute on chronic COPD:  Currently stable  Oxygen has been more than 90%  Continue his inhalers    Chronic congestive heart failure, diastolic:  Continue with oral Lasix  Monitor input output    History of seizure disorder:  Continue zonisamide    Tobacco abuse:  Suggested to quit smoking      Continue to monitor/telemetry/CBC with differential daily/BMP daily  DVT and GI prophylaxis.  Continue medications as below      Scheduled Meds:   albuterol  2.5 mg Nebulization TID RT    cefTRIAXone (ROCEPHIN) IV  1,000 mg IntraVENous Q24H    metroNIDAZOLE  500 mg IntraVENous Q8H    sodium chloride flush  10 mL IntraVENous 2 times per day    budesonide-formoterol  2 puff Inhalation BID RT    And    tiotropium  2 puff Inhalation Daily RT    furosemide  20 mg Oral Daily    pantoprazole  40 mg Oral QAM AC    spironolactone  50 mg Oral BID    thiamine mononitrate  100 mg Oral Daily    benzonatate  200 mg Oral TID    sodium chloride  80 mL IntraVENous Once     Continuous Infusions:

## 2025-02-24 ENCOUNTER — APPOINTMENT (OUTPATIENT)
Dept: GENERAL RADIOLOGY | Age: 65
DRG: 871 | End: 2025-02-24
Payer: MEDICARE

## 2025-02-24 ENCOUNTER — APPOINTMENT (OUTPATIENT)
Dept: INTERVENTIONAL RADIOLOGY/VASCULAR | Age: 65
DRG: 871 | End: 2025-02-24
Attending: INTERNAL MEDICINE
Payer: MEDICARE

## 2025-02-24 LAB
ANION GAP SERPL CALCULATED.3IONS-SCNC: 7 MMOL/L (ref 9–16)
BASOPHILS # BLD: 0.07 K/UL (ref 0–0.2)
BASOPHILS NFR BLD: 1 % (ref 0–2)
BUN SERPL-MCNC: 12 MG/DL (ref 8–23)
CALCIUM SERPL-MCNC: 8.3 MG/DL (ref 8.8–10.2)
CHLORIDE SERPL-SCNC: 106 MMOL/L (ref 98–107)
CO2 SERPL-SCNC: 21 MMOL/L (ref 20–31)
CREAT SERPL-MCNC: 0.7 MG/DL (ref 0.7–1.2)
EOSINOPHIL # BLD: 0.08 K/UL (ref 0–0.44)
EOSINOPHILS RELATIVE PERCENT: 1 % (ref 1–4)
ERYTHROCYTE [DISTWIDTH] IN BLOOD BY AUTOMATED COUNT: 14 % (ref 11.8–14.4)
GFR, ESTIMATED: >90 ML/MIN/1.73M2
GLUCOSE SERPL-MCNC: 131 MG/DL (ref 82–115)
HCT VFR BLD AUTO: 32.1 % (ref 40.7–50.3)
HGB BLD-MCNC: 10.9 G/DL (ref 13–17)
IMM GRANULOCYTES # BLD AUTO: 0.06 K/UL (ref 0–0.3)
IMM GRANULOCYTES NFR BLD: 1 %
LYMPHOCYTES NFR BLD: 2.55 K/UL (ref 1.1–3.7)
LYMPHOCYTES RELATIVE PERCENT: 21 % (ref 24–43)
MCH RBC QN AUTO: 33.1 PG (ref 25.2–33.5)
MCHC RBC AUTO-ENTMCNC: 34 G/DL (ref 28.4–34.8)
MCV RBC AUTO: 97.6 FL (ref 82.6–102.9)
MONOCYTES NFR BLD: 1.32 K/UL (ref 0.1–1.2)
MONOCYTES NFR BLD: 11 % (ref 3–12)
NEUTROPHILS NFR BLD: 65 % (ref 36–65)
NEUTS SEG NFR BLD: 7.96 K/UL (ref 1.5–8.1)
NRBC BLD-RTO: 0 PER 100 WBC
PLATELET # BLD AUTO: 169 K/UL (ref 138–453)
PMV BLD AUTO: 10.4 FL (ref 8.1–13.5)
POTASSIUM SERPL-SCNC: 4.5 MMOL/L (ref 3.7–5.3)
RBC # BLD AUTO: 3.29 M/UL (ref 4.21–5.77)
SODIUM SERPL-SCNC: 134 MMOL/L (ref 136–145)
WBC OTHER # BLD: 12 K/UL (ref 3.5–11.3)

## 2025-02-24 PROCEDURE — 6370000000 HC RX 637 (ALT 250 FOR IP): Performed by: NURSE PRACTITIONER

## 2025-02-24 PROCEDURE — 88112 CYTOPATH CELL ENHANCE TECH: CPT

## 2025-02-24 PROCEDURE — 1200000000 HC SEMI PRIVATE

## 2025-02-24 PROCEDURE — 80048 BASIC METABOLIC PNL TOTAL CA: CPT

## 2025-02-24 PROCEDURE — 6360000002 HC RX W HCPCS: Performed by: HOSPITALIST

## 2025-02-24 PROCEDURE — 94761 N-INVAS EAR/PLS OXIMETRY MLT: CPT

## 2025-02-24 PROCEDURE — 6370000000 HC RX 637 (ALT 250 FOR IP): Performed by: FAMILY MEDICINE

## 2025-02-24 PROCEDURE — 6360000002 HC RX W HCPCS: Performed by: NURSE PRACTITIONER

## 2025-02-24 PROCEDURE — 85025 COMPLETE CBC W/AUTO DIFF WBC: CPT

## 2025-02-24 PROCEDURE — 36415 COLL VENOUS BLD VENIPUNCTURE: CPT

## 2025-02-24 PROCEDURE — 94640 AIRWAY INHALATION TREATMENT: CPT

## 2025-02-24 PROCEDURE — 2500000003 HC RX 250 WO HCPCS: Performed by: NURSE PRACTITIONER

## 2025-02-24 PROCEDURE — 71045 X-RAY EXAM CHEST 1 VIEW: CPT

## 2025-02-24 PROCEDURE — 88305 TISSUE EXAM BY PATHOLOGIST: CPT

## 2025-02-24 RX ADMIN — ALBUTEROL SULFATE 2.5 MG: 2.5 SOLUTION RESPIRATORY (INHALATION) at 17:29

## 2025-02-24 RX ADMIN — BUDESONIDE AND FORMOTEROL FUMARATE DIHYDRATE 2 PUFF: 160; 4.5 AEROSOL RESPIRATORY (INHALATION) at 06:17

## 2025-02-24 RX ADMIN — SPIRONOLACTONE 50 MG: 25 TABLET ORAL at 21:16

## 2025-02-24 RX ADMIN — BENZONATATE 200 MG: 100 CAPSULE ORAL at 21:16

## 2025-02-24 RX ADMIN — METRONIDAZOLE 500 MG: 500 INJECTION, SOLUTION INTRAVENOUS at 15:48

## 2025-02-24 RX ADMIN — HYDROCODONE BITARTRATE AND ACETAMINOPHEN 1 TABLET: 5; 325 TABLET ORAL at 05:57

## 2025-02-24 RX ADMIN — HYDROCODONE BITARTRATE AND ACETAMINOPHEN 1 TABLET: 5; 325 TABLET ORAL at 15:46

## 2025-02-24 RX ADMIN — SPIRONOLACTONE 50 MG: 25 TABLET ORAL at 08:31

## 2025-02-24 RX ADMIN — BENZONATATE 200 MG: 100 CAPSULE ORAL at 15:46

## 2025-02-24 RX ADMIN — ALBUTEROL SULFATE 2.5 MG: 2.5 SOLUTION RESPIRATORY (INHALATION) at 06:16

## 2025-02-24 RX ADMIN — METRONIDAZOLE 500 MG: 500 INJECTION, SOLUTION INTRAVENOUS at 23:58

## 2025-02-24 RX ADMIN — BENZONATATE 200 MG: 100 CAPSULE ORAL at 08:31

## 2025-02-24 RX ADMIN — METRONIDAZOLE 500 MG: 500 INJECTION, SOLUTION INTRAVENOUS at 08:30

## 2025-02-24 RX ADMIN — SODIUM CHLORIDE, PRESERVATIVE FREE 10 ML: 5 INJECTION INTRAVENOUS at 21:17

## 2025-02-24 RX ADMIN — SODIUM CHLORIDE, PRESERVATIVE FREE 10 ML: 5 INJECTION INTRAVENOUS at 08:35

## 2025-02-24 RX ADMIN — WATER 1000 MG: 1 INJECTION INTRAMUSCULAR; INTRAVENOUS; SUBCUTANEOUS at 21:16

## 2025-02-24 RX ADMIN — Medication 100 MG: at 08:31

## 2025-02-24 RX ADMIN — TIOTROPIUM BROMIDE INHALATION SPRAY 2 PUFF: 3.12 SPRAY, METERED RESPIRATORY (INHALATION) at 06:16

## 2025-02-24 RX ADMIN — ALBUTEROL SULFATE 2.5 MG: 2.5 SOLUTION RESPIRATORY (INHALATION) at 13:15

## 2025-02-24 RX ADMIN — PANTOPRAZOLE SODIUM 40 MG: 40 TABLET, DELAYED RELEASE ORAL at 05:57

## 2025-02-24 RX ADMIN — FUROSEMIDE 20 MG: 20 TABLET ORAL at 08:31

## 2025-02-24 RX ADMIN — BUDESONIDE AND FORMOTEROL FUMARATE DIHYDRATE 2 PUFF: 160; 4.5 AEROSOL RESPIRATORY (INHALATION) at 17:31

## 2025-02-24 ASSESSMENT — PAIN DESCRIPTION - ORIENTATION
ORIENTATION: LOWER
ORIENTATION: LOWER

## 2025-02-24 ASSESSMENT — PAIN SCALES - GENERAL
PAINLEVEL_OUTOF10: 5
PAINLEVEL_OUTOF10: 5
PAINLEVEL_OUTOF10: 7
PAINLEVEL_OUTOF10: 7

## 2025-02-24 ASSESSMENT — PAIN DESCRIPTION - FREQUENCY
FREQUENCY: CONTINUOUS
FREQUENCY: INTERMITTENT

## 2025-02-24 ASSESSMENT — PAIN DESCRIPTION - LOCATION
LOCATION: BACK
LOCATION: BACK

## 2025-02-24 ASSESSMENT — PAIN DESCRIPTION - DESCRIPTORS
DESCRIPTORS: ACHING
DESCRIPTORS: ACHING

## 2025-02-24 ASSESSMENT — PAIN DESCRIPTION - ONSET: ONSET: ON-GOING

## 2025-02-24 ASSESSMENT — PAIN DESCRIPTION - PAIN TYPE: TYPE: ACUTE PAIN

## 2025-02-24 NOTE — PROGRESS NOTES
Nutrition Assessment     Type and Reason for Visit: Reassess    Nutrition Recommendations/Plan:   Continue current diet  Monitor po intake, labs, fluid status     Malnutrition Assessment:  Malnutrition Status: Severe malnutrition    Nutrition Assessment:  Pt seen for reassessment. He reports appetite is still minimal. Pt is experience nausea. Pt says he is typically eating breakfast then snacking throughout the day. He reports drinking the Ensures provided and notes that he will only drink the chocolate flavor. Pt was schedule to have paracentesis this morning, but procedure has been delayed. RN states IR reports \"if pt has procedure today it will be later\". Pt expressed pain in pelvic region, RN notified. Continue current diet. Monitor po intake, labs, and fluid status.    Estimated Daily Nutrient Needs:  Energy (kcal):  1840-2140kcals (30-35kcals/kg) Weight Used for Energy Requirements: Current     Protein (g):  92-100g (1.5-1.6g/kg) Weight Used for Protein Requirements: Current        Fluid (ml/day):  1840-2140mL Method Used for Fluid Requirements: 1 ml/kcal    Nutrition Related Findings:   Edema +1, ascites, active bowel sounds Wound Type: None    Current Nutrition Therapies:    ADULT DIET; Regular  ADULT ORAL NUTRITION SUPPLEMENT; Breakfast, Lunch, Dinner; Standard High Calorie/High Protein Oral Supplement    Anthropometric Measures:  Height: 190.5 cm (6' 3\")  Current Body Wt: 61.2 kg (134 lb 14.7 oz)   BMI: 16.9        Nutrition Diagnosis:   Inadequate oral intake related to catabolic illness as evidenced by intake 0-25%    Nutrition Interventions:   Food and/or Nutrient Delivery: Continue Current Diet, Continue Oral Nutrition Supplement  Nutrition Education/Counseling: Education/Counseling not indicated  Coordination of Nutrition Care: Continue to monitor while inpatient       Goals:  Goals: PO intake 50% or greater  Type of Goal: New goal  Previous Goal Met: Progress towards Goal(s) Declining    Nutrition

## 2025-02-24 NOTE — CARE COORDINATION
DC Planning    Spoke with pt, plan for paracentesis today, he comes to [facility] or [facility] weekly for taps. Has caregiver Astrid to assist w care. No longer has pcp used to see provider Tyron at Dr. English office but they moved-and he does not want to see Dr. English.     Offered to make appt his caregiver Astrid is working on the pcp per pt and declines. She is the one who takes him to his appts. Has a cane only.     Declines hc dc needs.

## 2025-02-24 NOTE — PROGRESS NOTES
Progress note  [facility].,    Adult Hospitalist      Name: Dariel Griffiths  MRN: [id number]     Acct: [id number]  Room: [address] [place] [address]    Admit Date: 2/20/2025  6:13 PM  PCP: Evelia Gillespie MD    Primary Problem  Principal Problem:    Acute pneumonia  Active Problems:    Severe malnutrition  Resolved Problems:    * No resolved hospital problems. *        Assesment/ plan:       Acute bacterial pneumonia/leukocytosis/sepsis:  IV Rocephin  IV Flagyl  IV vancomycin pulmonology consult  Blood cultures  Respiratory pathogen panel  Pulmonology consult    Abdominal pain/ascites:  PT gets weekly paracentesis at home.   Patient has history of alcoholic liver cirrhosis with ascites  Continue with diuretics Lasix/Aldactone  Plan for IR para centesis today with fluid analysis to r/o SBP.   CT abdomen is concerning for ascites/liver cirrhosis  Consult gastroenterology, they are recommending paracentesis along with likely an EGD on Monday    Mild hyponatremia:  Fluid hydration    Hypokalemia:  Replace electrolytes    Acute on chronic COPD:  Currently stable  Oxygen has been more than 90%  Continue his inhalers    Chronic congestive heart failure, diastolic:  Continue with oral Lasix  Monitor input output    History of seizure disorder:  Continue zonisamide    Tobacco abuse:  Suggested to quit smoking      Continue to monitor/telemetry/CBC with differential daily/BMP daily  DVT and GI prophylaxis.  Continue medications as below      Scheduled Meds:   albuterol  2.5 mg Nebulization TID RT    cefTRIAXone (ROCEPHIN) IV  1,000 mg IntraVENous Q24H    metroNIDAZOLE  500 mg IntraVENous Q8H    sodium chloride flush  10 mL IntraVENous 2 times per day    budesonide-formoterol  2 puff Inhalation BID RT    And    tiotropium  2 puff Inhalation Daily RT    furosemide  20 mg Oral Daily    pantoprazole  40 mg Oral QAM AC    spironolactone  50 mg Oral BID    thiamine mononitrate  100 mg Oral Daily    benzonatate  200 mg Oral TID     skill demonstration/verbal instruction

## 2025-02-24 NOTE — RT PROTOCOL NOTE
RT Inhaler-Nebulizer Bronchodilator Protocol Note    There is a bronchodilator order in the chart from a provider indicating to follow the RT Bronchodilator Protocol and there is an “Initiate RT Inhaler-Nebulizer Bronchodilator Protocol” order as well (see protocol at bottom of note).    CXR Findings:  No results found.    The findings from the last RT Protocol Assessment were as follows:   History Pulmonary Disease: Chronic pulmonary disease  Respiratory Pattern: Dyspnea on exertion or RR 21-25 bpm  Breath Sounds: Intermittent or unilateral wheezes  Cough: Strong, productive  Indication for Bronchodilator Therapy: Decreased or absent breath sounds, Wheezing associated with pulm disorder  Bronchodilator Assessment Score: 9    Aerosolized bronchodilator medication orders have been revised according to the RT Inhaler-Nebulizer Bronchodilator Protocol below.    Respiratory Therapist to perform RT Therapy Protocol Assessment initially then follow the protocol.  Repeat RT Therapy Protocol Assessment PRN for score 0-3 or on second treatment, BID, and PRN for scores above 3.    No Indications - adjust the frequency to every 6 hours PRN wheezing or bronchospasm, if no treatments needed after 48 hours then discontinue using Per Protocol order mode.     If indication present, adjust the RT bronchodilator orders based on the Bronchodilator Assessment Score as indicated below.  Use Inhaler orders unless patient has one or more of the following: on home nebulizer, not able to hold breath for 10 seconds, is not alert and oriented, cannot activate and use MDI correctly, or respiratory rate 25 breaths per minute or more, then use the equivalent nebulizer order(s) with same Frequency and PRN reasons based on the score.  If a patient is on this medication at home then do not decrease Frequency below that used at home.    0-3 - enter or revise RT bronchodilator order(s) to equivalent RT Bronchodilator order with Frequency of every 4

## 2025-02-24 NOTE — PROGRESS NOTES
Progress note  [facility].,    Adult Hospitalist      Name: Dariel Griffiths  MRN: [id number]     Acct: [id number]  Room: [address] [place] [address]    Admit Date: 2/20/2025  6:13 PM  PCP: Evelia Gillespie MD    Primary Problem  Principal Problem:    Acute pneumonia  Active Problems:    Severe malnutrition  Resolved Problems:    * No resolved hospital problems. *        Assesment/ plan:       Acute bacterial pneumonia/leukocytosis/sepsis:  COPD / Nodules / Left lung base cavitary Lesion  IV Rocephin  IV Flagyl  IV vancomycin pulmonology consult  Symbicort, spiriva  CT chest outpt in 6weeks  Blood cultures NTD  Respiratory pathogen panel  Pulmonology consult    Abdominal pain/ascites / Transaminitis  Patient has history of alcoholic liver cirrhosis with ascites  Continue with diuretics Lasix/Aldactone  Plan for IR paracentesis  On diuretics  Monitor bp    CT abdomen is concerning for ascites/liver cirrhosis  Consult gastroenterology, they are recommending paracentesis along with likely an EGD     Mild hyponatremia:  Fluid hydration    Hypokalemia:  Replace electrolytes    Acute on chronic COPD:  Currently stable  Oxygen has been more than 90%  Continue his inhalers    Chronic congestive heart failure, diastolic:  Continue with oral Lasix  Monitor input output    History of seizure disorder:  Continue zonisamide    Tobacco abuse:  Suggested to quit smoking      Continue to monitor/telemetry/CBC with differential daily/BMP daily  DVT and GI prophylaxis.  Continue medications as below      Scheduled Meds:   albuterol  2.5 mg Nebulization TID RT    cefTRIAXone (ROCEPHIN) IV  1,000 mg IntraVENous Q24H    metroNIDAZOLE  500 mg IntraVENous Q8H    sodium chloride flush  10 mL IntraVENous 2 times per day    budesonide-formoterol  2 puff Inhalation BID RT    And    tiotropium  2 puff Inhalation Daily RT    furosemide  20 mg Oral Daily    pantoprazole  40 mg Oral QAM AC    spironolactone  50 mg Oral BID    thiamine mononitrate

## 2025-02-24 NOTE — PROGRESS NOTES
Pulmonary Critical Care Progress Note    Patient seen for the follow up of Acute pneumonia     Subjective:    He still reports having nausea abdominal pain..  Slight improvement in shortness of breath.  He has occasional cough.  He has no chest pain he is on room air    Examination:    Vitals: /75   Pulse (!) 119   Temp 97.2 °F (36.2 °C) (Oral)   Resp 18   Ht 1.905 m (6' 3\")   Wt 63.6 kg (140 lb 3.2 oz)   SpO2 94%   BMI 17.52 kg/m²   SpO2  Av.8 %  Min: 91 %  Max: 94 %  General appearance: alert and cooperative with exam  Neck: No JVD  Lungs: Decreased breath sound no crackles or wheeze  Heart: regular rate and rhythm, S1, S2 normal, no gallop  Abdomen: Soft, non tender, tense ascites  Extremities: no cyanosis or clubbing. No significant edema  Skin 1/2  inch nodularity right upper back?  Fibroma    LABs:    CBC:   Recent Labs     25  0556 25  0639   WBC 11.3 12.0*   HGB 10.7* 10.9*   HCT 31.4* 32.1*    169     BMP:   Recent Labs     25  0556 25  0639   * 134*   K 4.2 4.5   CO2 21 21   BUN 10 12   CREATININE 0.7 0.7   LABGLOM >90 >90   GLUCOSE 99 131*     PT/INR: No results for input(s): \"PROTIME\", \"INR\" in the last 72 hours.  APTT:No results for input(s): \"APTT\" in the last 72 hours.  LIVER PROFILE:  Recent Labs     25  0556   *   ALT 58*      Latest Reference Range & Units 25 12:43   Chlamydia pneumoniae By PCR Not Detected  Not Detected   Rhino/Enterovirus PCR Not Detected  Not Detected   Human Metapneumovirus PCR Not Detected  Not Detected   Adenovirus PCR Not Detected  Not Detected   B Pertussis by PCR Not Detected  Not Detected   Coronavirus 229E PCR Not Detected  Not Detected   Coronavirus HKU1 PCR Not Detected  Not Detected   Coronavirus NL63 PCR Not Detected  Not Detected   Coronavirus OC Not Detected  Not Detected   Influenza A by PCR Not Detected  Not Detected   Influenza B by PCR Not Detected  Not Detected   Parainfluenza 1 PCR Not

## 2025-02-24 NOTE — PROGRESS NOTES
GASTROENTEROLOGY NOTE       Patient:   Dariel Griffiths   :    1960   Facility:   [facility]  Date:     2025  Consultant:   ROMANA Cannon CNP      SUBJECTIVE:      Complains of abdominal fullness and distention.  Breakfast tray at bedside, he is attempting to eat.  Denies nausea or vomiting.  Plan for paracentesis today, though he expresses irritation that despite a paracentesis, ascitic fluid will redevelop.      OBJECTIVE:   Vital Signs:  /77   Pulse (!) 127   Temp 97.3 °F (36.3 °C) (Oral)   Resp 20   Ht 1.905 m (6' 3\")   Wt 63.6 kg (140 lb 3.2 oz)   SpO2 91%   BMI 17.52 kg/m²      Physical Exam:   General appearance: Alert, NAD  Lungs: Coarse bilaterally, unlabored pattern  Heart: S1S2, RRR  Abdomen: Soft, distention noted, +BS  Skin/Musculoskeletal:  No jaundice      Lab and Imaging Review   Recent Labs     25  1537 25  0556 25  0639   WBC 10.8 11.3 12.0*   HGB 10.3* 10.7* 10.9*   MCV 99.0 98.4 97.6    172 169   * 133* 134*   K 4.0 4.2 4.5    105 106   CO2 21 21 21   BUN 9 10 12   CREATININE 0.6* 0.7 0.7   GLUCOSE 114 99 131*   CALCIUM 7.9* 8.1* 8.3*   AST  --  112*  --    ALT  --  58*  --    ALKPHOS  --  237*  --    BILITOT  --  1.7*  --    BILIDIR  --  1.1*  --    LIPASE  --  31  --      No results for input(s): \"INR\", \"PROTIME\" in the last 72 hours.      Impression:    ETOH cirrhosis  Ascites      PLAN:    Plan for paracentesis today  Continue diurectics  2GM sodium diet  Following along      Damaris An CNP    Plans were discussed with Dr. Amezquita

## 2025-02-25 ENCOUNTER — APPOINTMENT (OUTPATIENT)
Dept: INTERVENTIONAL RADIOLOGY/VASCULAR | Age: 65
DRG: 871 | End: 2025-02-25
Attending: INTERNAL MEDICINE
Payer: MEDICARE

## 2025-02-25 LAB
ALBUMIN FLD-MCNC: <0.2 G/DL
AMYLASE FLD-CCNC: 42 U/L
ANION GAP SERPL CALCULATED.3IONS-SCNC: 6 MMOL/L (ref 9–16)
APPEARANCE FLD: NORMAL
BASOPHILS # BLD: 0.14 K/UL (ref 0–0.2)
BASOPHILS NFR BLD: 1 %
BODY FLD TYPE: NORMAL
BUN SERPL-MCNC: 14 MG/DL (ref 8–23)
CALCIUM SERPL-MCNC: 8.6 MG/DL (ref 8.8–10.2)
CASE NUMBER:: NORMAL
CHLORIDE SERPL-SCNC: 105 MMOL/L (ref 98–107)
CLOT CHECK: NORMAL
CO2 SERPL-SCNC: 22 MMOL/L (ref 20–31)
COLOR FLD: YELLOW
CREAT SERPL-MCNC: 0.6 MG/DL (ref 0.7–1.2)
EOSINOPHIL # BLD: 0.14 K/UL (ref 0–0.4)
EOSINOPHILS RELATIVE PERCENT: 1 % (ref 1–4)
ERYTHROCYTE [DISTWIDTH] IN BLOOD BY AUTOMATED COUNT: 14.6 % (ref 11.8–14.4)
GFR, ESTIMATED: >90 ML/MIN/1.73M2
GLUCOSE FLD-MCNC: 126 MG/DL
GLUCOSE SERPL-MCNC: 116 MG/DL (ref 82–115)
HCT VFR BLD AUTO: 31 % (ref 40.7–50.3)
HGB BLD-MCNC: 10.7 G/DL (ref 13–17)
IMM GRANULOCYTES # BLD AUTO: 0.14 K/UL (ref 0–0.3)
IMM GRANULOCYTES NFR BLD: 1 %
LACTATE BLDV-SCNC: 1.6 MMOL/L (ref 0.5–2.2)
LDH FLD L TO P-CCNC: 30 U/L
LYMPHOCYTES NFR BLD: 2.54 K/UL (ref 1–4.8)
LYMPHOCYTES NFR FLD: 92 %
LYMPHOCYTES RELATIVE PERCENT: 18 % (ref 24–44)
MAGNESIUM SERPL-MCNC: 1.4 MG/DL (ref 1.6–2.4)
MCH RBC QN AUTO: 33.8 PG (ref 25.2–33.5)
MCHC RBC AUTO-ENTMCNC: 34.5 G/DL (ref 28.4–34.8)
MCV RBC AUTO: 97.8 FL (ref 82.6–102.9)
MICROORGANISM SPEC CULT: NORMAL
MONOCYTES NFR BLD: 15 % (ref 1–7)
MONOCYTES NFR BLD: 2.12 K/UL (ref 0.2–0.8)
MONOCYTES NFR FLD: 4 %
NEUTROPHILS NFR BLD: 64 % (ref 36–66)
NEUTROPHILS NFR FLD: 4 %
NEUTS SEG NFR BLD: 9.02 K/UL (ref 1.8–7.7)
NRBC BLD-RTO: 0 PER 100 WBC
NUC CELL # FLD: 140 CELLS/UL
PLATELET # BLD AUTO: 169 K/UL (ref 138–453)
PMV BLD AUTO: 10.7 FL (ref 8.1–13.5)
POTASSIUM SERPL-SCNC: 4.8 MMOL/L (ref 3.7–5.3)
PROCALCITONIN SERPL-MCNC: 0.25 NG/ML (ref 0–0.09)
PROT FLD-MCNC: 0.5 G/DL
RBC # BLD AUTO: 3.17 M/UL (ref 4.21–5.77)
RBC # FLD: <3000 CELLS/UL
SERVICE CMNT-IMP: NORMAL
SODIUM SERPL-SCNC: 134 MMOL/L (ref 136–145)
SPECIMEN DESCRIPTION: NORMAL
SPECIMEN DESCRIPTION: NORMAL
SPECIMEN TYPE: NORMAL
WBC OTHER # BLD: 14.1 K/UL (ref 3.5–11.3)

## 2025-02-25 PROCEDURE — 85025 COMPLETE CBC W/AUTO DIFF WBC: CPT

## 2025-02-25 PROCEDURE — 36415 COLL VENOUS BLD VENIPUNCTURE: CPT

## 2025-02-25 PROCEDURE — 84145 PROCALCITONIN (PCT): CPT

## 2025-02-25 PROCEDURE — 83605 ASSAY OF LACTIC ACID: CPT

## 2025-02-25 PROCEDURE — 87075 CULTR BACTERIA EXCEPT BLOOD: CPT

## 2025-02-25 PROCEDURE — 6360000002 HC RX W HCPCS: Performed by: FAMILY MEDICINE

## 2025-02-25 PROCEDURE — 6370000000 HC RX 637 (ALT 250 FOR IP): Performed by: NURSE PRACTITIONER

## 2025-02-25 PROCEDURE — 6370000000 HC RX 637 (ALT 250 FOR IP): Performed by: FAMILY MEDICINE

## 2025-02-25 PROCEDURE — 49083 ABD PARACENTESIS W/IMAGING: CPT

## 2025-02-25 PROCEDURE — 83615 LACTATE (LD) (LDH) ENZYME: CPT

## 2025-02-25 PROCEDURE — 2500000003 HC RX 250 WO HCPCS: Performed by: NURSE PRACTITIONER

## 2025-02-25 PROCEDURE — 84157 ASSAY OF PROTEIN OTHER: CPT

## 2025-02-25 PROCEDURE — 94761 N-INVAS EAR/PLS OXIMETRY MLT: CPT

## 2025-02-25 PROCEDURE — 0W9G3ZZ DRAINAGE OF PERITONEAL CAVITY, PERCUTANEOUS APPROACH: ICD-10-PCS | Performed by: RADIOLOGY

## 2025-02-25 PROCEDURE — 80048 BASIC METABOLIC PNL TOTAL CA: CPT

## 2025-02-25 PROCEDURE — 6360000002 HC RX W HCPCS: Performed by: HOSPITALIST

## 2025-02-25 PROCEDURE — 87070 CULTURE OTHR SPECIMN AEROBIC: CPT

## 2025-02-25 PROCEDURE — 83735 ASSAY OF MAGNESIUM: CPT

## 2025-02-25 PROCEDURE — 82042 OTHER SOURCE ALBUMIN QUAN EA: CPT

## 2025-02-25 PROCEDURE — 94640 AIRWAY INHALATION TREATMENT: CPT

## 2025-02-25 PROCEDURE — 82945 GLUCOSE OTHER FLUID: CPT

## 2025-02-25 PROCEDURE — 97116 GAIT TRAINING THERAPY: CPT

## 2025-02-25 PROCEDURE — 87205 SMEAR GRAM STAIN: CPT

## 2025-02-25 PROCEDURE — 1200000000 HC SEMI PRIVATE

## 2025-02-25 PROCEDURE — 89051 BODY FLUID CELL COUNT: CPT

## 2025-02-25 PROCEDURE — 82150 ASSAY OF AMYLASE: CPT

## 2025-02-25 RX ORDER — ALBUTEROL SULFATE 0.83 MG/ML
2.5 SOLUTION RESPIRATORY (INHALATION) EVERY 6 HOURS PRN
COMMUNITY

## 2025-02-25 RX ORDER — HYDROCODONE BITARTRATE AND ACETAMINOPHEN 5; 325 MG/1; MG/1
1 TABLET ORAL EVERY 4 HOURS PRN
Status: DISCONTINUED | OUTPATIENT
Start: 2025-02-26 | End: 2025-02-26 | Stop reason: HOSPADM

## 2025-02-25 RX ORDER — MAGNESIUM SULFATE IN WATER 40 MG/ML
2000 INJECTION, SOLUTION INTRAVENOUS ONCE
Status: COMPLETED | OUTPATIENT
Start: 2025-02-25 | End: 2025-02-25

## 2025-02-25 RX ORDER — HYDROCODONE BITARTRATE AND ACETAMINOPHEN 5; 325 MG/1; MG/1
2 TABLET ORAL EVERY 4 HOURS PRN
Status: DISCONTINUED | OUTPATIENT
Start: 2025-02-26 | End: 2025-02-26 | Stop reason: HOSPADM

## 2025-02-25 RX ORDER — FUROSEMIDE 40 MG/1
40 TABLET ORAL DAILY
Status: DISCONTINUED | OUTPATIENT
Start: 2025-02-26 | End: 2025-02-26 | Stop reason: HOSPADM

## 2025-02-25 RX ORDER — METRONIDAZOLE 500 MG/1
500 TABLET ORAL EVERY 8 HOURS SCHEDULED
Status: DISCONTINUED | OUTPATIENT
Start: 2025-02-25 | End: 2025-02-26 | Stop reason: HOSPADM

## 2025-02-25 RX ORDER — METRONIDAZOLE 500 MG/1
500 TABLET ORAL EVERY 8 HOURS SCHEDULED
Status: DISCONTINUED | OUTPATIENT
Start: 2025-02-25 | End: 2025-02-25

## 2025-02-25 RX ORDER — FAMOTIDINE 40 MG/1
40 TABLET, FILM COATED ORAL DAILY
COMMUNITY

## 2025-02-25 RX ORDER — OMEPRAZOLE 40 MG/1
40 CAPSULE, DELAYED RELEASE ORAL DAILY
COMMUNITY

## 2025-02-25 RX ORDER — HYDROCODONE BITARTRATE AND ACETAMINOPHEN 5; 325 MG/1; MG/1
1 TABLET ORAL ONCE
Status: COMPLETED | OUTPATIENT
Start: 2025-02-25 | End: 2025-02-25

## 2025-02-25 RX ORDER — FUROSEMIDE 40 MG/1
40 TABLET ORAL DAILY
COMMUNITY

## 2025-02-25 RX ORDER — LACTULOSE 10 G/15ML
20 SOLUTION ORAL 3 TIMES DAILY
COMMUNITY

## 2025-02-25 RX ADMIN — BENZONATATE 200 MG: 100 CAPSULE ORAL at 21:30

## 2025-02-25 RX ADMIN — BUDESONIDE AND FORMOTEROL FUMARATE DIHYDRATE 2 PUFF: 160; 4.5 AEROSOL RESPIRATORY (INHALATION) at 06:05

## 2025-02-25 RX ADMIN — FUROSEMIDE 20 MG: 20 TABLET ORAL at 11:13

## 2025-02-25 RX ADMIN — SODIUM CHLORIDE, PRESERVATIVE FREE 10 ML: 5 INJECTION INTRAVENOUS at 21:31

## 2025-02-25 RX ADMIN — SPIRONOLACTONE 50 MG: 25 TABLET ORAL at 11:13

## 2025-02-25 RX ADMIN — HYDROCODONE BITARTRATE AND ACETAMINOPHEN 1 TABLET: 5; 325 TABLET ORAL at 18:45

## 2025-02-25 RX ADMIN — SPIRONOLACTONE 50 MG: 25 TABLET ORAL at 21:31

## 2025-02-25 RX ADMIN — Medication 100 MG: at 11:16

## 2025-02-25 RX ADMIN — BUDESONIDE AND FORMOTEROL FUMARATE DIHYDRATE 2 PUFF: 160; 4.5 AEROSOL RESPIRATORY (INHALATION) at 21:22

## 2025-02-25 RX ADMIN — METRONIDAZOLE 500 MG: 500 TABLET ORAL at 11:13

## 2025-02-25 RX ADMIN — BENZONATATE 200 MG: 100 CAPSULE ORAL at 11:13

## 2025-02-25 RX ADMIN — HYDROCODONE BITARTRATE AND ACETAMINOPHEN 1 TABLET: 5; 325 TABLET ORAL at 20:15

## 2025-02-25 RX ADMIN — MAGNESIUM SULFATE HEPTAHYDRATE 2000 MG: 40 INJECTION, SOLUTION INTRAVENOUS at 18:42

## 2025-02-25 RX ADMIN — TIOTROPIUM BROMIDE INHALATION SPRAY 2 PUFF: 3.12 SPRAY, METERED RESPIRATORY (INHALATION) at 06:05

## 2025-02-25 RX ADMIN — SODIUM CHLORIDE, PRESERVATIVE FREE 10 ML: 5 INJECTION INTRAVENOUS at 11:14

## 2025-02-25 RX ADMIN — ALBUTEROL SULFATE 2.5 MG: 2.5 SOLUTION RESPIRATORY (INHALATION) at 21:20

## 2025-02-25 RX ADMIN — SODIUM CHLORIDE, PRESERVATIVE FREE 10 ML: 5 INJECTION INTRAVENOUS at 18:39

## 2025-02-25 RX ADMIN — ALBUTEROL SULFATE 2.5 MG: 2.5 SOLUTION RESPIRATORY (INHALATION) at 14:47

## 2025-02-25 RX ADMIN — ALBUTEROL SULFATE 2.5 MG: 2.5 SOLUTION RESPIRATORY (INHALATION) at 06:04

## 2025-02-25 RX ADMIN — PANTOPRAZOLE SODIUM 40 MG: 40 TABLET, DELAYED RELEASE ORAL at 05:47

## 2025-02-25 RX ADMIN — METRONIDAZOLE 500 MG: 500 TABLET ORAL at 21:30

## 2025-02-25 ASSESSMENT — PAIN SCALES - GENERAL
PAINLEVEL_OUTOF10: 7
PAINLEVEL_OUTOF10: 8
PAINLEVEL_OUTOF10: 7
PAINLEVEL_OUTOF10: 8

## 2025-02-25 ASSESSMENT — PAIN DESCRIPTION - DESCRIPTORS
DESCRIPTORS: THROBBING;STABBING
DESCRIPTORS: ACHING

## 2025-02-25 ASSESSMENT — PAIN DESCRIPTION - PAIN TYPE: TYPE: ACUTE PAIN

## 2025-02-25 ASSESSMENT — PAIN - FUNCTIONAL ASSESSMENT: PAIN_FUNCTIONAL_ASSESSMENT: PREVENTS OR INTERFERES SOME ACTIVE ACTIVITIES AND ADLS

## 2025-02-25 ASSESSMENT — PAIN DESCRIPTION - LOCATION
LOCATION: CHEST
LOCATION: HEAD

## 2025-02-25 ASSESSMENT — PAIN DESCRIPTION - ONSET: ONSET: GRADUAL

## 2025-02-25 ASSESSMENT — PAIN DESCRIPTION - FREQUENCY: FREQUENCY: INTERMITTENT

## 2025-02-25 NOTE — PROGRESS NOTES
GASTROENTEROLOGY NOTE       Patient:   Dariel Griffiths   :    1960   Facility:   [facility]  Date:     2025  Consultant:   ROMANA Cannon CNP      SUBJECTIVE:      Patient very angry.  IR cancelled paracentesis yesterday, overbooked.  Plans for paracentesis today.  Continues to complain of abdominal pain and distention.  Tolerating diet without difficulty.    OBJECTIVE:   Vital Signs:  /76   Pulse (!) 123   Temp 97.7 °F (36.5 °C) (Oral)   Resp 20   Ht 1.905 m (6' 3\")   Wt 63.6 kg (140 lb 3.2 oz)   SpO2 92%   BMI 17.52 kg/m²      Physical Exam:   General appearance: Alert, NAD  Lungs: Coarse bilaterally, unlabored pattern  Heart: S1S2, RRR  Abdomen: Soft, diffuse tenderness and distention noted, +BS  Skin/Musculoskeletal:  No jaundice. ROM normal.      Lab and Imaging Review   Recent Labs     25  1537 25  0556 25  0639 25  0634   WBC 10.8 11.3 12.0* 14.1*   HGB 10.3* 10.7* 10.9* 10.7*   MCV 99.0 98.4 97.6 97.8    172 169 169   * 133* 134* 134*   K 4.0 4.2 4.5 4.8    105 106 105   CO2 21 21 21 22   BUN 9 10 12 14   CREATININE 0.6* 0.7 0.7 0.6*   GLUCOSE 114 99 131* 116*   CALCIUM 7.9* 8.1* 8.3* 8.6*   AST  --  112*  --   --    ALT  --  58*  --   --    ALKPHOS  --  237*  --   --    BILITOT  --  1.7*  --   --    BILIDIR  --  1.1*  --   --    LIPASE  --  31  --   --      No results for input(s): \"INR\", \"PROTIME\" in the last 72 hours.      Impression:    ETOH cirrhosis  Ascites      PLAN:    Plans for paracentesis today  Continue diuretics  2GM sodium diet  Following      Damaris An CNP    Plans were discussed with Dr. Amezquita

## 2025-02-25 NOTE — PROGRESS NOTES
Pulmonary Critical Care Progress Note    Patient seen for the follow up of Acute pneumonia     Subjective:    He had paracentesis with improved abdominal pain.  Slight improvement in shortness of breath.  He has occasional cough.  He has no chest pain he is on room air    Examination:    Vitals: /72   Pulse (!) 123   Temp 98.2 °F (36.8 °C) (Oral)   Resp 18   Ht 1.905 m (6' 3\")   Wt 63.6 kg (140 lb 3.2 oz)   SpO2 93%   BMI 17.52 kg/m²   SpO2  Av.4 %  Min: 92 %  Max: 95 %  General appearance: alert and cooperative with exam  Neck: No JVD  Lungs: Decreased breath sound no crackles or wheeze  Heart: regular rate and rhythm, S1, S2 normal, no gallop  Abdomen: Soft, non tender, tense ascites  Extremities: no cyanosis or clubbing. No significant edema  Skin 1/2  inch nodularity right upper back?  Fibroma    LABs:    CBC:   Recent Labs     25  0639 25  0634   WBC 12.0* 14.1*   HGB 10.9* 10.7*   HCT 32.1* 31.0*    169     BMP:   Recent Labs     25  0639 25  0634   * 134*   K 4.5 4.8   CO2 21 22   BUN 12 14   CREATININE 0.7 0.6*   LABGLOM >90 >90   GLUCOSE 131* 116*     PT/INR: No results for input(s): \"PROTIME\", \"INR\" in the last 72 hours.  APTT:No results for input(s): \"APTT\" in the last 72 hours.  LIVER PROFILE:  Recent Labs     25  0556   *   ALT 58*      Latest Reference Range & Units 25 12:43   Chlamydia pneumoniae By PCR Not Detected  Not Detected   Rhino/Enterovirus PCR Not Detected  Not Detected   Human Metapneumovirus PCR Not Detected  Not Detected   Adenovirus PCR Not Detected  Not Detected   B Pertussis by PCR Not Detected  Not Detected   Coronavirus 229E PCR Not Detected  Not Detected   Coronavirus HKU1 PCR Not Detected  Not Detected   Coronavirus NL63 PCR Not Detected  Not Detected   Coronavirus OC Not Detected  Not Detected   Influenza A by PCR Not Detected  Not Detected   Influenza B by PCR Not Detected  Not Detected   Parainfluenza 1

## 2025-02-25 NOTE — PROGRESS NOTES
Progress note  [facility].,    Adult Hospitalist      Name: Dariel Griffiths  MRN: [id number]     Acct: [id number]  Room: [address] [place] [address]    Admit Date: 2/20/2025  6:13 PM  PCP: Evelia Gillespie MD    Primary Problem  Principal Problem:    Acute pneumonia  Active Problems:    Severe malnutrition  Resolved Problems:    * No resolved hospital problems. *        Assesment/ plan:       Acute bacterial pneumonia/leukocytosis/sepsis:  IV Rocephin  IV Flagyl  IV vancomycin pulmonology consult  Blood cultures  Respiratory pathogen panel  Pulmonology consult    Abdominal pain/ascites:  PT gets weekly paracentesis at home.   Patient has history of alcoholic liver cirrhosis with ascites  Continue with diuretics Lasix/Aldactone  Plan for IR para centesis today with fluid analysis to r/o SBP. If neg, will plan dc.   CT abdomen is concerning for ascites/liver cirrhosis  GI following.     Mild hyponatremia:  Fluid hydration    Hypokalemia:  Replace electrolytes    Acute on chronic COPD:  Currently stable  Oxygen has been more than 90%  Continue his inhalers    Chronic congestive heart failure, diastolic:  Continue with oral Lasix  Monitor input output    History of seizure disorder:  Continue zonisamide    Tobacco abuse:  Suggested to quit smoking      Continue to monitor/telemetry/CBC with differential daily/BMP daily  DVT and GI prophylaxis.  Continue medications as below      Scheduled Meds:   metroNIDAZOLE  500 mg Oral 3 times per day    [START ON 2/26/2025] furosemide  40 mg Oral Daily    albuterol  2.5 mg Nebulization TID RT    sodium chloride flush  10 mL IntraVENous 2 times per day    budesonide-formoterol  2 puff Inhalation BID RT    And    tiotropium  2 puff Inhalation Daily RT    pantoprazole  40 mg Oral QAM AC    spironolactone  50 mg Oral BID    thiamine mononitrate  100 mg Oral Daily    benzonatate  200 mg Oral TID    sodium chloride  80 mL IntraVENous Once     Continuous Infusions:   sodium chloride

## 2025-02-25 NOTE — PROGRESS NOTES
ANTIMICROBIAL STEWARDSHIP  Upon review of the patient's chart, the committee has the following recommendation for your consideration:    Indication: intra-abdominal infection   Day of Antimicrobial Therapy: 6    Recommendation   Patient was initiated on vancomycin/ceftriaxone/flagyl for possible sepsis due to pneumonia and intraabdominal infection.     Vancomycin was discontinued with negative MRSA nasal pcr and 5 days of ceftriaxone completed for CAP, but flagyl was continued. If flagyl is still warranted, please document the appropriate indication with justifying s/sx.         Recent Labs     02/24/25  0639 02/25/25  0634   WBC 12.0* 14.1*     No results for input(s): \"PROCAL\" in the last 72 hours.    Unnecessary or inappropriate antimicrobial use increases the risk of subsequent infections with resistant bacterial infections and drug-associated toxicities including C. difficile infection.     Thank you.  Isa Sanders RP, PharmD  2/25/2025  9:37 AM    The Antimicrobial Stewardship Committee at [facility] is led by  Kaykay Morales MD, Infectious Diseases Section Chair.

## 2025-02-25 NOTE — PROGRESS NOTES
Pharmacy Medication Review    The patient's list of current home medications has been reviewed.     PHYSICIANS AND NURSE PRACTITIONERS: please note there is no Transitions of Care/Med Rec Pharmacist available to address any discrepancies on inpatient orders. It is the responsibility of the attending provider to review the updates made to the home med list and adjust inpatient orders as appropriate.        Source(s) of information:patient, Surescripts refill report        Based on information provided by the above source(s), I have updated the patient's home med list as described below.     Removed   Multiple Vitamins-Minerals (CENTRUM/CERTA-ESPERANZA WITH MINERALS ORAL) solution   Ergocalciferol (VITAMIN D) 86531 units CAPS   folic acid (FOLVITE) 1 MG tablet   furosemide (LASIX) 20 MG tablet   omeprazole (PRILOSEC) 20 MG delayed release capsule   zonisamide (ZONEGRAN) 100 MG capsule   Budeson-Glycopyrrol-Formoterol (BREZTRI AEROSPHERE) 160-9-4.8 MCG/ACT AERO     Added famotidine (PEPCID) 40 MG tablet   lactulose (CHRONULAC) 10 GM/15ML solution   omeprazole (PRILOSEC) 40 MG delayed release capsule   furosemide (LASIX) 40 MG tablet   albuterol (PROVENTIL) (2.5 MG/3ML) 0.083% nebulizer solution   Multiple Vitamins-Minerals (MULTIVITAMIN GUMMIES ADULTS PO)      Adjusted   None      Other notes:   None    Are any of the medications noted above considered a 'high alert' medication? no      Is the patient on warfarin at home? No          Please feel free to call me with any questions about this encounter. Thank you.      Sabas Ceballos, pharmacy technician  [facility]  Phone:  [phone]      Electronically signed by Sabas Ceballos on 2/25/2025 at 1:36 PM   Note: co-signature by the pharmacist only acknowledges that I have performed a medication review and does not attest to an evaluation of this medication review.        Prior to Admission medications    Medication Sig   famotidine (PEPCID) 40 MG tablet Take 1

## 2025-02-25 NOTE — PROGRESS NOTES
Mobility Raw Score : 21  AM-PAC Inpatient T-Scale Score : 50.25  Mobility Inpatient CMS 0-100% Score: 28.97  Mobility Inpatient CMS G-Code Modifier : CJ    Restrictions/Precautions  Restrictions/Precautions  Restrictions/Precautions: General Precautions, Fall Risk, Bed Alarm  Activity Level: Up as Tolerated  Required Braces or Orthoses?: No  Position Activity Restriction  Other Position/Activity Restrictions: 2L O2 NC       Subjective  General  Chart Reviewed: Yes  Patient assessed for rehabilitation services?: Yes  Response To Previous Treatment: Patient with no complaints from previous session.  Family/Caregiver Present: No  Diagnosis: Penumonia, Paracentesis 2/25/25  Follows Commands: Impaired  General  General Comments: ELIANE, RN reports patient medically appropriate for PT.  Subjective  Subjective: Patient very pleasant and agreeable to PT. Patient reports he is functioning close to his baseline.     Objective  Orientation  Overall Orientation Status: Within Functional Limits  Orientation Level: Oriented X4  Cognition  Overall Cognitive Status: Exceptions  Arousal/Alertness: Appears intact  Following Commands: Follows multistep commands with increased time  Attention Span: Appears intact  Memory: Decreased recall of recent events  Safety Judgement: Decreased awareness of need for safety, Decreased awareness of need for assistance  Problem Solving: Assistance required to generate solutions, Assistance required to identify errors made, Assistance required to correct errors made, Assistance required to implement solutions, Decreased awareness of errors  Insights: Decreased awareness of deficits  Initiation: Requires cues for some  Sequencing: Appears intact    Observation/Palpation  Posture: Fair  Observation: Patient in bed upon arrival.      Mobility   Bed mobility  Rolling to Left: Independent  Rolling to Right: Independent  Supine to Sit: Independent  Sit to Supine: Independent  Scooting:

## 2025-02-25 NOTE — PROGRESS NOTES
The patient called writer to the room and states \"I was told by a doctor this morning after my paracentesis I could go home\". The patient told his family member, \"call a  these Fucking doctors don't know what they're doing\". The patient again refused ADLs and pain medication and stated \"I'll just fucking rot\".

## 2025-02-25 NOTE — PROGRESS NOTES
Spiritual Health History and Assessment/Progress Note  [facility]    (P) Initial Encounter,  ,  ,      Name: Dariel Griffiths MRN: [id number]    Age: 64 y.o.     Sex: male   Language: English   Islam: Temple   Acute pneumonia     Date: 2/25/2025            Total Time Calculated: (P) 17 min              Spiritual Assessment began in [facility] MED SURG [place]        Referral/Consult From: (P) Rounding   Encounter Overview/Reason: (P) Initial Encounter  Service Provided For: (P) Patient    Patient shared his frustration with the care he has received although the level of his frustration seems to be more than the circumstances warrant and his verbal complaints are circular in presentation.  provided active listening, acknowledged patient's feelings and concerns, validated patient's personhood and worth, and seemed to help patient gain a more positive perspective. Patient expressed appreciation for  support.    Carissa, Belief, Meaning:   Patient identifies as spiritual  Family/Friends No family/friends present      Importance and Influence:  Patient has no beliefs influential to healthcare decision-making identified during this visit  Family/Friends No family/friends present    Community:  Patient Other: patient lists one contact in chart but relationship is unclear  Family/Friends No family/friends present    Assessment and Plan of Care:     Patient Interventions include: Facilitated expression of thoughts and feelings  Family/Friends Interventions include: No family/friends present    Patient Plan of Care: Spiritual Care available upon further referral  Family/Friends Plan of Care: Spiritual Care available upon further referral    Electronically signed by Chaplain Jenn on 2/25/2025 at 3:36 PM

## 2025-02-26 VITALS
BODY MASS INDEX: 18.02 KG/M2 | WEIGHT: 144.9 LBS | DIASTOLIC BLOOD PRESSURE: 69 MMHG | HEIGHT: 75 IN | OXYGEN SATURATION: 97 % | SYSTOLIC BLOOD PRESSURE: 105 MMHG | TEMPERATURE: 97.3 F | RESPIRATION RATE: 16 BRPM | HEART RATE: 108 BPM

## 2025-02-26 LAB
ANION GAP SERPL CALCULATED.3IONS-SCNC: 6 MMOL/L (ref 9–16)
BASOPHILS # BLD: 0.13 K/UL (ref 0–0.2)
BASOPHILS NFR BLD: 1 % (ref 0–2)
BUN SERPL-MCNC: 15 MG/DL (ref 8–23)
CALCIUM SERPL-MCNC: 8.2 MG/DL (ref 8.8–10.2)
CHLORIDE SERPL-SCNC: 105 MMOL/L (ref 98–107)
CO2 SERPL-SCNC: 22 MMOL/L (ref 20–31)
CREAT SERPL-MCNC: 0.7 MG/DL (ref 0.7–1.2)
EOSINOPHIL # BLD: 0.13 K/UL (ref 0–0.44)
EOSINOPHILS RELATIVE PERCENT: 1 % (ref 1–4)
ERYTHROCYTE [DISTWIDTH] IN BLOOD BY AUTOMATED COUNT: 15.4 % (ref 11.8–14.4)
GFR, ESTIMATED: >90 ML/MIN/1.73M2
GLUCOSE SERPL-MCNC: 119 MG/DL (ref 82–115)
HCT VFR BLD AUTO: 30.1 % (ref 40.7–50.3)
HGB BLD-MCNC: 10.5 G/DL (ref 13–17)
IMM GRANULOCYTES # BLD AUTO: 0.13 K/UL (ref 0–0.3)
IMM GRANULOCYTES NFR BLD: 1 %
LYMPHOCYTES NFR BLD: 2.54 K/UL (ref 1.1–3.7)
LYMPHOCYTES RELATIVE PERCENT: 20 % (ref 24–43)
MAGNESIUM SERPL-MCNC: 1.7 MG/DL (ref 1.6–2.4)
MCH RBC QN AUTO: 33.7 PG (ref 25.2–33.5)
MCHC RBC AUTO-ENTMCNC: 34.9 G/DL (ref 28.4–34.8)
MCV RBC AUTO: 96.5 FL (ref 82.6–102.9)
MICROORGANISM SPEC CULT: NORMAL
MONOCYTES NFR BLD: 16 % (ref 3–12)
MONOCYTES NFR BLD: 2.03 K/UL (ref 0.1–1.2)
NEUTROPHILS NFR BLD: 61 % (ref 36–65)
NEUTS SEG NFR BLD: 7.74 K/UL (ref 1.5–8.1)
NRBC BLD-RTO: 0 PER 100 WBC
PLATELET # BLD AUTO: 168 K/UL (ref 138–453)
PMV BLD AUTO: 10.3 FL (ref 8.1–13.5)
POTASSIUM SERPL-SCNC: 4.7 MMOL/L (ref 3.7–5.3)
RBC # BLD AUTO: 3.12 M/UL (ref 4.21–5.77)
SERVICE CMNT-IMP: NORMAL
SODIUM SERPL-SCNC: 133 MMOL/L (ref 136–145)
SPECIMEN DESCRIPTION: NORMAL
SURGICAL PATHOLOGY REPORT: NORMAL
WBC OTHER # BLD: 12.7 K/UL (ref 3.5–11.3)

## 2025-02-26 PROCEDURE — 6370000000 HC RX 637 (ALT 250 FOR IP): Performed by: FAMILY MEDICINE

## 2025-02-26 PROCEDURE — 36415 COLL VENOUS BLD VENIPUNCTURE: CPT

## 2025-02-26 PROCEDURE — 2500000003 HC RX 250 WO HCPCS: Performed by: NURSE PRACTITIONER

## 2025-02-26 PROCEDURE — 85025 COMPLETE CBC W/AUTO DIFF WBC: CPT

## 2025-02-26 PROCEDURE — 94640 AIRWAY INHALATION TREATMENT: CPT

## 2025-02-26 PROCEDURE — 6370000000 HC RX 637 (ALT 250 FOR IP): Performed by: NURSE PRACTITIONER

## 2025-02-26 PROCEDURE — 94761 N-INVAS EAR/PLS OXIMETRY MLT: CPT

## 2025-02-26 PROCEDURE — 6360000002 HC RX W HCPCS: Performed by: HOSPITALIST

## 2025-02-26 PROCEDURE — 80048 BASIC METABOLIC PNL TOTAL CA: CPT

## 2025-02-26 PROCEDURE — 6360000002 HC RX W HCPCS: Performed by: FAMILY MEDICINE

## 2025-02-26 PROCEDURE — 83735 ASSAY OF MAGNESIUM: CPT

## 2025-02-26 RX ORDER — METRONIDAZOLE 500 MG/1
500 TABLET ORAL EVERY 8 HOURS SCHEDULED
Qty: 30 TABLET | Refills: 0 | Status: SHIPPED | OUTPATIENT
Start: 2025-02-26 | End: 2025-03-08

## 2025-02-26 RX ORDER — CEFDINIR 300 MG/1
300 CAPSULE ORAL 2 TIMES DAILY
Qty: 14 CAPSULE | Refills: 0 | Status: SHIPPED | OUTPATIENT
Start: 2025-02-26 | End: 2025-03-05

## 2025-02-26 RX ORDER — MAGNESIUM SULFATE 1 G/100ML
1000 INJECTION INTRAVENOUS ONCE
Status: COMPLETED | OUTPATIENT
Start: 2025-02-26 | End: 2025-02-26

## 2025-02-26 RX ADMIN — Medication 100 MG: at 10:42

## 2025-02-26 RX ADMIN — METRONIDAZOLE 500 MG: 500 TABLET ORAL at 14:56

## 2025-02-26 RX ADMIN — TIOTROPIUM BROMIDE INHALATION SPRAY 2 PUFF: 3.12 SPRAY, METERED RESPIRATORY (INHALATION) at 08:49

## 2025-02-26 RX ADMIN — SPIRONOLACTONE 50 MG: 25 TABLET ORAL at 10:42

## 2025-02-26 RX ADMIN — METRONIDAZOLE 500 MG: 500 TABLET ORAL at 06:16

## 2025-02-26 RX ADMIN — ALBUTEROL SULFATE 2.5 MG: 2.5 SOLUTION RESPIRATORY (INHALATION) at 14:05

## 2025-02-26 RX ADMIN — FUROSEMIDE 40 MG: 40 TABLET ORAL at 10:42

## 2025-02-26 RX ADMIN — PANTOPRAZOLE SODIUM 40 MG: 40 TABLET, DELAYED RELEASE ORAL at 06:16

## 2025-02-26 RX ADMIN — ALBUTEROL SULFATE 2.5 MG: 2.5 SOLUTION RESPIRATORY (INHALATION) at 08:49

## 2025-02-26 RX ADMIN — BUDESONIDE AND FORMOTEROL FUMARATE DIHYDRATE 2 PUFF: 160; 4.5 AEROSOL RESPIRATORY (INHALATION) at 08:49

## 2025-02-26 RX ADMIN — SODIUM CHLORIDE, PRESERVATIVE FREE 10 ML: 5 INJECTION INTRAVENOUS at 10:41

## 2025-02-26 RX ADMIN — MAGNESIUM SULFATE HEPTAHYDRATE 1000 MG: 1 INJECTION, SOLUTION INTRAVENOUS at 10:45

## 2025-02-26 NOTE — PROGRESS NOTES
Physical Therapy  DATE: 2025    NAME: Dariel Griffiths  MRN: [id number]   : 1960    Patient not seen this date for Physical Therapy due to:      [] Cancel by RN or physician due to:    [] Hemodialysis    [] Critical Lab Value Level     [] Blood transfusion in progress    [] Acute or unstable cardiovascular status   _MAP < 55 or more than >115  _HR < 40 or > 130    [] Acute or unstable pulmonary status   -FiO2 > 60%   _RR < 5 or >40    _O2 sats < 85%    [] Strict Bedrest    [] Off Unit for surgery or procedure    [] Off Unit for testing       [] Pending imaging to R/O fracture    [x] Refusal by Patient Pt states he is being discharged soon heredia not want PT or HEP to go home with     [] Other      [] PT being discontinued at this time. Patient independent. No further needs.     [] PT being discontinued at this time as the patient has been transferred to hospice care. No further needs.      SHALINI BUCHANAN, PTA

## 2025-02-26 NOTE — PLAN OF CARE
Problem: Chronic Conditions and Co-morbidities  Goal: Patient's chronic conditions and co-morbidity symptoms are monitored and maintained or improved  2/23/2025 1140 by Shruti Guzman RN  Outcome: Progressing     Problem: Discharge Planning  Goal: Discharge to home or other facility with appropriate resources  2/23/2025 1140 by Shruti Guzman, RN  Outcome: Progressing     Problem: Pain  Goal: Verbalizes/displays adequate comfort level or baseline comfort level  Outcome: Progressing     Problem: Safety - Adult  Goal: Free from fall injury  Outcome: Progressing     Problem: Skin/Tissue Integrity  Goal: Skin integrity remains intact  Description: 1.  Monitor for areas of redness and/or skin breakdown  2.  Assess vascular access sites hourly  3.  Every 4-6 hours minimum:  Change oxygen saturation probe site  4.  Every 4-6 hours:  If on nasal continuous positive airway pressure, respiratory therapy assess nares and determine need for appliance change or resting period  Outcome: Progressing  Flowsheets (Taken 2/22/2025 2151 by Tamiko Alba, RN)  Skin Integrity Remains Intact: Monitor for areas of redness and/or skin breakdown     Problem: Musculoskeletal - Adult  Goal: Return mobility to safest level of function  Outcome: Progressing     Problem: Nutrition Deficit:  Goal: Optimize nutritional status  Outcome: Progressing     
  Problem: Chronic Conditions and Co-morbidities  Goal: Patient's chronic conditions and co-morbidity symptoms are monitored and maintained or improved  2/25/2025 0416 by Lenora Reinoso RN  Outcome: Progressing  Flowsheets (Taken 2/24/2025 2015)  Care Plan - Patient's Chronic Conditions and Co-Morbidity Symptoms are Monitored and Maintained or Improved:   Monitor and assess patient's chronic conditions and comorbid symptoms for stability, deterioration, or improvement   Collaborate with multidisciplinary team to address chronic and comorbid conditions and prevent exacerbation or deterioration   Update acute care plan with appropriate goals if chronic or comorbid symptoms are exacerbated and prevent overall improvement and discharge     Problem: Discharge Planning  Goal: Discharge to home or other facility with appropriate resources  2/25/2025 0416 by Lenora Reinoso RN  Outcome: Progressing  Flowsheets (Taken 2/24/2025 2015)  Discharge to home or other facility with appropriate resources:   Identify barriers to discharge with patient and caregiver   Arrange for needed discharge resources and transportation as appropriate   Identify discharge learning needs (meds, wound care, etc)   Refer to discharge planning if patient needs post-hospital services based on physician order or complex needs related to functional status, cognitive ability or social support system     Problem: Pain  Goal: Verbalizes/displays adequate comfort level or baseline comfort level  2/25/2025 0416 by Lenora Reinoso RN  Outcome: Progressing     Problem: Safety - Adult  Goal: Free from fall injury  2/25/2025 0416 by Lenora Reinoso RN  Outcome: Progressing     Problem: ABCDS Injury Assessment  Goal: Absence of physical injury  2/25/2025 0416 by Lenora Reinoso RN  Outcome: Progressing     Problem: Skin/Tissue Integrity  Goal: Skin integrity remains intact  Description: 1.  Monitor for areas of redness and/or skin breakdown  2.  
  Problem: Chronic Conditions and Co-morbidities  Goal: Patient's chronic conditions and co-morbidity symptoms are monitored and maintained or improved  2/25/2025 1753 by Neha Eli RN  Outcome: Not Progressing  2/25/2025 0416 by Lenora Reinoso RN  Outcome: Progressing  Flowsheets (Taken 2/24/2025 2015)  Care Plan - Patient's Chronic Conditions and Co-Morbidity Symptoms are Monitored and Maintained or Improved:   Monitor and assess patient's chronic conditions and comorbid symptoms for stability, deterioration, or improvement   Collaborate with multidisciplinary team to address chronic and comorbid conditions and prevent exacerbation or deterioration   Update acute care plan with appropriate goals if chronic or comorbid symptoms are exacerbated and prevent overall improvement and discharge     Problem: Discharge Planning  Goal: Discharge to home or other facility with appropriate resources  2/25/2025 1753 by Neha Eli RN  Outcome: Progressing  2/25/2025 0416 by Lenora Reinoso RN  Outcome: Progressing  Flowsheets (Taken 2/24/2025 2015)  Discharge to home or other facility with appropriate resources:   Identify barriers to discharge with patient and caregiver   Arrange for needed discharge resources and transportation as appropriate   Identify discharge learning needs (meds, wound care, etc)   Refer to discharge planning if patient needs post-hospital services based on physician order or complex needs related to functional status, cognitive ability or social support system     Problem: Pain  Goal: Verbalizes/displays adequate comfort level or baseline comfort level  2/25/2025 1753 by Neha Eli RN  Outcome: Progressing  2/25/2025 0416 by Lenora Reinoso RN  Outcome: Progressing     Problem: Safety - Adult  Goal: Free from fall injury  2/25/2025 1753 by Neha Eli RN  Outcome: Progressing  2/25/2025 0416 by Lenora Reinoso RN  Outcome: Progressing     Problem: ABCDS Injury 
  Problem: Chronic Conditions and Co-morbidities  Goal: Patient's chronic conditions and co-morbidity symptoms are monitored and maintained or improved  2/26/2025 1408 by Neha Eli RN  Outcome: Adequate for Discharge  Flowsheets (Taken 2/26/2025 0759)  Care Plan - Patient's Chronic Conditions and Co-Morbidity Symptoms are Monitored and Maintained or Improved:   Monitor and assess patient's chronic conditions and comorbid symptoms for stability, deterioration, or improvement   Collaborate with multidisciplinary team to address chronic and comorbid conditions and prevent exacerbation or deterioration   Update acute care plan with appropriate goals if chronic or comorbid symptoms are exacerbated and prevent overall improvement and discharge  2/26/2025 0048 by Mavis Davidson RN  Outcome: Progressing     Problem: Discharge Planning  Goal: Discharge to home or other facility with appropriate resources  2/26/2025 1408 by Neha Eli RN  Outcome: HH/HSPC Resolved Met  Flowsheets (Taken 2/26/2025 0759)  Discharge to home or other facility with appropriate resources:   Identify barriers to discharge with patient and caregiver   Arrange for needed discharge resources and transportation as appropriate   Identify discharge learning needs (meds, wound care, etc)   Refer to discharge planning if patient needs post-hospital services based on physician order or complex needs related to functional status, cognitive ability or social support system  2/26/2025 0048 by Mavis Davidson RN  Outcome: Progressing     Problem: Pain  Goal: Verbalizes/displays adequate comfort level or baseline comfort level  2/26/2025 1408 by Neha Eli RN  Outcome: Adequate for Discharge  2/26/2025 0048 by Mavis Davidson, RN  Outcome: Progressing     Problem: Safety - Adult  Goal: Free from fall injury  2/26/2025 1408 by Neha Eli RN  Outcome: Adequate for Discharge  2/26/2025 0048 by Mavis Davidson, RN  Outcome: Progressing     Problem: 
  Problem: Chronic Conditions and Co-morbidities  Goal: Patient's chronic conditions and co-morbidity symptoms are monitored and maintained or improved  Outcome: Progressing     Problem: Discharge Planning  Goal: Discharge to home or other facility with appropriate resources  Outcome: Progressing     Problem: Pain  Goal: Verbalizes/displays adequate comfort level or baseline comfort level  Outcome: Progressing     Problem: Safety - Adult  Goal: Free from fall injury  Outcome: Progressing     Problem: ABCDS Injury Assessment  Goal: Absence of physical injury  Outcome: Progressing     Problem: Skin/Tissue Integrity  Goal: Skin integrity remains intact  Description: 1.  Monitor for areas of redness and/or skin breakdown  2.  Assess vascular access sites hourly  3.  Every 4-6 hours minimum:  Change oxygen saturation probe site  4.  Every 4-6 hours:  If on nasal continuous positive airway pressure, respiratory therapy assess nares and determine need for appliance change or resting period  Outcome: Progressing     Problem: Neurosensory - Adult  Goal: Achieves stable or improved neurological status  Outcome: Progressing  Goal: Absence of seizures  Outcome: Progressing  Goal: Achieves maximal functionality and self care  Outcome: Progressing     Problem: Respiratory - Adult  Goal: Achieves optimal ventilation and oxygenation  2/22/2025 1020 by Leobardo Sepulveda, RN  Outcome: Progressing  2/21/2025 2255 by Paula Cohen, P  Outcome: Progressing     Problem: Cardiovascular - Adult  Goal: Maintains optimal cardiac output and hemodynamic stability  Outcome: Progressing     Problem: Skin/Tissue Integrity - Adult  Goal: Skin integrity remains intact  Description: 1.  Monitor for areas of redness and/or skin breakdown  2.  Assess vascular access sites hourly  3.  Every 4-6 hours minimum:  Change oxygen saturation probe site  4.  Every 4-6 hours:  If on nasal continuous positive airway pressure, respiratory therapy assess 
  Problem: Chronic Conditions and Co-morbidities  Goal: Patient's chronic conditions and co-morbidity symptoms are monitored and maintained or improved  Outcome: Progressing     Problem: Discharge Planning  Goal: Discharge to home or other facility with appropriate resources  Outcome: Progressing     Problem: Pain  Goal: Verbalizes/displays adequate comfort level or baseline comfort level  Outcome: Progressing     Problem: Safety - Adult  Goal: Free from fall injury  Outcome: Progressing     Problem: ABCDS Injury Assessment  Goal: Absence of physical injury  Outcome: Progressing     Problem: Skin/Tissue Integrity  Goal: Skin integrity remains intact  Description: 1.  Monitor for areas of redness and/or skin breakdown  2.  Assess vascular access sites hourly  3.  Every 4-6 hours minimum:  Change oxygen saturation probe site  4.  Every 4-6 hours:  If on nasal continuous positive airway pressure, respiratory therapy assess nares and determine need for appliance change or resting period  Outcome: Progressing     Problem: Neurosensory - Adult  Goal: Achieves stable or improved neurological status  Outcome: Progressing  Goal: Absence of seizures  Outcome: Progressing  Goal: Achieves maximal functionality and self care  Outcome: Progressing     Problem: Respiratory - Adult  Goal: Achieves optimal ventilation and oxygenation  Outcome: Progressing     Problem: Cardiovascular - Adult  Goal: Maintains optimal cardiac output and hemodynamic stability  Outcome: Progressing     Problem: Skin/Tissue Integrity - Adult  Goal: Skin integrity remains intact  Description: 1.  Monitor for areas of redness and/or skin breakdown  2.  Assess vascular access sites hourly  3.  Every 4-6 hours minimum:  Change oxygen saturation probe site  4.  Every 4-6 hours:  If on nasal continuous positive airway pressure, respiratory therapy assess nares and determine need for appliance change or resting period  Outcome: Progressing     Problem: 
  Problem: Chronic Conditions and Co-morbidities  Goal: Patient's chronic conditions and co-morbidity symptoms are monitored and maintained or improved  Outcome: Progressing  Care Plan - Patient's Chronic Conditions and Co-Morbidity Symptoms are Monitored and Maintained or Improved:   Monitor and assess patient's chronic conditions and comorbid symptoms for stability, deterioration, or improvement   Collaborate with multidisciplinary team to address chronic and comorbid conditions and prevent exacerbation or deterioration   Update acute care plan with appropriate goals if chronic or comorbid symptoms are exacerbated and prevent overall improvement and discharge     Problem: Discharge Planning  Goal: Discharge to home or other facility with appropriate resources  2/24/2025 1835 by Stephanie Shell, RN  Outcome: Progressing  Discharge to home or other facility with appropriate resources:   Identify barriers to discharge with patient and caregiver   Arrange for needed discharge resources and transportation as appropriate   Identify discharge learning needs (meds, wound care, etc)   Arrange for interpreters to assist at discharge as needed   Refer to discharge planning if patient needs post-hospital services based on physician order or complex needs related to functional status, cognitive ability or social support system     Problem: Pain  Goal: Verbalizes/displays adequate comfort level or baseline comfort level  Outcome: Progressing  Flowsheets (Taken 2/24/2025 1835)  Verbalizes/displays adequate comfort level or baseline comfort level:   Encourage patient to monitor pain and request assistance   Assess pain using appropriate pain scale     Problem: Safety - Adult  Goal: Free from fall injury  Outcome: Progressing  Flowsheets (Taken 2/24/2025 1835)  Free From Fall Injury: Instruct family/caregiver on patient safety     Problem: ABCDS Injury Assessment  Goal: Absence of physical injury  Outcome: Progressing  Flowsheets 
  Problem: Chronic Conditions and Co-morbidities  Goal: Patient's chronic conditions and co-morbidity symptoms are monitored and maintained or improved  Outcome: Progressing  Flowsheets (Taken 2/23/2025 0547)  Care Plan - Patient's Chronic Conditions and Co-Morbidity Symptoms are Monitored and Maintained or Improved:   Monitor and assess patient's chronic conditions and comorbid symptoms for stability, deterioration, or improvement   Collaborate with multidisciplinary team to address chronic and comorbid conditions and prevent exacerbation or deterioration   Update acute care plan with appropriate goals if chronic or comorbid symptoms are exacerbated and prevent overall improvement and discharge     Problem: Discharge Planning  Goal: Discharge to home or other facility with appropriate resources  Outcome: Progressing  Flowsheets (Taken 2/23/2025 0547)  Discharge to home or other facility with appropriate resources:   Identify barriers to discharge with patient and caregiver   Arrange for needed discharge resources and transportation as appropriate   Identify discharge learning needs (meds, wound care, etc)   Arrange for interpreters to assist at discharge as needed   Refer to discharge planning if patient needs post-hospital services based on physician order or complex needs related to functional status, cognitive ability or social support system     
  Problem: Discharge Planning  Goal: Discharge to home or other facility with appropriate resources  Outcome: Progressing  Flowsheets (Taken 2/24/2025 6161)  Discharge to home or other facility with appropriate resources:   Identify barriers to discharge with patient and caregiver   Arrange for needed discharge resources and transportation as appropriate   Identify discharge learning needs (meds, wound care, etc)   Arrange for interpreters to assist at discharge as needed   Refer to discharge planning if patient needs post-hospital services based on physician order or complex needs related to functional status, cognitive ability or social support system     
  Problem: Respiratory - Adult  Goal: Able to breathe comfortably  2/26/2025 0840 by Vale Corea RCP  Outcome: Progressing  2/25/2025 2113 by Promise Ortiz RCP  Outcome: Progressing  Goal: Patient's breath sounds will be clear and equal  2/26/2025 0840 by Vale Corea RCP  Outcome: Progressing  2/25/2025 2113 by Promise Ortiz RCP  Outcome: Progressing     
  Problem: Respiratory - Adult  Goal: Able to breathe comfortably  Outcome: Progressing     Problem: Respiratory - Adult  Goal: Patient's breath sounds will be clear and equal  Outcome: Progressing     
  Problem: Respiratory - Adult  Goal: Able to breathe comfortably  Outcome: Progressing     Problem: Respiratory - Adult  Goal: Patient's breath sounds will be clear and equal  Outcome: Progressing        
  Problem: Respiratory - Adult  Goal: Able to breathe comfortably  Outcome: Progressing     Problem: Respiratory - Adult  Goal: Patient's breath sounds will be clear and equal  Outcome: Progressing           BRONCHOSPASM/BRONCHOCONSTRICTION    IMPROVE  AERATION/BREATHSOUNDS  ADMINISTER BRONCHODILATOR THERAPY AS APPROPRIATE  ASSESS BREATH SOUNDS  INITIATE AEROSOL PROTOCOL IF ORDERED TO DO SO  PATIENT EDUCATION AS NEEDED  
  Problem: Respiratory - Adult  Goal: Achieves optimal ventilation and oxygenation  2/21/2025 2255 by Paula Cohen RCP  Outcome: Progressing  2/21/2025 1004 by Leobardo Sepulveda RN  Outcome: Progressing     
  Problem: Respiratory - Adult  Goal: Achieves optimal ventilation and oxygenation  Outcome: Progressing     
of physical injury  2/26/2025 0048 by Mavis Davidson RN  Outcome: Progressing  2/25/2025 1753 by Neha Eli RN  Outcome: Progressing     Problem: Skin/Tissue Integrity  Goal: Skin integrity remains intact  Description: 1.  Monitor for areas of redness and/or skin breakdown  2.  Assess vascular access sites hourly  3.  Every 4-6 hours minimum:  Change oxygen saturation probe site  4.  Every 4-6 hours:  If on nasal continuous positive airway pressure, respiratory therapy assess nares and determine need for appliance change or resting period  2/26/2025 0048 by Mavis Davidson RN  Outcome: Progressing  2/25/2025 1753 by Neha Eli RN  Outcome: Progressing  Flowsheets  Taken 2/25/2025 1751  Skin Integrity Remains Intact: Monitor for areas of redness and/or skin breakdown  Taken 2/25/2025 0811  Skin Integrity Remains Intact: Monitor for areas of redness and/or skin breakdown     Problem: Neurosensory - Adult  Goal: Achieves stable or improved neurological status  2/26/2025 0048 by Mavis Davidson RN  Outcome: Progressing  2/25/2025 1753 by Neha Eli RN  Outcome: Progressing  Goal: Absence of seizures  2/26/2025 0048 by Mavis Davidson RN  Outcome: Progressing  2/25/2025 1753 by Neha Eli RN  Outcome: Progressing  Goal: Achieves maximal functionality and self care  2/26/2025 0048 by Mavis Davidson RN  Outcome: Progressing  2/25/2025 1753 by Neha Eli RN  Outcome: Not Progressing     Problem: Respiratory - Adult  Goal: Able to breathe comfortably  2/25/2025 2113 by Promise Ortiz RCP  Outcome: Progressing  Goal: Patient's breath sounds will be clear and equal  2/25/2025 2113 by Promise Ortiz RCP  Outcome: Progressing     Problem: Cardiovascular - Adult  Goal: Maintains optimal cardiac output and hemodynamic stability  2/26/2025 0048 by Mavis Davidson RN  Outcome: Progressing  2/25/2025 1753 by Neha Eli RN  Outcome: Progressing  Flowsheets (Taken 2/25/2025 0811)  Maintains optimal

## 2025-02-26 NOTE — PROGRESS NOTES
GASTROENTEROLOGY NOTE       Patient:   Dariel Griffiths   :    1960   Facility:   [facility]  Date:     2025  Consultant:   ROMANA Cannon - CNP      SUBJECTIVE:      Paracentesis yesterday, 4.6L withdrawn.  He is feeling better today.  Remains distended with slight diffuse abdominal pain, though tells me that pain has improved.  Tolerating diet without difficulty.    OBJECTIVE:   Vital Signs:  /76   Pulse (!) 110   Temp 97.3 °F (36.3 °C)   Resp 16   Ht 1.905 m (6' 3\")   Wt 65.7 kg (144 lb 14.4 oz)   SpO2 97%   BMI 18.11 kg/m²      Physical Exam:   General appearance: Alert, NAD  Lungs: CTA bilaterally, unlabored pattern  Heart: S1S2, RRR  Abdomen: Soft, diffuse tenderness and distention, +BS  Skin/Musculoskeletal:  No jaundice. ROM normal.      Lab and Imaging Review   Recent Labs     25  0639 25  0634 25  1547 25  0604   WBC 12.0* 14.1*  --  12.7*   HGB 10.9* 10.7*  --  10.5*   MCV 97.6 97.8  --  96.5    169  --  168   * 134*  --  133*   K 4.5 4.8  --  4.7    105  --  105   CO2 21 22  --  22   BUN 12 14  --  15   CREATININE 0.7 0.6*  --  0.7   GLUCOSE 131* 116*  --  119*   CALCIUM 8.3* 8.6*  --  8.2*   MG  --   --  1.4* 1.7     No results for input(s): \"INR\", \"PROTIME\" in the last 72 hours.      Impression:    ETOH cirrhosis  Ascites      PLAN:    Needs to schedule next paracentesis, if not already scheduled  Continue diuretics  2GM sodium diet  No objections to discharge  GI to sign off.      Damaris An CNP    Plans were discussed with Dr. Amezquita

## 2025-02-26 NOTE — PROGRESS NOTES
The patient is discharged home at this time; family member present to drive the patient home. No distress. All discharge instructions discussed with the patient and family member prior to discharge.

## 2025-02-26 NOTE — CARE COORDINATION
DC Planning    Spoke with pt at bedside, pt plans to return home w caregiver Astrid-offered to make arrangements for next paracentesis however pt declines he will have Astrid arrange at [facility] because it is more affordable for him. Declines any dc needs. Astrid to .     IMM letter provided to patient.  Patient offered four hours to make informed decision regarding appeal process; patient agreeable to discharge.

## 2025-02-26 NOTE — DISCHARGE SUMMARY
Automated 0.0 0.0 per 100 WBC    Neutrophils % 65 36 - 65 %    Lymphocytes % 21 (L) 24 - 43 %    Monocytes % 11 3 - 12 %    Eosinophils % 1 1 - 4 %    Basophils % 1 0 - 2 %    Immature Granulocytes % 1 (H) 0 %    Neutrophils Absolute 7.96 1.50 - 8.10 k/uL    Lymphocytes Absolute 2.55 1.10 - 3.70 k/uL    Monocytes Absolute 1.32 (H) 0.10 - 1.20 k/uL    Eosinophils Absolute 0.08 0.00 - 0.44 k/uL    Basophils Absolute 0.07 0.00 - 0.20 k/uL    Immature Granulocytes Absolute 0.06 0.00 - 0.30 k/uL   Basic Metabolic Panel w/ Reflex to MG    Collection Time: 02/25/25  6:34 AM   Result Value Ref Range    Sodium 134 (L) 136 - 145 mmol/L    Potassium 4.8 3.7 - 5.3 mmol/L    Chloride 105 98 - 107 mmol/L    CO2 22 20 - 31 mmol/L    Anion Gap 6 (L) 9 - 16 mmol/L    Glucose 116 (H) 82 - 115 mg/dL    BUN 14 8 - 23 mg/dL    Creatinine 0.6 (L) 0.70 - 1.20 mg/dL    Est, Glom Filt Rate >90 >60 mL/min/1.73m2    Calcium 8.6 (L) 8.8 - 10.2 mg/dL   CBC with Auto Differential    Collection Time: 02/25/25  6:34 AM   Result Value Ref Range    WBC 14.1 (H) 3.5 - 11.3 k/uL    RBC 3.17 (L) 4.21 - 5.77 m/uL    Hemoglobin 10.7 (L) 13.0 - 17.0 g/dL    Hematocrit 31.0 (L) 40.7 - 50.3 %    MCV 97.8 82.6 - 102.9 fL    MCH 33.8 (H) 25.2 - 33.5 pg    MCHC 34.5 28.4 - 34.8 g/dL    RDW 14.6 (H) 11.8 - 14.4 %    Platelets 169 138 - 453 k/uL    MPV 10.7 8.1 - 13.5 fL    NRBC Automated 0.0 0.0 per 100 WBC    Neutrophils % 64 36 - 66 %    Lymphocytes % 18 (L) 24 - 44 %    Monocytes % 15 (H) 1 - 7 %    Eosinophils % 1 1 - 4 %    Basophils % 1 %    Immature Granulocytes % 1 (H) 0 %    Neutrophils Absolute 9.02 (H) 1.8 - 7.7 k/uL    Lymphocytes Absolute 2.54 1.0 - 4.8 k/uL    Monocytes Absolute 2.12 (H) 0.2 - 0.8 k/uL    Eosinophils Absolute 0.14 0.0 - 0.4 k/uL    Basophils Absolute 0.14 0.0 - 0.2 k/uL    Immature Granulocytes Absolute 0.14 0.00 - 0.30 k/uL   Cytology, Non-Gyn (Excluding Katarina and Akil)    Collection Time: 02/25/25  9:25 AM   Result Value

## 2025-02-27 NOTE — PROGRESS NOTES
Pulmonary Critical Care Progress Note    Patient seen for the follow up of Acute pneumonia     Subjective:    He had paracentesis with improved abdominal pain.  Slight improvement in shortness of breath.  He has occasional cough.  He has no chest pain he is on room air    Examination:    Vitals: /69   Pulse (!) 108   Temp 97.3 °F (36.3 °C) (Oral)   Resp 16   Ht 1.905 m (6' 3\")   Wt 65.7 kg (144 lb 14.4 oz)   SpO2 97%   BMI 18.11 kg/m²   SpO2  Av.8 %  Min: 92 %  Max: 97 %  General appearance: alert and cooperative with exam  Neck: No JVD  Lungs: Decreased breath sound no crackles or wheeze  Heart: regular rate and rhythm, S1, S2 normal, no gallop  Abdomen: Soft, non tender, tense ascites  Extremities: no cyanosis or clubbing. No significant edema  Skin 1/2  inch nodularity right upper back?  Fibroma    LABs:    CBC:   Recent Labs     25  0634 25  0604   WBC 14.1* 12.7*   HGB 10.7* 10.5*   HCT 31.0* 30.1*    168     BMP:   Recent Labs     25  0634 25  0604   * 133*   K 4.8 4.7   CO2 22 22   BUN 14 15   CREATININE 0.6* 0.7   LABGLOM >90 >90   GLUCOSE 116* 119*     PT/INR: No results for input(s): \"PROTIME\", \"INR\" in the last 72 hours.  APTT:No results for input(s): \"APTT\" in the last 72 hours.  LIVER PROFILE:  No results for input(s): \"AST\", \"ALT\", \"LABALBU\" in the last 72 hours.     Latest Reference Range & Units 25 12:43   Chlamydia pneumoniae By PCR Not Detected  Not Detected   Rhino/Enterovirus PCR Not Detected  Not Detected   Human Metapneumovirus PCR Not Detected  Not Detected   Adenovirus PCR Not Detected  Not Detected   B Pertussis by PCR Not Detected  Not Detected   Coronavirus 229E PCR Not Detected  Not Detected   Coronavirus HKU1 PCR Not Detected  Not Detected   Coronavirus NL63 PCR Not Detected  Not Detected   Coronavirus OC Not Detected  Not Detected   Influenza A by PCR Not Detected  Not Detected   Influenza B by PCR Not Detected  Not Detected

## 2025-03-02 LAB
MICROORGANISM SPEC CULT: NORMAL
MICROORGANISM/AGENT SPEC: NORMAL
SPECIMEN DESCRIPTION: NORMAL

## 2025-03-03 LAB
MICROORGANISM SPEC CULT: NORMAL
MICROORGANISM/AGENT SPEC: NORMAL
SPECIMEN DESCRIPTION: NORMAL

## 2025-03-29 ENCOUNTER — APPOINTMENT (OUTPATIENT)
Dept: CT IMAGING | Age: 65
DRG: 432 | End: 2025-03-29
Payer: MEDICARE

## 2025-03-29 ENCOUNTER — HOSPITAL ENCOUNTER (INPATIENT)
Age: 65
LOS: 6 days | Discharge: HOME | DRG: 432 | End: 2025-04-04
Attending: EMERGENCY MEDICINE | Admitting: FAMILY MEDICINE
Payer: MEDICARE

## 2025-03-29 DIAGNOSIS — K70.31 ASCITES DUE TO ALCOHOLIC CIRRHOSIS (HCC): Primary | ICD-10-CM

## 2025-03-29 DIAGNOSIS — E87.6 HYPOKALEMIA: ICD-10-CM

## 2025-03-29 PROBLEM — B99.9: Status: ACTIVE | Noted: 2025-03-29

## 2025-03-29 PROBLEM — R18.8: Status: ACTIVE | Noted: 2025-03-29

## 2025-03-29 LAB
ALBUMIN SERPL-MCNC: 2.6 G/DL (ref 3.5–5.2)
ALBUMIN/GLOB SERPL: 0.5 {RATIO} (ref 1–2.5)
ALP SERPL-CCNC: 136 U/L (ref 40–129)
ALT SERPL-CCNC: 14 U/L (ref 10–50)
ANION GAP SERPL CALCULATED.3IONS-SCNC: 12 MMOL/L (ref 9–16)
AST SERPL-CCNC: 46 U/L (ref 10–50)
BASOPHILS # BLD: 0.09 K/UL (ref 0–0.2)
BASOPHILS NFR BLD: 1 % (ref 0–2)
BILIRUB SERPL-MCNC: 1.2 MG/DL (ref 0–1.2)
BUN SERPL-MCNC: 7 MG/DL (ref 8–23)
CALCIUM SERPL-MCNC: 8.3 MG/DL (ref 8.8–10.2)
CHLORIDE SERPL-SCNC: 100 MMOL/L (ref 98–107)
CO2 SERPL-SCNC: 24 MMOL/L (ref 20–31)
CREAT SERPL-MCNC: 0.6 MG/DL (ref 0.7–1.2)
EOSINOPHIL # BLD: 0.19 K/UL (ref 0–0.44)
EOSINOPHILS RELATIVE PERCENT: 2 % (ref 1–4)
ERYTHROCYTE [DISTWIDTH] IN BLOOD BY AUTOMATED COUNT: 14.6 % (ref 11.8–14.4)
GFR, ESTIMATED: >90 ML/MIN/1.73M2
GLUCOSE SERPL-MCNC: 90 MG/DL (ref 82–115)
HCT VFR BLD AUTO: 39.8 % (ref 40.7–50.3)
HGB BLD-MCNC: 13.2 G/DL (ref 13–17)
IMM GRANULOCYTES # BLD AUTO: 0.02 K/UL (ref 0–0.3)
IMM GRANULOCYTES NFR BLD: 0 %
LACTATE BLDV-SCNC: 2.4 MMOL/L (ref 0.5–2.2)
LIPASE SERPL-CCNC: 39 U/L (ref 13–60)
LYMPHOCYTES NFR BLD: 2.27 K/UL (ref 1.1–3.7)
LYMPHOCYTES RELATIVE PERCENT: 27 % (ref 24–43)
MCH RBC QN AUTO: 32.9 PG (ref 25.2–33.5)
MCHC RBC AUTO-ENTMCNC: 33.2 G/DL (ref 28.4–34.8)
MCV RBC AUTO: 99.3 FL (ref 82.6–102.9)
MONOCYTES NFR BLD: 1.1 K/UL (ref 0.1–1.2)
MONOCYTES NFR BLD: 13 % (ref 3–12)
NEUTROPHILS NFR BLD: 57 % (ref 36–65)
NEUTS SEG NFR BLD: 4.67 K/UL (ref 1.5–8.1)
NRBC BLD-RTO: 0 PER 100 WBC
PLATELET # BLD AUTO: 203 K/UL (ref 138–453)
PMV BLD AUTO: 9.6 FL (ref 8.1–13.5)
POTASSIUM SERPL-SCNC: 3.5 MMOL/L (ref 3.7–5.3)
PROT SERPL-MCNC: 7.5 G/DL (ref 6.6–8.7)
RBC # BLD AUTO: 4.01 M/UL (ref 4.21–5.77)
RBC # BLD: ABNORMAL 10*6/UL
SODIUM SERPL-SCNC: 136 MMOL/L (ref 136–145)
WBC OTHER # BLD: 8.3 K/UL (ref 3.5–11.3)

## 2025-03-29 PROCEDURE — 85025 COMPLETE CBC W/AUTO DIFF WBC: CPT

## 2025-03-29 PROCEDURE — 2500000003 HC RX 250 WO HCPCS: Performed by: NURSE PRACTITIONER

## 2025-03-29 PROCEDURE — 83690 ASSAY OF LIPASE: CPT

## 2025-03-29 PROCEDURE — 83605 ASSAY OF LACTIC ACID: CPT

## 2025-03-29 PROCEDURE — 80053 COMPREHEN METABOLIC PANEL: CPT

## 2025-03-29 PROCEDURE — 6370000000 HC RX 637 (ALT 250 FOR IP): Performed by: EMERGENCY MEDICINE

## 2025-03-29 PROCEDURE — 2500000003 HC RX 250 WO HCPCS: Performed by: EMERGENCY MEDICINE

## 2025-03-29 PROCEDURE — 87040 BLOOD CULTURE FOR BACTERIA: CPT

## 2025-03-29 PROCEDURE — 6360000004 HC RX CONTRAST MEDICATION: Performed by: EMERGENCY MEDICINE

## 2025-03-29 PROCEDURE — 1200000000 HC SEMI PRIVATE

## 2025-03-29 PROCEDURE — 6360000002 HC RX W HCPCS: Performed by: EMERGENCY MEDICINE

## 2025-03-29 PROCEDURE — 99285 EMERGENCY DEPT VISIT HI MDM: CPT

## 2025-03-29 PROCEDURE — 96372 THER/PROPH/DIAG INJ SC/IM: CPT

## 2025-03-29 PROCEDURE — 74177 CT ABD & PELVIS W/CONTRAST: CPT

## 2025-03-29 PROCEDURE — 6360000002 HC RX W HCPCS: Performed by: NURSE PRACTITIONER

## 2025-03-29 RX ORDER — ENOXAPARIN SODIUM 100 MG/ML
40 INJECTION SUBCUTANEOUS DAILY
Status: DISCONTINUED | OUTPATIENT
Start: 2025-03-29 | End: 2025-04-04 | Stop reason: HOSPADM

## 2025-03-29 RX ORDER — SODIUM CHLORIDE 0.9 % (FLUSH) 0.9 %
10 SYRINGE (ML) INJECTION ONCE
Status: COMPLETED | OUTPATIENT
Start: 2025-03-29 | End: 2025-03-29

## 2025-03-29 RX ORDER — POLYETHYLENE GLYCOL 3350 17 G/17G
17 POWDER, FOR SOLUTION ORAL DAILY PRN
Status: DISCONTINUED | OUTPATIENT
Start: 2025-03-29 | End: 2025-04-04 | Stop reason: HOSPADM

## 2025-03-29 RX ORDER — MAGNESIUM SULFATE IN WATER 40 MG/ML
2000 INJECTION, SOLUTION INTRAVENOUS PRN
Status: DISCONTINUED | OUTPATIENT
Start: 2025-03-29 | End: 2025-04-04 | Stop reason: HOSPADM

## 2025-03-29 RX ORDER — POTASSIUM CHLORIDE 750 MG/1
10 CAPSULE, EXTENDED RELEASE ORAL ONCE
Status: COMPLETED | OUTPATIENT
Start: 2025-03-29 | End: 2025-03-29

## 2025-03-29 RX ORDER — SODIUM CHLORIDE 9 MG/ML
INJECTION, SOLUTION INTRAVENOUS PRN
Status: DISCONTINUED | OUTPATIENT
Start: 2025-03-29 | End: 2025-04-04 | Stop reason: HOSPADM

## 2025-03-29 RX ORDER — POTASSIUM CHLORIDE 1500 MG/1
40 TABLET, EXTENDED RELEASE ORAL PRN
Status: DISCONTINUED | OUTPATIENT
Start: 2025-03-29 | End: 2025-04-04 | Stop reason: HOSPADM

## 2025-03-29 RX ORDER — IOPAMIDOL 755 MG/ML
75 INJECTION, SOLUTION INTRAVASCULAR
Status: COMPLETED | OUTPATIENT
Start: 2025-03-29 | End: 2025-03-29

## 2025-03-29 RX ORDER — ONDANSETRON 2 MG/ML
4 INJECTION INTRAMUSCULAR; INTRAVENOUS EVERY 6 HOURS PRN
Status: DISCONTINUED | OUTPATIENT
Start: 2025-03-29 | End: 2025-04-04 | Stop reason: HOSPADM

## 2025-03-29 RX ORDER — 0.9 % SODIUM CHLORIDE 0.9 %
80 INTRAVENOUS SOLUTION INTRAVENOUS ONCE
Status: DISCONTINUED | OUTPATIENT
Start: 2025-03-29 | End: 2025-04-04 | Stop reason: HOSPADM

## 2025-03-29 RX ORDER — POTASSIUM CHLORIDE 7.45 MG/ML
10 INJECTION INTRAVENOUS PRN
Status: DISCONTINUED | OUTPATIENT
Start: 2025-03-29 | End: 2025-04-04 | Stop reason: HOSPADM

## 2025-03-29 RX ORDER — ONDANSETRON 4 MG/1
4 TABLET, ORALLY DISINTEGRATING ORAL EVERY 8 HOURS PRN
Status: DISCONTINUED | OUTPATIENT
Start: 2025-03-29 | End: 2025-04-04 | Stop reason: HOSPADM

## 2025-03-29 RX ORDER — SODIUM CHLORIDE 0.9 % (FLUSH) 0.9 %
5-40 SYRINGE (ML) INJECTION PRN
Status: DISCONTINUED | OUTPATIENT
Start: 2025-03-29 | End: 2025-04-04 | Stop reason: HOSPADM

## 2025-03-29 RX ORDER — SODIUM CHLORIDE 0.9 % (FLUSH) 0.9 %
5-40 SYRINGE (ML) INJECTION EVERY 12 HOURS SCHEDULED
Status: DISCONTINUED | OUTPATIENT
Start: 2025-03-29 | End: 2025-04-04 | Stop reason: HOSPADM

## 2025-03-29 RX ADMIN — IOPAMIDOL 75 ML: 755 INJECTION, SOLUTION INTRAVENOUS at 17:44

## 2025-03-29 RX ADMIN — SODIUM CHLORIDE, PRESERVATIVE FREE 10 ML: 5 INJECTION INTRAVENOUS at 17:45

## 2025-03-29 RX ADMIN — Medication 80 ML: at 17:45

## 2025-03-29 RX ADMIN — SODIUM CHLORIDE, PRESERVATIVE FREE 10 ML: 5 INJECTION INTRAVENOUS at 21:34

## 2025-03-29 RX ADMIN — WATER 1000 MG: 1 INJECTION INTRAMUSCULAR; INTRAVENOUS; SUBCUTANEOUS at 17:31

## 2025-03-29 RX ADMIN — POTASSIUM CHLORIDE 10 MEQ: 750 CAPSULE, EXTENDED RELEASE ORAL at 20:41

## 2025-03-29 RX ADMIN — ENOXAPARIN SODIUM 40 MG: 100 INJECTION SUBCUTANEOUS at 20:41

## 2025-03-29 ASSESSMENT — PAIN SCALES - GENERAL
PAINLEVEL_OUTOF10: 6
PAINLEVEL_OUTOF10: 6

## 2025-03-29 ASSESSMENT — PAIN DESCRIPTION - DESCRIPTORS
DESCRIPTORS: PRESSURE;ACHING
DESCRIPTORS: PRESSURE;SHARP

## 2025-03-29 ASSESSMENT — PAIN - FUNCTIONAL ASSESSMENT: PAIN_FUNCTIONAL_ASSESSMENT: 0-10

## 2025-03-29 ASSESSMENT — PAIN DESCRIPTION - LOCATION
LOCATION: ABDOMEN
LOCATION: ABDOMEN

## 2025-03-29 ASSESSMENT — PAIN DESCRIPTION - FREQUENCY: FREQUENCY: CONTINUOUS

## 2025-03-29 NOTE — ED PROVIDER NOTES
EMERGENCY DEPARTMENT ENCOUNTER    Pt Name: Dariel Griffiths  MRN: [id number]  Birthdate 1960  Date of evaluation: 3/29/25  CHIEF COMPLAINT       Chief Complaint   Patient presents with    Abdominal Pain     Had paracentesis on Wednesday- removed 5L of fluid; MD called today and said to come here for IV antibiotics for infection from fluid.      HISTORY OF PRESENT ILLNESS   64-year-old male with past medical history of liver cirrhosis, presents to the emergency department today for abnormal lab findings.  Reports that he gets paracentesis every week as an outpatient.  He was reported by his GI physician that his culture was positive and was therefore sent to the ER to get admitted for IV antibiotics.  Patient is currently complaining of generalized abdominal pain.  He also reports that he has abdominal distention which is not unusual for him.  Patient does not have any other systemic signs or symptoms otherwise.  He is a former alcoholic and quit drinking in January of this year.             REVIEW OF SYSTEMS     Review of Systems   Gastrointestinal:  Positive for abdominal distention and abdominal pain.   All other systems reviewed and are negative.    PASTMEDICAL HISTORY     Past Medical History:   Diagnosis Date    Abnormal results of liver function studies  10/20/2016    Arthritis     Asthma     Attempted suicide ([facility])     attempted 7 times    Avascular necrosis of bone of right hip ([facility]) 10/26/2017    Bipolar affective ([facility])     Blood in stool     Cavitary pneumonia     Chest pain     Daily    CHF (congestive heart failure), NYHA class III ([facility]) 08/22/2014    COPD (chronic obstructive pulmonary disease) ([facility])     [facility] pulmonology    COPD exacerbation ([facility]) 08/22/2014    CVA (cerebral vascular accident) ([facility])     x2 in 2006    Depression 07/26/2015    Difficult intubation 07/05/2013    Needed Glidescope, only epiglottis seen, easy mask ventilation    Erectile dysfunction 05/23/2014    Folliculitis  necrosis and is following up with orthopedic surgeon as an outpatient.  Unable to perform surgery yet due to his smoking. [AP]   1921 Spoke with the admitting team who were agreeable with the plan of admission [AP]      ED Course User Index  [AP] Steffen Davis MD       DATA FOR LAB AND RADIOLOGY TESTS ORDERED BELOW ARE REVIEWED BY THE ED CLINICIAN:    RADIOLOGY: All x-rays, CT, MRI, and formal ultrasound images (except ED bedside ultrasound) are read by the radiologist, see reports below, unless otherwise noted in MDM or here.  Reports below are reviewed by myself.  CT ABDOMEN PELVIS W IV CONTRAST Additional Contrast? Radiologist Recommendation   Final Result   Small right pleural effusion is new.      Moderate ascites and probable cirrhosis.      Vascular disease with probable left renal artery stenosis.      Query right renovascular hypertension.      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             LABS: Lab orders shown below, the results are reviewed by myself, and all abnormals are listed below.  Labs Reviewed   COMPREHENSIVE METABOLIC PANEL - Abnormal; Notable for the following components:       Result Value    Potassium 3.5 (*)     BUN 7 (*)     Creatinine 0.6 (*)     Calcium 8.3 (*)     Albumin 2.6 (*)     Albumin/Globulin Ratio 0.5 (*)     Alkaline Phosphatase 136 (*)     All other components within normal limits   CBC WITH AUTO DIFFERENTIAL - Abnormal; Notable for the following components:    RBC 4.01 (*)     Hematocrit 39.8 (*)     RDW 14.6 (*)     Monocytes % 13 (*)     All other components within normal limits   LACTIC ACID - Abnormal; Notable for the following components:    Lactic Acid 2.4 (*)     All other components within normal limits   CULTURE, BLOOD 1   CULTURE, BLOOD 2   LIPASE   BASIC METABOLIC PANEL W/ REFLEX TO MG FOR LOW K   CBC WITH AUTO DIFFERENTIAL       Vitals Reviewed:    Vitals:    03/29/25 1535 03/29/25 1541   BP: (!) 128/94    Pulse: (!) 114    Resp: 20    Temp: 98.4 °F

## 2025-03-30 ENCOUNTER — APPOINTMENT (OUTPATIENT)
Dept: GENERAL RADIOLOGY | Age: 65
DRG: 432 | End: 2025-03-30
Payer: MEDICARE

## 2025-03-30 LAB
ANION GAP SERPL CALCULATED.3IONS-SCNC: 7 MMOL/L (ref 9–16)
BASOPHILS # BLD: 0.09 K/UL (ref 0–0.2)
BASOPHILS NFR BLD: 1 % (ref 0–2)
BUN SERPL-MCNC: 8 MG/DL (ref 8–23)
CALCIUM SERPL-MCNC: 7.7 MG/DL (ref 8.8–10.2)
CHLORIDE SERPL-SCNC: 103 MMOL/L (ref 98–107)
CO2 SERPL-SCNC: 25 MMOL/L (ref 20–31)
CREAT SERPL-MCNC: 0.6 MG/DL (ref 0.7–1.2)
EOSINOPHIL # BLD: 0.22 K/UL (ref 0–0.44)
EOSINOPHILS RELATIVE PERCENT: 3 % (ref 1–4)
ERYTHROCYTE [DISTWIDTH] IN BLOOD BY AUTOMATED COUNT: 14.6 % (ref 11.8–14.4)
GFR, ESTIMATED: >90 ML/MIN/1.73M2
GLUCOSE SERPL-MCNC: 90 MG/DL (ref 82–115)
HCT VFR BLD AUTO: 31.2 % (ref 40.7–50.3)
HGB BLD-MCNC: 10.5 G/DL (ref 13–17)
IMM GRANULOCYTES # BLD AUTO: 0.02 K/UL (ref 0–0.3)
IMM GRANULOCYTES NFR BLD: 0 %
LYMPHOCYTES NFR BLD: 1.89 K/UL (ref 1.1–3.7)
LYMPHOCYTES RELATIVE PERCENT: 23 % (ref 24–43)
MCH RBC QN AUTO: 33.1 PG (ref 25.2–33.5)
MCHC RBC AUTO-ENTMCNC: 33.7 G/DL (ref 28.4–34.8)
MCV RBC AUTO: 98.4 FL (ref 82.6–102.9)
MONOCYTES NFR BLD: 1.13 K/UL (ref 0.1–1.2)
MONOCYTES NFR BLD: 14 % (ref 3–12)
NEUTROPHILS NFR BLD: 60 % (ref 36–65)
NEUTS SEG NFR BLD: 4.94 K/UL (ref 1.5–8.1)
NRBC BLD-RTO: 0 PER 100 WBC
PLATELET # BLD AUTO: 168 K/UL (ref 138–453)
PMV BLD AUTO: 9.6 FL (ref 8.1–13.5)
POTASSIUM SERPL-SCNC: 3.6 MMOL/L (ref 3.7–5.3)
RBC # BLD AUTO: 3.17 M/UL (ref 4.21–5.77)
RBC # BLD: ABNORMAL 10*6/UL
SODIUM SERPL-SCNC: 135 MMOL/L (ref 136–145)
WBC OTHER # BLD: 8.3 K/UL (ref 3.5–11.3)

## 2025-03-30 PROCEDURE — 2500000003 HC RX 250 WO HCPCS: Performed by: FAMILY MEDICINE

## 2025-03-30 PROCEDURE — 73502 X-RAY EXAM HIP UNI 2-3 VIEWS: CPT

## 2025-03-30 PROCEDURE — 6370000000 HC RX 637 (ALT 250 FOR IP): Performed by: NURSE PRACTITIONER

## 2025-03-30 PROCEDURE — 6360000002 HC RX W HCPCS: Performed by: NURSE PRACTITIONER

## 2025-03-30 PROCEDURE — 85025 COMPLETE CBC W/AUTO DIFF WBC: CPT

## 2025-03-30 PROCEDURE — 6360000002 HC RX W HCPCS: Performed by: FAMILY MEDICINE

## 2025-03-30 PROCEDURE — 36415 COLL VENOUS BLD VENIPUNCTURE: CPT

## 2025-03-30 PROCEDURE — 80048 BASIC METABOLIC PNL TOTAL CA: CPT

## 2025-03-30 PROCEDURE — 1200000000 HC SEMI PRIVATE

## 2025-03-30 PROCEDURE — 6370000000 HC RX 637 (ALT 250 FOR IP): Performed by: FAMILY MEDICINE

## 2025-03-30 PROCEDURE — 2500000003 HC RX 250 WO HCPCS: Performed by: NURSE PRACTITIONER

## 2025-03-30 RX ORDER — GAUZE BANDAGE 2" X 2"
100 BANDAGE TOPICAL DAILY
Status: DISCONTINUED | OUTPATIENT
Start: 2025-03-30 | End: 2025-04-04 | Stop reason: HOSPADM

## 2025-03-30 RX ORDER — FUROSEMIDE 10 MG/ML
40 INJECTION INTRAMUSCULAR; INTRAVENOUS 2 TIMES DAILY
Status: DISCONTINUED | OUTPATIENT
Start: 2025-03-30 | End: 2025-03-31

## 2025-03-30 RX ORDER — SODIUM CHLORIDE 0.9 % (FLUSH) 0.9 %
5-40 SYRINGE (ML) INJECTION PRN
Status: DISCONTINUED | OUTPATIENT
Start: 2025-03-30 | End: 2025-04-04 | Stop reason: HOSPADM

## 2025-03-30 RX ORDER — SPIRONOLACTONE 25 MG/1
50 TABLET ORAL 2 TIMES DAILY
Status: DISCONTINUED | OUTPATIENT
Start: 2025-03-30 | End: 2025-03-31

## 2025-03-30 RX ORDER — SODIUM CHLORIDE 0.9 % (FLUSH) 0.9 %
5-40 SYRINGE (ML) INJECTION EVERY 12 HOURS SCHEDULED
Status: DISCONTINUED | OUTPATIENT
Start: 2025-03-30 | End: 2025-04-04 | Stop reason: HOSPADM

## 2025-03-30 RX ORDER — FUROSEMIDE 40 MG/1
80 TABLET ORAL DAILY
Status: DISCONTINUED | OUTPATIENT
Start: 2025-03-30 | End: 2025-04-04 | Stop reason: HOSPADM

## 2025-03-30 RX ORDER — SODIUM CHLORIDE 9 MG/ML
INJECTION, SOLUTION INTRAVENOUS PRN
Status: DISCONTINUED | OUTPATIENT
Start: 2025-03-30 | End: 2025-04-04 | Stop reason: HOSPADM

## 2025-03-30 RX ORDER — FAMOTIDINE 20 MG/1
40 TABLET, FILM COATED ORAL DAILY
Status: DISCONTINUED | OUTPATIENT
Start: 2025-03-30 | End: 2025-04-02

## 2025-03-30 RX ADMIN — FAMOTIDINE 40 MG: 20 TABLET, FILM COATED ORAL at 10:45

## 2025-03-30 RX ADMIN — SODIUM CHLORIDE, PRESERVATIVE FREE 10 ML: 5 INJECTION INTRAVENOUS at 10:45

## 2025-03-30 RX ADMIN — Medication 100 MG: at 10:45

## 2025-03-30 RX ADMIN — Medication 6 MG: at 21:38

## 2025-03-30 RX ADMIN — SPIRONOLACTONE 50 MG: 25 TABLET ORAL at 21:38

## 2025-03-30 RX ADMIN — FUROSEMIDE 20 MG: 10 INJECTION, SOLUTION INTRAMUSCULAR; INTRAVENOUS at 18:25

## 2025-03-30 RX ADMIN — ENOXAPARIN SODIUM 40 MG: 100 INJECTION SUBCUTANEOUS at 08:59

## 2025-03-30 RX ADMIN — FUROSEMIDE 40 MG: 10 INJECTION, SOLUTION INTRAMUSCULAR; INTRAVENOUS at 10:45

## 2025-03-30 RX ADMIN — SODIUM CHLORIDE, PRESERVATIVE FREE 10 ML: 5 INJECTION INTRAVENOUS at 21:39

## 2025-03-30 RX ADMIN — SPIRONOLACTONE 50 MG: 25 TABLET ORAL at 10:45

## 2025-03-30 RX ADMIN — WATER 2000 MG: 1 INJECTION INTRAMUSCULAR; INTRAVENOUS; SUBCUTANEOUS at 18:14

## 2025-03-30 ASSESSMENT — PAIN DESCRIPTION - LOCATION: LOCATION: HIP

## 2025-03-30 ASSESSMENT — PAIN SCALES - GENERAL: PAINLEVEL_OUTOF10: 7

## 2025-03-30 ASSESSMENT — PAIN DESCRIPTION - ORIENTATION: ORIENTATION: LEFT

## 2025-03-30 NOTE — CARE COORDINATION
Case Management Assessment  Initial Evaluation    Date/Time of Evaluation: 3/30/2025 10:51 AM  Assessment Completed by: Pia Barth RN    If patient is discharged prior to next notation, then this note serves as note for discharge by case management.    Patient Name: Dariel Griffiths                   YOB: 1960  Diagnosis: Hypokalemia [E87.6]  Ascites due to alcoholic cirrhosis (HCC) [K70.31]  Infection of ascitic fluid [R18.8, B99.9]                   Date / Time: 3/29/2025  3:50 PM    Patient Admission Status: Inpatient   Readmission Risk (Low < 19, Mod (19-27), High > 27): Readmission Risk Score: 25.1    Current PCP: Evelia Gillespie MD  PCP verified by CM? Yes    Chart Reviewed: Yes      History Provided by: Patient  Patient Orientation: Alert and Oriented    Patient Cognition: Alert    Hospitalization in the last 30 days (Readmission):  No    If yes, Readmission Assessment in CM Navigator will be completed.    Advance Directives:      Code Status: Full Code   Patient's Primary Decision Maker is: Legal Next of Kin      Discharge Planning:    Patient lives with: Friends Type of Home: House  Primary Care Giver: Self  Patient Support Systems include: Friends/Neighbors   Current Financial resources: Medicare  Current community resources: Other (Comment) (OP Para weekly [facility])  Current services prior to admission: Durable Medical Equipment            Current DME: Cane            Type of Home Care services:  None    ADLS  Prior functional level: Assistance with the following:, Cooking, Housework, Shopping, Bathing, Mobility  Current functional level: Assistance with the following:, Bathing, Cooking, Housework, Shopping, Mobility    PT AM-PAC:   /24  OT AM-PAC:   /24    Family can provide assistance at DC: Yes (friend Astrid)  Would you like Case Management to discuss the discharge plan with any other family members/significant others, and if so, who? Yes (Astrid if needed)  Plans to Return  to Present Housing: Yes  Other Identified Issues/Barriers to RETURNING to current housing: na   Potential Assistance needed at discharge: Outpatient IV            Potential DME:    Patient expects to discharge to: House  Plan for transportation at discharge: Family    Financial    Payor: [facility] MEDICARE / Plan: [facility] MEDICARE ADVANTAGE / Product Type: *No Product type* /     Does insurance require precert for SNF: Yes    Potential assistance Purchasing Medications:    Meds-to-Beds request:        [facility] - [place], [place] - [address] [phone] - F [phone]  [facility] [address]  Phone: [phone] Fax: [phone]      Notes:    Factors facilitating achievement of predicted outcomes: Caregiver support, Motivated, Cooperative, and Pleasant    Barriers to discharge: Pain, Long standing deficits, and Medical complications    Additional Case Management Notes:     Pt is a+o, independent w cane, non , lives w friend/caregiver Astrid. Astrid helps w shower, meds, appointments. Pt has a pcp appt tomorrow w a [facility] doctor but does not remember the name. Has weekly travis at [facility]. Hx cirrhosis. Used to see [facility]. Watch for IV ABX. Declines dc needs.     The Plan for Transition of Care is related to the following treatment goals of Hypokalemia [E87.6]  Ascites due to alcoholic cirrhosis (HCC) [K70.31]  Infection of ascitic fluid [R18.8, B99.9]    IF APPLICABLE: The Patient and/or patient representative Dariel and his family were provided with a choice of provider and agrees with the discharge plan. Freedom of choice list with basic dialogue that supports the patient's individualized plan of care/goals and shares the quality data associated with the providers was provided to:     Patient Representative Name:       The Patient and/or Patient Representative Agree with the Discharge Plan?      Pia Barth RN  Case Management Department  Ph:  Fax:

## 2025-03-30 NOTE — PROGRESS NOTES
Family friend Astrid Moses [phone] stated patient has a new patient appointment with Natalie Gold NP with Dr. Corbett's office on Monday March 31.2025 and sees Gregoria at [facility] for paracentesis every week either Tuesday or Wednesday. Patient has appointment this Wednesday for paracentesis this week.Stated to please call for any questions.

## 2025-03-30 NOTE — PROGRESS NOTES
Patient found on floor in front of toilet. Patient stated he felt like he was gong to get sick, he was in a hurry trying to get to toilet. He tried to sit down and he fell while trying to get down to floor. Patient had emesis. Vitals obtained, patient denied hitting his head. Patient transferred to bed via wheelchair X 2 staff assist. Upon assessment ecchymosis noted to left hip. Attending and charge nurse notified, orders received and carried out.

## 2025-03-30 NOTE — PLAN OF CARE
Problem: Chronic Conditions and Co-morbidities  Goal: Patient's chronic conditions and co-morbidity symptoms are monitored and maintained or improved  Outcome: Progressing     Problem: Discharge Planning  Goal: Discharge to home or other facility with appropriate resources  Outcome: Progressing     Problem: Pain  Goal: Verbalizes/displays adequate comfort level or baseline comfort level  Outcome: Progressing     Problem: Risk for Elopement  Goal: Patient will not exit the unit/facility without proper excort  Outcome: Progressing  Flowsheets (Taken 3/29/2025 2100)  Nursing Interventions for Elopement Risk: Make sure patient has all necessary personal care items     Problem: Safety - Adult  Goal: Free from fall injury  Outcome: Progressing

## 2025-03-30 NOTE — PLAN OF CARE
Problem: Chronic Conditions and Co-morbidities  Goal: Patient's chronic conditions and co-morbidity symptoms are monitored and maintained or improved  Outcome: Progressing  Flowsheets  Taken 3/30/2025 1245  Care Plan - Patient's Chronic Conditions and Co-Morbidity Symptoms are Monitored and Maintained or Improved:   Monitor and assess patient's chronic conditions and comorbid symptoms for stability, deterioration, or improvement   Collaborate with multidisciplinary team to address chronic and comorbid conditions and prevent exacerbation or deterioration   Update acute care plan with appropriate goals if chronic or comorbid symptoms are exacerbated and prevent overall improvement and discharge       Problem: Discharge Planning  Goal: Discharge to home or other facility with appropriate resources  Outcome: Progressing  Flowsheets  Taken 3/30/2025 1245  Discharge to home or other facility with appropriate resources:   Identify barriers to discharge with patient and caregiver   Refer to discharge planning if patient needs post-hospital services based on physician order or complex needs related to functional status, cognitive ability or social support system   Identify discharge learning needs (meds, wound care, etc)   Arrange for needed discharge resources and transportation as appropriate  Problem: Pain  Goal: Verbalizes/displays adequate comfort level or baseline comfort level  Outcome: Progressing     Problem: Risk for Elopement  Goal: Patient will not exit the unit/facility without proper excort  Outcome: Progressing     Problem: Safety - Adult  Goal: Free from fall injury  Outcome: Progressing

## 2025-03-30 NOTE — H&P
Hospitalist - History & Physical      Patient: Dariel Griffiths    Unit/Bed:2110/2110-01  YOB: 1960  MRN: [id number]   Acct: [id number]   PCP: Evelia Gillespie MD    Date of Service: Pt seen/examined on 03/30/25  and Admitted to Inpatient with expected LOS greater than two midnights due to medical therapy.     Chief Complaint:  abd pain, infection    Assessment and Plan:-    Concern for SBP   - based on labs done by Dr. Daly. I do not have access to them  - started on IV rocephin  - IR consult for paracentesis as pt had moderate ascites on exam as well studies.   - GI consulted     Liver Cirrhosis with ascites  - pt not taking lactulose at home  - gets weekly paracentesis and had one last Wednesday  - hold oral lasix and start IV lasix. Placed on 40mg IV bid but bp is low normal. Will adjust to 20mg IV bid.   - continue aldactone  - may need to utilize prn midodrine if not improving.     Fall while in the hospital  - no LOC, RN notified me, likely due to pts condition  - xray neg for fx    Hypokalemia:  Replace electrolytes     COPD:  Currently stable  Oxygen has been more than 90%  Continue his inhalers     Chronic congestive heart failure, diastolic:  Continue with lasix  Monitor input output     History of seizure disorder:  Previously on zonisamide but unsure if he is taking it. He seems to not be taking a lot of his medicines. He was not on zonisamide on his last admission as well.   Denies any recent seizures.      Tobacco abuse:  Suggested to quit smoking      History Of Present Illness:    65 yo M with hx of CHF, Liver cirrhosis, ascites, weekly paracentesis requirement, COPD, CVA, anxiety and depression and Suicide attempts who was sent in by GI for concerns of infection on his last paracentesis fluid analysis. Patient does endorse abdominal pain. States he is losing muscle mass. He also states that his GI physician gave him very few months to live due to his underlying cirrhosis.  REPAIR      CARDIAC SURGERY      cardiac cath x2    COLONOSCOPY  05/19/2014    COLONOSCOPY N/A 12/31/2020    COLONOSCOPY POLYPECTOMY SNARE/COLD BIOPSY WITH CLIP APPLICATION AND CONTROL OF BLEEDING performed by Raoul Daly MD at [facility] OR    COLONOSCOPY N/A 6/21/2023    COLONOSCOPY POLYPECTOMY SNARE/COLD BIOPSY WITH BANDING performed by Raoul Daly MD at [facility] OR    ENDOSCOPY, COLON, DIAGNOSTIC      FRACTURE SURGERY      right arm, left knee, l ankle pins placed    HERNIA REPAIR      KNEE SURGERY      NERVE BLOCK Left 10/23/2017    medial branch block, cervical    TIBIA FRACTURE SURGERY Left 07/05/2013    IM nailing    TOE SURGERY Right     UMBILICAL HERNIA REPAIR  04/08/2014    with mesh    UPPER GASTROINTESTINAL ENDOSCOPY N/A 12/31/2020    EGD BIOPSY performed by Raoul Daly MD at [facility] OR    UPPER GASTROINTESTINAL ENDOSCOPY N/A 03/25/2021    EGD BIOPSY performed by Raoul Daly MD at [facility] OR    UPPER GASTROINTESTINAL ENDOSCOPY N/A 6/21/2023    EGD BIOPSY performed by Raoul Daly MD at [facility] OR       Home Medications:   No current facility-administered medications on file prior to encounter.     Current Outpatient Medications on File Prior to Encounter   Medication Sig Dispense Refill    famotidine (PEPCID) 40 MG tablet Take 1 tablet by mouth daily      omeprazole (PRILOSEC) 40 MG delayed release capsule Take 1 capsule by mouth daily      furosemide (LASIX) 40 MG tablet Take 2 tablets by mouth daily 120mg total-   80mg - AM   40mg -PM      albuterol (PROVENTIL) (2.5 MG/3ML) 0.083% nebulizer solution Take 3 mLs by nebulization every 6 hours as needed for Wheezing      Multiple Vitamins-Minerals (MULTIVITAMIN GUMMIES ADULTS PO) Take 2 tablets by mouth daily      spironolactone (ALDACTONE) 50 MG tablet Take 1 tablet by mouth 2 times daily 60 tablet 3    thiamine 100 MG tablet Take 1 tablet by mouth daily 30 tablet 3    albuterol sulfate HFA (PROAIR HFA) 108 (90 Base) MCG/ACT inhaler Inhale 2 puffs into the lungs every

## 2025-03-31 ENCOUNTER — APPOINTMENT (OUTPATIENT)
Dept: INTERVENTIONAL RADIOLOGY/VASCULAR | Age: 65
DRG: 432 | End: 2025-03-31
Payer: MEDICARE

## 2025-03-31 LAB
ALBUMIN FLD-MCNC: 0.3 G/DL
AMYLASE FLD-CCNC: 23 U/L
ANION GAP SERPL CALCULATED.3IONS-SCNC: 6 MMOL/L (ref 9–16)
APPEARANCE FLD: CLEAR
BASOPHILS # BLD: 0.07 K/UL (ref 0–0.2)
BASOPHILS NFR BLD: 1 % (ref 0–2)
BLASTS NFR FLD: 0 %
BODY FLD TYPE: NORMAL
BUN SERPL-MCNC: 8 MG/DL (ref 8–23)
CALCIUM SERPL-MCNC: 7.9 MG/DL (ref 8.8–10.2)
CASE NUMBER:: NORMAL
CHLORIDE SERPL-SCNC: 100 MMOL/L (ref 98–107)
CLOT CHECK: NORMAL
CO2 SERPL-SCNC: 26 MMOL/L (ref 20–31)
COLOR FLD: YELLOW
CREAT SERPL-MCNC: 0.6 MG/DL (ref 0.7–1.2)
EOSINOPHIL # BLD: 0.17 K/UL (ref 0–0.44)
EOSINOPHIL NFR FLD: 0 %
EOSINOPHILS RELATIVE PERCENT: 2 % (ref 1–4)
ERYTHROCYTE [DISTWIDTH] IN BLOOD BY AUTOMATED COUNT: 14.5 % (ref 11.8–14.4)
GFR, ESTIMATED: >90 ML/MIN/1.73M2
GLUCOSE FLD-MCNC: 113 MG/DL
GLUCOSE SERPL-MCNC: 87 MG/DL (ref 82–115)
HCT VFR BLD AUTO: 31.8 % (ref 40.7–50.3)
HGB BLD-MCNC: 10.9 G/DL (ref 13–17)
IMM GRANULOCYTES # BLD AUTO: 0.02 K/UL (ref 0–0.3)
IMM GRANULOCYTES NFR BLD: 0 %
LDH FLD L TO P-CCNC: 34 U/L
LYMPHOCYTES NFR BLD: 1.82 K/UL (ref 1.1–3.7)
LYMPHOCYTES NFR FLD: 57 %
LYMPHOCYTES RELATIVE PERCENT: 24 % (ref 24–43)
MAGNESIUM SERPL-MCNC: 1.3 MG/DL (ref 1.6–2.4)
MCH RBC QN AUTO: 33.2 PG (ref 25.2–33.5)
MCHC RBC AUTO-ENTMCNC: 34.3 G/DL (ref 28.4–34.8)
MCV RBC AUTO: 97 FL (ref 82.6–102.9)
MESOTHELIAL CELLS BODY FLUID: 10 %
MONOCYTES NFR BLD: 1.08 K/UL (ref 0.1–1.2)
MONOCYTES NFR BLD: 14 % (ref 3–12)
MONOCYTES NFR FLD: 31 %
NEUTROPHILS NFR BLD: 59 % (ref 36–65)
NEUTROPHILS NFR FLD: 2 %
NEUTS SEG NFR BLD: 4.56 K/UL (ref 1.5–8.1)
NRBC BLD-RTO: 0 PER 100 WBC
NUC CELL # FLD: 259 CELLS/UL
PLATELET # BLD AUTO: 158 K/UL (ref 138–453)
PMV BLD AUTO: 9.3 FL (ref 8.1–13.5)
POTASSIUM SERPL-SCNC: 3.5 MMOL/L (ref 3.7–5.3)
PROT FLD-MCNC: 0.7 G/DL
RBC # BLD AUTO: 3.28 M/UL (ref 4.21–5.77)
RBC # BLD: ABNORMAL 10*6/UL
RBC # FLD: <3000 CELLS/UL
SODIUM SERPL-SCNC: 132 MMOL/L (ref 136–145)
SPECIMEN DESCRIPTION: NORMAL
SPECIMEN TYPE: NORMAL
WBC OTHER # BLD: 7.7 K/UL (ref 3.5–11.3)

## 2025-03-31 PROCEDURE — 6370000000 HC RX 637 (ALT 250 FOR IP): Performed by: NURSE PRACTITIONER

## 2025-03-31 PROCEDURE — 6360000002 HC RX W HCPCS: Performed by: NURSE PRACTITIONER

## 2025-03-31 PROCEDURE — 88112 CYTOPATH CELL ENHANCE TECH: CPT

## 2025-03-31 PROCEDURE — 97166 OT EVAL MOD COMPLEX 45 MIN: CPT

## 2025-03-31 PROCEDURE — 87070 CULTURE OTHR SPECIMN AEROBIC: CPT

## 2025-03-31 PROCEDURE — 97535 SELF CARE MNGMENT TRAINING: CPT

## 2025-03-31 PROCEDURE — 84157 ASSAY OF PROTEIN OTHER: CPT

## 2025-03-31 PROCEDURE — 0W9G3ZX DRAINAGE OF PERITONEAL CAVITY, PERCUTANEOUS APPROACH, DIAGNOSTIC: ICD-10-PCS | Performed by: RADIOLOGY

## 2025-03-31 PROCEDURE — 97530 THERAPEUTIC ACTIVITIES: CPT

## 2025-03-31 PROCEDURE — 82150 ASSAY OF AMYLASE: CPT

## 2025-03-31 PROCEDURE — 82042 OTHER SOURCE ALBUMIN QUAN EA: CPT

## 2025-03-31 PROCEDURE — C1729 CATH, DRAINAGE: HCPCS

## 2025-03-31 PROCEDURE — 97162 PT EVAL MOD COMPLEX 30 MIN: CPT

## 2025-03-31 PROCEDURE — 85025 COMPLETE CBC W/AUTO DIFF WBC: CPT

## 2025-03-31 PROCEDURE — 6360000002 HC RX W HCPCS: Performed by: FAMILY MEDICINE

## 2025-03-31 PROCEDURE — 36415 COLL VENOUS BLD VENIPUNCTURE: CPT

## 2025-03-31 PROCEDURE — 49083 ABD PARACENTESIS W/IMAGING: CPT

## 2025-03-31 PROCEDURE — 2500000003 HC RX 250 WO HCPCS: Performed by: FAMILY MEDICINE

## 2025-03-31 PROCEDURE — 87205 SMEAR GRAM STAIN: CPT

## 2025-03-31 PROCEDURE — 2500000003 HC RX 250 WO HCPCS: Performed by: NURSE PRACTITIONER

## 2025-03-31 PROCEDURE — 6370000000 HC RX 637 (ALT 250 FOR IP): Performed by: FAMILY MEDICINE

## 2025-03-31 PROCEDURE — 82945 GLUCOSE OTHER FLUID: CPT

## 2025-03-31 PROCEDURE — 88305 TISSUE EXAM BY PATHOLOGIST: CPT

## 2025-03-31 PROCEDURE — 80048 BASIC METABOLIC PNL TOTAL CA: CPT

## 2025-03-31 PROCEDURE — 83735 ASSAY OF MAGNESIUM: CPT

## 2025-03-31 PROCEDURE — 6370000000 HC RX 637 (ALT 250 FOR IP): Performed by: HOSPITALIST

## 2025-03-31 PROCEDURE — 89051 BODY FLUID CELL COUNT: CPT

## 2025-03-31 PROCEDURE — 1200000000 HC SEMI PRIVATE

## 2025-03-31 PROCEDURE — 83615 LACTATE (LD) (LDH) ENZYME: CPT

## 2025-03-31 PROCEDURE — 87075 CULTR BACTERIA EXCEPT BLOOD: CPT

## 2025-03-31 RX ORDER — FUROSEMIDE 10 MG/ML
40 INJECTION INTRAMUSCULAR; INTRAVENOUS ONCE
Status: COMPLETED | OUTPATIENT
Start: 2025-04-01 | End: 2025-04-01

## 2025-03-31 RX ORDER — SPIRONOLACTONE 100 MG/1
100 TABLET, FILM COATED ORAL DAILY
Status: DISCONTINUED | OUTPATIENT
Start: 2025-04-01 | End: 2025-04-02

## 2025-03-31 RX ORDER — LACTULOSE 10 G/15ML
20 SOLUTION ORAL 2 TIMES DAILY
Status: DISCONTINUED | OUTPATIENT
Start: 2025-03-31 | End: 2025-04-03

## 2025-03-31 RX ORDER — MIDODRINE HYDROCHLORIDE 5 MG/1
5 TABLET ORAL 3 TIMES DAILY PRN
Status: DISCONTINUED | OUTPATIENT
Start: 2025-03-31 | End: 2025-04-04 | Stop reason: HOSPADM

## 2025-03-31 RX ADMIN — POTASSIUM CHLORIDE 40 MEQ: 1500 TABLET, EXTENDED RELEASE ORAL at 14:18

## 2025-03-31 RX ADMIN — ENOXAPARIN SODIUM 40 MG: 100 INJECTION SUBCUTANEOUS at 09:24

## 2025-03-31 RX ADMIN — SODIUM CHLORIDE, PRESERVATIVE FREE 10 ML: 5 INJECTION INTRAVENOUS at 21:34

## 2025-03-31 RX ADMIN — LACTULOSE 20 G: 20 SOLUTION ORAL at 21:34

## 2025-03-31 RX ADMIN — FAMOTIDINE 40 MG: 20 TABLET, FILM COATED ORAL at 09:24

## 2025-03-31 RX ADMIN — SODIUM CHLORIDE, PRESERVATIVE FREE 10 ML: 5 INJECTION INTRAVENOUS at 09:31

## 2025-03-31 RX ADMIN — RIFAXIMIN 400 MG: 200 TABLET ORAL at 21:34

## 2025-03-31 RX ADMIN — MIDODRINE HYDROCHLORIDE 5 MG: 5 TABLET ORAL at 16:49

## 2025-03-31 RX ADMIN — SPIRONOLACTONE 50 MG: 25 TABLET ORAL at 09:26

## 2025-03-31 RX ADMIN — LACTULOSE 20 G: 20 SOLUTION ORAL at 14:18

## 2025-03-31 RX ADMIN — Medication 6 MG: at 21:34

## 2025-03-31 RX ADMIN — Medication 100 MG: at 09:25

## 2025-03-31 RX ADMIN — RIFAXIMIN 400 MG: 200 TABLET ORAL at 14:18

## 2025-03-31 RX ADMIN — FUROSEMIDE 40 MG: 10 INJECTION, SOLUTION INTRAMUSCULAR; INTRAVENOUS at 09:27

## 2025-03-31 RX ADMIN — WATER 2000 MG: 1 INJECTION INTRAMUSCULAR; INTRAVENOUS; SUBCUTANEOUS at 16:49

## 2025-03-31 ASSESSMENT — PAIN DESCRIPTION - INTENSITY: RATING_2: 6

## 2025-03-31 ASSESSMENT — PAIN DESCRIPTION - FREQUENCY: FREQUENCY: CONTINUOUS

## 2025-03-31 ASSESSMENT — PAIN SCALES - GENERAL: PAINLEVEL_OUTOF10: 9

## 2025-03-31 ASSESSMENT — PAIN DESCRIPTION - ONSET: ONSET: ON-GOING

## 2025-03-31 ASSESSMENT — PAIN DESCRIPTION - DESCRIPTORS
DESCRIPTORS: SHARP;SHOOTING
DESCRIPTORS_2: PRESSURE

## 2025-03-31 ASSESSMENT — PAIN DESCRIPTION - LOCATION
LOCATION: HIP
LOCATION_2: ABDOMEN

## 2025-03-31 ASSESSMENT — PAIN DESCRIPTION - ORIENTATION: ORIENTATION: LEFT

## 2025-03-31 NOTE — DISCHARGE INSTR - COC
Continuity of Care Form    Patient Name: Dariel Griffiths   :  1960  MRN:  [id number]    Admit date:  3/29/2025  Discharge date:  ***    Code Status Order: Full Code   Advance Directives:     Admitting Physician:  Mi Soto MD  PCP: Evelia Gillespie MD    Discharging Nurse: ***  Discharging Hospital Unit/Room#:   Discharging Unit Phone Number: ***    Emergency Contact:   Extended Emergency Contact Information  Primary Emergency Contact: Astrid Moses  Address: [address] [place] [address]            [address] [facility]  Home Phone: [phone]  Work Phone: [phone]  Mobile Phone: [phone]  Relation: Other    Past Surgical History:  Past Surgical History:   Procedure Laterality Date    ANESTHESIA NERVE BLOCK Left 10/23/2017    NERVE BLOCK LEFT CERVICAL C3 TON C4 C5 performed by Rudy Baron MD at [facility] OR    BICEPS TENDON REPAIR      CARDIAC SURGERY      cardiac cath x2    COLONOSCOPY  2014    COLONOSCOPY N/A 2020    COLONOSCOPY POLYPECTOMY SNARE/COLD BIOPSY WITH CLIP APPLICATION AND CONTROL OF BLEEDING performed by Raoul Daly MD at [facility] OR    COLONOSCOPY N/A 2023    COLONOSCOPY POLYPECTOMY SNARE/COLD BIOPSY WITH BANDING performed by Raoul Daly MD at [facility] OR    ENDOSCOPY, COLON, DIAGNOSTIC      FRACTURE SURGERY      right arm, left knee, l ankle pins placed    HERNIA REPAIR      KNEE SURGERY      NERVE BLOCK Left 10/23/2017    medial branch block, cervical    TIBIA FRACTURE SURGERY Left 2013    IM nailing    TOE SURGERY Right     UMBILICAL HERNIA REPAIR  2014    with mesh    UPPER GASTROINTESTINAL ENDOSCOPY N/A 2020    EGD BIOPSY performed by Raoul Daly MD at [facility] OR    UPPER GASTROINTESTINAL ENDOSCOPY N/A 2021    EGD BIOPSY performed by Raoul Daly MD at [facility] OR    UPPER GASTROINTESTINAL ENDOSCOPY N/A 2023    EGD BIOPSY performed by Raoul Daly MD at [facility] OR       Immunization History:   Immunization History   Administered

## 2025-03-31 NOTE — PROGRESS NOTES
Physical Therapy  Facility/Department: [facility] MED SURG  Physical Therapy Initial Assessment    Name: Dariel Griffiths  : 1960  MRN: [id number]  Date of Service: 3/31/2025    Per H&P:63 yo M with hx of CHF, Liver cirrhosis, ascites, weekly paracentesis requirement, COPD, CVA, anxiety and depression and Suicide attempts who was sent in by GI for concerns of infection on his last paracentesis fluid analysis. Patient does endorse abdominal pain. States he is losing muscle mass. He also states that his GI physician gave him very few months to live due to his underlying cirrhosis. Patients work up revealed tachycardia, low normal bp, K 3.5, LA 2.4, small right plerual effusion, moderate ascites on CT Abd pelvis.   Patient started on IV antibiotics and admitted for further evaluation.   During the day, pt had a fall going to the bathroom. He fell on his right hip due to weakness. Xray was ordered which did not show an acute fracture.     SHEYLA Ohara reports patient is medically stable for therapy treatment this date.    Chart reviewed prior to treatment and patient is agreeable for therapy.  All lines intact and patient positioned comfortably at end of treatment.  All patient needs addressed prior to ending therapy session.     Discharge Recommendations:  Patient would benefit from continued therapy after discharge   Due to recent hospitalization and medical condition, pt would benefit from additional intermittent skilled therapy at time of discharge.  Please refer to the AM-PAC score for current functional status.           Patient Diagnosis(es): The primary encounter diagnosis was Ascites due to alcoholic cirrhosis (HCC). A diagnosis of Hypokalemia was also pertinent to this visit.  Past Medical History:  has a past medical history of Abnormal results of liver function studies , Arthritis, Asthma, Attempted suicide (HCC), Avascular necrosis of bone of right hip (HCC), Bipolar affective (HCC), Blood in stool, Cavitary

## 2025-03-31 NOTE — PLAN OF CARE
Problem: Chronic Conditions and Co-morbidities  Goal: Patient's chronic conditions and co-morbidity symptoms are monitored and maintained or improved  3/31/2025 0025 by Mavis Davidson RN  Outcome: Progressing  3/30/2025 1446 by Bee Zuñiga RN  Outcome: Progressing  Flowsheets  Taken 3/30/2025 1245  Care Plan - Patient's Chronic Conditions and Co-Morbidity Symptoms are Monitored and Maintained or Improved:   Monitor and assess patient's chronic conditions and comorbid symptoms for stability, deterioration, or improvement   Collaborate with multidisciplinary team to address chronic and comorbid conditions and prevent exacerbation or deterioration   Update acute care plan with appropriate goals if chronic or comorbid symptoms are exacerbated and prevent overall improvement and discharge  Taken 3/30/2025 0902  Care Plan - Patient's Chronic Conditions and Co-Morbidity Symptoms are Monitored and Maintained or Improved:   Monitor and assess patient's chronic conditions and comorbid symptoms for stability, deterioration, or improvement   Collaborate with multidisciplinary team to address chronic and comorbid conditions and prevent exacerbation or deterioration   Update acute care plan with appropriate goals if chronic or comorbid symptoms are exacerbated and prevent overall improvement and discharge     Problem: Discharge Planning  Goal: Discharge to home or other facility with appropriate resources  3/31/2025 0025 by Mavis Davidson, RN  Outcome: Progressing  3/30/2025 1446 by Bee Zuñiga RN  Outcome: Progressing  Flowsheets  Taken 3/30/2025 1245  Discharge to home or other facility with appropriate resources:   Identify barriers to discharge with patient and caregiver   Refer to discharge planning if patient needs post-hospital services based on physician order or complex needs related to functional status, cognitive ability or social support system   Identify discharge learning needs (meds, wound care, etc)    Arrange for needed discharge resources and transportation as appropriate  Taken 3/30/2025 0902  Discharge to home or other facility with appropriate resources:   Identify barriers to discharge with patient and caregiver   Arrange for needed discharge resources and transportation as appropriate   Identify discharge learning needs (meds, wound care, etc)   Refer to discharge planning if patient needs post-hospital services based on physician order or complex needs related to functional status, cognitive ability or social support system     Problem: Pain  Goal: Verbalizes/displays adequate comfort level or baseline comfort level  3/31/2025 0025 by Mavis Davidson RN  Outcome: Progressing  3/30/2025 1446 by Bee Zuñiga RN  Outcome: Progressing     Problem: Risk for Elopement  Goal: Patient will not exit the unit/facility without proper excort  3/31/2025 0025 by Mavis Davidson RN  Outcome: Progressing  3/30/2025 1446 by Bee Zuñiga RN  Outcome: Progressing     Problem: Safety - Adult  Goal: Free from fall injury  3/31/2025 0025 by Mavis Davidson RN  Outcome: Progressing  3/30/2025 1446 by Bee Zuñiga RN  Outcome: Progressing

## 2025-03-31 NOTE — PROGRESS NOTES
Comprehensive Nutrition Assessment    Type and Reason for Visit:  Positive nutrition screen    Nutrition Recommendations/Plan:   Continue current diet  Monitor po intake, weight, labs, and GI status.      Malnutrition Assessment:  Malnutrition Status:  Severe malnutrition (03/31/25 1618)    Context:  Chronic Illness     Findings of the 6 clinical characteristics of malnutrition:  Energy Intake:  Mild decrease in energy intake  Weight Loss:  Greater than 7.5% over 3 months     Body Fat Loss:  Severe body fat loss Orbital, Triceps, Buccal region   Muscle Mass Loss:  Mild muscle mass loss Temples (temporalis), Hand (interosseous), Clavicles (pectoralis & deltoids), Scapula (trapezius)  Fluid Accumulation:  Mild Extremities   Strength:  Not Performed    Nutrition Assessment:    Pt admitted to hospital for abnormal labs. PMH of liver cirrhosis, COPD, and GERD. Pt gets paracentesis every week, he was told to come to the hospital d/t positive fluid culture results. Pt is being treated with IV abx. During admission pt expressed concerns of general abd pain and distention. Pt has positive nutrition screen for weight loss and decreased appetite. Pt states \"I have no meat on his butt, when I sit down I have to sit on a pillow.\" He says his appetite is good, but his intake varies d/t abd pain and distention at times. Pt ate all of his lunch today except for his pudding. Pt admits to not drinking much water when he is at home. He mostly drinks various kinds of juice. He is receiving Ensure with his meals, but did not drink any yet today d/t feel full from completing his meals. Monitor po intake, weight, labs, and GI status.    Nutrition Related Findings:    Active bowel sounds, distention. BLE +1 edema. Wound Type: None       Current Nutrition Intake & Therapies:    Average Meal Intake: %     ADULT ORAL NUTRITION SUPPLEMENT; Breakfast, Lunch, Dinner; Standard High Calorie/High Protein Oral Supplement  ADULT DIET;  Regular; Low Sodium (2 gm)    Anthropometric Measures:  Height: 190.5 cm (6' 3\")  Ideal Body Weight (IBW): 196 lbs (89 kg)    Admission Body Weight: 66.7 kg (147 lb 0.8 oz)  Current Body Weight: 66.7 kg (147 lb 0.8 oz), 75 % IBW. Weight Source: Standing scale  Current BMI (kg/m2): 18.4  Usual Body Weight: 68.5 kg (151 lb 0.2 oz)     % Weight Change (Calculated): -2.6  Weight Adjustment For: No Adjustment                 BMI Categories: Underweight (BMI less than 18.5)    Estimated Daily Nutrient Needs:  Energy Requirements Based On: Kcal/kg  Weight Used for Energy Requirements: Current  Energy (kcal/day): 1670-1870kcals (25-28kcals/kg)  Weight Used for Protein Requirements: Current  Protein (g/day): 90-100g (1.3-1.5g/kg)  Method Used for Fluid Requirements: 1 ml/kcal  Fluid (ml/day): 1670-1870ml    Nutrition Diagnosis:   Inadequate oral intake related to altered GI function as evidenced by variable po intake  Severe malnutrition related to early satiety, decreased appetite as evidenced by BMI, criteria as identified in malnutrition assessment, loss of subcutaneous fat, muscle loss    Nutrition Interventions:   Food and/or Nutrient Delivery: Continue Current Diet  Nutrition Education/Counseling: Education/Counseling not indicated  Coordination of Nutrition Care: Continue to monitor while inpatient       Goals:  Goals: Maintain adequate nutrition status  Type of Goal: New goal  Previous Goal Met: New Goal    Nutrition Monitoring and Evaluation:      Food/Nutrient Intake Outcomes: Food and Nutrient Intake, Supplement Intake  Physical Signs/Symptoms Outcomes: Biochemical Data, Nutrition Focused Physical Findings, GI Status, Weight, Fluid Status or Edema    Discharge Planning:    Too soon to determine     SHOBHA [id number]  [phone]

## 2025-03-31 NOTE — PLAN OF CARE
Problem: Chronic Conditions and Co-morbidities  Goal: Patient's chronic conditions and co-morbidity symptoms are monitored and maintained or improved  Outcome: Progressing  Flowsheets (Taken 3/31/2025 0930)  Care Plan - Patient's Chronic Conditions and Co-Morbidity Symptoms are Monitored and Maintained or Improved:   Monitor and assess patient's chronic conditions and comorbid symptoms for stability, deterioration, or improvement   Collaborate with multidisciplinary team to address chronic and comorbid conditions and prevent exacerbation or deterioration   Update acute care plan with appropriate goals if chronic or comorbid symptoms are exacerbated and prevent overall improvement and discharge     Problem: Discharge Planning  Goal: Discharge to home or other facility with appropriate resources  Outcome: Progressing  Flowsheets (Taken 3/31/2025 0930)  Discharge to home or other facility with appropriate resources:   Identify barriers to discharge with patient and caregiver   Arrange for needed discharge resources and transportation as appropriate   Identify discharge learning needs (meds, wound care, etc)     Problem: Pain  Goal: Verbalizes/displays adequate comfort level or baseline comfort level  Outcome: Progressing     Problem: Risk for Elopement  Goal: Patient will not exit the unit/facility without proper excort  Outcome: Progressing  Flowsheets (Taken 3/31/2025 0930)  Nursing Interventions for Elopement Risk:   Reduce environmental triggers   Assist with personal care needs such as toileting, eating, dressing, as needed to reduce the risk of wandering     Problem: Safety - Adult  Goal: Free from fall injury  Outcome: Progressing     Problem: Nutrition Deficit:  Goal: Optimize nutritional status  Outcome: Progressing     Problem: Neurosensory - Adult  Goal: Achieves stable or improved neurological status  Outcome: Progressing     Problem: Respiratory - Adult  Goal: Achieves optimal ventilation and

## 2025-03-31 NOTE — CONSULTS
GASTROENTEROLOGY CONSULT       REASON FOR CONSULT:  ascites, ? Recent SBP from outpatient paracentesis    REQUESTING PHYSICIAN:  Mi Soto MD    HISTORY OF PRESENT ILLNESS:    The patient is a 64 y.o. male PMH decompensated etoh cirrhosis sober since January of this year, COPD, CVA, anxiety, depression, PTSD, hx suicide attempts, recurrent ascites, H.E. He is known to our group from inpatient stays, most recently last month. His primary GI MD is Dr. Daly.   Patient presents with ascites, ? SBP, repeat paracentesis studies pending, however his already kim empirically tx with appropriate antibiotics (Rocephin).   Patient continues to have degree of H.E making gathering an accurate HPI limited. He is notably a poor medication historian as well, yet able to tell me some pertinent facts, including taking in broth lately due to large belly making it hard to eat.   There has not been any report of melena or hematochezia by patient or inpatient staff.   On exam he does not appear to display any localized abdominal tenderness. Does report a cramping sensation as fluid is building back up after his paracenteses.   Inpatient labs and imaging reviewed. Outpatient records from Dr. Daly are requested.   Patient has paracentesis on Tuesday or Wednesday \"at [facility] in [place]\" he states.   Typically 7 L removed, he states right away fluid comes back. Recently fluid was so large that he could     CT 3/29/25  IMPRESSION:  Small right pleural effusion is new.     Moderate ascites and probable cirrhosis.     Vascular disease with probable left renal artery stenosis.     Query right renovascular hypertension.     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           Exam Ended: 03/29/25 17:44 EDT       MEDICAL HISTORY:   Past Medical History:   Diagnosis Date    Abnormal results of liver function studies  10/20/2016    Arthritis     Asthma     Attempted suicide (HCC)     attempted 7 times    Avascular necrosis of    WBC 8.3  --  8.3 7.7   HGB 13.2  --  10.5* 10.9*   MCV 99.3  --  98.4 97.0     --  168 158    135*  --  132*   K 3.5* 3.6*  --  3.5*    103  --  100   CO2 24 25  --  26   BUN 7* 8  --  8   CREATININE 0.6* 0.6*  --  0.6*   GLUCOSE 90 90  --  87   CALCIUM 8.3* 7.7*  --  7.9*   AST 46  --   --   --    ALT 14  --   --   --    ALKPHOS 136*  --   --   --    BILITOT 1.2  --   --   --    LIPASE 39  --   --   --    MG  --   --   --  1.3*     No results for input(s): \"INR\", \"PROTIME\" in the last 72 hours.        IMPRESSION/PLAN:  63 yo male with hx of decompensated etoh cirrhosis, sober since January, follows Dr. Daly outpatient. Admitted for recurrent large volume ascites, ? SBP, ongoing H.E. question of medication understanding/compliance outpatient. ?compliance with low Na diet.    No overt GI bleeding reported.   Records from Dr. Daly pending.     Paracentesis this am pending with pending fluid studies, would give albumin if greater than 5L removed. Agree with empiric Rocephin, r/o SBP now or recent past. Mentation seems to be improving with antibiotics, thus may not need to stay on lactulose/Xifaxan.     Diuretics adjusted, agree patient benefits from Midodrine as well.     Continue lactulose, Xifaxan for hepatic encephalopathy for now, may discontinue as infection tx.     No need for endoscopy currently.     Low Na diet.     Following.      Annie Madrid CNP  Discussed with Dr. Amezquita

## 2025-03-31 NOTE — PROGRESS NOTES
Occupational Therapy  Facility/Department: [facility] MED SURG  Occupational Therapy Initial Assessment    Name: Dariel Griffiths  : 1960  MRN: [id number]  Date of Service: 3/31/2025    Discharge Recommendations:  Patient would benefit from continued therapy after discharge  OT Equipment Recommendations  Equipment Needed: Yes  Mobility Devices: ADL Assistive Devices  ADL Assistive Devices: Reacher;Long-handled Sponge;Long-handled Shoe Horn;Emergency Alert System;Sock-Aid Hard    RN reports patient is medically stable for therapy treatment this date.    Chart reviewed prior to treatment and patient is agreeable for therapy.  All lines intact and patient positioned comfortably at end of treatment.  All patient needs addressed prior to ending therapy session.      Due to recent hospitalization and medical condition, pt would benefit from additional intermittent skilled therapy at time of discharge.  Please refer to the AM-PAC score for current functional status.        Patient Diagnosis(es): The primary encounter diagnosis was Ascites due to alcoholic cirrhosis (HCC). A diagnosis of Hypokalemia was also pertinent to this visit.  Past Medical History:  has a past medical history of Abnormal results of liver function studies , Arthritis, Asthma, Attempted suicide (HCC), Avascular necrosis of bone of right hip (HCC), Bipolar affective (HCC), Blood in stool, Cavitary pneumonia, Chest pain, CHF (congestive heart failure), NYHA class III (HCC), COPD (chronic obstructive pulmonary disease) (HCC), COPD exacerbation (HCC), CVA (cerebral vascular accident) (HCC), Depression, Difficult intubation, Erectile dysfunction, Folliculitis, Fracture of wrist, GERD (gastroesophageal reflux disease), Head injury, Hepatitis, HLD (hyperlipidemia), Hypertension, Insomnia, Left wrist pain, Pain, Schizoaffective disorder, depressive type (HCC), Seasonal allergies, Seizures (HCC), Spondylosis of cervical region without myelopathy or  clothing?: A Little  How much help is needed for bathing (which includes washing, rinsing, drying)?: A Little  How much help is needed for toileting (which includes using toilet, bedpan, or urinal)?: A Little  How much help is needed for putting on and taking off regular upper body clothing?: A Little  How much help is needed for taking care of personal grooming?: A Little  How much help for eating meals?: None  AM-PAC Inpatient Daily Activity Raw Score: 19  AM-PAC Inpatient ADL T-Scale Score : 40.22  ADL Inpatient CMS 0-100% Score: 42.8  ADL Inpatient CMS G-Code Modifier : CK         Goals  Short Term Goals  Time Frame for Short Term Goals: by discharge, pt to demo  Short Term Goal 1: I/MI for bed mob without bed rails/controls  Short Term Goal 2: I/MI for ADL transfers and functional mob with AD as needed and Good safety  Short Term Goal 3: I/MI for total body ADLs with AE as needed and Good safety  Short Term Goal 4: I/MI for toileting routine with BSC/AD as needed  Short Term Goal 5: increase BUE strength by 1/2 grade to inc IND with self-care, and be IND with HEP with use of handouts as needed  Long Term Goals  Long Term Goal 1: pt/family to be IND with EC/WS, fall prevention tech, pressure relief education, discharge recommendations, disease specific education, AE/DME recommendations, cog strategies, with use of handouts as needed  Patient Goals   Patient goals : to go home      Therapy Time   Individual Concurrent Group Co-treatment   Time In 0333         Time Out 0349 (+10 chart review)         Minutes 26          Tx time: 15 min     Upon writer exit, call light within reach, pt retired to chair. All lines intact and patient positioned comfortably. All patient needs addressed prior to ending therapy session. Chart reviewed prior to treatment and patient is agreeable for therapy.  RN reports patient is medically stable for therapy treatment this date.    Jayla JESSICA/ODALYS

## 2025-03-31 NOTE — FLOWSHEET NOTE
Pt tolerated procedure without distress. 5000 ml of yellow ascitic fluid removed specimen collected for labs  Dressing applied to procedure site. Pt returned to room in nad

## 2025-03-31 NOTE — PROGRESS NOTES
Hospitalist - History & Physical      Patient: Dariel Griffiths    Unit/Bed:2002/2002-01  YOB: 1960  MRN: [id number]   Acct: [id number]   PCP: Evelia Gillespie MD    Date of Service: Pt seen/examined on 03/31/25  and Admitted to Inpatient with expected LOS greater than two midnights due to medical therapy.     Chief Complaint:  abd pain, infection    Assessment and Plan:-    Concern for SBP   - based on labs done by Dr. Daly.   - started on IV rocephin  - IR consult for paracentesis as pt had moderate ascites on exam as well studies.   - GI consulted     Liver Cirrhosis with ascites  - pt not taking lactulose at home  - gets weekly paracentesis and had one last Wednesday  - hold oral lasix and start IV lasix. Placed on 40mg IV bid but bp is low normal.  Adjusted to 20mg IV bid.   - continue aldactone  - may need to utilize prn midodrine if not improving.     Fall while in the hospital  - no LOC,  likely due to pts condition  - xray neg for fx    Hypokalemia:  Replace electrolytes     COPD:  Currently stable  Oxygen has been more than 90%  Continue his inhalers     Chronic congestive heart failure, diastolic:  Continue with lasix  Monitor input output     History of seizure disorder:  Previously on zonisamide but unsure if he is taking it. He seems to not be taking a lot of his medicines. He was not on zonisamide on his last admission as well.   Denies any recent seizures.      Tobacco abuse:  Suggested to quit smoking      History Of Present Illness:      Patient seen and examined at bedside.   No overnight events.   No acute complaints today.   afebrile   Denies any chest pain, shortness of breath, palpitation, headache, dizziness, cough, cold, changes in urination or bowel habits            63 yo M with hx of CHF, Liver cirrhosis, ascites, weekly paracentesis requirement, COPD, CVA, anxiety and depression and Suicide attempts who was sent in by GI for concerns of infection on his last        Past Surgical History:        Procedure Laterality Date    ANESTHESIA NERVE BLOCK Left 10/23/2017    NERVE BLOCK LEFT CERVICAL C3 TON C4 C5 performed by Rudy Baron MD at [facility] OR    BICEPS TENDON REPAIR      CARDIAC SURGERY      cardiac cath x2    COLONOSCOPY  05/19/2014    COLONOSCOPY N/A 12/31/2020    COLONOSCOPY POLYPECTOMY SNARE/COLD BIOPSY WITH CLIP APPLICATION AND CONTROL OF BLEEDING performed by Raoul Daly MD at [facility] OR    COLONOSCOPY N/A 6/21/2023    COLONOSCOPY POLYPECTOMY SNARE/COLD BIOPSY WITH BANDING performed by Raoul Daly MD at [facility] OR    ENDOSCOPY, COLON, DIAGNOSTIC      FRACTURE SURGERY      right arm, left knee, l ankle pins placed    HERNIA REPAIR      KNEE SURGERY      NERVE BLOCK Left 10/23/2017    medial branch block, cervical    TIBIA FRACTURE SURGERY Left 07/05/2013    IM nailing    TOE SURGERY Right     UMBILICAL HERNIA REPAIR  04/08/2014    with mesh    UPPER GASTROINTESTINAL ENDOSCOPY N/A 12/31/2020    EGD BIOPSY performed by Raoul Daly MD at [facility] OR    UPPER GASTROINTESTINAL ENDOSCOPY N/A 03/25/2021    EGD BIOPSY performed by Raoul Dayl MD at [facility] OR    UPPER GASTROINTESTINAL ENDOSCOPY N/A 6/21/2023    EGD BIOPSY performed by Raoul Daly MD at [facility] OR       Home Medications:   No current facility-administered medications on file prior to encounter.     Current Outpatient Medications on File Prior to Encounter   Medication Sig Dispense Refill    famotidine (PEPCID) 40 MG tablet Take 1 tablet by mouth daily      omeprazole (PRILOSEC) 40 MG delayed release capsule Take 1 capsule by mouth daily      furosemide (LASIX) 40 MG tablet Take 2 tablets by mouth daily 120mg total-   80mg - AM   40mg -PM      albuterol (PROVENTIL) (2.5 MG/3ML) 0.083% nebulizer solution Take 3 mLs by nebulization every 6 hours as needed for Wheezing      Multiple Vitamins-Minerals (MULTIVITAMIN GUMMIES ADULTS PO) Take 2 tablets by mouth daily      spironolactone (ALDACTONE) 50 MG tablet Take 1 tablet

## 2025-04-01 LAB
ALBUMIN SERPL-MCNC: 2.4 G/DL (ref 3.5–5.2)
ANION GAP SERPL CALCULATED.3IONS-SCNC: 7 MMOL/L (ref 9–16)
BACTERIA URNS QL MICRO: ABNORMAL
BASOPHILS # BLD: 0.08 K/UL (ref 0–0.2)
BASOPHILS NFR BLD: 1 % (ref 0–2)
BILIRUB UR QL STRIP: NEGATIVE
BNP SERPL-MCNC: 284 PG/ML (ref 0–125)
BUN SERPL-MCNC: 8 MG/DL (ref 8–23)
CALCIUM SERPL-MCNC: 8.2 MG/DL (ref 8.8–10.2)
CHLORIDE SERPL-SCNC: 101 MMOL/L (ref 98–107)
CHLORIDE UR-SCNC: <20 MMOL/L
CLARITY UR: CLEAR
CO2 SERPL-SCNC: 27 MMOL/L (ref 20–31)
COLOR UR: YELLOW
CREAT SERPL-MCNC: 0.6 MG/DL (ref 0.7–1.2)
CREAT UR-MCNC: 216 MG/DL (ref 39–259)
EOSINOPHIL # BLD: 0.16 K/UL (ref 0–0.44)
EOSINOPHILS RELATIVE PERCENT: 2 % (ref 1–4)
EPI CELLS #/AREA URNS HPF: ABNORMAL /HPF (ref 0–5)
ERYTHROCYTE [DISTWIDTH] IN BLOOD BY AUTOMATED COUNT: 14.3 % (ref 11.8–14.4)
GFR, ESTIMATED: >90 ML/MIN/1.73M2
GLUCOSE SERPL-MCNC: 117 MG/DL (ref 82–115)
GLUCOSE UR STRIP-MCNC: NEGATIVE MG/DL
HCT VFR BLD AUTO: 32.9 % (ref 40.7–50.3)
HGB BLD-MCNC: 11.1 G/DL (ref 13–17)
HGB UR QL STRIP.AUTO: NEGATIVE
IMM GRANULOCYTES # BLD AUTO: 0.03 K/UL (ref 0–0.3)
IMM GRANULOCYTES NFR BLD: 0 %
KETONES UR STRIP-MCNC: ABNORMAL MG/DL
LEUKOCYTE ESTERASE UR QL STRIP: NEGATIVE
LYMPHOCYTES NFR BLD: 1.99 K/UL (ref 1.1–3.7)
LYMPHOCYTES RELATIVE PERCENT: 25 % (ref 24–43)
MAGNESIUM SERPL-MCNC: 1.5 MG/DL (ref 1.6–2.4)
MCH RBC QN AUTO: 33.1 PG (ref 25.2–33.5)
MCHC RBC AUTO-ENTMCNC: 33.7 G/DL (ref 28.4–34.8)
MCV RBC AUTO: 98.2 FL (ref 82.6–102.9)
MONOCYTES NFR BLD: 1.15 K/UL (ref 0.1–1.2)
MONOCYTES NFR BLD: 14 % (ref 3–12)
MUCOUS THREADS URNS QL MICRO: ABNORMAL
NEUTROPHILS NFR BLD: 58 % (ref 36–65)
NEUTS SEG NFR BLD: 4.58 K/UL (ref 1.5–8.1)
NITRITE UR QL STRIP: NEGATIVE
NRBC BLD-RTO: 0 PER 100 WBC
OSMOLALITY SERPL: 289 MOSM/KG (ref 275–295)
OSMOLALITY UR: 693 MOSM/KG (ref 80–1300)
PH UR STRIP: 5.5 [PH] (ref 5–8)
PHOSPHATE SERPL-MCNC: 3.3 MG/DL (ref 2.5–4.5)
PLATELET # BLD AUTO: 164 K/UL (ref 138–453)
PMV BLD AUTO: 10.1 FL (ref 8.1–13.5)
POTASSIUM SERPL-SCNC: 3.9 MMOL/L (ref 3.7–5.3)
POTASSIUM, UR: 69.8 MMOL/L
PROT UR STRIP-MCNC: ABNORMAL MG/DL
RBC # BLD AUTO: 3.35 M/UL (ref 4.21–5.77)
RBC #/AREA URNS HPF: ABNORMAL /HPF (ref 0–2)
SODIUM SERPL-SCNC: 135 MMOL/L (ref 136–145)
SODIUM UR-SCNC: 34 MMOL/L
SP GR UR STRIP: 1.02 (ref 1–1.03)
SURGICAL PATHOLOGY REPORT: NORMAL
URATE SERPL-MCNC: 6.3 MG/DL (ref 3.4–7)
UROBILINOGEN UR STRIP-ACNC: NORMAL EU/DL (ref 0–1)
UUN UR-MCNC: 903 MG/DL
WBC #/AREA URNS HPF: ABNORMAL /HPF (ref 0–5)
WBC OTHER # BLD: 8 K/UL (ref 3.5–11.3)

## 2025-04-01 PROCEDURE — 6370000000 HC RX 637 (ALT 250 FOR IP): Performed by: NURSE PRACTITIONER

## 2025-04-01 PROCEDURE — 97116 GAIT TRAINING THERAPY: CPT

## 2025-04-01 PROCEDURE — 84100 ASSAY OF PHOSPHORUS: CPT

## 2025-04-01 PROCEDURE — 6360000002 HC RX W HCPCS: Performed by: NURSE PRACTITIONER

## 2025-04-01 PROCEDURE — 6370000000 HC RX 637 (ALT 250 FOR IP): Performed by: FAMILY MEDICINE

## 2025-04-01 PROCEDURE — 84540 ASSAY OF URINE/UREA-N: CPT

## 2025-04-01 PROCEDURE — 84550 ASSAY OF BLOOD/URIC ACID: CPT

## 2025-04-01 PROCEDURE — 83930 ASSAY OF BLOOD OSMOLALITY: CPT

## 2025-04-01 PROCEDURE — 83880 ASSAY OF NATRIURETIC PEPTIDE: CPT

## 2025-04-01 PROCEDURE — 80048 BASIC METABOLIC PNL TOTAL CA: CPT

## 2025-04-01 PROCEDURE — 81001 URINALYSIS AUTO W/SCOPE: CPT

## 2025-04-01 PROCEDURE — 84133 ASSAY OF URINE POTASSIUM: CPT

## 2025-04-01 PROCEDURE — 83935 ASSAY OF URINE OSMOLALITY: CPT

## 2025-04-01 PROCEDURE — 84300 ASSAY OF URINE SODIUM: CPT

## 2025-04-01 PROCEDURE — 2500000003 HC RX 250 WO HCPCS: Performed by: FAMILY MEDICINE

## 2025-04-01 PROCEDURE — 97535 SELF CARE MNGMENT TRAINING: CPT

## 2025-04-01 PROCEDURE — 1200000000 HC SEMI PRIVATE

## 2025-04-01 PROCEDURE — 97110 THERAPEUTIC EXERCISES: CPT

## 2025-04-01 PROCEDURE — 36415 COLL VENOUS BLD VENIPUNCTURE: CPT

## 2025-04-01 PROCEDURE — 82040 ASSAY OF SERUM ALBUMIN: CPT

## 2025-04-01 PROCEDURE — 83735 ASSAY OF MAGNESIUM: CPT

## 2025-04-01 PROCEDURE — 6370000000 HC RX 637 (ALT 250 FOR IP): Performed by: HOSPITALIST

## 2025-04-01 PROCEDURE — 2500000003 HC RX 250 WO HCPCS: Performed by: NURSE PRACTITIONER

## 2025-04-01 PROCEDURE — 82570 ASSAY OF URINE CREATININE: CPT

## 2025-04-01 PROCEDURE — 97530 THERAPEUTIC ACTIVITIES: CPT

## 2025-04-01 PROCEDURE — 82436 ASSAY OF URINE CHLORIDE: CPT

## 2025-04-01 PROCEDURE — 85025 COMPLETE CBC W/AUTO DIFF WBC: CPT

## 2025-04-01 RX ADMIN — FUROSEMIDE 40 MG: 10 INJECTION, SOLUTION INTRAMUSCULAR; INTRAVENOUS at 09:37

## 2025-04-01 RX ADMIN — RIFAXIMIN 400 MG: 200 TABLET ORAL at 09:37

## 2025-04-01 RX ADMIN — SODIUM CHLORIDE, PRESERVATIVE FREE 10 ML: 5 INJECTION INTRAVENOUS at 20:21

## 2025-04-01 RX ADMIN — Medication 100 MG: at 09:37

## 2025-04-01 RX ADMIN — MIDODRINE HYDROCHLORIDE 5 MG: 5 TABLET ORAL at 11:33

## 2025-04-01 RX ADMIN — ENOXAPARIN SODIUM 40 MG: 100 INJECTION SUBCUTANEOUS at 09:37

## 2025-04-01 RX ADMIN — RIFAXIMIN 400 MG: 200 TABLET ORAL at 14:38

## 2025-04-01 RX ADMIN — MAGNESIUM SULFATE HEPTAHYDRATE 2000 MG: 40 INJECTION, SOLUTION INTRAVENOUS at 20:25

## 2025-04-01 RX ADMIN — Medication 6 MG: at 22:39

## 2025-04-01 RX ADMIN — LACTULOSE 20 G: 20 SOLUTION ORAL at 09:37

## 2025-04-01 RX ADMIN — LACTULOSE 20 G: 20 SOLUTION ORAL at 20:21

## 2025-04-01 RX ADMIN — FAMOTIDINE 40 MG: 20 TABLET, FILM COATED ORAL at 09:37

## 2025-04-01 RX ADMIN — RIFAXIMIN 400 MG: 200 TABLET ORAL at 20:21

## 2025-04-01 RX ADMIN — SODIUM CHLORIDE, PRESERVATIVE FREE 10 ML: 5 INJECTION INTRAVENOUS at 09:45

## 2025-04-01 RX ADMIN — SPIRONOLACTONE 100 MG: 100 TABLET ORAL at 09:37

## 2025-04-01 RX ADMIN — WATER 2000 MG: 1 INJECTION INTRAMUSCULAR; INTRAVENOUS; SUBCUTANEOUS at 18:39

## 2025-04-01 NOTE — PROGRESS NOTES
ANTIMICROBIAL STEWARDSHIP  Upon review of the patient's chart, the committee has the following recommendation for your consideration:    Indication: ?SBP    Day of Antimicrobial Therapy: 4    Recommendation   Patient was empirically started on rocephin for possible SBP.     Ascitic fluid study results do not indicate the infection unlikely. Please reevaluate the need of antibiotic therapy.     Recent Labs     03/31/25  0545 04/01/25  0520   WBC 7.7 8.0     No results for input(s): \"PROCAL\" in the last 72 hours.    Unnecessary or inappropriate antimicrobial use increases the risk of subsequent infections with resistant bacterial infections and drug-associated toxicities including C. difficile infection.     Thank you.  Isa Sanders [facility], PharmD  4/1/2025  9:45 AM    The Antimicrobial Stewardship Committee at [facility] is led by  Kaykay Morales MD, Infectious Diseases Section Chair.

## 2025-04-01 NOTE — CONSULTS
[facility]    Shaukat Rashid MD Zafar Magsi MD Danial Rashid MD John O. Ranker MD Sembria Ligibel, GINI       Nephrology Consultation Note    Reason for Consult:    Date of Service: 04/01/25   PCP: Evelia Gillespie MD       Reason for Consult     Hyponatremia    Assessment and Plan     63 yo M with hx of CHF, Liver cirrhosis, ascites, weekly paracentesis requirement, COPD, CVA, anxiety and depression and Suicide attempts who was sent in by GI for concerns of infection on his last paracentesis fluid analysis. Patient does endorse abdominal pain. States he is losing muscle mass. He also states that his GI physician gave him very few months to live due to his underlying cirrhosis. Patients work up revealed tachycardia, low normal bp, K 3.5, LA 2.4, small right plerual effusion, moderate ascites on CT Abd pelvis.  Patient started on IV antibiotics and admitted for further evaluation.  Patient sodium was noted to be on the low side, he has history of low solute diet and relative increased electrolyte free fluid intake, nephrology was consulted for further evaluation    Assessment   Hyponatremia most likely chronic hyponatremia, differential low solute diet, SIADH, contributory-year-old hypokalemia  Hypokalemia:  Concern for SBP at admission  Liver Cirrhosis with ascites  History of seizure disorder:    Plan  Check serum and urine studies  Check TSH and a.m. cortisol level in the morning tomorrow  Check BNP and uric acid ordered  Based on lab workup will decide regarding ongoing management of chronic hyponatremia    Thank you for this consultation, we will follow along with you regarding care of this patient while the patient is here in the hospital, please call if you have any questions or concerns.    Rajan Grant MD  on 4/1/2025 at 11:11 AM   [facility].  Ph: [phone]     History of presenting illness      63 yo M with hx of  glycol      .  Results Review      Labs Review:     Recent Labs     03/30/25  0531 03/31/25  0545 04/01/25  0520   * 132* 135*   K 3.6* 3.5* 3.9    100 101   CO2 25 26 27   BUN 8 8 8   CREATININE 0.6* 0.6* 0.6*   GLUCOSE 90 87 117*   CALCIUM 7.7* 7.9* 8.2*         Recent Labs     03/30/25  0615 03/31/25  0545 04/01/25  0520   WBC 8.3 7.7 8.0   RBC 3.17* 3.28* 3.35*   HGB 10.5* 10.9* 11.1*   HCT 31.2* 31.8* 32.9*   MCV 98.4 97.0 98.2   MCH 33.1 33.2 33.1   MCHC 33.7 34.3 33.7   RDW 14.6* 14.5* 14.3    158 164   MPV 9.6 9.3 10.1      BNP:  Lab Results   Component Value Date/Time     07/09/2012 06:49 AM         Phosphorus:  No results for input(s): \"PHOS\" in the last 72 hours.  Magnesium:   Recent Labs     03/31/25  0545   MG 1.3*     Albumin: No results for input(s): \"LABALBU\" in the last 72 hours.  PTH:  No results for input(s): \"PTH\" in the last 72 hours.    Urine Studies:  Urine Sodium:  No components found for: \"KEO\"  Urine Potassium:  No results found for: \"KUR\"  Urine Chloride:  No results found for: \"CLUR\"  Urine Osmolarity:  No results found for: \"OSMOU\"  Urine Creatinine:  No results found for: \"LABCREA\"  Urine Eosinophils: No components found for: \"UEOS\"  Urine Protein:  No results found for: \"TPU\"      Urine:    Lab Results   Component Value Date/Time    PROTEINU TRACE 02/21/2025 02:11 AM         Serology:  TEMO:   Lab Results   Component Value Date/Time    TEMO NEGATIVE 01/08/2025 11:47 PM     C3:No results found for: \"C3\"  C4:No results found for: \"C4\"  MPO ANCA:  No results found for: \"MPO\"  PR3 ANCA:  No results found for: \"PR3\"  hepatitis serologies  ANTIGBM:No results found for: \"GBMABIGG\"  HEPATITIS B SURFACE AG:   Lab Results   Component Value Date/Time    HEPBSAG NONREACTIVE 01/09/2025 12:00 AM     HEPATITIS C AB:   Lab Results   Component Value Date/Time    HEPCAB NONREACTIVE 01/09/2025 12:00 AM       Electrophoresis:  SPEP:No results found for: \"ALBCAL\", \"ALBPCT\",

## 2025-04-01 NOTE — PLAN OF CARE
Problem: Chronic Conditions and Co-morbidities  Goal: Patient's chronic conditions and co-morbidity symptoms are monitored and maintained or improved  Outcome: Progressing  Flowsheets (Taken 4/1/2025 0930 by Jessica Kahn, RN)  Care Plan - Patient's Chronic Conditions and Co-Morbidity Symptoms are Monitored and Maintained or Improved:   Monitor and assess patient's chronic conditions and comorbid symptoms for stability, deterioration, or improvement   Collaborate with multidisciplinary team to address chronic and comorbid conditions and prevent exacerbation or deterioration   Update acute care plan with appropriate goals if chronic or comorbid symptoms are exacerbated and prevent overall improvement and discharge     Problem: Discharge Planning  Goal: Discharge to home or other facility with appropriate resources  Outcome: Progressing  Flowsheets (Taken 4/1/2025 0930 by Jessica Kahn, RN)  Discharge to home or other facility with appropriate resources:   Identify barriers to discharge with patient and caregiver   Arrange for needed discharge resources and transportation as appropriate   Identify discharge learning needs (meds, wound care, etc)     Problem: Pain  Goal: Verbalizes/displays adequate comfort level or baseline comfort level  Outcome: Progressing     Problem: Safety - Adult  Goal: Free from fall injury  Outcome: Progressing  Flowsheets (Taken 4/1/2025 1050 by Jessica Kahn, RN)  Free From Fall Injury: Instruct family/caregiver on patient safety     Problem: Nutrition Deficit:  Goal: Optimize nutritional status  Outcome: Progressing     Problem: Neurosensory - Adult  Goal: Achieves stable or improved neurological status  Outcome: Progressing  Flowsheets (Taken 4/1/2025 0930 by Jessica Kahn, RN)  Achieves stable or improved neurological status:   Assess for and report changes in neurological status   Initiate measures to prevent increased intracranial pressure   Maintain blood pressure and fluid  during mobilization   Obtain physical therapy/occupational therapy consults as needed      --continue lisinopril  --continue lasix --continue lisinopril  --holding lasix in setting of pancreatitis

## 2025-04-01 NOTE — PROGRESS NOTES
Physical Therapy  Facility/Department: [facility]  Daily Treatment Note  NAME: Dariel Griffiths  : 1960  MRN: [id number]    Date of Service: 2025    Discharge Recommendations:  Patient would benefit from continued therapy after discharge    Due to recent hospitalization and medical condition, pt would benefit from additional intermittent skilled therapy at time of discharge.  Please refer to the AM-PAC score for current functional status.      Patient Diagnosis(es): The primary encounter diagnosis was Ascites due to alcoholic cirrhosis (HCC). A diagnosis of Hypokalemia was also pertinent to this visit.    Assessment  Assessment: Patient progressing toward STGs with improved ambulation. Able to complete 200' x 2 with no device, no LOB noted but slightly unsteady steps periodically. Patient has limited insight to his deficits regarding endurance and aerobic capcaity required seated rest breaks, ambulated too far with increased SOB, and required a rescue inhaler at the end of the session. Patient declined any LE exercises d/t increased hip pain. Patient is below baseline with his overall endurance with functional distance and would benefit from continued skilled PT services.  Activity Tolerance: Patient tolerated treatment well    Plan  Physical Therapy Plan  General Plan: 5-7 times per week  Current Treatment Recommendations: Strengthening;Balance training;ROM;Functional mobility training;Transfer training;Gait training;Stair training;Home exercise program;Therapeutic activities;Patient/Caregiver education & training    Restrictions  Restrictions/Precautions  Restrictions/Precautions: General Precautions, Fall Risk  Required Braces or Orthoses?: No  Position Activity Restriction  Other Position/Activity Restrictions: LUE IV placement.     Subjective   Subjective  Subjective: Patient resting in bed and agreeable for PT treatment. SHEYLA Bahena reported patient medically stable for PT

## 2025-04-01 NOTE — PROGRESS NOTES
Gastroenterology Progress Note      Patient:   Dariel Griffiths   :    1960   Facility:   [facility]  Date:     2025  Consultant:   LEXY DAMON  Subjective:     Patient seen and examined. Mentation good/improved.   Remains on antibiotics for SBP.. Dr. Daly records reviewed. No abdominal pain.   Low na diet in place, patient does seem to accumulate ascitic fluid fairly quickly.   Nephrology now following due to worsening hyponatremia.     Objective:   Vital Signs:  BP (!) 96/59   Pulse (!) 104   Temp 98.2 °F (36.8 °C) (Oral)   Resp 18   Ht 1.905 m (6' 3\")   Wt 66.7 kg (147 lb)   SpO2 96%   BMI 18.37 kg/m²      Physical Exam:   General appearance: Alert, NAD, muscle wasting  Lungs: CTA bilaterally    Heart:S1S2 RRR  Abdomen: Soft, +ascites, NT present bs  Skin:  No jaundice, + stigmata of chronic liver disease.    Lab and Imaging Review   CBC with Differential:    Lab Results   Component Value Date/Time    WBC 8.0 2025 05:20 AM    RBC 3.35 2025 05:20 AM    HGB 11.1 2025 05:20 AM    HCT 32.9 2025 05:20 AM     2025 05:20 AM    MCV 98.2 2025 05:20 AM    MCH 33.1 2025 05:20 AM    MCHC 33.7 2025 05:20 AM    RDW 14.3 2025 05:20 AM    LYMPHOPCT 25 2025 05:20 AM    MONOPCT 14 2025 05:20 AM    EOSPCT 2 2025 05:20 AM    BASOPCT 1 2025 05:20 AM    MONOSABS 1.15 2025 05:20 AM    LYMPHSABS 1.99 2025 05:20 AM    EOSABS 0.16 2025 05:20 AM    BASOSABS 0.08 2025 05:20 AM    DIFFTYPE NOT REPORTED 2021 04:10 PM     Platelets:    Lab Results   Component Value Date/Time     2025 05:20 AM     Hemoglobin/Hematocrit:    Lab Results   Component Value Date/Time    HGB 11.1 2025 05:20 AM    HCT 32.9 2025 05:20 AM     CMP:    Lab Results   Component Value Date/Time     2025 05:20 AM    K 3.9 2025 05:20 AM     2025 05:20 AM    CO2

## 2025-04-01 NOTE — PROGRESS NOTES
Occupational Therapy  Facility/Department: [facility]  Daily Treatment Note  NAME: Dariel Griffiths  : 1960  MRN: [id number]    Date of Service: 2025    RN reports patient is medically stable for therapy treatment this date.    Chart reviewed prior to treatment and patient is agreeable for therapy.  All lines intact and patient positioned comfortably at end of treatment.  All patient needs addressed prior to ending therapy session.      Discharge Recommendations:  Patient would benefit from continued therapy after discharge  Due to recent hospitalization and medical condition, pt would benefit from additional intermittent skilled therapy at time of discharge.  Please refer to the AM-PAC score for current functional status.   OT Equipment Recommendations  Equipment Needed: Yes  Mobility Devices: ADL Assistive Devices  ADL Assistive Devices: Reacher;Long-handled Sponge;Long-handled Shoe Horn;Emergency Alert System;Sock-Aid Hard    Patient Diagnosis(es): The primary encounter diagnosis was Ascites due to alcoholic cirrhosis (HCC). A diagnosis of Hypokalemia was also pertinent to this visit.     Assessment   Assessment: Pt tolerating session fair this date. Pt ambulating with SBA no AD in room. Pt standing at sink for grooming tasks with SBA ~4 minutes. Pt seated to toilet with SUP. Pt provided BUE HEP to complete this date with good tolerance. Pt would benefit from continued OT to increase indep with ADLs/IADLs for d/c.  Activity Tolerance: Patient tolerated treatment well  Discharge Recommendations: Patient would benefit from continued therapy after discharge  Equipment Needed: Yes  Mobility Devices: ADL Assistive Devices     Plan  Occupational Therapy Plan  Times Per Week: 4-5x per week  Current Treatment Recommendations: Strengthening;Balance training;Functional mobility training;Endurance training;Safety education & training;Patient/Caregiver education & training;Equipment evaluation, education, &

## 2025-04-01 NOTE — PLAN OF CARE
Problem: Chronic Conditions and Co-morbidities  Goal: Patient's chronic conditions and co-morbidity symptoms are monitored and maintained or improved  3/31/2025 2356 by Mavis Davidson RN  Outcome: Progressing  3/31/2025 1723 by Jessica Kahn RN  Outcome: Progressing  Flowsheets (Taken 3/31/2025 0930)  Care Plan - Patient's Chronic Conditions and Co-Morbidity Symptoms are Monitored and Maintained or Improved:   Monitor and assess patient's chronic conditions and comorbid symptoms for stability, deterioration, or improvement   Collaborate with multidisciplinary team to address chronic and comorbid conditions and prevent exacerbation or deterioration   Update acute care plan with appropriate goals if chronic or comorbid symptoms are exacerbated and prevent overall improvement and discharge     Problem: Discharge Planning  Goal: Discharge to home or other facility with appropriate resources  3/31/2025 2356 by Mavis Davidson RN  Outcome: Progressing  3/31/2025 1723 by Jessica Kahn RN  Outcome: Progressing  Flowsheets (Taken 3/31/2025 0930)  Discharge to home or other facility with appropriate resources:   Identify barriers to discharge with patient and caregiver   Arrange for needed discharge resources and transportation as appropriate   Identify discharge learning needs (meds, wound care, etc)     Problem: Pain  Goal: Verbalizes/displays adequate comfort level or baseline comfort level  3/31/2025 2356 by Mavis Davidson RN  Outcome: Progressing  3/31/2025 1723 by Jessica Kahn RN  Outcome: Progressing     Problem: Risk for Elopement  Goal: Patient will not exit the unit/facility without proper excort  3/31/2025 2356 by Mavis Davidson RN  Outcome: Progressing  3/31/2025 1723 by Jessica Kahn RN  Outcome: Progressing  Flowsheets (Taken 3/31/2025 0930)  Nursing Interventions for Elopement Risk:   Reduce environmental triggers   Assist with personal care needs such as toileting, eating, dressing, as needed to  Jessica, SHEYLA  Outcome: Progressing     Problem: Genitourinary - Adult  Goal: Absence of urinary retention  3/31/2025 2356 by Mavis Davidson, RN  Outcome: Progressing  3/31/2025 1723 by Jessica Kahn, RN  Outcome: Progressing

## 2025-04-01 NOTE — PROGRESS NOTES
Hospitalist - History & Physical      Patient: Dariel Griffiths    Unit/Bed:2002/2002-01  YOB: 1960  MRN: [id number]   Acct: [id number]   PCP: Evelia Gillespie MD    Date of Service: Pt seen/examined on 04/01/25  and Admitted to Inpatient with expected LOS greater than two midnights due to medical therapy.     Chief Complaint:  abd pain, infection    Assessment and Plan:-    Concern for SBP   - based on labs done by Dr. Daly.   - started on IV rocephin  - IR consult for paracentesis as pt had moderate ascites on exam as well studies.   - GI consulted     Liver Cirrhosis with ascites  - pt not taking lactulose at home  - gets weekly paracentesis and had one last Wednesday  - hold oral lasix and start IV lasix. Placed on 40mg IV bid but bp is low normal.  Adjusted to 20mg IV bid.   - continue aldactone  - may need to utilize prn midodrine if not improving.     Fall while in the hospital  - no LOC,  likely due to pts condition  - xray neg for fx    Hypokalemia:  Replace electrolytes     COPD:  Currently stable  Oxygen has been more than 90%  Continue his inhalers     Chronic congestive heart failure, diastolic:  Continue with lasix  Monitor input output     History of seizure disorder:  Previously on zonisamide but unsure if he is taking it. He seems to not be taking a lot of his medicines. He was not on zonisamide on his last admission as well.   Denies any recent seizures.      Tobacco abuse:  Suggested to quit smoking      History Of Present Illness:      Patient seen and examined at bedside.   No overnight events.   No acute complaints today.   afebrile   Denies any chest pain, shortness of breath, palpitation, headache, dizziness, cough, cold, changes in urination or bowel habits  RN reported the patient is hypotensive.  I discussed with patient.  He is essentially asymptomatic.  Already on midodrine.    HPI:            63 yo M with hx of CHF, Liver cirrhosis, ascites, weekly paracentesis  CONTRAST 3/29/2025 5:35 pm TECHNIQUE: CT of the abdomen and pelvis was performed with the administration of intravenous contrast. Multiplanar reformatted images are provided for review. Automated exposure control, iterative reconstruction, and/or weight based adjustment of the mA/kV was utilized to reduce the radiation dose to as low as reasonably achievable. COMPARISON: February 20, 2025 CT abdomen and pelvis HISTORY: ORDERING SYSTEM PROVIDED HISTORY: abdominal pain, hx if liver cirrhosis, recent tap shows possible SBO, was referred to the ER by GI TECHNOLOGIST PROVIDED HISTORY: abdominal pain, hx if liver cirrhosis, recent tap shows possible SBO, was referred to the ER by GI Decision Support Exception - unselect if not a suspected or confirmed emergency medical condition->Emergency Medical Condition (MA) Reason for Exam: Pt gets weekly paracenteses for liver failure, was told to come to ER for positive cultures. FINDINGS: Lower Chest: Small right pleural effusion is new. Organs: The abdominal wall appears normal. The liver, spleen, pancreas, and adrenals appear normal.  Liver appears small and somewhat nodular.  Moderate ascites.  Gallbladder normal. Kidneys appear normal. The bladder appears normal. GI/Bowel: The stomach,small bowel, and colon appear normal. Appendix normal. Moderate hiatal hernia. Pelvis: Free fluid. Peritoneum/Retroperitoneum: The abdominal aorta and iliac arteries are normal in caliber. There is no pathologic adenopathy.  Calcified plaque along the aorta and its branches.  In particular the left renal artery. Bones/Soft Tissues: Cystic changes right femoral head.  Sclerotic changes left femoral head.     Small right pleural effusion is new. Moderate ascites and probable cirrhosis. Vascular disease with probable left renal artery stenosis. Query right renovascular hypertension. 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         EKG:     Electronically signed by Zach Orr MD on 4/1/2025 at

## 2025-04-02 LAB
ANION GAP SERPL CALCULATED.3IONS-SCNC: 6 MMOL/L (ref 9–16)
BASOPHILS # BLD: 0.08 K/UL (ref 0–0.2)
BASOPHILS NFR BLD: 1 % (ref 0–2)
BUN SERPL-MCNC: 9 MG/DL (ref 8–23)
CALCIUM SERPL-MCNC: 8.1 MG/DL (ref 8.8–10.2)
CHLORIDE SERPL-SCNC: 101 MMOL/L (ref 98–107)
CO2 SERPL-SCNC: 25 MMOL/L (ref 20–31)
CREAT SERPL-MCNC: 0.5 MG/DL (ref 0.7–1.2)
EOSINOPHIL # BLD: 0.27 K/UL (ref 0–0.44)
EOSINOPHILS RELATIVE PERCENT: 3 % (ref 1–4)
ERYTHROCYTE [DISTWIDTH] IN BLOOD BY AUTOMATED COUNT: 14.3 % (ref 11.8–14.4)
GFR, ESTIMATED: >90 ML/MIN/1.73M2
GLUCOSE SERPL-MCNC: 95 MG/DL (ref 82–115)
HCT VFR BLD AUTO: 33 % (ref 40.7–50.3)
HGB BLD-MCNC: 10.9 G/DL (ref 13–17)
IMM GRANULOCYTES # BLD AUTO: 0.02 K/UL (ref 0–0.3)
IMM GRANULOCYTES NFR BLD: 0 %
LYMPHOCYTES NFR BLD: 2.38 K/UL (ref 1.1–3.7)
LYMPHOCYTES RELATIVE PERCENT: 28 % (ref 24–43)
MAGNESIUM SERPL-MCNC: 1.8 MG/DL (ref 1.6–2.4)
MCH RBC QN AUTO: 32.2 PG (ref 25.2–33.5)
MCHC RBC AUTO-ENTMCNC: 33 G/DL (ref 28.4–34.8)
MCV RBC AUTO: 97.6 FL (ref 82.6–102.9)
MONOCYTES NFR BLD: 1.07 K/UL (ref 0.1–1.2)
MONOCYTES NFR BLD: 12 % (ref 3–12)
NEUTROPHILS NFR BLD: 56 % (ref 36–65)
NEUTS SEG NFR BLD: 4.85 K/UL (ref 1.5–8.1)
NRBC BLD-RTO: 0 PER 100 WBC
PLATELET # BLD AUTO: 161 K/UL (ref 138–453)
PMV BLD AUTO: 10.1 FL (ref 8.1–13.5)
POTASSIUM SERPL-SCNC: 4.1 MMOL/L (ref 3.7–5.3)
RBC # BLD AUTO: 3.38 M/UL (ref 4.21–5.77)
SODIUM SERPL-SCNC: 133 MMOL/L (ref 136–145)
WBC OTHER # BLD: 8.7 K/UL (ref 3.5–11.3)

## 2025-04-02 PROCEDURE — 6360000002 HC RX W HCPCS: Performed by: NURSE PRACTITIONER

## 2025-04-02 PROCEDURE — 6360000002 HC RX W HCPCS: Performed by: EMERGENCY MEDICINE

## 2025-04-02 PROCEDURE — 6370000000 HC RX 637 (ALT 250 FOR IP): Performed by: EMERGENCY MEDICINE

## 2025-04-02 PROCEDURE — 80048 BASIC METABOLIC PNL TOTAL CA: CPT

## 2025-04-02 PROCEDURE — 2500000003 HC RX 250 WO HCPCS: Performed by: FAMILY MEDICINE

## 2025-04-02 PROCEDURE — 83735 ASSAY OF MAGNESIUM: CPT

## 2025-04-02 PROCEDURE — 2500000003 HC RX 250 WO HCPCS: Performed by: NURSE PRACTITIONER

## 2025-04-02 PROCEDURE — 1200000000 HC SEMI PRIVATE

## 2025-04-02 PROCEDURE — 36415 COLL VENOUS BLD VENIPUNCTURE: CPT

## 2025-04-02 PROCEDURE — 6370000000 HC RX 637 (ALT 250 FOR IP): Performed by: NURSE PRACTITIONER

## 2025-04-02 PROCEDURE — P9047 ALBUMIN (HUMAN), 25%, 50ML: HCPCS | Performed by: EMERGENCY MEDICINE

## 2025-04-02 PROCEDURE — 6370000000 HC RX 637 (ALT 250 FOR IP): Performed by: FAMILY MEDICINE

## 2025-04-02 PROCEDURE — 2580000003 HC RX 258: Performed by: EMERGENCY MEDICINE

## 2025-04-02 PROCEDURE — 85025 COMPLETE CBC W/AUTO DIFF WBC: CPT

## 2025-04-02 RX ORDER — SODIUM CHLORIDE 9 MG/ML
INJECTION, SOLUTION INTRAVENOUS CONTINUOUS
Status: DISCONTINUED | OUTPATIENT
Start: 2025-04-02 | End: 2025-04-03

## 2025-04-02 RX ORDER — SPIRONOLACTONE 25 MG/1
25 TABLET ORAL DAILY
Status: DISCONTINUED | OUTPATIENT
Start: 2025-04-02 | End: 2025-04-02

## 2025-04-02 RX ORDER — HYDROCODONE BITARTRATE AND ACETAMINOPHEN 5; 325 MG/1; MG/1
1 TABLET ORAL EVERY 6 HOURS PRN
Status: DISCONTINUED | OUTPATIENT
Start: 2025-04-02 | End: 2025-04-04 | Stop reason: HOSPADM

## 2025-04-02 RX ORDER — SPIRONOLACTONE 25 MG/1
50 TABLET ORAL DAILY
Status: DISCONTINUED | OUTPATIENT
Start: 2025-04-02 | End: 2025-04-04 | Stop reason: HOSPADM

## 2025-04-02 RX ORDER — ALBUMIN (HUMAN) 12.5 G/50ML
25 SOLUTION INTRAVENOUS EVERY 8 HOURS
Status: COMPLETED | OUTPATIENT
Start: 2025-04-02 | End: 2025-04-03

## 2025-04-02 RX ORDER — FUROSEMIDE 40 MG/1
40 TABLET ORAL DAILY
Status: DISCONTINUED | OUTPATIENT
Start: 2025-04-02 | End: 2025-04-04 | Stop reason: HOSPADM

## 2025-04-02 RX ADMIN — LACTULOSE 20 G: 20 SOLUTION ORAL at 23:26

## 2025-04-02 RX ADMIN — LACTULOSE 20 G: 20 SOLUTION ORAL at 09:55

## 2025-04-02 RX ADMIN — Medication 6 MG: at 23:29

## 2025-04-02 RX ADMIN — SODIUM CHLORIDE, PRESERVATIVE FREE 10 ML: 5 INJECTION INTRAVENOUS at 23:26

## 2025-04-02 RX ADMIN — Medication 100 MG: at 09:55

## 2025-04-02 RX ADMIN — RIFAXIMIN 400 MG: 200 TABLET ORAL at 13:17

## 2025-04-02 RX ADMIN — SODIUM CHLORIDE: 0.9 INJECTION, SOLUTION INTRAVENOUS at 11:27

## 2025-04-02 RX ADMIN — SODIUM CHLORIDE, PRESERVATIVE FREE 10 ML: 5 INJECTION INTRAVENOUS at 09:56

## 2025-04-02 RX ADMIN — ALBUMIN (HUMAN) 25 G: 0.25 INJECTION, SOLUTION INTRAVENOUS at 11:15

## 2025-04-02 RX ADMIN — RIFAXIMIN 400 MG: 200 TABLET ORAL at 09:55

## 2025-04-02 RX ADMIN — SPIRONOLACTONE 50 MG: 100 TABLET ORAL at 09:55

## 2025-04-02 RX ADMIN — WATER 2000 MG: 1 INJECTION INTRAMUSCULAR; INTRAVENOUS; SUBCUTANEOUS at 18:05

## 2025-04-02 RX ADMIN — ALBUMIN (HUMAN) 25 G: 0.25 INJECTION, SOLUTION INTRAVENOUS at 18:12

## 2025-04-02 RX ADMIN — RIFAXIMIN 400 MG: 200 TABLET ORAL at 23:26

## 2025-04-02 RX ADMIN — FUROSEMIDE 40 MG: 40 TABLET ORAL at 11:07

## 2025-04-02 NOTE — CARE COORDINATION
DC Planning    Spoke with pt at bedside, declines dc needs, plans for home mele Resendiz-had pcp appt but missed Monday dt hospitalization. Has travis weekly usually Tues/or Wednesdays. Does not recall pcp name but is w [facility].

## 2025-04-02 NOTE — PLAN OF CARE
Problem: Risk for Elopement  Goal: Patient will not exit the unit/facility without proper excort  4/2/2025 0424 by Jessi Graham, RN  Outcome: Progressing  Flowsheets (Taken 4/1/2025 2020)  Nursing Interventions for Elopement Risk:   Reduce environmental triggers   Assist with personal care needs such as toileting, eating, dressing, as needed to reduce the risk of wandering     Problem: Safety - Adult  Goal: Free from fall injury  4/2/2025 0424 by Jessi Graham, RN  Outcome: Progressing     Problem: Gastrointestinal - Adult  Goal: Minimal or absence of nausea and vomiting  4/2/2025 0424 by Jessi Graham, RN  Outcome: Progressing

## 2025-04-02 NOTE — PROGRESS NOTES
Gastroenterology Progress Note      Patient:   Dariel Griffiths   :    1960   Facility:   [facility]  Date:     2025  Consultant:   LEXY DAMON  Subjective:     Patient seen and examined. Mentation good/improved.   Ascitic fluid does seem to accumulated quickly, note Nephrology starting on NS at 75cc/hr this am, along with albumin to replete intravascular volume.  No localized abdominal pain.   No overt GI blood loss.     Objective:   Vital Signs:  /68   Pulse (!) 101   Temp 97.2 °F (36.2 °C)   Resp 16   Ht 1.905 m (6' 3\")   Wt 66.7 kg (147 lb)   SpO2 94%   BMI 18.37 kg/m²      Physical Exam:   General appearance: Alert, NAD, muscle wasting  Lungs: CTA bilaterally    Heart:S1S2 RRR  Abdomen: Soft, +ascites, NT present bs  Skin:  No jaundice, + stigmata of chronic liver disease.    Lab and Imaging Review   CBC with Differential:    Lab Results   Component Value Date/Time    WBC 8.7 2025 05:27 AM    RBC 3.38 2025 05:27 AM    HGB 10.9 2025 05:27 AM    HCT 33.0 2025 05:27 AM     2025 05:27 AM    MCV 97.6 2025 05:27 AM    MCH 32.2 2025 05:27 AM    MCHC 33.0 2025 05:27 AM    RDW 14.3 2025 05:27 AM    LYMPHOPCT 28 2025 05:27 AM    MONOPCT 12 2025 05:27 AM    EOSPCT 3 2025 05:27 AM    BASOPCT 1 2025 05:27 AM    MONOSABS 1.07 2025 05:27 AM    LYMPHSABS 2.38 2025 05:27 AM    EOSABS 0.27 2025 05:27 AM    BASOSABS 0.08 2025 05:27 AM    DIFFTYPE NOT REPORTED 2021 04:10 PM     Platelets:    Lab Results   Component Value Date/Time     2025 05:27 AM     Hemoglobin/Hematocrit:    Lab Results   Component Value Date/Time    HGB 10.9 2025 05:27 AM    HCT 33.0 2025 05:27 AM     CMP:    Lab Results   Component Value Date/Time     2025 05:27 AM    K 4.1 2025 05:27 AM     2025 05:27 AM    CO2 25 2025 05:27 AM

## 2025-04-02 NOTE — PROGRESS NOTES
Spiritual Health History and Assessment/Progress Note  [facility]    (P) Initial Encounter,  ,  ,      Name: Dariel Griffiths MRN: [id number]    Age: 64 y.o.     Sex: male   Language: English   Druze: Zoroastrianism   Infection of ascitic fluid     Date: 4/2/2025            Total Time Calculated: (P) 5 min              Spiritual Assessment began in [facility]        Referral/Consult From: (P) Rounding   Encounter Overview/Reason: (P) Initial Encounter  Service Provided For: (P) Patient    Patient and  reconnected again from previous hospital stays. Patient engaged readily saying \"I feel good. They are taking good care of me.\" Patient did speak of his concern about his liver and now \"they mentioned kidney infection, but I have no pain.\" Patient indicated having no present spiritual concerns or questions but expressed appreciation for \"visiting me again.\"    Carissa, Belief, Meaning:   Patient identifies as spiritual  Family/Friends No family/friends present      Importance and Influence:  Patient has spiritual/personal beliefs that influence decisions regarding their health  Family/Friends No family/friends present    Community:  Patient {Patient Community:[id number]}  Family/Friends {Family/Friends Community:[id number]}    Assessment and Plan of Care:     Patient Interventions include: {Patient Interventions:715962296}  Family/Friends Interventions include: {Family/Friends Interventions:[id number]}    Patient Plan of Care: {Patient Plan of Care:626168883}  Family/Friends Plan of Care: {Family/Friends Plan of Care:577778031}    Electronically signed by Carrillo Corona, {Spiritual Health Credentials:[id number]} on 4/2/2025 at 3:48 PM

## 2025-04-02 NOTE — PLAN OF CARE
Problem: Chronic Conditions and Co-morbidities  Goal: Patient's chronic conditions and co-morbidity symptoms are monitored and maintained or improved  4/2/2025 1521 by Kathy Maurice RN  Flowsheets (Taken 4/2/2025 0955)  Care Plan - Patient's Chronic Conditions and Co-Morbidity Symptoms are Monitored and Maintained or Improved:   Monitor and assess patient's chronic conditions and comorbid symptoms for stability, deterioration, or improvement   Collaborate with multidisciplinary team to address chronic and comorbid conditions and prevent exacerbation or deterioration   Update acute care plan with appropriate goals if chronic or comorbid symptoms are exacerbated and prevent overall improvement and discharge     Problem: Discharge Planning  Goal: Discharge to home or other facility with appropriate resources  4/2/2025 1521 by Kathy Maurice RN  Flowsheets (Taken 4/2/2025 0955)  Discharge to home or other facility with appropriate resources:   Identify barriers to discharge with patient and caregiver   Arrange for needed discharge resources and transportation as appropriate   Identify discharge learning needs (meds, wound care, etc)   Refer to discharge planning if patient needs post-hospital services based on physician order or complex needs related to functional status, cognitive ability or social support system     Problem: Pain  Goal: Verbalizes/displays adequate comfort level or baseline comfort level  4/2/2025 1521 by Ktahy Maurice RN  Flowsheets (Taken 4/2/2025 1128)  Verbalizes/displays adequate comfort level or baseline comfort level:   Encourage patient to monitor pain and request assistance   Assess pain using appropriate pain scale   Administer analgesics based on type and severity of pain and evaluate response   Implement non-pharmacological measures as appropriate and evaluate response   Notify Licensed Independent Practitioner if interventions unsuccessful or patient reports new pain     Problem: Risk  Function:   Administer medication as ordered   Assess activities of daily living deficits and provide assistive devices as needed   Obtain physical therapy/occupational therapy consults as needed   Assist and instruct patient to increase activity and self care as tolerated     Problem: Gastrointestinal - Adult  Goal: Minimal or absence of nausea and vomiting  4/2/2025 1520 by Kathy Maurice RN  Outcome: Progressing  Flowsheets (Taken 4/2/2025 0955)  Minimal or absence of nausea and vomiting: Administer IV fluids as ordered to ensure adequate hydration     Problem: Gastrointestinal - Adult  Goal: Maintains adequate nutritional intake  4/2/2025 1520 by Kathy Maurice RN  Outcome: Progressing  Flowsheets (Taken 4/2/2025 0955)  Maintains adequate nutritional intake: Monitor intake and output, weight and lab values     Problem: Genitourinary - Adult  Goal: Absence of urinary retention  4/2/2025 1520 by Kathy Maurice RN  Outcome: Progressing  Flowsheets (Taken 4/2/2025 0955)  Absence of urinary retention: Assess patient’s ability to void and empty bladder     Problem: Skin/Tissue Integrity  Goal: Skin integrity remains intact  Description: 1.  Monitor for areas of redness and/or skin breakdown  2.  Assess vascular access sites hourly  3.  Every 4-6 hours minimum:  Change oxygen saturation probe site  4.  Every 4-6 hours:  If on nasal continuous positive airway pressure, respiratory therapy assess nares and determine need for appliance change or resting period  4/2/2025 1520 by Kathy Maurice RN  Outcome: Progressing  Flowsheets (Taken 4/2/2025 0955)  Skin Integrity Remains Intact:   Monitor for areas of redness and/or skin breakdown   Turn and reposition as indicated   Positioning devices   Pressure redistribution bed/mattress (bed type)   Check visual cues for pain     Problem: ABCDS Injury Assessment  Goal: Absence of physical injury  4/2/2025 1520 by Kathy Maurice, RN  Outcome: Progressing

## 2025-04-02 NOTE — PROGRESS NOTES
Occupational Therapy  [facility]  Occupational Therapy Not Seen Note    Patient not available for Occupational Therapy due to:    [] Testing:    [] Hemodialysis    [] Cancelled by RN:    [x]Refusal by Patient:Pt refused tx, to get washed up/change clothes, participate and get up OOB/move.  Pt stated he just wanted to eat breakfast and then go back to sleep.      [] Surgery:     [] Intubation:     [] Pain Medication:    [] Sedation:     [] Spine Precautions :    [] Medical Instability:    [] Other:

## 2025-04-02 NOTE — PROGRESS NOTES
Hospitalist - History & Physical      Patient: Dariel Griffiths    Unit/Bed:2002/2002-01  YOB: 1960  MRN: [id number]   Acct: [id number]   PCP: Evelia Gillespie MD    Date of Service: Pt seen/examined on 04/02/25  and Admitted to Inpatient with expected LOS greater than two midnights due to medical therapy.     Chief Complaint:  abd pain, infection    Assessment and Plan:-    Concern for SBP   - based on labs done by Dr. Daly.   -On IV Rocephin  - IR consult for paracentesis as pt had moderate ascites on exam as well studies.   - GI consulted     Liver Cirrhosis with ascites  - pt not taking lactulose at home  - gets weekly paracentesis and had one last Wednesday  - hold oral lasix and start IV lasix. Placed on 40mg IV bid but bp is low normal.  Adjusted to 20mg IV bid.   - continue aldactone  - may need to utilize prn midodrine if not improving.     Fall while in the hospital  - no LOC,  likely due to pts condition  - xray neg for fx    Hypokalemia:  Replace electrolytes     COPD:  Currently stable  Oxygen has been more than 90%  Continue his inhalers     Chronic congestive heart failure, diastolic:  Continue with lasix  Monitor input output    Hyponatremia:  Nephrology is following.  Patient was started on normal saline along with albumin     History of seizure disorder:  Previously on zonisamide but unsure if he is taking it. He seems to not be taking a lot of his medicines. He was not on zonisamide on his last admission as well.   Denies any recent seizures.      Tobacco abuse:  Suggested to quit smoking      History Of Present Illness:      Patient was seen and examined.  Patient denies any acute complaints.  Patient slept well.  Tolerated p.o. intake.  Patient denies any chest pain, shortness of breath, cough.  Patient denies any nausea, changes ration, bowel habit or rash.  No reported fever.    HPI:            63 yo M with hx of CHF, Liver cirrhosis, ascites, weekly paracentesis  4/2/2025 at 2:21 PM

## 2025-04-02 NOTE — PROGRESS NOTES
[facility]    Shaukat Rashid MD Zafar Magsi MD Danial Rashid MD John O. Ranker MD Sembria Ligibel, CNP       Nephrology progress note    Reason for Consult:    Date of Service: 04/02/25   PCP: Evelia Gillespie MD       Reason for Consult     Hyponatremia    Assessment and Plan     65 yo M with hx of CHF, Liver cirrhosis, ascites, weekly paracentesis requirement, COPD, CVA, anxiety and depression and Suicide attempts who was sent in by GI for concerns of infection on his last paracentesis fluid analysis. Patient does endorse abdominal pain. States he is losing muscle mass. He also states that his GI physician gave him very few months to live due to his underlying cirrhosis. Patients work up revealed tachycardia, low normal bp, K 3.5, LA 2.4, small right plerual effusion, moderate ascites on CT Abd pelvis.  Patient started on IV antibiotics and admitted for further evaluation.  Patient sodium was noted to be on the low side, he has history of low solute diet and relative increased electrolyte free fluid intake, nephrology was consulted for further evaluation    Assessment   Hyponatremia most likely chronic hyponatremia due to SIADH, acute on chronic hyponatremia likely due to multifactorial factors, differential low solute diet, relative increase water intake, intravascular hypovolemia, all this in the setting of high ADH state.  Hypokalemia:  Concern for SBP at admission  Liver Cirrhosis with ascites  History of seizure disorder:    Plan  Start patient on normal saline to replete intravascular volume, will give albumin's x 3, resume diuretic regimen as Lasix 40 mg once a day and spironolactone 25 mg once a day  Electrolyte free fluid restriction of 1200 mL per 24 hours  Strict ins and outs  Renal chemistry panel monitoring daily    Thank you for this consultation, we will follow along with you regarding care of this patient while the patient is here in the hospital,

## 2025-04-03 LAB
ANION GAP SERPL CALCULATED.3IONS-SCNC: 9 MMOL/L (ref 9–16)
BASOPHILS # BLD: 0.04 K/UL (ref 0–0.2)
BASOPHILS NFR BLD: 1 % (ref 0–2)
BUN SERPL-MCNC: 8 MG/DL (ref 8–23)
CALCIUM SERPL-MCNC: 8.3 MG/DL (ref 8.8–10.2)
CHLORIDE SERPL-SCNC: 104 MMOL/L (ref 98–107)
CO2 SERPL-SCNC: 23 MMOL/L (ref 20–31)
CREAT SERPL-MCNC: 0.5 MG/DL (ref 0.7–1.2)
EOSINOPHIL # BLD: 0.22 K/UL (ref 0–0.44)
EOSINOPHILS RELATIVE PERCENT: 3 % (ref 1–4)
ERYTHROCYTE [DISTWIDTH] IN BLOOD BY AUTOMATED COUNT: 14.5 % (ref 11.8–14.4)
GFR, ESTIMATED: >90 ML/MIN/1.73M2
GLUCOSE SERPL-MCNC: 80 MG/DL (ref 82–115)
HCT VFR BLD AUTO: 29.5 % (ref 40.7–50.3)
HGB BLD-MCNC: 9.9 G/DL (ref 13–17)
IMM GRANULOCYTES # BLD AUTO: 0.01 K/UL (ref 0–0.3)
IMM GRANULOCYTES NFR BLD: 0 %
LYMPHOCYTES NFR BLD: 1.88 K/UL (ref 1.1–3.7)
LYMPHOCYTES RELATIVE PERCENT: 27 % (ref 24–43)
MCH RBC QN AUTO: 33.1 PG (ref 25.2–33.5)
MCHC RBC AUTO-ENTMCNC: 33.6 G/DL (ref 28.4–34.8)
MCV RBC AUTO: 98.7 FL (ref 82.6–102.9)
MICROORGANISM SPEC CULT: NORMAL
MICROORGANISM SPEC CULT: NORMAL
MONOCYTES NFR BLD: 0.87 K/UL (ref 0.1–1.2)
MONOCYTES NFR BLD: 13 % (ref 3–12)
NEUTROPHILS NFR BLD: 56 % (ref 36–65)
NEUTS SEG NFR BLD: 3.88 K/UL (ref 1.5–8.1)
NRBC BLD-RTO: 0 PER 100 WBC
PLATELET # BLD AUTO: 146 K/UL (ref 138–453)
PMV BLD AUTO: 10.3 FL (ref 8.1–13.5)
POTASSIUM SERPL-SCNC: 3.8 MMOL/L (ref 3.7–5.3)
RBC # BLD AUTO: 2.99 M/UL (ref 4.21–5.77)
RBC # BLD: ABNORMAL 10*6/UL
SERVICE CMNT-IMP: NORMAL
SERVICE CMNT-IMP: NORMAL
SODIUM SERPL-SCNC: 136 MMOL/L (ref 136–145)
SPECIMEN DESCRIPTION: NORMAL
SPECIMEN DESCRIPTION: NORMAL
WBC OTHER # BLD: 6.9 K/UL (ref 3.5–11.3)

## 2025-04-03 PROCEDURE — 85025 COMPLETE CBC W/AUTO DIFF WBC: CPT

## 2025-04-03 PROCEDURE — 80048 BASIC METABOLIC PNL TOTAL CA: CPT

## 2025-04-03 PROCEDURE — 6370000000 HC RX 637 (ALT 250 FOR IP): Performed by: NURSE PRACTITIONER

## 2025-04-03 PROCEDURE — P9047 ALBUMIN (HUMAN), 25%, 50ML: HCPCS | Performed by: EMERGENCY MEDICINE

## 2025-04-03 PROCEDURE — 6360000002 HC RX W HCPCS: Performed by: NURSE PRACTITIONER

## 2025-04-03 PROCEDURE — 6370000000 HC RX 637 (ALT 250 FOR IP): Performed by: EMERGENCY MEDICINE

## 2025-04-03 PROCEDURE — 6370000000 HC RX 637 (ALT 250 FOR IP): Performed by: FAMILY MEDICINE

## 2025-04-03 PROCEDURE — 36415 COLL VENOUS BLD VENIPUNCTURE: CPT

## 2025-04-03 PROCEDURE — 2500000003 HC RX 250 WO HCPCS: Performed by: FAMILY MEDICINE

## 2025-04-03 PROCEDURE — 6360000002 HC RX W HCPCS: Performed by: EMERGENCY MEDICINE

## 2025-04-03 PROCEDURE — 2500000003 HC RX 250 WO HCPCS: Performed by: NURSE PRACTITIONER

## 2025-04-03 PROCEDURE — 2580000003 HC RX 258: Performed by: EMERGENCY MEDICINE

## 2025-04-03 PROCEDURE — 1200000000 HC SEMI PRIVATE

## 2025-04-03 RX ORDER — SODIUM CHLORIDE 9 MG/ML
INJECTION, SOLUTION INTRAVENOUS CONTINUOUS
Status: ACTIVE | OUTPATIENT
Start: 2025-04-03 | End: 2025-04-04

## 2025-04-03 RX ADMIN — RIFAXIMIN 400 MG: 200 TABLET ORAL at 08:53

## 2025-04-03 RX ADMIN — HYDROCODONE BITARTRATE AND ACETAMINOPHEN 1 TABLET: 5; 325 TABLET ORAL at 01:44

## 2025-04-03 RX ADMIN — SODIUM CHLORIDE: 0.9 INJECTION, SOLUTION INTRAVENOUS at 01:40

## 2025-04-03 RX ADMIN — WATER 2000 MG: 1 INJECTION INTRAMUSCULAR; INTRAVENOUS; SUBCUTANEOUS at 16:30

## 2025-04-03 RX ADMIN — HYDROCODONE BITARTRATE AND ACETAMINOPHEN 1 TABLET: 5; 325 TABLET ORAL at 22:03

## 2025-04-03 RX ADMIN — SODIUM CHLORIDE: 0.9 INJECTION, SOLUTION INTRAVENOUS at 16:29

## 2025-04-03 RX ADMIN — Medication 100 MG: at 08:53

## 2025-04-03 RX ADMIN — ALBUMIN (HUMAN) 25 G: 0.25 INJECTION, SOLUTION INTRAVENOUS at 01:45

## 2025-04-03 RX ADMIN — LACTULOSE 20 G: 20 SOLUTION ORAL at 08:53

## 2025-04-03 RX ADMIN — ENOXAPARIN SODIUM 40 MG: 100 INJECTION SUBCUTANEOUS at 08:52

## 2025-04-03 RX ADMIN — Medication 6 MG: at 22:16

## 2025-04-03 RX ADMIN — SODIUM CHLORIDE, PRESERVATIVE FREE 10 ML: 5 INJECTION INTRAVENOUS at 08:53

## 2025-04-03 RX ADMIN — SPIRONOLACTONE 50 MG: 100 TABLET ORAL at 08:53

## 2025-04-03 RX ADMIN — FUROSEMIDE 40 MG: 40 TABLET ORAL at 08:53

## 2025-04-03 ASSESSMENT — PAIN SCALES - GENERAL
PAINLEVEL_OUTOF10: 0
PAINLEVEL_OUTOF10: 4
PAINLEVEL_OUTOF10: 8
PAINLEVEL_OUTOF10: 5
PAINLEVEL_OUTOF10: 8

## 2025-04-03 ASSESSMENT — PAIN DESCRIPTION - PAIN TYPE
TYPE: CHRONIC PAIN
TYPE: CHRONIC PAIN

## 2025-04-03 ASSESSMENT — PAIN DESCRIPTION - LOCATION
LOCATION: ABDOMEN
LOCATION: ABDOMEN

## 2025-04-03 ASSESSMENT — PAIN DESCRIPTION - FREQUENCY
FREQUENCY: CONTINUOUS
FREQUENCY: CONTINUOUS

## 2025-04-03 ASSESSMENT — PAIN DESCRIPTION - DESCRIPTORS
DESCRIPTORS: STABBING
DESCRIPTORS: SHARP

## 2025-04-03 NOTE — PROGRESS NOTES
further questions/concerns. We will continue to follow along with you.        Electronically signed by Rajan Grant MD  on 4/3/2025 at 3:17 PM

## 2025-04-03 NOTE — PLAN OF CARE
Patient is A&Ox4. Able to ambulate with standby assist. Patient agitated throughout the day, stated to RN, \"if you dont have meds to give, get out.\" Informed patient that writer and staff have to periodically come check on him and make sure he is doing okay. Staff attempted to empty patients urinal, patient refused, hugging it close to his chest. Asked why staff could not empy urinal, patient stated, \"I have my reasons.\" Patient is a high fall risk, bed alarm is on, bed in lowest position, call device within reach    Problem: Chronic Conditions and Co-morbidities  Goal: Patient's chronic conditions and co-morbidity symptoms are monitored and maintained or improved  4/3/2025 0923 by Ashli Zarco RN  Outcome: Progressing     Problem: Discharge Planning  Goal: Discharge to home or other facility with appropriate resources  4/3/2025 0923 by Ashli Zarco RN  Outcome: Progressing     Problem: Pain  Goal: Verbalizes/displays adequate comfort level or baseline comfort level  4/3/2025 0923 by Ashli Zarco RN  Outcome: Progressing     Problem: Risk for Elopement  Goal: Patient will not exit the unit/facility without proper excort  Outcome: Progressing     Problem: Safety - Adult  Goal: Free from fall injury  Outcome: Progressing     Problem: Respiratory - Adult  Goal: Achieves optimal ventilation and oxygenation  Outcome: Progressing     Problem: Cardiovascular - Adult  Goal: Maintains optimal cardiac output and hemodynamic stability  Outcome: Progressing     Problem: Skin/Tissue Integrity - Adult  Goal: Skin integrity remains intact  Description: 1.  Monitor for areas of redness and/or skin breakdown  2.  Assess vascular access sites hourly  3.  Every 4-6 hours minimum:  Change oxygen saturation probe site  4.  Every 4-6 hours:  If on nasal continuous positive airway pressure, respiratory therapy assess nares and determine need for appliance change or resting period  Outcome: Progressing     Problem:

## 2025-04-03 NOTE — PROGRESS NOTES
Hospitalist - History & Physical      Patient: Dariel Griffiths    Unit/Bed:2002/2002-01  YOB: 1960  MRN: [id number]   Acct: [id number]   PCP: Evelia Gillespie MD    Date of Service: Pt seen/examined on 04/03/25  and Admitted to Inpatient with expected LOS greater than two midnights due to medical therapy.     Chief Complaint:  abd pain, infection    Assessment and Plan:-    Concern for SBP   - based on labs done by Dr. Daly.   -On IV Rocephin  - IR consult for paracentesis as pt had moderate ascites on exam as well studies.   - GI consulted     Liver Cirrhosis with ascites  - pt not taking lactulose at home  - gets weekly paracentesis and had one last Wednesday  - hold oral lasix and start IV lasix. Placed on 40mg IV bid but bp is low normal.  Adjusted to 20mg IV bid.   - continue aldactone  - may need to utilize prn midodrine if not improving.     Fall while in the hospital  - no LOC,  likely due to pts condition  - xray neg for fx    Hypokalemia:  Replace electrolytes     COPD:  Currently stable  Oxygen has been more than 90%  Continue his inhalers     Chronic congestive heart failure, diastolic:  Continue with lasix  Monitor input output    Hyponatremia:  Nephrology is following.  Patient was started on normal saline along with albumin     History of seizure disorder:  Previously on zonisamide but unsure if he is taking it. He seems to not be taking a lot of his medicines. He was not on zonisamide on his last admission as well.   Denies any recent seizures.      Tobacco abuse:  Suggested to quit smoking      History Of Present Illness:      Patient was seen and examined.  Patient denies any acute complaints.  Patient slept well.  Tolerated p.o. intake.  Patient denies any chest pain, shortness of breath, cough.  Patient denies any nausea, changes ration, bowel habit or rash.  No reported fever.    HPI:            63 yo M with hx of CHF, Liver cirrhosis, ascites, weekly paracentesis  4:36 PM

## 2025-04-03 NOTE — PLAN OF CARE
Problem: Chronic Conditions and Co-morbidities  Goal: Patient's chronic conditions and co-morbidity symptoms are monitored and maintained or improved  Outcome: Progressing     Problem: Discharge Planning  Goal: Discharge to home or other facility with appropriate resources  Outcome: Progressing     Problem: Pain  Goal: Verbalizes/displays adequate comfort level or baseline comfort level  Outcome: Progressing     Problem: Musculoskeletal - Adult  Goal: Return mobility to safest level of function  Outcome: Progressing     Problem: Genitourinary - Adult  Goal: Absence of urinary retention  Outcome: Progressing

## 2025-04-03 NOTE — PROGRESS NOTES
Nutrition Assessment     Type and Reason for Visit: Reassess    Nutrition Recommendations/Plan:   Continue current diet  Monitor po intake, labs and weight     Malnutrition Assessment:  Malnutrition Status: Severe malnutrition    Nutrition Assessment:  Pt has been hypnatremic and hypokalemic since last visit. He is currently on abx for concern of SBP, and has been started on lasix. Pt is reported to have abd cramping and decrease in appetite. Pt stated he told staff \"since it takes them 8 minutes to get a nurse down here when I call for one, I just told them to leave me alone so I can rest\". Pt did state before RD left that his appetite and intake are \"so-so\".  Monitor po intake, labs and weight.    Estimated Daily Nutrient Needs:  Energy (kcal):  1670-1870kcals (25-28kcals/kg) Weight Used for Energy Requirements: Current     Protein (g):  90-100g (1.3-1.5g/kg) Weight Used for Protein Requirements: Current        Fluid (ml/day):  1670-1870ml Method Used for Fluid Requirements: 1 ml/kcal    Nutrition Related Findings:   Active bowel sounds, abd cramping. BLE +1 edema. On lasix Wound Type: None    Current Nutrition Therapies:    ADULT ORAL NUTRITION SUPPLEMENT; Breakfast, Lunch, Dinner; Standard High Calorie/High Protein Oral Supplement  ADULT DIET; Regular; Low Sodium (2 gm)    Anthropometric Measures:  Height: 190.5 cm (6' 3\")  Current Body Wt: 66.7 kg (147 lb 0.8 oz)   BMI: 18.4        Nutrition Diagnosis:   Inadequate oral intake related to altered GI function as evidenced by variable po intake  Severe malnutrition related to early satiety, decreased appetite as evidenced by BMI, criteria as identified in malnutrition assessment, loss of subcutaneous fat, muscle loss    Nutrition Interventions:   Food and/or Nutrient Delivery: Continue Current Diet  Nutrition Education/Counseling: Education/Counseling not indicated  Coordination of Nutrition Care: Continue to monitor while inpatient       Goals:  Goals: PO intake  75% or greater, Maintain adequate nutrition status  Type of Goal: New goal  Previous Goal Met: Progressing toward Goal(s)    Nutrition Monitoring and Evaluation:      Food/Nutrient Intake Outcomes: Food and Nutrient Intake, Supplement Intake  Physical Signs/Symptoms Outcomes: Biochemical Data, Nutrition Focused Physical Findings, GI Status, Weight, Fluid Status or Edema    Discharge Planning:    Too soon to determine     SHOBHA [id number]  [phone]

## 2025-04-03 NOTE — PROGRESS NOTES
Gastroenterology Progress Note      Patient:   Dariel Griffiths   :    1960   Facility:   [facility]  Date:     4/3/2025  Consultant:   LEXY DAMON  Subjective:     Patient seen and examined. Mentation good/improved.   Continues on IV antibiotics for SBP, ascites increased from yesterday.     Objective:   Vital Signs:  BP (!) 95/55   Pulse 97   Temp 98.1 °F (36.7 °C) (Oral)   Resp 16   Ht 1.905 m (6' 3\")   Wt 66.7 kg (147 lb)   SpO2 93%   BMI 18.37 kg/m²      Physical Exam:   General appearance: Alert, NAD, muscle wasting  Lungs: CTA bilaterally    Heart:S1S2 RRR  Abdomen: Soft, +ascites, NT present bs  Skin:  No jaundice, + stigmata of chronic liver disease.    Lab and Imaging Review   CBC with Differential:    Lab Results   Component Value Date/Time    WBC 6.9 2025 05:33 AM    RBC 2.99 2025 05:33 AM    HGB 9.9 2025 05:33 AM    HCT 29.5 2025 05:33 AM     2025 05:33 AM    MCV 98.7 2025 05:33 AM    MCH 33.1 2025 05:33 AM    MCHC 33.6 2025 05:33 AM    RDW 14.5 2025 05:33 AM    LYMPHOPCT 27 2025 05:33 AM    MONOPCT 13 2025 05:33 AM    EOSPCT 3 2025 05:33 AM    BASOPCT 1 2025 05:33 AM    MONOSABS 0.87 2025 05:33 AM    LYMPHSABS 1.88 2025 05:33 AM    EOSABS 0.22 2025 05:33 AM    BASOSABS 0.04 2025 05:33 AM    DIFFTYPE NOT REPORTED 2021 04:10 PM     Platelets:    Lab Results   Component Value Date/Time     2025 05:33 AM     Hemoglobin/Hematocrit:    Lab Results   Component Value Date/Time    HGB 9.9 2025 05:33 AM    HCT 29.5 2025 05:33 AM     CMP:    Lab Results   Component Value Date/Time     2025 05:33 AM    K 3.8 2025 05:33 AM     2025 05:33 AM    CO2 23 2025 05:33 AM    BUN 8 2025 05:33 AM    CREATININE 0.5 2025 05:33 AM    GFRAA 154.4 2023 12:48 PM    LABGLOM >90 2025 05:33 AM     LABGLOM >60 03/10/2023 08:04 PM    GLUCOSE 80 04/03/2025 05:33 AM    GLUCOSE 77 03/11/2025 12:42 PM    CALCIUM 8.3 04/03/2025 05:33 AM    BILITOT 1.2 03/29/2025 04:40 PM    ALKPHOS 136 03/29/2025 04:40 PM    AST 46 03/29/2025 04:40 PM    ALT 14 03/29/2025 04:40 PM     BMP:    Lab Results   Component Value Date/Time     04/03/2025 05:33 AM    K 3.8 04/03/2025 05:33 AM     04/03/2025 05:33 AM    CO2 23 04/03/2025 05:33 AM    BUN 8 04/03/2025 05:33 AM    CREATININE 0.5 04/03/2025 05:33 AM    CALCIUM 8.3 04/03/2025 05:33 AM    GFRAA 154.4 07/14/2023 12:48 PM    LABGLOM >90 04/03/2025 05:33 AM    LABGLOM >60 03/10/2023 08:04 PM    GLUCOSE 80 04/03/2025 05:33 AM    GLUCOSE 77 03/11/2025 12:42 PM     Potassium:    Lab Results   Component Value Date/Time    K 3.8 04/03/2025 05:33 AM     BUN/Creatinine:    Lab Results   Component Value Date/Time    BUN 8 04/03/2025 05:33 AM    CREATININE 0.5 04/03/2025 05:33 AM     Hepatic Function Panel:    Lab Results   Component Value Date/Time    ALKPHOS 136 03/29/2025 04:40 PM    ALT 14 03/29/2025 04:40 PM    AST 46 03/29/2025 04:40 PM    BILITOT 1.2 03/29/2025 04:40 PM    BILIDIR 1.1 02/23/2025 05:56 AM    IBILI 0.6 02/23/2025 05:56 AM     PT/INR:    Lab Results   Component Value Date/Time    PROTIME 12.6 03/25/2025 02:38 PM    INR 1.2 03/25/2025 02:38 PM     Assessment:   65 yo male with hx of decompensated etoh cirrhosis, sober since January, follows Dr. Daly outpatient. Admitted for recurrent large volume ascites,+ SBP, ongoing H.E.   Question of medication understanding/compliance outpatient. ?compliance with low Na diet at home.   No overt GI bleeding reported.   Records from Dr. Daly on chart reviewed earlier this week.      Paracentesis Monday with 5 L removed, fluid studies reviewed, but previous recent fluid studies + SBP on antibiotics increased to 2 grams daily.      Diuretics continue as tolerated, Nephrology now following for acute on chronic hyponatremia.

## 2025-04-04 ENCOUNTER — APPOINTMENT (OUTPATIENT)
Dept: ULTRASOUND IMAGING | Age: 65
DRG: 432 | End: 2025-04-04
Payer: MEDICARE

## 2025-04-04 VITALS
TEMPERATURE: 97.5 F | OXYGEN SATURATION: 97 % | HEART RATE: 97 BPM | BODY MASS INDEX: 18.28 KG/M2 | WEIGHT: 147 LBS | RESPIRATION RATE: 16 BRPM | DIASTOLIC BLOOD PRESSURE: 78 MMHG | HEIGHT: 75 IN | SYSTOLIC BLOOD PRESSURE: 121 MMHG

## 2025-04-04 LAB
ANION GAP SERPL CALCULATED.3IONS-SCNC: 7 MMOL/L (ref 9–16)
BASOPHILS # BLD: 0.09 K/UL (ref 0–0.2)
BASOPHILS NFR BLD: 1 % (ref 0–2)
BUN SERPL-MCNC: 6 MG/DL (ref 8–23)
CALCIUM SERPL-MCNC: 8.3 MG/DL (ref 8.8–10.2)
CHLORIDE SERPL-SCNC: 106 MMOL/L (ref 98–107)
CO2 SERPL-SCNC: 23 MMOL/L (ref 20–31)
CREAT SERPL-MCNC: 0.4 MG/DL (ref 0.7–1.2)
EOSINOPHIL # BLD: 0.25 K/UL (ref 0–0.44)
EOSINOPHILS RELATIVE PERCENT: 4 % (ref 1–4)
ERYTHROCYTE [DISTWIDTH] IN BLOOD BY AUTOMATED COUNT: 14.6 % (ref 11.8–14.4)
GFR, ESTIMATED: >90 ML/MIN/1.73M2
GLUCOSE SERPL-MCNC: 81 MG/DL (ref 82–115)
HCT VFR BLD AUTO: 31.4 % (ref 40.7–50.3)
HGB BLD-MCNC: 10.5 G/DL (ref 13–17)
IMM GRANULOCYTES # BLD AUTO: 0.02 K/UL (ref 0–0.3)
IMM GRANULOCYTES NFR BLD: 0 %
LYMPHOCYTES NFR BLD: 2.2 K/UL (ref 1.1–3.7)
LYMPHOCYTES RELATIVE PERCENT: 33 % (ref 24–43)
MCH RBC QN AUTO: 32.7 PG (ref 25.2–33.5)
MCHC RBC AUTO-ENTMCNC: 33.4 G/DL (ref 28.4–34.8)
MCV RBC AUTO: 97.8 FL (ref 82.6–102.9)
MONOCYTES NFR BLD: 0.97 K/UL (ref 0.1–1.2)
MONOCYTES NFR BLD: 14 % (ref 3–12)
NEUTROPHILS NFR BLD: 48 % (ref 36–65)
NEUTS SEG NFR BLD: 3.24 K/UL (ref 1.5–8.1)
NRBC BLD-RTO: 0 PER 100 WBC
PLATELET # BLD AUTO: 147 K/UL (ref 138–453)
PMV BLD AUTO: 9.9 FL (ref 8.1–13.5)
POTASSIUM SERPL-SCNC: 4 MMOL/L (ref 3.7–5.3)
RBC # BLD AUTO: 3.21 M/UL (ref 4.21–5.77)
RBC # BLD: ABNORMAL 10*6/UL
SODIUM SERPL-SCNC: 136 MMOL/L (ref 136–145)
WBC OTHER # BLD: 6.8 K/UL (ref 3.5–11.3)

## 2025-04-04 PROCEDURE — 99223 1ST HOSP IP/OBS HIGH 75: CPT | Performed by: INTERNAL MEDICINE

## 2025-04-04 PROCEDURE — 6360000002 HC RX W HCPCS: Performed by: NURSE PRACTITIONER

## 2025-04-04 PROCEDURE — 36415 COLL VENOUS BLD VENIPUNCTURE: CPT

## 2025-04-04 PROCEDURE — 2580000003 HC RX 258: Performed by: EMERGENCY MEDICINE

## 2025-04-04 PROCEDURE — 6370000000 HC RX 637 (ALT 250 FOR IP): Performed by: FAMILY MEDICINE

## 2025-04-04 PROCEDURE — 0W9G3ZZ DRAINAGE OF PERITONEAL CAVITY, PERCUTANEOUS APPROACH: ICD-10-PCS | Performed by: RADIOLOGY

## 2025-04-04 PROCEDURE — 49083 ABD PARACENTESIS W/IMAGING: CPT

## 2025-04-04 PROCEDURE — 80048 BASIC METABOLIC PNL TOTAL CA: CPT

## 2025-04-04 PROCEDURE — 6370000000 HC RX 637 (ALT 250 FOR IP): Performed by: EMERGENCY MEDICINE

## 2025-04-04 PROCEDURE — 85025 COMPLETE CBC W/AUTO DIFF WBC: CPT

## 2025-04-04 RX ORDER — MIDODRINE HYDROCHLORIDE 5 MG/1
5 TABLET ORAL 3 TIMES DAILY PRN
Qty: 90 TABLET | Refills: 3 | Status: SHIPPED | OUTPATIENT
Start: 2025-04-04

## 2025-04-04 RX ORDER — FUROSEMIDE 40 MG/1
40 TABLET ORAL DAILY
Qty: 60 TABLET | Refills: 3 | Status: SHIPPED | OUTPATIENT
Start: 2025-04-04

## 2025-04-04 RX ORDER — HYDROCODONE BITARTRATE AND ACETAMINOPHEN 5; 325 MG/1; MG/1
1 TABLET ORAL EVERY 6 HOURS PRN
Qty: 10 TABLET | Refills: 0 | Status: SHIPPED | OUTPATIENT
Start: 2025-04-04 | End: 2025-04-07

## 2025-04-04 RX ORDER — SPIRONOLACTONE 50 MG/1
50 TABLET, FILM COATED ORAL DAILY
Qty: 30 TABLET | Refills: 3 | Status: SHIPPED | OUTPATIENT
Start: 2025-04-05

## 2025-04-04 RX ADMIN — SODIUM CHLORIDE: 0.9 INJECTION, SOLUTION INTRAVENOUS at 08:47

## 2025-04-04 RX ADMIN — Medication 100 MG: at 08:42

## 2025-04-04 RX ADMIN — FUROSEMIDE 40 MG: 40 TABLET ORAL at 08:42

## 2025-04-04 RX ADMIN — SPIRONOLACTONE 50 MG: 100 TABLET ORAL at 08:42

## 2025-04-04 RX ADMIN — ENOXAPARIN SODIUM 40 MG: 100 INJECTION SUBCUTANEOUS at 08:42

## 2025-04-04 ASSESSMENT — PAIN SCALES - GENERAL: PAINLEVEL_OUTOF10: 1

## 2025-04-04 NOTE — DISCHARGE SUMMARY
DISCHARGE SUMMARY  [facility].,    Adult Hospitalist      Patient ID: Dariel Griffiths  MRN: [id number]     Acct:  [id number]       Patient's PCP: Evelia Gillespie MD    Admit Date: 3/29/2025     Discharge Date:   ***    Admitting Physician: Mi Soto MD    Discharge Physician: Zach Orr MD     CONSULTANTS: Patient Care Team:  Evelia Gillespie MD as PCP - General (Family Medicine)  Zay Garcia DO as Surgeon (Orthopedic Surgery)  Nanette Lund DO as Consulting Physician (General Surgery)  Group, [facility]  Velia Brantley MD as Resident (Family Medicine)  Juan Ramon Ramos MD as Consulting Physician (Pulmonology)  Nanette Lund DO as Consulting Physician (General Surgery)    PROCEDURES PERFORMED: Paracentesis        Primary Problem  Infection of ascitic fluid      HPI: ***              Active Discharge Diagnoses with hospital course:              At the time of discharge patient was hemodynamically stable and asymptomatic.    The plan was discussed in detail with patient who agreed with the plan and verbalized understanding .    The patient was seen and examined on day of discharge and this discharge summary is in conjunction with any daily progress note from day of discharge.    Hospital Data:    Labs:    Hematology:  Recent Labs     04/02/25 0527 04/03/25 0533 04/04/25 0545   WBC 8.7 6.9 6.8   RBC 3.38* 2.99* 3.21*   HGB 10.9* 9.9* 10.5*   HCT 33.0* 29.5* 31.4*   MCV 97.6 98.7 97.8   MCH 32.2 33.1 32.7   MCHC 33.0 33.6 33.4   RDW 14.3 14.5* 14.6*    146 147   MPV 10.1 10.3 9.9     Chemistry:  Recent Labs     04/02/25 0527 04/03/25 0533 04/04/25 0545   * 136 136   K 4.1 3.8 4.0    104 106   CO2 25 23 23   GLUCOSE 95 80* 81*   BUN 9 8 6*   CREATININE 0.5* 0.5* 0.4*   MG 1.8  --   --    ANIONGAP 6* 9 7*   LABGLOM >90 >90 >90   CALCIUM 8.1* 8.3* 8.3*     No results for input(s): \"LABALBU\", \"LABA1C\", \"T0XMLNB\", \"FT4\", \"TSH\", \"AST\",  \"AST\", \"ALT\", \"LDH\", \"GGT\", \"ALKPHOS\", \"BILITOT\", \"BILIDIR\", \"AMMONIA\", \"AMYLASE\", \"LIPASE\", \"LACTATE\", \"CHOL\", \"HDL\", \"CHOLHDLRATIO\", \"TRIG\", \"VLDL\", \"ESV78VI\", \"PHENYTOIN\", \"PHENYF\", \"URICACID\", \"POCGLU\" in the last 72 hours.    Invalid input(s): \"PROT\", \"I8CZMNO\", \"LABGGT\", \"LDLCHOLESTEROL\"  Lab Results   Component Value Date    INR 1.2 (H) 03/25/2025    INR 1.4 (H) 03/17/2025    INR 1.2 (H) 03/11/2025    PROTIME 12.6 (H) 03/25/2025    PROTIME 14.0 (H) 03/17/2025    PROTIME 12.6 (H) 03/11/2025     Lab Results   Component Value Date/Time    SPECIAL LT HAND, 1.5 ML 02/20/2025 08:22 PM     Lab Results   Component Value Date/Time    CULTURE NO GROWTH 4 DAYS 03/31/2025 10:30 AM       Lab Results   Component Value Date/Time    PHART 7.44 08/31/2012 10:09 PM    GOJ5GMQ 40 08/31/2012 10:09 PM    PO2ART 287 08/31/2012 10:09 PM    AVZ9LLO 26.8 08/31/2012 10:09 PM    NBEA NOT REPORTED 08/31/2012 10:09 PM    PBEA 3 08/31/2012 10:09 PM    TGP6KNI 28 08/31/2012 10:09 PM    A4SARLYS 100 08/31/2012 10:09 PM    FIO2 INFORMATION NOT PROVIDED 01/08/2025 11:47 PM       Radiology:    IR US GUIDED PARACENTESIS  Result Date: 3/31/2025  Successful ultrasound-guided paracentesis.     XR HIP 2-3 VW W PELVIS LEFT  Result Date: 3/30/2025  1. No acute fracture or dislocation. 2. Mild to moderate arthritic changes in both hips.     CT ABDOMEN PELVIS W IV CONTRAST Additional Contrast? Radiologist Recommendation  Result Date: 3/29/2025  Small right pleural effusion is new. Moderate ascites and probable cirrhosis. Vascular disease with probable left renal artery stenosis. Query right renovascular hypertension. 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         All radiological studies reviewed      Reviews of Symptoms:    A 10 point system is reviewed and  negative except described in hospital course    Physical Exam:    Vitals:  /68   Pulse 88   Temp 98.1 °F (36.7 °C) (Oral)   Resp 16   Ht 1.905 m (6' 3\")   Wt 66.7 kg (147 lb)   SpO2

## 2025-04-04 NOTE — PLAN OF CARE
Problem: Chronic Conditions and Co-morbidities  Goal: Patient's chronic conditions and co-morbidity symptoms are monitored and maintained or improved  Outcome: Progressing  Care Plan - Patient's Chronic Conditions and Co-Morbidity Symptoms are Monitored and Maintained or Improved:   Monitor and assess patient's chronic conditions and comorbid symptoms for stability, deterioration, or improvement   Collaborate with multidisciplinary team to address chronic and comorbid conditions and prevent exacerbation or deterioration   Update acute care plan with appropriate goals if chronic or comorbid symptoms are exacerbated and prevent overall improvement and discharge     Problem: Discharge Planning  Goal: Discharge to home or other facility with appropriate resources  Outcome: Progressing  Discharge to home or other facility with appropriate resources:   Identify barriers to discharge with patient and caregiver   Arrange for needed discharge resources and transportation as appropriate   Identify discharge learning needs (meds, wound care, etc)   Refer to discharge planning if patient needs post-hospital services based on physician order or complex needs related to functional status, cognitive ability or social support system     Problem: Pain  Goal: Verbalizes/displays adequate comfort level or baseline comfort level  Outcome: Progressing  Verbalizes/displays adequate comfort level or baseline comfort level:   Encourage patient to monitor pain and request assistance   Assess pain using appropriate pain scale   Administer analgesics based on type and severity of pain and evaluate response   Implement non-pharmacological measures as appropriate and evaluate response   Notify Licensed Independent Practitioner if interventions unsuccessful or patient reports new pain     Problem: Risk for Elopement  Goal: Patient will not exit the unit/facility without proper excort  Outcome: Progressing  Flowsheets (Taken 4/4/2025

## 2025-04-04 NOTE — PLAN OF CARE
Problem: Discharge Planning  Goal: Discharge to home or other facility with appropriate resources  4/4/2025 1529 by Stephanie Sehll RN  Outcome: Adequate for Discharge  4/4/2025 1509 by Stephanie Shell RN  Outcome: Progressing  Flowsheets (Taken 4/2/2025 0955 by Kathy Maurice, RN)  Discharge to home or other facility with appropriate resources:   Identify barriers to discharge with patient and caregiver   Arrange for needed discharge resources and transportation as appropriate   Identify discharge learning needs (meds, wound care, etc)   Refer to discharge planning if patient needs post-hospital services based on physician order or complex needs related to functional status, cognitive ability or social support system     Problem: Pain  Goal: Verbalizes/displays adequate comfort level or baseline comfort level  4/4/2025 1529 by Stephanie Shell RN  Outcome: Adequate for Discharge  4/4/2025 1509 by Stephanie Shell RN  Outcome: Progressing  Flowsheets (Taken 4/2/2025 1128 by Kathy Maurice, RN)  Verbalizes/displays adequate comfort level or baseline comfort level:   Encourage patient to monitor pain and request assistance   Assess pain using appropriate pain scale   Administer analgesics based on type and severity of pain and evaluate response   Implement non-pharmacological measures as appropriate and evaluate response   Notify Licensed Independent Practitioner if interventions unsuccessful or patient reports new pain     Problem: Risk for Elopement  Goal: Patient will not exit the unit/facility without proper excort  4/4/2025 1529 by Stephanie Shell RN  Outcome: Adequate for Discharge  4/4/2025 1509 by Stephanie Shell RN  Outcome: Progressing  Flowsheets (Taken 4/4/2025 0842)  Nursing Interventions for Elopement Risk:   Reduce environmental triggers   Assist with personal care needs such as toileting, eating, dressing, as needed to reduce the risk of wandering     Problem: Safety - Adult  Goal: Free from fall  injury  4/4/2025 1529 by Stephanie Shell RN  Outcome: Adequate for Discharge  4/4/2025 1509 by Stephanie Shell RN  Outcome: Progressing  Flowsheets (Taken 4/1/2025 1050 by Jessica Kahn, RN)  Free From Fall Injury: Instruct family/caregiver on patient safety     Problem: Nutrition Deficit:  Goal: Optimize nutritional status  4/4/2025 1529 by Stephanie Shell RN  Outcome: Adequate for Discharge  4/4/2025 1509 by Stephanie Shell RN  Outcome: Progressing  Flowsheets (Taken 4/3/2025 0825 by Louise Mortensen)  Nutrient intake appropriate for improving, restoring, or maintaining nutritional needs:   Monitor oral intake, labs, and treatment plans   Assess nutritional status and recommend course of action     Problem: Neurosensory - Adult  Goal: Achieves stable or improved neurological status  4/4/2025 1529 by Stephanie Shell RN  Outcome: Adequate for Discharge  4/4/2025 1509 by Stephanie Shell RN  Outcome: Progressing  Flowsheets (Taken 4/2/2025 0955 by Kathy Maurice RN)  Achieves stable or improved neurological status:   Assess for and report changes in neurological status   Monitor temperature, glucose, and sodium. Initiate appropriate interventions as ordered     Problem: Respiratory - Adult  Goal: Achieves optimal ventilation and oxygenation  4/4/2025 1529 by Stephanie Shell RN  Outcome: Adequate for Discharge  4/4/2025 1509 by Stephanie Shell RN  Outcome: Progressing  Flowsheets (Taken 4/2/2025 0955 by Kathy Maurice RN)  Achieves optimal ventilation and oxygenation:   Assess for changes in respiratory status   Assess for changes in mentation and behavior   Position to facilitate oxygenation and minimize respiratory effort   Oxygen supplementation based on oxygen saturation or arterial blood gases   Initiate smoking cessation protocol as indicated   Encourage broncho-pulmonary hygiene including cough, deep breathe, incentive spirometry   Assess and instruct to report shortness of breath or any respiratory difficulty   Respiratory

## 2025-04-04 NOTE — PROGRESS NOTES
Physical Therapy  DATE: 2025    NAME: Dariel Griffiths  MRN: [id number]   : 1960    Patient not seen this date for Physical Therapy due to:      [] Cancel by RN or physician due to:    [] Hemodialysis    [] Critical Lab Value Level     [] Blood transfusion in progress    [] Acute or unstable cardiovascular status   _MAP < 55 or more than >115  _HR < 40 or > 130    [] Acute or unstable pulmonary status   -FiO2 > 60%   _RR < 5 or >40    _O2 sats < 85%    [] Strict Bedrest    [] Off Unit for surgery or procedure    [] Off Unit for testing       [] Pending imaging to R/O fracture    [x] Refusal by Patient  Patient states that therapist arrived before 9 am and that is his rule to not be seen until after 9 am and due to that he is refusing therapy for the day.  Patient very nasty towards therapist.    [] Other      [] PT being discontinued at this time. Patient independent. No further needs.     [] PT being discontinued at this time as the patient has been transferred to hospice care. No further needs.      Tabitha Canela, PTA

## 2025-04-04 NOTE — PROGRESS NOTES
Gastroenterology Progress Note      Patient:   Dariel Griffiths   :    1960   Facility:   [facility]  Date:     2025  Consultant:   LEXY DAMON  Subjective:     Patient seen and examined.   Mentation remains good.   ID consult pending.   Objective:   Vital Signs:  /79   Pulse 100   Temp 98.1 °F (36.7 °C) (Oral)   Resp 16   Ht 1.905 m (6' 3\")   Wt 66.7 kg (147 lb)   SpO2 96%   BMI 18.37 kg/m²      Physical Exam:   General appearance: Alert, NAD, muscle wasting  Lungs: CTA bilaterally    Heart:S1S2 RRR  Abdomen: Soft, +ascites, NT present bs  Skin:  No jaundice, + stigmata of chronic liver disease.    Lab and Imaging Review   CBC with Differential:    Lab Results   Component Value Date/Time    WBC 6.8 2025 05:45 AM    RBC 3.21 2025 05:45 AM    HGB 10.5 2025 05:45 AM    HCT 31.4 2025 05:45 AM     2025 05:45 AM    MCV 97.8 2025 05:45 AM    MCH 32.7 2025 05:45 AM    MCHC 33.4 2025 05:45 AM    RDW 14.6 2025 05:45 AM    LYMPHOPCT 33 2025 05:45 AM    MONOPCT 14 2025 05:45 AM    EOSPCT 4 2025 05:45 AM    BASOPCT 1 2025 05:45 AM    MONOSABS 0.97 2025 05:45 AM    LYMPHSABS 2.20 2025 05:45 AM    EOSABS 0.25 2025 05:45 AM    BASOSABS 0.09 2025 05:45 AM    DIFFTYPE NOT REPORTED 2021 04:10 PM     Platelets:    Lab Results   Component Value Date/Time     2025 05:45 AM     Hemoglobin/Hematocrit:    Lab Results   Component Value Date/Time    HGB 10.5 2025 05:45 AM    HCT 31.4 2025 05:45 AM     CMP:    Lab Results   Component Value Date/Time     2025 05:45 AM    K 4.0 2025 05:45 AM     2025 05:45 AM    CO2 23 2025 05:45 AM    BUN 6 2025 05:45 AM    CREATININE 0.4 2025 05:45 AM    GFRAA 154.4 2023 12:48 PM    LABGLOM >90 2025 05:45 AM    LABGLOM >60 03/10/2023 08:04 PM    GLUCOSE 81

## 2025-04-04 NOTE — PROGRESS NOTES
Pt discharged to home in stable condition with belongings  Discharge instructions given  \"Meds To Beds\" medication at bedside  Pt denies having any further questions at this time  Personal items given to patient at discharge  Patient/family state they have everything they were admitted with.  Dressing change supplies sent home with patient

## 2025-04-04 NOTE — PROGRESS NOTES
Hospitalist - History & Physical      Patient: Dariel Griffiths    Unit/Bed:2002/2002-01  YOB: 1960  MRN: [id number]   Acct: [id number]   PCP: Evelia Gillespie MD    Date of Service: Pt seen/examined on 04/04/25  and Admitted to Inpatient with expected LOS greater than two midnights due to medical therapy.     Chief Complaint:  abd pain, infection    Assessment and Plan:-    Concern for SBP   - based on labs done by Dr. Daly.   -On IV Rocephin  - IR consult for paracentesis as pt had moderate ascites on exam as well studies.   - GI consulted     Liver Cirrhosis with ascites  - pt not taking lactulose at home  - gets weekly paracentesis and had one last Wednesday  - hold oral lasix and start IV lasix. Placed on 40mg IV bid but bp is low normal.  Adjusted to 20mg IV bid.   - continue aldactone  - may need to utilize prn midodrine if not improving.  Plan for paracentesis on 4/4/2025    Fall while in the hospital  - no LOC,  likely due to pts condition  - xray neg for fx    Hypokalemia:  Replace electrolytes     COPD:  Currently stable  Oxygen has been more than 90%  Continue his inhalers     Chronic congestive heart failure, diastolic:  Continue with lasix  Monitor input output    Hyponatremia:  Nephrology is following.  Patient was started on normal saline along with albumin     History of seizure disorder:  Previously on zonisamide but unsure if he is taking it. He seems to not be taking a lot of his medicines. He was not on zonisamide on his last admission as well.   Denies any recent seizures.      Tobacco abuse:  Suggested to quit smoking        Discharge planning      History Of Present Illness:      Patient was seen and examined.  Patient denies any acute complaints.  Patient slept well.  Tolerated p.o. intake.  Patient denies any chest pain, shortness of breath, cough.  Patient denies any nausea, changes ration, bowel habit or rash.  Complaining of abdominal distention  No reported

## 2025-04-04 NOTE — PROGRESS NOTES
CLINICAL PHARMACY NOTE: MEDS TO BEDS    Total # of Prescriptions Filled: 3   The following medications were delivered to the patient:  Norco 5-325  Amoxicillin-potclavul 875-125  Midorine 5mg    Additional Documentation:    Spironolactone 50mg too soon till 4/20/25

## 2025-04-04 NOTE — FLOWSHEET NOTE
Pt tolerated procedure without distress. 3700 ml of yellow ascitic fluid removed Dressing applied to procedure site. Pt returned to room in nad.

## 2025-04-04 NOTE — PROGRESS NOTES
3.38* 2.99* 3.21*   HGB 10.9* 9.9* 10.5*   HCT 33.0* 29.5* 31.4*   MCV 97.6 98.7 97.8   MCH 32.2 33.1 32.7   MCHC 33.0 33.6 33.4   RDW 14.3 14.5* 14.6*    146 147   MPV 10.1 10.3 9.9      BNP:  Lab Results   Component Value Date/Time     07/09/2012 06:49 AM         Phosphorus:    No results for input(s): \"PHOS\" in the last 72 hours.    Magnesium:   Recent Labs     04/02/25  0527   MG 1.8     Albumin: No results for input(s): \"LABALBU\" in the last 72 hours.  PTH:  No results for input(s): \"PTH\" in the last 72 hours.    Urine Studies:  Urine Sodium:  No components found for: \"KEO\"  Urine Potassium:    Lab Results   Component Value Date/Time    KUR 69.8 04/01/2025 08:15 PM     Urine Chloride:    Lab Results   Component Value Date/Time    CLUR <20 04/01/2025 08:15 PM     Urine Osmolarity:    Lab Results   Component Value Date/Time    OSMOU 693 04/01/2025 08:15 PM     Urine Creatinine:  No results found for: \"LABCREA\"  Urine Eosinophils: No components found for: \"UEOS\"  Urine Protein:  No results found for: \"TPU\"      Urine:    Lab Results   Component Value Date/Time    PROTEINU TRACE 04/01/2025 08:15 PM         Serology:  TEMO:   Lab Results   Component Value Date/Time    TEMO NEGATIVE 01/08/2025 11:47 PM     C3:No results found for: \"C3\"  C4:No results found for: \"C4\"  MPO ANCA:  No results found for: \"MPO\"  PR3 ANCA:  No results found for: \"PR3\"  hepatitis serologies  ANTIGBM:No results found for: \"GBMABIGG\"  HEPATITIS B SURFACE AG:   Lab Results   Component Value Date/Time    HEPBSAG NONREACTIVE 01/09/2025 12:00 AM     HEPATITIS C AB:   Lab Results   Component Value Date/Time    HEPCAB NONREACTIVE 01/09/2025 12:00 AM       Electrophoresis:  SPEP:No results found for: \"ALBCAL\", \"ALBPCT\", \"LABALPH\", \"A1PCT\", \"A2PCT\", \"LABBETA\", \"BETAPCT\", \"GAMGLOB\", \"GGPCT\", \"PATH\"  UPEP:No results found for: \"LABPE\"         Thank you for the consultation. Please do not hesitate to contact us for any further  questions/concerns. We will continue to follow along with you.        Electronically signed by Rajan Grant MD  on 4/4/2025 at 3:09 PM

## 2025-04-06 LAB
MICROORGANISM SPEC CULT: NORMAL
MICROORGANISM/AGENT SPEC: NORMAL
SPECIMEN DESCRIPTION: NORMAL

## 2025-04-10 ENCOUNTER — HOSPITAL ENCOUNTER (OUTPATIENT)
Age: 65
Setting detail: OBSERVATION
Discharge: HOME | End: 2025-04-12
Attending: EMERGENCY MEDICINE | Admitting: EMERGENCY MEDICINE
Payer: MEDICARE

## 2025-04-10 DIAGNOSIS — K70.31 ASCITES DUE TO ALCOHOLIC CIRRHOSIS (HCC): Primary | ICD-10-CM

## 2025-04-10 LAB
ALBUMIN SERPL-MCNC: 2.7 G/DL (ref 3.5–5.2)
ALBUMIN/GLOB SERPL: 0.6 {RATIO} (ref 1–2.5)
ALP SERPL-CCNC: 97 U/L (ref 40–129)
ALT SERPL-CCNC: 13 U/L (ref 10–50)
ANION GAP SERPL CALCULATED.3IONS-SCNC: 12 MMOL/L (ref 9–16)
AST SERPL-CCNC: 45 U/L (ref 10–50)
BASOPHILS # BLD: 0.07 K/UL (ref 0–0.2)
BASOPHILS NFR BLD: 1 % (ref 0–2)
BILIRUB SERPL-MCNC: 1.1 MG/DL (ref 0–1.2)
BUN SERPL-MCNC: 6 MG/DL (ref 8–23)
CALCIUM SERPL-MCNC: 8.8 MG/DL (ref 8.6–10.4)
CHLORIDE SERPL-SCNC: 104 MMOL/L (ref 98–107)
CO2 SERPL-SCNC: 22 MMOL/L (ref 20–31)
CREAT SERPL-MCNC: 0.6 MG/DL (ref 0.7–1.2)
EOSINOPHIL # BLD: 0.1 K/UL (ref 0–0.44)
EOSINOPHILS RELATIVE PERCENT: 1 % (ref 1–4)
ERYTHROCYTE [DISTWIDTH] IN BLOOD BY AUTOMATED COUNT: 15 % (ref 11.8–14.4)
GFR, ESTIMATED: >90 ML/MIN/1.73M2
GLUCOSE SERPL-MCNC: 107 MG/DL (ref 74–99)
HCT VFR BLD AUTO: 38.8 % (ref 40.7–50.3)
HGB BLD-MCNC: 12.6 G/DL (ref 13–17)
IMM GRANULOCYTES # BLD AUTO: 0.03 K/UL (ref 0–0.3)
IMM GRANULOCYTES NFR BLD: 0 %
LACTIC ACID, SEPSIS WHOLE BLOOD: 2.7 MMOL/L (ref 0.5–1.9)
LIPASE SERPL-CCNC: 24 U/L (ref 13–60)
LYMPHOCYTES NFR BLD: 1.86 K/UL (ref 1.1–3.7)
LYMPHOCYTES RELATIVE PERCENT: 22 % (ref 24–43)
MCH RBC QN AUTO: 31.7 PG (ref 25.2–33.5)
MCHC RBC AUTO-ENTMCNC: 32.5 G/DL (ref 28.4–34.8)
MCV RBC AUTO: 97.7 FL (ref 82.6–102.9)
MONOCYTES NFR BLD: 1.19 K/UL (ref 0.1–1.2)
MONOCYTES NFR BLD: 14 % (ref 3–12)
NEUTROPHILS NFR BLD: 62 % (ref 36–65)
NEUTS SEG NFR BLD: 5.39 K/UL (ref 1.5–8.1)
NRBC BLD-RTO: 0 PER 100 WBC
PLATELET # BLD AUTO: 205 K/UL (ref 138–453)
PMV BLD AUTO: 9.6 FL (ref 8.1–13.5)
POTASSIUM SERPL-SCNC: 3.5 MMOL/L (ref 3.7–5.3)
PROT SERPL-MCNC: 6.9 G/DL (ref 6.6–8.7)
RBC # BLD AUTO: 3.97 M/UL (ref 4.21–5.77)
RBC # BLD: ABNORMAL 10*6/UL
SODIUM SERPL-SCNC: 138 MMOL/L (ref 136–145)
WBC OTHER # BLD: 8.6 K/UL (ref 3.5–11.3)

## 2025-04-10 PROCEDURE — 85025 COMPLETE CBC W/AUTO DIFF WBC: CPT

## 2025-04-10 PROCEDURE — 87040 BLOOD CULTURE FOR BACTERIA: CPT

## 2025-04-10 PROCEDURE — G0378 HOSPITAL OBSERVATION PER HR: HCPCS

## 2025-04-10 PROCEDURE — 93005 ELECTROCARDIOGRAM TRACING: CPT | Performed by: EMERGENCY MEDICINE

## 2025-04-10 PROCEDURE — 2500000003 HC RX 250 WO HCPCS

## 2025-04-10 PROCEDURE — 83690 ASSAY OF LIPASE: CPT

## 2025-04-10 PROCEDURE — 96374 THER/PROPH/DIAG INJ IV PUSH: CPT

## 2025-04-10 PROCEDURE — 80053 COMPREHEN METABOLIC PANEL: CPT

## 2025-04-10 PROCEDURE — 99284 EMERGENCY DEPT VISIT MOD MDM: CPT

## 2025-04-10 PROCEDURE — 83605 ASSAY OF LACTIC ACID: CPT

## 2025-04-10 PROCEDURE — 6360000002 HC RX W HCPCS

## 2025-04-10 RX ORDER — MAGNESIUM SULFATE IN WATER 40 MG/ML
2000 INJECTION, SOLUTION INTRAVENOUS PRN
Status: DISCONTINUED | OUTPATIENT
Start: 2025-04-10 | End: 2025-04-12 | Stop reason: HOSPADM

## 2025-04-10 RX ORDER — POLYETHYLENE GLYCOL 3350 17 G/17G
17 POWDER, FOR SOLUTION ORAL DAILY PRN
Status: DISCONTINUED | OUTPATIENT
Start: 2025-04-10 | End: 2025-04-12 | Stop reason: HOSPADM

## 2025-04-10 RX ORDER — SODIUM CHLORIDE 9 MG/ML
INJECTION, SOLUTION INTRAVENOUS PRN
Status: DISCONTINUED | OUTPATIENT
Start: 2025-04-10 | End: 2025-04-12 | Stop reason: HOSPADM

## 2025-04-10 RX ORDER — SODIUM CHLORIDE 0.9 % (FLUSH) 0.9 %
5-40 SYRINGE (ML) INJECTION EVERY 12 HOURS SCHEDULED
Status: DISCONTINUED | OUTPATIENT
Start: 2025-04-10 | End: 2025-04-12 | Stop reason: HOSPADM

## 2025-04-10 RX ORDER — POTASSIUM CHLORIDE 1500 MG/1
40 TABLET, EXTENDED RELEASE ORAL PRN
Status: DISCONTINUED | OUTPATIENT
Start: 2025-04-10 | End: 2025-04-12 | Stop reason: HOSPADM

## 2025-04-10 RX ORDER — ONDANSETRON 4 MG/1
4 TABLET, ORALLY DISINTEGRATING ORAL EVERY 8 HOURS PRN
Status: DISCONTINUED | OUTPATIENT
Start: 2025-04-10 | End: 2025-04-12 | Stop reason: HOSPADM

## 2025-04-10 RX ORDER — SODIUM CHLORIDE 0.9 % (FLUSH) 0.9 %
5-40 SYRINGE (ML) INJECTION PRN
Status: DISCONTINUED | OUTPATIENT
Start: 2025-04-10 | End: 2025-04-12 | Stop reason: HOSPADM

## 2025-04-10 RX ORDER — POTASSIUM CHLORIDE 7.45 MG/ML
10 INJECTION INTRAVENOUS PRN
Status: DISCONTINUED | OUTPATIENT
Start: 2025-04-10 | End: 2025-04-12 | Stop reason: HOSPADM

## 2025-04-10 RX ORDER — ONDANSETRON 2 MG/ML
4 INJECTION INTRAMUSCULAR; INTRAVENOUS EVERY 6 HOURS PRN
Status: DISCONTINUED | OUTPATIENT
Start: 2025-04-10 | End: 2025-04-12 | Stop reason: HOSPADM

## 2025-04-10 RX ADMIN — SODIUM CHLORIDE, PRESERVATIVE FREE 10 ML: 5 INJECTION INTRAVENOUS at 23:55

## 2025-04-10 RX ADMIN — WATER 1000 MG: 1 INJECTION INTRAMUSCULAR; INTRAVENOUS; SUBCUTANEOUS at 21:12

## 2025-04-10 ASSESSMENT — PAIN DESCRIPTION - LOCATION: LOCATION: ABDOMEN;HIP

## 2025-04-10 ASSESSMENT — PAIN SCALES - GENERAL: PAINLEVEL_OUTOF10: 6

## 2025-04-11 ENCOUNTER — APPOINTMENT (OUTPATIENT)
Dept: ULTRASOUND IMAGING | Age: 65
End: 2025-04-11
Payer: MEDICARE

## 2025-04-11 LAB
ANION GAP SERPL CALCULATED.3IONS-SCNC: 9 MMOL/L (ref 9–16)
BASOPHILS # BLD: 0.15 K/UL (ref 0–0.2)
BASOPHILS NFR BLD: 2 % (ref 0–2)
BUN SERPL-MCNC: 6 MG/DL (ref 8–23)
CALCIUM SERPL-MCNC: 7.8 MG/DL (ref 8.6–10.4)
CHLORIDE SERPL-SCNC: 107 MMOL/L (ref 98–107)
CO2 SERPL-SCNC: 21 MMOL/L (ref 20–31)
CREAT SERPL-MCNC: 0.5 MG/DL (ref 0.7–1.2)
EKG ATRIAL RATE: 112 BPM
EKG P AXIS: 86 DEGREES
EKG P-R INTERVAL: 194 MS
EKG Q-T INTERVAL: 314 MS
EKG QRS DURATION: 72 MS
EKG QTC CALCULATION (BAZETT): 428 MS
EKG R AXIS: 85 DEGREES
EKG T AXIS: 76 DEGREES
EKG VENTRICULAR RATE: 112 BPM
EOSINOPHIL # BLD: 0.23 K/UL (ref 0–0.4)
EOSINOPHILS RELATIVE PERCENT: 3 % (ref 1–4)
ERYTHROCYTE [DISTWIDTH] IN BLOOD BY AUTOMATED COUNT: 15.1 % (ref 11.8–14.4)
GFR, ESTIMATED: >90 ML/MIN/1.73M2
GLUCOSE SERPL-MCNC: 83 MG/DL (ref 74–99)
HCT VFR BLD AUTO: 32.1 % (ref 40.7–50.3)
HGB BLD-MCNC: 10.4 G/DL (ref 13–17)
IMM GRANULOCYTES # BLD AUTO: 0 K/UL (ref 0–0.3)
IMM GRANULOCYTES NFR BLD: 0 %
LACTIC ACID, WHOLE BLOOD: 1.5 MMOL/L (ref 0.7–2.1)
LYMPHOCYTES NFR BLD: 1.28 K/UL (ref 1–4.8)
LYMPHOCYTES RELATIVE PERCENT: 17 % (ref 24–44)
MCH RBC QN AUTO: 31.3 PG (ref 25.2–33.5)
MCHC RBC AUTO-ENTMCNC: 32.4 G/DL (ref 28.4–34.8)
MCV RBC AUTO: 96.7 FL (ref 82.6–102.9)
MONOCYTES NFR BLD: 0.75 K/UL (ref 0.1–0.8)
MONOCYTES NFR BLD: 10 % (ref 1–7)
MORPHOLOGY: ABNORMAL
NEUTROPHILS NFR BLD: 68 % (ref 36–66)
NEUTS SEG NFR BLD: 5.09 K/UL (ref 1.8–7.7)
NRBC BLD-RTO: 0 PER 100 WBC
PLATELET # BLD AUTO: 188 K/UL (ref 138–453)
PMV BLD AUTO: 9.7 FL (ref 8.1–13.5)
POTASSIUM SERPL-SCNC: 4.1 MMOL/L (ref 3.7–5.3)
RBC # BLD AUTO: 3.32 M/UL (ref 4.21–5.77)
SODIUM SERPL-SCNC: 137 MMOL/L (ref 136–145)
WBC OTHER # BLD: 7.5 K/UL (ref 3.5–11.3)

## 2025-04-11 PROCEDURE — 93010 ELECTROCARDIOGRAM REPORT: CPT | Performed by: STUDENT IN AN ORGANIZED HEALTH CARE EDUCATION/TRAINING PROGRAM

## 2025-04-11 PROCEDURE — G0378 HOSPITAL OBSERVATION PER HR: HCPCS

## 2025-04-11 PROCEDURE — 83605 ASSAY OF LACTIC ACID: CPT

## 2025-04-11 PROCEDURE — 88305 TISSUE EXAM BY PATHOLOGIST: CPT

## 2025-04-11 PROCEDURE — 6370000000 HC RX 637 (ALT 250 FOR IP)

## 2025-04-11 PROCEDURE — 87205 SMEAR GRAM STAIN: CPT

## 2025-04-11 PROCEDURE — 2500000003 HC RX 250 WO HCPCS

## 2025-04-11 PROCEDURE — 88112 CYTOPATH CELL ENHANCE TECH: CPT

## 2025-04-11 PROCEDURE — 87075 CULTR BACTERIA EXCEPT BLOOD: CPT

## 2025-04-11 PROCEDURE — 99222 1ST HOSP IP/OBS MODERATE 55: CPT | Performed by: INTERNAL MEDICINE

## 2025-04-11 PROCEDURE — G0378 HOSPITAL OBSERVATION PER HR: HCPCS | Performed by: FAMILY MEDICINE

## 2025-04-11 PROCEDURE — 85025 COMPLETE CBC W/AUTO DIFF WBC: CPT

## 2025-04-11 PROCEDURE — 2709999900 US GUIDED PARACENTESIS

## 2025-04-11 PROCEDURE — 36415 COLL VENOUS BLD VENIPUNCTURE: CPT

## 2025-04-11 PROCEDURE — 80048 BASIC METABOLIC PNL TOTAL CA: CPT

## 2025-04-11 PROCEDURE — 87070 CULTURE OTHR SPECIMN AEROBIC: CPT

## 2025-04-11 RX ORDER — LANOLIN ALCOHOL/MO/W.PET/CERES
100 CREAM (GRAM) TOPICAL DAILY
Status: DISCONTINUED | OUTPATIENT
Start: 2025-04-11 | End: 2025-04-12 | Stop reason: HOSPADM

## 2025-04-11 RX ORDER — SPIRONOLACTONE 25 MG/1
50 TABLET ORAL DAILY
Status: DISCONTINUED | OUTPATIENT
Start: 2025-04-11 | End: 2025-04-12 | Stop reason: HOSPADM

## 2025-04-11 RX ORDER — ALBUTEROL SULFATE 0.83 MG/ML
2.5 SOLUTION RESPIRATORY (INHALATION) EVERY 6 HOURS PRN
Status: DISCONTINUED | OUTPATIENT
Start: 2025-04-11 | End: 2025-04-12 | Stop reason: HOSPADM

## 2025-04-11 RX ORDER — FUROSEMIDE 20 MG/1
40 TABLET ORAL DAILY
Status: DISCONTINUED | OUTPATIENT
Start: 2025-04-11 | End: 2025-04-12 | Stop reason: HOSPADM

## 2025-04-11 RX ORDER — MIDODRINE HYDROCHLORIDE 5 MG/1
5 TABLET ORAL 3 TIMES DAILY PRN
Status: DISCONTINUED | OUTPATIENT
Start: 2025-04-11 | End: 2025-04-12 | Stop reason: HOSPADM

## 2025-04-11 RX ORDER — HYDROXYZINE HYDROCHLORIDE 25 MG/1
25 TABLET, FILM COATED ORAL ONCE
Status: COMPLETED | OUTPATIENT
Start: 2025-04-11 | End: 2025-04-11

## 2025-04-11 RX ORDER — FAMOTIDINE 20 MG/1
40 TABLET, FILM COATED ORAL DAILY
Status: DISCONTINUED | OUTPATIENT
Start: 2025-04-11 | End: 2025-04-12 | Stop reason: HOSPADM

## 2025-04-11 RX ADMIN — POTASSIUM CHLORIDE 40 MEQ: 1500 TABLET, EXTENDED RELEASE ORAL at 00:18

## 2025-04-11 RX ADMIN — FUROSEMIDE 40 MG: 20 TABLET ORAL at 10:58

## 2025-04-11 RX ADMIN — HYDROXYZINE HYDROCHLORIDE 25 MG: 25 TABLET, FILM COATED ORAL at 00:18

## 2025-04-11 RX ADMIN — Medication 100 MG: at 10:59

## 2025-04-11 RX ADMIN — SODIUM CHLORIDE, PRESERVATIVE FREE 10 ML: 5 INJECTION INTRAVENOUS at 21:24

## 2025-04-11 RX ADMIN — FAMOTIDINE 40 MG: 20 TABLET, FILM COATED ORAL at 10:58

## 2025-04-11 RX ADMIN — SODIUM CHLORIDE, PRESERVATIVE FREE 10 ML: 5 INJECTION INTRAVENOUS at 10:35

## 2025-04-11 RX ADMIN — SPIRONOLACTONE 50 MG: 25 TABLET, FILM COATED ORAL at 10:58

## 2025-04-11 ASSESSMENT — PAIN - FUNCTIONAL ASSESSMENT: PAIN_FUNCTIONAL_ASSESSMENT: ACTIVITIES ARE NOT PREVENTED

## 2025-04-11 ASSESSMENT — PAIN DESCRIPTION - LOCATION
LOCATION: ABDOMEN;HIP
LOCATION: ABDOMEN;HIP

## 2025-04-11 ASSESSMENT — PAIN SCALES - GENERAL
PAINLEVEL_OUTOF10: 7
PAINLEVEL_OUTOF10: 8

## 2025-04-11 ASSESSMENT — PAIN DESCRIPTION - ONSET: ONSET: AWAKENED FROM SLEEP

## 2025-04-11 ASSESSMENT — PAIN SCALES - WONG BAKER
WONGBAKER_NUMERICALRESPONSE: NO HURT

## 2025-04-11 ASSESSMENT — ENCOUNTER SYMPTOMS
ABDOMINAL DISTENTION: 1
DIARRHEA: 0
EYE DISCHARGE: 0
VOMITING: 0
NAUSEA: 1
APNEA: 0
SHORTNESS OF BREATH: 0
COLOR CHANGE: 0
ABDOMINAL PAIN: 1

## 2025-04-11 ASSESSMENT — PAIN DESCRIPTION - DESCRIPTORS
DESCRIPTORS: SHARP
DESCRIPTORS: SHARP;STABBING

## 2025-04-11 ASSESSMENT — PAIN DESCRIPTION - PAIN TYPE: TYPE: ACUTE PAIN;CHRONIC PAIN

## 2025-04-11 ASSESSMENT — PAIN DESCRIPTION - ORIENTATION
ORIENTATION: RIGHT
ORIENTATION: MID;RIGHT

## 2025-04-11 ASSESSMENT — PAIN DESCRIPTION - FREQUENCY: FREQUENCY: INTERMITTENT

## 2025-04-11 NOTE — ED PROVIDER NOTES
[facility] OBSERVATION UNIT  Emergency Department Encounter  Emergency Medicine Resident     Pt Name:Dariel Griffiths  MRN: [id number]  Birthdate 1960  Date of evaluation: 4/11/25  PCP:  Gilma Corbett MD  Note Started: 1:31 AM EDT      CHIEF COMPLAINT       Chief Complaint   Patient presents with    Abnormal Lab     Stating positive infection per GI doc       HISTORY OF PRESENT ILLNESS  (Location/Symptom, Timing/Onset, Context/Setting, Quality, Duration, Modifying Factors, Severity.)      Dariel Griffiths is a 64 y.o. male with past medical history of alcoholic cirrhosis with weekly paracentesis, CHF, COPD who presents with complaint of abnormal lab result.  Patient and his wife who is at bedside report they were called by their GI doctor Dr. Daly and told to present to the emergency department due to a positive culture following paracentesis yesterday.  This was performed at the [facility], we are unable to visualize any of these results.  Patient denies fevers, chills, chest pain, shortness of breath greater than his typical baseline dyspnea.    Patient was actually recently admitted 3/29-4/4 for potential SBP based on previous paracentesis analysis by Dr. Daly.  Patient had repeat paracentesis studies performed during this admission that were not consistent with SBP and patient was discharged home.    PAST MEDICAL / SURGICAL / SOCIAL / FAMILY HISTORY      has a past medical history of Abnormal results of liver function studies , Arthritis, Asthma, Attempted suicide (HCC), Avascular necrosis of bone of right hip (HCC), Bipolar affective (HCC), Blood in stool, Cavitary pneumonia, Chest pain, CHF (congestive heart failure), NYHA class III (HCC), COPD (chronic obstructive pulmonary disease) (HCC), COPD exacerbation (HCC), CVA (cerebral vascular accident) (HCC), Depression, Difficult intubation, Erectile dysfunction, Folliculitis, Fracture of wrist, GERD (gastroesophageal reflux disease), Head injury,    MCH 31.7   MCHC 32.5   RDW 15.0(!)   Platelet Count 205   MPV 9.6   NRBC Automated 0.0   Neutrophils % 62   Lymphocyte % 22(!)   Monocytes % 14(!)   Eosinophils % 1   Basophils % 1   Immature Granulocytes % 0   Neutrophils Absolute 5.39   Lymphocytes Absolute 1.86   Monocytes Absolute 1.19   Eosinophils Absolute 0.10   Basophils Absolute 0.07   Immature Granulocytes Absolute 0.03   RBC Morphology ANISOCYTOSIS PRESENT [MB]   2113 Lactate, Sepsis(!):    Lactic Acid, Sepsis, Whole Blood 2.7(!) [MB]   2134 CMP(!):    Sodium 138   Potassium 3.5(!)   Chloride 104   CARBON DIOXIDE 22   Anion Gap 12   Glucose 107(!)   BUN,BUNPL 6(!)   Creatinine 0.6(!)   Est, Glom Filt Rate >90   Calcium 8.8   Total Protein 6.9   Albumin 2.7(!)   Albumin/Globulin Ratio 0.6(!)   Total Bilirubin 1.1   Alkaline Phosphatase 97   ALT 13   AST 45 [MB]   2134 Lipase:    Lipase 24 [MB]   2134 Discussed workup and previous admission with patient.  Discussed my concerns for potential lab error as well. Plan for observation admission with infectious disease consult tomorrow. Patient is agreeable. Repeat lactic also added to AM labs. [MB]      ED Course User Index  [MB] Donna Spencer DO         CONSULTS:  IP CONSULT TO INFECTIOUS DISEASES        FINAL IMPRESSION      1. Ascites due to alcoholic cirrhosis (HCC)          DISPOSITION / PLAN     DISPOSITION Decision To Admit 04/11/2025 01:31:20 AM   DISPOSITION CONDITION Stable           PATIENT REFERRED TO:  No follow-up provider specified.    DISCHARGE MEDICATIONS:  Current Discharge Medication List          Donna Spencer DO  Emergency Medicine Resident    (Please note that portions of this note were completed with a voice recognition program.  Efforts were made to edit the dictations but occasionally words are mis-transcribed.)

## 2025-04-11 NOTE — CONSULTS
UPPER GASTROINTESTINAL ENDOSCOPY N/A 12/31/2020    EGD BIOPSY performed by Raoul Daly MD at [facility] OR    UPPER GASTROINTESTINAL ENDOSCOPY N/A 03/25/2021    EGD BIOPSY performed by Raoul Daly MD at [facility] OR    UPPER GASTROINTESTINAL ENDOSCOPY N/A 6/21/2023    EGD BIOPSY performed by Raoul Daly MD at [facility] OR       Medications:      sodium chloride flush  5-40 mL IntraVENous 2 times per day       Social History:     Social History     Socioeconomic History    Marital status: Single     Spouse name: Not on file    Number of children: Not on file    Years of education: Not on file    Highest education level: Not on file   Occupational History    Not on file   Tobacco Use    Smoking status: Every Day     Current packs/day: 0.20     Average packs/day: 0.2 packs/day for 1 year (0.2 ttl pk-yrs)     Types: Cigarettes, Cigars    Smokeless tobacco: Never   Vaping Use    Vaping status: Never Used   Substance and Sexual Activity    Alcohol use: Not Currently     Alcohol/week: 9.0 standard drinks of alcohol     Types: 5 Cans of beer, 4 Shots of liquor per week     Comment: 5 beers and 10 shots daily per patient    Drug use: Not Currently    Sexual activity: Not Currently   Other Topics Concern    Not on file   Social History Narrative    Not on file     Social Drivers of Health     Financial Resource Strain: Low Risk  (6/11/2021)    Received from [email or web address]    Overall Financial Resource Strain (CARDIA)     Difficulty of Paying Living Expenses: Not hard at all   Food Insecurity: No Food Insecurity (3/29/2025)    Hunger Vital Sign     Worried About Running Out of Food in the Last Year: Never true     Ran Out of Food in the Last Year: Never true   Recent Concern: Food Insecurity - Food Insecurity Present (1/9/2025)    Hunger Vital Sign     Worried About Running Out of Food in the Last Year: Sometimes true     Ran Out of Food in the Last Year: Sometimes true   Transportation Needs: No Transportation Needs (3/29/2025)     PRAPARE - Transportation     Lack of Transportation (Medical): No     Lack of Transportation (Non-Medical): No   Physical Activity: Inactive (8/23/2019)    Received from [facility]    Exercise Vital Sign     Days of Exercise per Week: 0 days     Minutes of Exercise per Session: 0 min   Stress: Stress Concern Present (8/23/2019)    Received from [facility]    [employer] [place] [employer] - Occupational Stress Questionnaire     Feeling of Stress : Very much   Social Connections: Socially Isolated (8/23/2019)    Received from [facility]    Social Connection and Isolation Panel [NHANES]     Frequency of Communication with Friends and Family: Never     Frequency of Social Gatherings with Friends and Family: Twice a week     Attends Tenriism Services: Never     Active Member of Clubs or Organizations: No     Attends Club or Organization Meetings: Never     Marital Status:    Intimate Partner Violence: Unknown (2/22/2024)    Received from [facility]    [place] Safety & Environment     Fear of Current or Ex-Partner: Not on file     Emotionally Abused: Not on file     Physically Abused: Not on file     Sexually Abused: Not on file     Physically or Sexually Abused: Not on file   Housing Stability: Low Risk  (3/29/2025)    Housing Stability Vital Sign     Unable to Pay for Housing in the Last Year: No     Number of Times Moved in the Last Year: 0     Homeless in the Last Year: No       Family History:     Family History   Problem Relation Age of Onset    Stroke Mother     Diabetes Mother     Heart Disease Father     High Blood Pressure Father     Diabetes Maternal Grandmother       Medical Decision Making:   I have independently reviewed/ordered the following labs:    CBC with Differential:   Recent Labs     04/10/25  2028 04/11/25  0552   WBC 8.6 7.5   HGB 12.6* 10.4*   HCT 38.8* 32.1*    188   LYMPHOPCT 22* 17*   MONOPCT 14* 10*   EOSPCT 1 3

## 2025-04-11 NOTE — ED NOTES
Pt reports to the ED with complaints of an abnormal lab. Pt and family state they got a call from GI stating he had a positive culture, unsure if it was a blood culture or culture from a thoracentesis. Pt has a hx of cirrhosis and ascites, states he was last tapped yesterday and they drained about 4 L off. Pt states he has not has a drink since Jan. Per epic, pt also has a hx of COPD, CHF, HTN, GERD, seizures and avascular necrosis of the R hip. Pt states he is always a little SOB at baseline, denies anything new or worsened. Pt denies any chest pain or any other symptoms at this time. Pt is A&O x4 and speaking in complete sentences. Pt is resting in bed comfortably, NAD noted. EKG obtained. Pt placed on full cardiac monitor. Care ongoing

## 2025-04-11 NOTE — CARE COORDINATION
04/11/25 1000   Readmission Assessment   Number of Days since last admission? 8-30 days   Previous Disposition Other (comment)  (home with friend)   Who is being Interviewed Patient;Caregiver   What was the patient's/caregiver's perception as to why they think they needed to return back to the hospital? Other (Comment)  (symptoms)   Did you visit your Primary Care Physician after you left the hospital, before you returned this time? No   Why weren't you able to visit your PCP? Did not have an appointment   Did you see a specialist, such as Cardiac, Pulmonary, Orthopedic Physician, etc. after you left the hospital? No   Who advised the patient to return to the hospital? Self-referral   Does the patient report anything that got in the way of taking their medications? No   In our efforts to provide the best possible care to you and others like you, can you think of anything that we could have done to help you after you left the hospital the first time, so that you might not have needed to return so soon? Discharge instructions that are concise, clear, and non contradictory

## 2025-04-11 NOTE — PLAN OF CARE
Problem: Discharge Planning  Goal: Discharge to home or other facility with appropriate resources  Outcome: Progressing     Problem: Risk for Elopement  Goal: Patient will not exit the unit/facility without proper excort  Outcome: Progressing     Problem: Safety - Adult  Goal: Free from fall injury  Outcome: Progressing     Problem: Pain  Goal: Verbalizes/displays adequate comfort level or baseline comfort level  Outcome: Progressing     Problem: Nutrition Deficit:  Goal: Optimize nutritional status  Outcome: Progressing

## 2025-04-11 NOTE — H&P
[facility]  CDU / OBSERVATION ENCOUNTER  Physician NOTE     Pt Name: Dariel Griffiths  MRN: [id number]  Birthdate 1960  Date of evaluation: 4/11/25  Patient's PCP is :  Gilma Corbett MD    CHIEF COMPLAINT       Chief Complaint   Patient presents with    Abnormal Lab     Stating positive infection per GI doc         HISTORY OF PRESENT ILLNESS   Dariel Griffiths is a 64 y.o. male who presented to the ED given concern for SBP. Patient has a known history of ascites secondary to alcoholic cirrhosis and receives weekly paracentesis. He last went on 4/9. Cultures from that visit grew bacteria, so patient was referred to the ED for evaluation. Patient is endorsing chronic abdominal pain and intermittent nausea which is unchanged from baseline. He denies any chest pain, SOB, vomiting, diarrhea.     Location/Symptom: diffuse abdominal pain  Timing/Onset: chronic  Provocation: na  Quality: achy/tight  Radiation: na  Severity: na  Timing/Duration: chronic, unchanged  Modifying Factors: na    History was obtained in part through review of the ED chart. When possible, a direct discussion was had with ED nurses, residents, and attendings  REVIEW OF SYSTEMS     Review of Systems   Constitutional:  Negative for fever.   HENT:  Negative for congestion.    Respiratory:  Negative for shortness of breath.    Cardiovascular:  Negative for chest pain.   Gastrointestinal:  Positive for abdominal pain and nausea. Negative for diarrhea and vomiting.   Neurological:  Negative for headaches.         (PQRS) Advance directives on face sheet per hospital policy. No change unless specifically mentioned in chart    PAST MEDICAL HISTORY    has a past medical history of Abnormal results of liver function studies , Arthritis, Asthma, Attempted suicide (HCC), Avascular necrosis of bone of right hip (HCC), Bipolar affective (HCC), Blood in stool, Cavitary pneumonia, Chest pain, CHF (congestive heart failure), NYHA class III

## 2025-04-11 NOTE — ED PROVIDER NOTES
[facility] EMERGENCY DEPARTMENT     Emergency Department     Faculty Attestation        I performed a history and physical examination of the patient and discussed management with the resident. I reviewed the resident’s note and agree with the documented findings and plan of care. Any areas of disagreement are noted on the chart. I was personally present for the key portions of any procedures. I have documented in the chart those procedures where I was not present during the key portions. I have reviewed the emergency nurses triage note. I agree with the chief complaint, past medical history, past surgical history, allergies, medications, social and family history as documented unless otherwise noted below.    For mid-level providers such as nurse practitioners as well as physicians assistants:    I have personally seen and evaluated the patient.    I find the patient's history and physical exam are consistent with NP/PA documentation.  I agree with the care provided, treatment rendered, disposition, & follow-up plan.     Additional findings are as noted.    Vital Signs: /77   Pulse (!) 109   Temp 98 °F (36.7 °C) (Oral)   Resp 15   SpO2 99%   PCP:  Gilma Corbett MD    Pertinent Comments:           Critical Care  None          Mikie Fish MD    Attending Emergency Medicine Physician            Luiz Fish MD  04/10/25 2026

## 2025-04-11 NOTE — PROGRESS NOTES
Comprehensive Nutrition Assessment    Type and Reason for Visit:  Initial, Positive nutrition screen    Nutrition Recommendations/Plan:   Recommend high kcal/high ONS TID  Will monitor PO/ONS intake, wt, and POC     Malnutrition Assessment:  Malnutrition Status:  Severe malnutrition (04/11/25 1445)    Context:  Chronic Illness     Findings of the 6 clinical characteristics of malnutrition:  Energy Intake:  No decrease in energy intake  Weight Loss:  Unable to assess (wt fluctuations 2/2 ascites)     Body Fat Loss:  Severe body fat loss Orbital, Buccal region, Triceps   Muscle Mass Loss:  Severe muscle mass loss Temples (temporalis), Clavicles (pectoralis & deltoids), Calf (gastrocnemius)  Fluid Accumulation:  Unable to assess     Strength:  Not Performed    Nutrition Assessment:    64 y.o.M admitted d/t lab abnormality. +nutrition screen r/t wt loss. Per chart, pt's wt fluctuates x past 3 months. Of note, pt has hx of ascites, recently has thoracentesis done w/ 4L removed. +1, pitting edema to BLE as well. NFPE finds muscle, fat loss, meeting criteria for malnutrition. Pt started on Reg diet today, pt hadn't eaten lunch yet but was going to eat after RD visit. RD will continue to monitor per protocol.    Nutrition Related Findings:    +1, pitting BLE edema. LBM on 4/10. Labs unremarkable. Meds: pepcid, lasix, thiamine.         Current Nutrition Intake & Therapies:    Average Meal Intake: Unable to assess  Average Supplements Intake: None Ordered  ADULT DIET; Regular  ADULT ORAL NUTRITION SUPPLEMENT; Breakfast, Lunch, Dinner; Standard High Calorie/High Protein Oral Supplement    Anthropometric Measures:  Height: 190.5 cm (6' 3\")  Ideal Body Weight (IBW): 196 lbs (89 kg)    Current Body Weight: 61.6 kg (135 lb 12.9 oz), 69.3 % IBW.    Current BMI (kg/m2): 17    Estimated Daily Nutrient Needs:  Energy Requirements Based On: Kcal/kg  Weight Used for Energy Requirements: Ideal  Energy (kcal/day): 8822-6598

## 2025-04-11 NOTE — ED NOTES
ED to inpatient nurses report      Chief Complaint:  Chief Complaint   Patient presents with    Abnormal Lab     Stating positive infection per GI doc     Present to ED from: Home    MOA:     LOC: A&O x4  Mobility: Ambulatory  Oxygen Baseline: RA    Current needs required: None  Pending ED orders: None  Present condition: Stable    Why did the patient come to the ED?   Was told he had a positive culture  What is the plan?   See ID  Any procedures or intervention occur?   Labs  Any safety concerns??    Mental Status:  Level of Consciousness: Alert (0)    Psych Assessment:      Vital signs   Vitals:    04/10/25 2000 04/10/25 2030 04/10/25 2041 04/10/25 2130   BP: 122/77 118/69  136/70   Pulse:  (!) 106 (!) 104 (!) 109   Resp:       Temp:       TempSrc:       SpO2:  98% 100% 99%        Vitals:  Patient Vitals for the past 24 hrs:   BP Temp Temp src Pulse Resp SpO2   04/10/25 2130 136/70 -- -- (!) 109 -- 99 %   04/10/25 2041 -- -- -- (!) 104 -- 100 %   04/10/25 2030 118/69 -- -- (!) 106 -- 98 %   04/10/25 2000 122/77 -- -- -- -- --   04/10/25 1945 113/68 -- -- (!) 109 15 99 %   04/10/25 1932 -- -- -- (!) 108 15 98 %   04/10/25 1930 120/78 -- -- -- -- --   04/10/25 1924 -- -- -- (!) 112 18 99 %   04/10/25 1915 131/80 -- -- -- -- --   04/10/25 1905 (!) 133/91 98 °F (36.7 °C) Oral (!) 116 23 100 %      Visit Vitals  /70   Pulse (!) 109   Temp 98 °F (36.7 °C) (Oral)   Resp 15   SpO2 99%        LDAs:   Peripheral IV 04/10/25 Right Forearm (Active)   Site Assessment Clean, dry & intact 04/10/25 1926   Line Status Blood return noted;Flushed 04/10/25 1926       Peripheral IV 04/10/25 Left Forearm (Active)   Site Assessment Clean, dry & intact 04/10/25 2041   Line Status Blood return noted;Flushed 04/10/25 2041       Ambulatory Status:  Presents to emergency department  because of falls (Syncope, seizure, or loss of consciousness): No, Age > 70: No, Altered Mental Status, Intoxication with alcohol or substance confusion    BASIC METABOLIC PANEL W/ REFLEX TO MG FOR LOW K   CBC WITH AUTO DIFFERENTIAL   LACTIC ACID       Electronically signed by Alyssia Duenas RN on 4/10/2025 at 10:13 PM

## 2025-04-11 NOTE — BRIEF OP NOTE
Brief Postoperative Note for Paracentesis    Dariel Griffiths  YOB: 1960  [id number]    Pre-operative Diagnosis:  Ascites     Post-operative Diagnosis: Same    Procedure: Ultrasound guided paracentesis     Anesthesia: 1% Lidocaine     Surgeons/Assistants: Janet Wan PA-C    Complications: none    EBL: Minimal    Specimens: Were obtained    Successful ultrasound guided paracentesis performed along the right abdomen. 3 liters of clear yellow fluid obtained.  Dressing applied.     Electronically signed by Janet Wan PA-C on 4/11/2025 at 3:24 PM

## 2025-04-11 NOTE — PROGRESS NOTES
Patient here for ultrasound paracentesis. Consent signed. Janet [facility] in room. Ultrasound done to abdomen. Right side of abdomen prepped, draped and numbed with lidocaine. Needle in without difficulty.   3L of yellow fluid drained. Yueh out, post scan done and dressing on. Patient discharged to room. Patient tolerated well.

## 2025-04-11 NOTE — PROGRESS NOTES
[facility]  CDU / OBSERVATION ENCOUNTER  ATTENDING NOTE         I performed a history and physical examination of the patient and discussed management with the resident or midlevel provider. I reviewed the resident or midlevel provider's note and agree with the documented findings and plan of care. Any areas of disagreement are noted on the chart. I was personally present for the key portions of any procedures. I have documented in the chart those procedures where I was not present during the key portions. I have reviewed the nurses notes. I agree with the chief complaint, past medical history, past surgical history, allergies, medications, social and family history as documented unless otherwise noted below.    The Family history, social history, and ROS are effectively unchanged since admission unless noted elsewhere in the chart.     This patient was placed in the observation unit for reevaluation for possible admission to the hospital     Patient for evaluation by ID for potential SBP.  Patient is without fever.  Patient had last studies done approximately 2 days ago.  Patient had repeat studies ordered.  Discussed with ID directly.  Patient does not have evidence of SBP.  Will look for results of testing here.       Brent Michael MD  Attending Emergency  Physician

## 2025-04-11 NOTE — CARE COORDINATION
Case Management Assessment  Initial Evaluation    Date/Time of Evaluation: 4/11/2025 10:13 AM  Assessment Completed by: MARY LOU DE LA CRUZ RN    If patient is discharged prior to next notation, then this note serves as note for discharge by case management.    Patient Name: Dariel Griffiths                   YOB: 1960  Diagnosis: Ascites due to alcoholic cirrhosis (HCC) [K70.31]                   Date / Time: 4/10/2025  6:56 PM    Patient Admission Status: Observation   Readmission Risk (Low < 19, Mod (19-27), High > 27): Readmission Risk Score: 25.4    Current PCP: Gilma Corbett MD  PCP verified by CM? Yes    Chart Reviewed: Yes      History Provided by: Patient, Friend  Patient Orientation: Alert and Oriented    Patient Cognition: Alert    Hospitalization in the last 30 days (Readmission):  Yes    If yes, Readmission Assessment in  Navigator will be completed.    Advance Directives:      Code Status: Full Code   Patient's Primary Decision Maker is:        Discharge Planning:    Patient lives with: Friends Type of Home: House  Primary Care Giver: Friend  Patient Support Systems include: Friends/Neighbors   Current Financial resources:    Current community resources:    Current services prior to admission: Durable Medical Equipment            Current DME: Cane, Home Aerosol, Bedside Commode            Type of Home Care services:  None    ADLS  Prior functional level: Assistance with the following:, Mobility  Current functional level: Assistance with the following:, Mobility    PT AM-PAC:   /24  OT AM-PAC:   /24    Family can provide assistance at DC: Other (comment) (friend)  Would you like Case Management to discuss the discharge plan with any other family members/significant others, and if so, who? No  Plans to Return to Present Housing: Yes  Other Identified Issues/Barriers to RETURNING to current housing: none  Potential Assistance needed at discharge: N/A            Potential DME:    Patient

## 2025-04-12 VITALS
WEIGHT: 145.72 LBS | HEART RATE: 103 BPM | TEMPERATURE: 98.1 F | RESPIRATION RATE: 18 BRPM | SYSTOLIC BLOOD PRESSURE: 100 MMHG | DIASTOLIC BLOOD PRESSURE: 65 MMHG | HEIGHT: 77 IN | BODY MASS INDEX: 17.21 KG/M2 | OXYGEN SATURATION: 94 %

## 2025-04-12 LAB
HCT VFR BLD AUTO: 33.2 % (ref 40.7–50.3)
HGB BLD-MCNC: 10.8 G/DL (ref 13–17)

## 2025-04-12 PROCEDURE — 36415 COLL VENOUS BLD VENIPUNCTURE: CPT

## 2025-04-12 PROCEDURE — 99232 SBSQ HOSP IP/OBS MODERATE 35: CPT | Performed by: INTERNAL MEDICINE

## 2025-04-12 PROCEDURE — 6370000000 HC RX 637 (ALT 250 FOR IP)

## 2025-04-12 PROCEDURE — 2500000003 HC RX 250 WO HCPCS

## 2025-04-12 PROCEDURE — 85014 HEMATOCRIT: CPT

## 2025-04-12 PROCEDURE — G0378 HOSPITAL OBSERVATION PER HR: HCPCS

## 2025-04-12 PROCEDURE — 86480 TB TEST CELL IMMUN MEASURE: CPT

## 2025-04-12 PROCEDURE — 85018 HEMOGLOBIN: CPT

## 2025-04-12 RX ORDER — PANTOPRAZOLE SODIUM 40 MG/1
40 TABLET, DELAYED RELEASE ORAL
Status: DISCONTINUED | OUTPATIENT
Start: 2025-04-13 | End: 2025-04-12 | Stop reason: HOSPADM

## 2025-04-12 RX ADMIN — FAMOTIDINE 40 MG: 20 TABLET, FILM COATED ORAL at 07:55

## 2025-04-12 RX ADMIN — FUROSEMIDE 40 MG: 20 TABLET ORAL at 07:55

## 2025-04-12 RX ADMIN — SPIRONOLACTONE 50 MG: 25 TABLET, FILM COATED ORAL at 07:56

## 2025-04-12 RX ADMIN — Medication 100 MG: at 07:56

## 2025-04-12 RX ADMIN — SODIUM CHLORIDE, PRESERVATIVE FREE 10 ML: 5 INJECTION INTRAVENOUS at 07:57

## 2025-04-12 ASSESSMENT — PAIN SCALES - WONG BAKER
WONGBAKER_NUMERICALRESPONSE: NO HURT

## 2025-04-12 ASSESSMENT — ENCOUNTER SYMPTOMS
APNEA: 0
COLOR CHANGE: 0
ABDOMINAL DISTENTION: 1
EYE DISCHARGE: 0

## 2025-04-12 ASSESSMENT — PAIN SCALES - GENERAL
PAINLEVEL_OUTOF10: 0

## 2025-04-12 NOTE — DISCHARGE INSTRUCTIONS
Your workup from the emergency department did not show any significant pathology but you were admitted to the observation unit for ongoing evaluation     Your testing included paracentesis               You saw the following specialists infectious disease   Recommendation from infectious disease  - Aerobic, fungal, AFB cultures with next paracentesis  - TB QuantiFERON     We no longer need to keep you in the hospital but that does not mean you are done being treated  -Take your prescribed medications as directed  -Follow-up with your primary care physician or the specialist designated or both as scheduled  -On your next paracentesis kindly let them know to acquire an aerobic, fungal, TB culture as well    Please return if your condition worsens or there are new symptoms     If there are concerns that have not been addressed while you have been in the hospital please let the discharge nurse know so the physician can be informed -it is easier to fix problems while you are still here     If you have questions after you have left the hospital please call the unit at  and ask for the observation resident on-call

## 2025-04-12 NOTE — DISCHARGE SUMMARY
DIET; Regular  ADULT ORAL NUTRITION SUPPLEMENT; Breakfast, Lunch, Dinner; Standard High Calorie/High Protein Oral Supplement, advance as tolerated     Activity:  As tolerated    Consultants: IP CONSULT TO INFECTIOUS DISEASES  IP CONSULT TO GI    Procedures: Paracentesis    Diagnostic Test:   Results for orders placed or performed during the hospital encounter of 04/10/25   Culture, Blood 1    Specimen: Blood   Result Value Ref Range    Specimen Description .BLOOD     Special Requests r forearm 10ml     Culture NO GROWTH 1 DAY    Culture, Blood 1    Specimen: Blood   Result Value Ref Range    Specimen Description .BLOOD     Special Requests L FA 8ML     Culture NO GROWTH 1 DAY    Culture, Body Fluid (with Gram Stain)    Specimen: Ascitic Fluid; Body Fluid   Result Value Ref Range    Specimen Description .ASCITIC FLUID     Special Requests Site: Body Fluid     Direct Exam MODERATE NEUTROPHILS     Direct Exam NO ORGANISMS SEEN     Direct Exam       Gram stain made from cytocentrifuged specimen.  Organisms and cells will be concentrated.    Culture NO GROWTH 11 HOURS    CBC with Auto Differential   Result Value Ref Range    WBC 8.6 3.5 - 11.3 k/uL    RBC 3.97 (L) 4.21 - 5.77 m/uL    Hemoglobin 12.6 (L) 13.0 - 17.0 g/dL    Hematocrit 38.8 (L) 40.7 - 50.3 %    MCV 97.7 82.6 - 102.9 fL    MCH 31.7 25.2 - 33.5 pg    MCHC 32.5 28.4 - 34.8 g/dL    RDW 15.0 (H) 11.8 - 14.4 %    Platelets 205 138 - 453 k/uL    MPV 9.6 8.1 - 13.5 fL    NRBC Automated 0.0 0.0 per 100 WBC    Neutrophils % 62 36 - 65 %    Lymphocytes % 22 (L) 24 - 43 %    Monocytes % 14 (H) 3 - 12 %    Eosinophils % 1 1 - 4 %    Basophils % 1 0 - 2 %    Immature Granulocytes % 0 0 %    Neutrophils Absolute 5.39 1.50 - 8.10 k/uL    Lymphocytes Absolute 1.86 1.10 - 3.70 k/uL    Monocytes Absolute 1.19 0.10 - 1.20 k/uL    Eosinophils Absolute 0.10 0.00 - 0.44 k/uL    Basophils Absolute 0.07 0.00 - 0.20 k/uL    Immature Granulocytes Absolute 0.03 0.00 - 0.30 k/uL     questions and concerns were addressed.     Time Spent: 1 day      --  Beto Sahu MD  Observation Physician    This dictation was generated by voice recognition computer software.  Although all attempts are made to edit the dictation for accuracy, there may be errors in the transcription that are not intended.

## 2025-04-12 NOTE — PROGRESS NOTES
OBS/CDU   Progress NOTE      Patients PCP is:  Gilma Corbett MD        SUBJECTIVE      Patient comfortably sleeping in his room, no acute distress.  Patient had mixture of dark and red bowel movement overnight.  Patient states that he has had some blood in his stool for the past couple months.  Denies any difficulty swallowing, chest pain, shortness of breath.  Patient does complain of some abdominal pain that is consistent with his normal.    PHYSICAL EXAM      General: NAD, AO X 3  Heent: EOMI, PERRL  Neck: SUPPLE, NO JVD  Cardiovascular: RRR, S1S2  Pulmonary: CTAB, NO SOB  Abdomen: SOFT, NTTP, ND, +BS  Extremities: +2/4 PULSES DISTAL, NO SWELLING  Neuro / Psych: NO NUMBNESS OR TINGLING, MENTATION AT BASELINE    PERTINENT TEST /EXAMS      I have reviewed all available laboratory results.    MEDICATIONS CURRENT   albuterol (PROVENTIL) (2.5 MG/3ML) 0.083% nebulizer solution 2.5 mg, Q6H PRN  famotidine (PEPCID) tablet 40 mg, Daily  furosemide (LASIX) tablet 40 mg, Daily  midodrine (PROAMATINE) tablet 5 mg, TID PRN  spironolactone (ALDACTONE) tablet 50 mg, Daily  thiamine tablet 100 mg, Daily  sodium chloride flush 0.9 % injection 5-40 mL, 2 times per day  sodium chloride flush 0.9 % injection 5-40 mL, PRN  0.9 % sodium chloride infusion, PRN  potassium chloride (KLOR-CON M) extended release tablet 40 mEq, PRN   Or  potassium bicarb-citric acid (EFFER-K) effervescent tablet 40 mEq, PRN   Or  potassium chloride 10 mEq/100 mL IVPB (Peripheral Line), PRN  magnesium sulfate 2000 mg in 50 mL IVPB premix, PRN  ondansetron (ZOFRAN-ODT) disintegrating tablet 4 mg, Q8H PRN   Or  ondansetron (ZOFRAN) injection 4 mg, Q6H PRN  polyethylene glycol (GLYCOLAX) packet 17 g, Daily PRN        All medication charted and reviewed.    CONSULTS      IP CONSULT TO INFECTIOUS DISEASES    ASSESSMENT/PLAN       Dariel Griffiths is a 64 y.o. male who presents with concern for SBP.  Known history of ascites secondary to alcoholic cirrhosis  and receives weekly paracentesis.    Paracentesis done 4/11-results awaiting  ID consult once paracentesis results come back  H&H  GI consult placed  Continue home medications and pain control  Monitor vitals, labs, and imaging  DISPO: pending consults and clinical improvement    --  Beto Sahu MD  Observation Physician     This dictation was generated by voice recognition computer software.  Although all attempts are made to edit the dictation for accuracy, there may be errors in the transcription that are not intended.

## 2025-04-12 NOTE — CARE COORDINATION
Case Management   Daily Progress Note       Patient Name: Dariel Griffiths                   YOB: 1960  Diagnosis: Ascites due to alcoholic cirrhosis (HCC) [K70.31]                         days  Length of Stay: 1  days    Anticipated Discharge Date: Ready for discharge    Readmission Risk (Low < 19, Mod (19-27), High > 27): Readmission Risk Score: 25.6        Current Transitional Plan    [x] Home Independently    [] Home with HC    [] Skilled Nursing Facility    [] Acute Rehabilitation    [] Long Term Acute Care (LTAC)    [] Other:     Plan for the Stay (Medical Management) :          Workflow Continuation (Additional Notes) :        Lis Cabrera RN  April 12, 2025

## 2025-04-12 NOTE — PROGRESS NOTES
[facility]  CDU / OBSERVATION ENCOUNTER  ATTENDING NOTE         I performed a history and physical examination of the patient and discussed management with the resident or midlevel provider. I reviewed the resident or midlevel provider's note and agree with the documented findings and plan of care. Any areas of disagreement are noted on the chart. I was personally present for the key portions of any procedures. I have documented in the chart those procedures where I was not present during the key portions. I have reviewed the nurses notes. I agree with the chief complaint, past medical history, past surgical history, allergies, medications, social and family history as documented unless otherwise noted below.    The Family history, social history, and ROS are effectively unchanged since admission unless noted elsewhere in the chart.     This patient was placed in the observation unit for reevaluation for possible admission to the hospital     No growth on cultures.  Patient seen by ID and cleared for discharge.  Patient with need for repeat studies with next paracentesis.  Patient comfortable for discharge.  Patient for reevaluation within the week with further paracentesis as planned       Brent Michael MD  Attending Emergency  Physician

## 2025-04-12 NOTE — PROGRESS NOTES
Observation resident on call notified via perfect serve bp 112/72, p116, spo2 96% room air, rr 18 , patient stating he has abd pain 8 out of 10 and reportes bloody bm for 4 months and just had blood in bm again , unwhitnessed patient is asking for something for pain. No new orders given.

## 2025-04-12 NOTE — PROGRESS NOTES
[facility]  CDU / OBSERVATION ENCOUNTER  ATTENDING NOTE         I performed a history and physical examination of the patient and discussed management with the resident or midlevel provider. I reviewed the resident or midlevel provider's note and agree with the documented findings and plan of care. Any areas of disagreement are noted on the chart. I was personally present for the key portions of any procedures. I have documented in the chart those procedures where I was not present during the key portions. I have reviewed the nurses notes. I agree with the chief complaint, past medical history, past surgical history, allergies, medications, social and family history as documented unless otherwise noted below.    The Family history, social history, and ROS are effectively unchanged since admission unless noted elsewhere in the chart.     This patient was placed in the observation unit for reevaluation for possible admission to the hospital     Patient with ascites.  Patient being evaluated for SBP.  Awaiting culture results.  Patient with ID consult.  Patient requiring further studies.  Uncertain diagnosis.  Awaiting laboratory work       Brent Michael MD  Attending Emergency  Physician

## 2025-04-12 NOTE — PROGRESS NOTES
[facility] -   Infectious diseases evaluation      Progress Note    admission date 4/10/2025    reason for consultation:   Concern for SBP    Impression :   Current:  Alcoholic liver cirrhosis with ascites requiring weekly paracentesis since 1/25  Ascites with lymphocytosis ongoing since February 2025  Current paracentesis of 4/10 with lymphocytosis and a suspected contaminant gram-positive coccobacilli on Gram stain-[facility]  Culture at [facility] pending  Doubt SBP  Aseptic necrosis of the hips  Left renal artery stenosis      Discussion / summary of stay / plan of care/ Recommendations:   All prior paracenteses showed a majority of lymphocytes -  Doubt current SBP  on the paracentesis of 4/10 at [facility]  The gram stain of [facility] coccobacilli is likely a contamination  I also suspect the Sphingomonas recovered from the 3/26 paracentesis is also a contamination  No cytology was ever sent  Pt is asymptomatic    Post ceftriaxone 1 g  Stop further dosing since I suspect the gram stain was a contamination - [facility] lab to call me the final results  4/11 results  paracentesis today, cell count cx and cytology  Case disc w Dr Michael  Recommendations:       Monitor off antibiotics   Paracentesis fluid from 4-11-15: No growth   No treatment required at this point    TB quantiferon test requested  Suggest additional aerobic, fungal and AFB cultures with the next scheduled paracentesis as an outpatient  Office f/up in 4 weeks with Dr Tian for infection. Please call [phone] for appointment     Infection Control Recommendations   [place] Precautions      Antimicrobial Stewardship Recommendations   Simplification of therapy  Targeted therapy    History of Present Illness:   Initial history:  Dariel Griffiths is a 64 y.o.-year-old male with a history of alcoholic cirrhosis and gets weekly paracentesis. He seems to have a pattern of lymphocytosis on those paracentesis.  He    Abdominal:      General: There is distension.      Tenderness: There is no abdominal tenderness.   Musculoskeletal:         General: No swelling or deformity.      Cervical back: Neck supple. No rigidity.   Skin:     Coloration: Skin is not jaundiced.      Findings: No bruising.   Neurological:      Mental Status: He is alert and oriented to person, place, and time.      Cranial Nerves: No cranial nerve deficit.   Psychiatric:         Mood and Affect: Mood normal.         Thought Content: Thought content normal.         Past Medical History:     Past Medical History:   Diagnosis Date    Abnormal results of liver function studies  10/20/2016    Arthritis     Asthma     Attempted suicide ([facility])     attempted 7 times    Avascular necrosis of bone of right hip ([facility]) 10/26/2017    Bipolar affective ([facility])     Blood in stool     Cavitary pneumonia     Chest pain     Daily    CHF (congestive heart failure), NYHA class III ([facility]) 08/22/2014    COPD (chronic obstructive pulmonary disease) ([facility])     [facility] pulmonology    COPD exacerbation ([facility]) 08/22/2014    CVA (cerebral vascular accident) ([facility])     x2 in 2006    Depression 07/26/2015    Difficult intubation 07/05/2013    Needed Glidescope, only epiglottis seen, easy mask ventilation    Erectile dysfunction 05/23/2014    Folliculitis 02/19/2014    Fracture of wrist     left    GERD (gastroesophageal reflux disease) 02/19/2014    Head injury     Hepatitis     HLD (hyperlipidemia) 02/19/2014    Hypertension     Insomnia     Left wrist pain     Pain     LEFT KNEE, RIGHT HIP    Schizoaffective disorder, depressive type ([facility]) 07/14/2017    Seasonal allergies 07/15/2014    Seizures ([facility])     Spondylosis of cervical region without myelopathy or radiculopathy     Stroke ([facility]) 10/31/2011    pt states a total of 4-5strokes; sees [facility] neurology    Tobacco dependence     Tremor 10/13/2016    Umbilical hernia 02/19/2014    Vomiting     Wears glasses        Past Surgical

## 2025-04-14 LAB
CASE NUMBER:: NORMAL
MICROORGANISM SPEC CULT: NORMAL
MICROORGANISM/AGENT SPEC: NORMAL
SERVICE CMNT-IMP: NORMAL
SPECIMEN DESCRIPTION: NORMAL

## 2025-04-17 LAB
QUANTI TB GOLD PLUS: POSITIVE
QUANTI TB1 MINUS NIL: 0.4 IU/ML
QUANTI TB2 MINUS NIL: 0.38 IU/ML
QUANTIFERON MITOGEN: 8.25 IU/ML
QUANTIFERON NIL: 0.02 IU/ML

## 2025-07-28 ENCOUNTER — APPOINTMENT (OUTPATIENT)
Dept: GENERAL RADIOLOGY | Age: 65
End: 2025-07-28
Payer: MEDICARE

## 2025-07-28 ENCOUNTER — HOSPITAL ENCOUNTER (OUTPATIENT)
Age: 65
Setting detail: OBSERVATION
Discharge: HOME | End: 2025-07-29
Attending: EMERGENCY MEDICINE | Admitting: EMERGENCY MEDICINE
Payer: MEDICARE

## 2025-07-28 DIAGNOSIS — L03.114 CELLULITIS OF LEFT UPPER EXTREMITY: ICD-10-CM
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
## 2025-07-28 PROBLEM — L03.90 CELLULITIS: Status: ACTIVE | Noted: 2025-07-28

## 2025-07-28 LAB
ANION GAP SERPL CALCULATED.3IONS-SCNC: 11 MMOL/L (ref 9–16)
BASOPHILS # BLD: 0.05 K/UL (ref 0–0.2)
BASOPHILS NFR BLD: 0 % (ref 0–2)
BUN SERPL-MCNC: 13 MG/DL (ref 8–23)
CALCIUM SERPL-MCNC: 10 MG/DL (ref 8.6–10.4)
CHLORIDE SERPL-SCNC: 103 MMOL/L (ref 98–107)
CO2 SERPL-SCNC: 25 MMOL/L (ref 20–31)
CREAT SERPL-MCNC: 0.6 MG/DL (ref 0.7–1.2)
CRP SERPL HS-MCNC: <3 MG/L (ref 0–5)
EOSINOPHIL # BLD: 0.22 K/UL (ref 0–0.44)
EOSINOPHILS RELATIVE PERCENT: 2 % (ref 1–4)
ERYTHROCYTE [DISTWIDTH] IN BLOOD BY AUTOMATED COUNT: 14.5 % (ref 11.8–14.4)
ERYTHROCYTE [SEDIMENTATION RATE] IN BLOOD BY PHOTOMETRIC METHOD: 22 MM/HR (ref 0–20)
GFR, ESTIMATED: >90 ML/MIN/1.73M2
GLUCOSE SERPL-MCNC: 89 MG/DL (ref 74–99)
HCT VFR BLD AUTO: 43.5 % (ref 40.7–50.3)
HGB BLD-MCNC: 14.5 G/DL (ref 13–17)
IMM GRANULOCYTES # BLD AUTO: 0.04 K/UL (ref 0–0.3)
IMM GRANULOCYTES NFR BLD: 0 %
LACTIC ACID, SEPSIS WHOLE BLOOD: 1.7 MMOL/L (ref 0.5–1.9)
LYMPHOCYTES NFR BLD: 2.72 K/UL (ref 1.1–3.7)
LYMPHOCYTES RELATIVE PERCENT: 22 % (ref 24–43)
MCH RBC QN AUTO: 32 PG (ref 25.2–33.5)
MCHC RBC AUTO-ENTMCNC: 33.3 G/DL (ref 28.4–34.8)
MCV RBC AUTO: 96 FL (ref 82.6–102.9)
MONOCYTES NFR BLD: 1.23 K/UL (ref 0.1–1.2)
MONOCYTES NFR BLD: 10 % (ref 3–12)
NEUTROPHILS NFR BLD: 66 % (ref 36–65)
NEUTS SEG NFR BLD: 8.01 K/UL (ref 1.5–8.1)
NRBC BLD-RTO: 0 PER 100 WBC
PLATELET # BLD AUTO: 161 K/UL (ref 138–453)
PMV BLD AUTO: 10 FL (ref 8.1–13.5)
POTASSIUM SERPL-SCNC: 4.2 MMOL/L (ref 3.7–5.3)
RBC # BLD AUTO: 4.53 M/UL (ref 4.21–5.77)
RBC # BLD: ABNORMAL 10*6/UL
SODIUM SERPL-SCNC: 139 MMOL/L (ref 136–145)
WBC OTHER # BLD: 12.3 K/UL (ref 3.5–11.3)

## 2025-07-28 PROCEDURE — 2580000003 HC RX 258

## 2025-07-28 PROCEDURE — 85652 RBC SED RATE AUTOMATED: CPT

## 2025-07-28 PROCEDURE — 83605 ASSAY OF LACTIC ACID: CPT

## 2025-07-28 PROCEDURE — G0378 HOSPITAL OBSERVATION PER HR: HCPCS

## 2025-07-28 PROCEDURE — 96367 TX/PROPH/DG ADDL SEQ IV INF: CPT

## 2025-07-28 PROCEDURE — 96366 THER/PROPH/DIAG IV INF ADDON: CPT

## 2025-07-28 PROCEDURE — 6370000000 HC RX 637 (ALT 250 FOR IP)

## 2025-07-28 PROCEDURE — 73130 X-RAY EXAM OF HAND: CPT

## 2025-07-28 PROCEDURE — 96361 HYDRATE IV INFUSION ADD-ON: CPT

## 2025-07-28 PROCEDURE — 80048 BASIC METABOLIC PNL TOTAL CA: CPT

## 2025-07-28 PROCEDURE — 73090 X-RAY EXAM OF FOREARM: CPT

## 2025-07-28 PROCEDURE — 2580000003 HC RX 258: Performed by: EMERGENCY MEDICINE

## 2025-07-28 PROCEDURE — 99285 EMERGENCY DEPT VISIT HI MDM: CPT

## 2025-07-28 PROCEDURE — 86140 C-REACTIVE PROTEIN: CPT

## 2025-07-28 PROCEDURE — 6360000002 HC RX W HCPCS

## 2025-07-28 PROCEDURE — 2500000003 HC RX 250 WO HCPCS

## 2025-07-28 PROCEDURE — 87040 BLOOD CULTURE FOR BACTERIA: CPT

## 2025-07-28 PROCEDURE — 85025 COMPLETE CBC W/AUTO DIFF WBC: CPT

## 2025-07-28 PROCEDURE — 96375 TX/PRO/DX INJ NEW DRUG ADDON: CPT

## 2025-07-28 PROCEDURE — 96365 THER/PROPH/DIAG IV INF INIT: CPT

## 2025-07-28 PROCEDURE — 6360000002 HC RX W HCPCS: Performed by: EMERGENCY MEDICINE

## 2025-07-28 RX ORDER — SPIRONOLACTONE 25 MG/1
50 TABLET ORAL DAILY
Status: DISCONTINUED | OUTPATIENT
Start: 2025-07-28 | End: 2025-07-29 | Stop reason: HOSPADM

## 2025-07-28 RX ORDER — LANOLIN ALCOHOL/MO/W.PET/CERES
100 CREAM (GRAM) TOPICAL DAILY
Status: DISCONTINUED | OUTPATIENT
Start: 2025-07-28 | End: 2025-07-29 | Stop reason: HOSPADM

## 2025-07-28 RX ORDER — SODIUM CHLORIDE 0.9 % (FLUSH) 0.9 %
5-40 SYRINGE (ML) INJECTION EVERY 12 HOURS SCHEDULED
Status: DISCONTINUED | OUTPATIENT
Start: 2025-07-28 | End: 2025-07-29 | Stop reason: HOSPADM

## 2025-07-28 RX ORDER — OXYCODONE HYDROCHLORIDE 5 MG/1
5 TABLET ORAL ONCE
Refills: 0 | Status: COMPLETED | OUTPATIENT
Start: 2025-07-28 | End: 2025-07-28

## 2025-07-28 RX ORDER — FAMOTIDINE 20 MG/1
40 TABLET, FILM COATED ORAL DAILY
Status: DISCONTINUED | OUTPATIENT
Start: 2025-07-28 | End: 2025-07-29 | Stop reason: HOSPADM

## 2025-07-28 RX ORDER — MIDODRINE HYDROCHLORIDE 5 MG/1
5 TABLET ORAL 3 TIMES DAILY PRN
Status: DISCONTINUED | OUTPATIENT
Start: 2025-07-28 | End: 2025-07-29 | Stop reason: HOSPADM

## 2025-07-28 RX ORDER — SODIUM CHLORIDE 0.9 % (FLUSH) 0.9 %
5-40 SYRINGE (ML) INJECTION PRN
Status: DISCONTINUED | OUTPATIENT
Start: 2025-07-28 | End: 2025-07-29 | Stop reason: HOSPADM

## 2025-07-28 RX ORDER — CYCLOBENZAPRINE HCL 10 MG
10 TABLET ORAL 3 TIMES DAILY PRN
Status: DISCONTINUED | OUTPATIENT
Start: 2025-07-28 | End: 2025-07-29 | Stop reason: HOSPADM

## 2025-07-28 RX ORDER — ONDANSETRON 4 MG/1
4 TABLET, ORALLY DISINTEGRATING ORAL EVERY 8 HOURS PRN
Status: DISCONTINUED | OUTPATIENT
Start: 2025-07-28 | End: 2025-07-29 | Stop reason: HOSPADM

## 2025-07-28 RX ORDER — FUROSEMIDE 20 MG/1
40 TABLET ORAL DAILY
Status: DISCONTINUED | OUTPATIENT
Start: 2025-07-28 | End: 2025-07-29 | Stop reason: HOSPADM

## 2025-07-28 RX ORDER — IBUPROFEN 800 MG/1
800 TABLET, FILM COATED ORAL ONCE
Status: COMPLETED | OUTPATIENT
Start: 2025-07-28 | End: 2025-07-28

## 2025-07-28 RX ORDER — IBUPROFEN 600 MG/1
600 TABLET, FILM COATED ORAL
Status: DISCONTINUED | OUTPATIENT
Start: 2025-07-28 | End: 2025-07-29 | Stop reason: HOSPADM

## 2025-07-28 RX ORDER — SODIUM CHLORIDE 9 MG/ML
INJECTION, SOLUTION INTRAVENOUS CONTINUOUS
Status: ACTIVE | OUTPATIENT
Start: 2025-07-28 | End: 2025-07-29

## 2025-07-28 RX ORDER — SODIUM CHLORIDE 9 MG/ML
INJECTION, SOLUTION INTRAVENOUS PRN
Status: DISCONTINUED | OUTPATIENT
Start: 2025-07-28 | End: 2025-07-29 | Stop reason: HOSPADM

## 2025-07-28 RX ORDER — OXYCODONE HYDROCHLORIDE 5 MG/1
5 TABLET ORAL EVERY 6 HOURS PRN
Refills: 0 | Status: DISCONTINUED | OUTPATIENT
Start: 2025-07-28 | End: 2025-07-29 | Stop reason: HOSPADM

## 2025-07-28 RX ORDER — ONDANSETRON 2 MG/ML
4 INJECTION INTRAMUSCULAR; INTRAVENOUS EVERY 6 HOURS PRN
Status: DISCONTINUED | OUTPATIENT
Start: 2025-07-28 | End: 2025-07-29 | Stop reason: HOSPADM

## 2025-07-28 RX ORDER — ACETAMINOPHEN 325 MG/1
650 TABLET ORAL EVERY 4 HOURS PRN
Status: DISCONTINUED | OUTPATIENT
Start: 2025-07-28 | End: 2025-07-29 | Stop reason: HOSPADM

## 2025-07-28 RX ADMIN — IBUPROFEN 800 MG: 800 TABLET, FILM COATED ORAL at 13:48

## 2025-07-28 RX ADMIN — IBUPROFEN 600 MG: 600 TABLET ORAL at 16:40

## 2025-07-28 RX ADMIN — SODIUM CHLORIDE: 0.9 INJECTION, SOLUTION INTRAVENOUS at 18:06

## 2025-07-28 RX ADMIN — WATER 1000 MG: 1 INJECTION INTRAMUSCULAR; INTRAVENOUS; SUBCUTANEOUS at 16:40

## 2025-07-28 RX ADMIN — CYCLOBENZAPRINE 10 MG: 10 TABLET, FILM COATED ORAL at 18:18

## 2025-07-28 RX ADMIN — AZITHROMYCIN MONOHYDRATE 500 MG: 500 INJECTION, POWDER, LYOPHILIZED, FOR SOLUTION INTRAVENOUS at 16:46

## 2025-07-28 RX ADMIN — FUROSEMIDE 40 MG: 20 TABLET ORAL at 18:18

## 2025-07-28 RX ADMIN — FAMOTIDINE 40 MG: 20 TABLET, FILM COATED ORAL at 18:18

## 2025-07-28 RX ADMIN — SODIUM CHLORIDE 1250 MG: 9 INJECTION, SOLUTION INTRAVENOUS at 18:13

## 2025-07-28 RX ADMIN — SPIRONOLACTONE 50 MG: 25 TABLET, FILM COATED ORAL at 18:18

## 2025-07-28 RX ADMIN — MIDODRINE HYDROCHLORIDE 5 MG: 5 TABLET ORAL at 18:18

## 2025-07-28 RX ADMIN — Medication 100 MG: at 18:18

## 2025-07-28 RX ADMIN — OXYCODONE HYDROCHLORIDE 5 MG: 5 TABLET ORAL at 13:49

## 2025-07-28 ASSESSMENT — ENCOUNTER SYMPTOMS
VOMITING: 0
COLOR CHANGE: 1
NAUSEA: 0

## 2025-07-28 ASSESSMENT — PAIN SCALES - GENERAL: PAINLEVEL_OUTOF10: 10

## 2025-07-28 ASSESSMENT — PAIN - FUNCTIONAL ASSESSMENT: PAIN_FUNCTIONAL_ASSESSMENT: 0-10

## 2025-07-28 NOTE — ED PROVIDER NOTES
[facility] EMERGENCY DEPARTMENT  Emergency Department Encounter  Emergency Medicine Resident     Pt Name:Dariel Griffiths  MRN: [id number]  Birthdate 1960  Date of evaluation: 7/28/25  PCP:  Gilma Corbett MD  Note Started: 4:28 PM EDT      CHIEF COMPLAINT       Chief Complaint   Patient presents with    Cat Scratch        HISTORY OF PRESENT ILLNESS  (Location/Symptom, Timing/Onset, Context/Setting, Quality, Duration, Modifying Factors, Severity.)      Dariel Griffiths is an ambidextrous 64 y.o. male with past medical history of CHF, COPD, HTN who presents with cat scratches that occurred last night to both the posterior portion of his left hand and his right forearm.  Patient reports that it is an indoor cat that belongs to him, the cat is up-to-date on vaccinations.  Patient reports that overnight he noticed his left hand beginning to swell and become warm and red.  He did not take any medication at home prior to coming to the emergency department.  He reports pain with movement of the right hand.  No fever or chills.  No nausea or vomiting.  No other complaints are expressed at this time.    PAST MEDICAL / SURGICAL / SOCIAL / FAMILY HISTORY      has a past medical history of Abnormal results of liver function studies , Arthritis, Asthma, Attempted suicide (HCC), Avascular necrosis of bone of right hip (HCC), Bipolar affective (HCC), Blood in stool, Cavitary pneumonia, Chest pain, CHF (congestive heart failure), NYHA class III (HCC), COPD (chronic obstructive pulmonary disease) (HCC), COPD exacerbation (HCC), CVA (cerebral vascular accident) (HCC), Depression, Difficult intubation, Erectile dysfunction, Folliculitis, Fracture of wrist, GERD (gastroesophageal reflux disease), Head injury, Hepatitis, HLD (hyperlipidemia), Hypertension, Insomnia, Left wrist pain, Pain, Schizoaffective disorder, depressive type (HCC), Seasonal allergies, Seizures (HCC), Spondylosis of cervical region without

## 2025-07-28 NOTE — ED NOTES
ED to inpatient nurses report      Chief Complaint:  Chief Complaint   Patient presents with    Cat Scratch      Present to ED from: home    MOA:     LOC: alert and orientated to name, place, date  Mobility: Independent  Oxygen Baseline: 95%    Current needs required: none   Pending ED orders: none  Present condition: stable    Why did the patient come to the ED?  Pt presents to ED with c/o cat scratch on left posterior hand.  Pt reports getting scratched by his indoor/outdoor cat last night at 7pm.  Pt denies cleaning or taking anything for pain.  Patient alert and oriented x4, talking in complete sentences. Respirations even and unlabored. Call light in reach, all needs met at this time.  What is the plan? admit  Any procedures or intervention occur? Blood cultures, labs, meds, consults, x-ray  Any safety concerns??    Mental Status:  Level of Consciousness: Alert (0)    Psych Assessment:   Psychosocial  Psychosocial (WDL): Within Defined Limits  Vital signs   Vitals:    07/28/25 1251 07/28/25 1630   BP: 137/80    Pulse: (!) 119    Resp: 18    Temp: 98.1 °F (36.7 °C)    SpO2: 95%    Weight:  63.5 kg (140 lb)   Height:  1.905 m (6' 3\")        Vitals:  Patient Vitals for the past 24 hrs:   BP Temp Pulse Resp SpO2 Height Weight   07/28/25 1630 -- -- -- -- -- 1.905 m (6' 3\") 63.5 kg (140 lb)   07/28/25 1251 137/80 98.1 °F (36.7 °C) (!) 119 18 95 % -- --      Visit Vitals  /80   Pulse (!) 119   Temp 98.1 °F (36.7 °C)   Resp 18   Ht 1.905 m (6' 3\")   Wt 63.5 kg (140 lb)   SpO2 95%   BMI 17.50 kg/m²        LDAs:   Peripheral IV 07/28/25 Posterior;Right Forearm (Active)   Site Assessment Clean, dry & intact 07/28/25 1407   Line Status Normal saline locked;Flushed;Brisk blood return;Specimen collected 07/28/25 1407   Phlebitis Assessment No symptoms 07/28/25 1407   Infiltration Assessment 0 07/28/25 1407   Alcohol Cap Used Yes 07/28/25 1407   Dressing Status Clean, dry & intact 07/28/25 1407   Dressing Type

## 2025-07-28 NOTE — PROGRESS NOTES
[facility]   Pharmacy Pharmacokinetic Monitoring Service - Vancomycin     Dariel Griffiths is a 64 y.o. male starting on vancomycin therapy for SSTI. Pharmacy consulted by Dr. Rivera for monitoring and adjustment.    Target Concentration: Goal trough of 10-15 mg/L and AUC/CHERELLE <500 mg*hr/L    Additional Antimicrobials: Ceftriaxone, azithromycin    Pertinent Laboratory Values:   Wt Readings from Last 1 Encounters:   07/28/25 63.5 kg (140 lb)     Temp Readings from Last 1 Encounters:   07/28/25 98.1 °F (36.7 °C)     Estimated Creatinine Clearance: 115 mL/min (A) (based on SCr of 0.6 mg/dL (L)).  Recent Labs     07/28/25  1431   CREATININE 0.6*   BUN 13   WBC 12.3*     Pertinent Cultures:  Culture Date Source Results   7/28 Blood x 2 NGTD   MRSA Nasal Swab: N/A. Non-respiratory infection.    Plan:  Dosing recommendations based on Bayesian software  Start vancomycin 1250 mg Q12  Anticipated AUC of ~480 and trough concentration of ~11 at steady state  Renal labs as indicated   Vancomycin concentration not ordered yet  Pharmacy will continue to monitor patient and adjust therapy as indicated    Thank you for the consult,  Ayana Muñoz RPH  7/28/2025 4:40 PM

## 2025-07-28 NOTE — ED NOTES
Labeled blood specimens sent to lab via tube system.    [x] Green/yellow  [x] Lavender   [] On ice   [] Blue   [] Green/black [] On ice  [] Yellow  [] On ice  [] Red  [] Pink  [] Blood Cultures  [] x1 [] x2    [] Ped Green  [] On ice  [] Ped Lavender  [] On ice    [] Ped Yellow  [] On ice  [] Ped Red

## 2025-07-28 NOTE — CONSULTS
Plastic Surgery Consult      CC/Reason for consult:  Left hand cat scratch    HPI:      The patient is a 64 y.o. male that was scratched by a cat at 7 PM yesterday, since 11/27 and has since developed dorsal hand erythema and swelling.  Patient states the cat is an indoor/outdoor cat that is recently not been at their home.  He denies scratches to other location.  He states that he did not use any other forms of treatment.  He presented to [facility] emergency department 07/28 because of the worsening swelling, redness, and inability to use his left hand.  Labs were obtained demonstrating a CRP less than 3, white blood cell count of 12,300, ESR 22, creatinine of 0.6.  AST of 45 and ALT of 22. Plastic surgery was consulted due to left hand infection for potential procedural treatment.  Initial plan was for admission and treatment with broad-spectrum antibiotics with azithromycin, ceftriaxone, and vancomycin as patient is allergic to penicillin.    Patient seen at bedside with wife and states that his pain has not changed since presentation.  He states that he is unable to make a fist or straighten his fingers out all the way of his left hand.  He states that the pain does not travel up past his left wrist.  Denies fever, muscle aches different than baseline, chills, or malaise.  Patient states that he has not drank alcohol since January but does endorse that he has a smoker.  Patient notably had acute ascites with lymphocytosis since February 2025 and has been receiving weekly paracenteses but has discontinued antibiotics since of February 2025, following with Dr. Cleaning. Other pertinent past medical history includes COPD, CHF, CVA x 2 in 2006, a extensive orthopedic history including ORIF of the right forearm as a teenager and previous fracture of the left wrist although patient states that this was not previously operatively treated.    GCS 15. Vitals were reviewed. Patient does not complain of any other

## 2025-07-28 NOTE — ED NOTES
Pt presents to ED with c/o cat scratch on left posterior hand.  Pt reports getting scratched by his indoor/outdoor cat last night at 7pm.  Pt denies cleaning or taking anything for pain.  Patient alert and oriented x4, talking in complete sentences. Respirations even and unlabored. Call light in reach, all needs met at this time.

## 2025-07-28 NOTE — ED PROVIDER NOTES
[facility]     Emergency Department     Faculty Attestation    I performed a history and physical examination of the patient and discussed management with the resident. I reviewed the resident’s note and agree with the documented findings and plan of care. Any areas of disagreement are noted on the chart. I was personally present for the key portions of any procedures. I have documented in the chart those procedures where I was not present during the key portions. I have reviewed the emergency nurses triage note. I agree with the chief complaint, past medical history, past surgical history, allergies, medications, social and family history as documented unless otherwise noted below. Documentation of the HPI, Physical Exam and Medical Decision Making performed by medical students or scribes is based on my personal performance of the HPI, PE and MDM. For Physician Assistant/ Nurse Practitioner cases/documentation I have personally evaluated this patient and have completed at least one if not all key elements of the E/M (history, physical exam, and MDM). Additional findings are as noted.    Vital signs:   Vitals:    07/28/25 1251   BP: 137/80   Pulse: (!) 119   Resp: 18   Temp: 98.1 °F (36.7 °C)   SpO2: 95%      Obvious infection to the dorsum of the left hand after a cat scratch. Fingers held partially flexed and patient unable to open hand. Sensation intact to fingers. No erythema to palmar surface. Plan for labs, x-rays, likely admission for IV antibiotics.             Lashay Rivera M.D,  Attending Emergency  Physician            Lashay Rivera MD  07/28/25 6415

## 2025-07-28 NOTE — ED NOTES
Patient to ED for infection of cat scratch on posterior left hand. Patient alert and oriented x4, talking in complete sentences. Respirations even and unlabored. Call light in reach, all needs met at this time

## 2025-07-28 NOTE — DISCHARGE INSTRUCTIONS
Thank you for visiting [facility] Emergency Department.    You need to call Gilma Corbett MD to make an appointment as directed for follow up.    Should you have any questions regarding your care or further treatment, please call [facility] Emergency Department at [phone].    Plastic Surgery Instructions:  Call your doctor for the following:   Chills   Temperature greater than 101   There is increased swelling, redness or warmth.  Follow up with Dr. Fernando in his office in 2 weeks after discharge. Call [phone] to schedule.

## 2025-07-29 VITALS
HEART RATE: 79 BPM | HEIGHT: 75 IN | BODY MASS INDEX: 17.41 KG/M2 | DIASTOLIC BLOOD PRESSURE: 76 MMHG | OXYGEN SATURATION: 98 % | WEIGHT: 140 LBS | RESPIRATION RATE: 18 BRPM | TEMPERATURE: 97.7 F | SYSTOLIC BLOOD PRESSURE: 128 MMHG

## 2025-07-29 LAB
BASOPHILS # BLD: 0.03 K/UL (ref 0–0.2)
BASOPHILS NFR BLD: 0 % (ref 0–2)
CRP SERPL HS-MCNC: 6.1 MG/L (ref 0–5)
EOSINOPHIL # BLD: 0.22 K/UL (ref 0–0.44)
EOSINOPHILS RELATIVE PERCENT: 3 % (ref 1–4)
ERYTHROCYTE [DISTWIDTH] IN BLOOD BY AUTOMATED COUNT: 14.4 % (ref 11.8–14.4)
ERYTHROCYTE [SEDIMENTATION RATE] IN BLOOD BY PHOTOMETRIC METHOD: 23 MM/HR (ref 0–20)
HCT VFR BLD AUTO: 34.9 % (ref 40.7–50.3)
HGB BLD-MCNC: 11.8 G/DL (ref 13–17)
IMM GRANULOCYTES # BLD AUTO: <0.03 K/UL (ref 0–0.3)
IMM GRANULOCYTES NFR BLD: 0 %
LYMPHOCYTES NFR BLD: 1.95 K/UL (ref 1.1–3.7)
LYMPHOCYTES RELATIVE PERCENT: 26 % (ref 24–43)
MCH RBC QN AUTO: 32.1 PG (ref 25.2–33.5)
MCHC RBC AUTO-ENTMCNC: 33.8 G/DL (ref 28.4–34.8)
MCV RBC AUTO: 94.8 FL (ref 82.6–102.9)
MONOCYTES NFR BLD: 0.86 K/UL (ref 0.1–1.2)
MONOCYTES NFR BLD: 11 % (ref 3–12)
NEUTROPHILS NFR BLD: 60 % (ref 36–65)
NEUTS SEG NFR BLD: 4.53 K/UL (ref 1.5–8.1)
NRBC BLD-RTO: 0 PER 100 WBC
PLATELET # BLD AUTO: 131 K/UL (ref 138–453)
PMV BLD AUTO: 10 FL (ref 8.1–13.5)
RBC # BLD AUTO: 3.68 M/UL (ref 4.21–5.77)
WBC OTHER # BLD: 7.6 K/UL (ref 3.5–11.3)

## 2025-07-29 PROCEDURE — 2500000003 HC RX 250 WO HCPCS

## 2025-07-29 PROCEDURE — G0378 HOSPITAL OBSERVATION PER HR: HCPCS

## 2025-07-29 PROCEDURE — 96376 TX/PRO/DX INJ SAME DRUG ADON: CPT

## 2025-07-29 PROCEDURE — 6360000002 HC RX W HCPCS

## 2025-07-29 PROCEDURE — 6360000002 HC RX W HCPCS: Performed by: EMERGENCY MEDICINE

## 2025-07-29 PROCEDURE — 6370000000 HC RX 637 (ALT 250 FOR IP)

## 2025-07-29 PROCEDURE — 85025 COMPLETE CBC W/AUTO DIFF WBC: CPT

## 2025-07-29 PROCEDURE — 96366 THER/PROPH/DIAG IV INF ADDON: CPT

## 2025-07-29 PROCEDURE — 96361 HYDRATE IV INFUSION ADD-ON: CPT

## 2025-07-29 PROCEDURE — 85652 RBC SED RATE AUTOMATED: CPT

## 2025-07-29 PROCEDURE — 36415 COLL VENOUS BLD VENIPUNCTURE: CPT

## 2025-07-29 PROCEDURE — 86140 C-REACTIVE PROTEIN: CPT

## 2025-07-29 PROCEDURE — 2580000003 HC RX 258: Performed by: EMERGENCY MEDICINE

## 2025-07-29 PROCEDURE — 2580000003 HC RX 258

## 2025-07-29 RX ORDER — OXYCODONE HYDROCHLORIDE 5 MG/1
5 TABLET ORAL EVERY 6 HOURS PRN
Qty: 12 TABLET | Refills: 0 | Status: SHIPPED | OUTPATIENT
Start: 2025-07-29 | End: 2025-08-01

## 2025-07-29 RX ORDER — ONDANSETRON 4 MG/1
4 TABLET, ORALLY DISINTEGRATING ORAL EVERY 8 HOURS PRN
Qty: 9 TABLET | Refills: 0 | Status: SHIPPED | OUTPATIENT
Start: 2025-07-29

## 2025-07-29 RX ADMIN — IBUPROFEN 600 MG: 600 TABLET ORAL at 08:00

## 2025-07-29 RX ADMIN — FAMOTIDINE 40 MG: 20 TABLET, FILM COATED ORAL at 08:00

## 2025-07-29 RX ADMIN — SPIRONOLACTONE 50 MG: 25 TABLET, FILM COATED ORAL at 08:00

## 2025-07-29 RX ADMIN — SODIUM CHLORIDE: 0.9 INJECTION, SOLUTION INTRAVENOUS at 02:13

## 2025-07-29 RX ADMIN — AZITHROMYCIN MONOHYDRATE 250 MG: 500 INJECTION, POWDER, LYOPHILIZED, FOR SOLUTION INTRAVENOUS at 09:24

## 2025-07-29 RX ADMIN — FUROSEMIDE 40 MG: 20 TABLET ORAL at 08:00

## 2025-07-29 RX ADMIN — SODIUM CHLORIDE 1250 MG: 9 INJECTION, SOLUTION INTRAVENOUS at 05:11

## 2025-07-29 RX ADMIN — Medication 100 MG: at 08:00

## 2025-07-29 RX ADMIN — WATER 1000 MG: 1 INJECTION INTRAMUSCULAR; INTRAVENOUS; SUBCUTANEOUS at 07:58

## 2025-07-29 RX ADMIN — IBUPROFEN 600 MG: 600 TABLET ORAL at 14:07

## 2025-07-29 ASSESSMENT — PAIN DESCRIPTION - ORIENTATION
ORIENTATION: LEFT
ORIENTATION: LEFT

## 2025-07-29 ASSESSMENT — PAIN SCALES - WONG BAKER
WONGBAKER_NUMERICALRESPONSE: HURTS A LITTLE BIT
WONGBAKER_NUMERICALRESPONSE: HURTS A LITTLE BIT

## 2025-07-29 ASSESSMENT — PAIN SCALES - GENERAL
PAINLEVEL_OUTOF10: 1
PAINLEVEL_OUTOF10: 6
PAINLEVEL_OUTOF10: 4
PAINLEVEL_OUTOF10: 1

## 2025-07-29 ASSESSMENT — PAIN DESCRIPTION - DESCRIPTORS
DESCRIPTORS: STABBING
DESCRIPTORS: STABBING

## 2025-07-29 ASSESSMENT — PAIN - FUNCTIONAL ASSESSMENT
PAIN_FUNCTIONAL_ASSESSMENT: ACTIVITIES ARE NOT PREVENTED
PAIN_FUNCTIONAL_ASSESSMENT: ACTIVITIES ARE NOT PREVENTED

## 2025-07-29 ASSESSMENT — PAIN DESCRIPTION - LOCATION
LOCATION: HAND
LOCATION: HAND

## 2025-07-29 NOTE — PROGRESS NOTES
Comprehensive Nutrition Assessment    Type and Reason for Visit:  Initial, Positive nutrition screen (Weight Loss; Poor Intakes/Appetite)    Nutrition Recommendations/Plan:   Continue current diet.  Will provide chocolate Ensure ONS x 2 per day.  Encourage/monitor PO intakes as tolerated.  Will monitor labs, weights, and plan of care.     Malnutrition Assessment:  Malnutrition Status:  Severe malnutrition (07/29/25 1587)    Context:  Chronic Illness     Findings of the 6 clinical characteristics of malnutrition:  Energy Intake:  75% or less of estimated energy requirements for 7 or more days  Weight Loss:  Greater than 7.5% over 3 months (16.5% x 6 months per chart review)     Body Fat Loss:  Moderate body fat loss Orbital, Triceps, Buccal region   Muscle Mass Loss:  Moderate muscle mass loss Temples (temporalis), Clavicles (pectoralis & deltoids)  Fluid Accumulation:  No fluid accumulation     Strength:  Not Performed    Nutrition Assessment:    Admitted due to recent cat scratch to hand with swelling and redness.  PMH: COPD, CHF, HTN; h/o ascites.  Pt reports appetite is good at times but reports issues with bloating/fullness/distention due to ascites which can affect his PO intakes.  Pt reports previously drinking Ensure supplements - willing to receive with some meals.  Reports weight fluctuations - likely partially due to paracentesis/ascites.  Significant weight loss of 16.5% x 6 months noted.  Pt with visible fat and muscle wasting present.  Labs reviewed.  Meds include: Lasix, Thiamine.    Nutrition Related Findings:    labs/meds reviewed. Wound Type: None       Current Nutrition Intake & Therapies:    Average Meal Intake: 26-50%, 51-75%  Average Supplements Intake: None Ordered  ADULT DIET; Regular  Additional Calorie Sources:  None    Anthropometric Measures:  Height: 190.5 cm (6' 3\")  Ideal Body Weight (IBW): 196 lbs (89 kg)    Admission Body Weight: 63.5 kg (139 lb 15.9 oz)  Current Body Weight: 63.5

## 2025-07-29 NOTE — PROGRESS NOTES
CLINICAL PHARMACY NOTE: MEDS TO BEDS    Total # of Prescriptions Filled: 3   The following medications were delivered to the patient:  Zofran  Augmentin  oxycodone    Additional Documentation:

## 2025-07-29 NOTE — PROGRESS NOTES
Plastic Surgery Progress Note            PATIENT NAME: Dariel Griffiths     TODAY'S DATE: 2025, 5:22 AM    SUBJECTIVE:    Pt seen and examined. No acute events overnight. Pain is well controlled. No new issues. Only acknowledges mild chills. Denies nausea, vomiting, diarrhea.  Receiving final dose of IV antibiotics when writer enters the room.     OBJECTIVE:   VITALS:  /73   Pulse 82   Temp 97.5 °F (36.4 °C) (Oral)   Resp 18   Ht 1.905 m (6' 3\")   Wt 63.5 kg (140 lb)   SpO2 96%   BMI 17.50 kg/m²  I Temperature Max - Temp  Av.8 °F (36.6 °C)  Min: 97.5 °F (36.4 °C)  Max: 98.1 °F (36.7 °C)    LABS:  Lab Results   Component Value Date/Time    WBC 12.3 2025 02:31 PM    HGB 14.5 2025 02:31 PM    HCT 43.5 2025 02:31 PM     2025 02:31 PM       Lab Results   Component Value Date/Time     2025 02:31 PM    K 4.2 2025 02:31 PM     2025 02:31 PM    CO2 25 2025 02:31 PM    BUN 13 2025 02:31 PM    CREATININE 0.6 2025 02:31 PM    GLUCOSE 89 2025 02:31 PM    GLUCOSE 90 2025 01:26 PM       Lab Results   Component Value Date/Time    ALKPHOS 186 2025 01:26 PM    ALKPHOS 97 04/10/2025 08:28 PM    ALT 22 2025 01:26 PM    AST 45 2025 01:26 PM    BILITOT 0.9 2025 01:26 PM    BILIDIR 1.1 2025 05:56 AM    IBILI 0.6 2025 05:56 AM       Lab Results   Component Value Date/Time    PROTIME 11.1 2025 01:26 PM    INR 1.1 2025 01:26 PM       Calcium:    Lab Results   Component Value Date/Time    CALCIUM 10.0 2025 02:31 PM     Ionized Calcium:  No components found for: \"IONCA\"  Magnesium:    Lab Results   Component Value Date/Time    MG 1.8 2025 05:27 AM     Phosphorus:    Lab Results   Component Value Date/Time    PHOS 3.3 2025 01:56 PM     Albumin:  No results found for: \"LABALBU\"      General: AOx3, NAD  Heart: Regular rate and rhythm without

## 2025-07-29 NOTE — H&P
[facility]  CDU / OBSERVATION ENCOUNTER  Physician NOTE     Pt Name: Dariel Griffiths  MRN: [id number]  Birthdate 1960  Date of evaluation: 7/29/25  Patient's PCP is :  Gilma Corbett MD    CHIEF COMPLAINT       Chief Complaint   Patient presents with    Cat Scratch          HISTORY OF PRESENT ILLNESS    Dariel Griffiths is a 64 y.o. male past medical history of CHF, COPD, hypertension who presents with cat scratches that occurred 1 day ago to the dorsum of the left hand and right forearm.  Per patient, cat is up-to-date on vaccinations and mostly stays indoors.  The patient reports that overnight he noticed his left hand started to swell and become red and warm with increased pain that would go up his left arm.  He denied taking any medications prior to coming to the hospital.  The patient stated he has pain with movement of the hand, however he denied any fevers, chills, nausea, vomiting.  No other complaints at this time.    Location/Symptom: Dorsum of left hand/right forearm  Timing/Onset: Acute  Provocation: Movement and use of left hand  Quality: Sharp  Radiation: Pain radiates up the left forearm  Severity: 8 out of 10  Timing/Duration: 1 day  Modifying Factors: None    History was obtained in part through review of the ED chart. When possible, a direct discussion was had with ED nurses, residents, and attendings  REVIEW OF SYSTEMS       General ROS - No fevers, No malaise   Ophthalmic ROS - No discharge, No changes in vision  ENT ROS -  No sore throat, No rhinorrhea,   Respiratory ROS - positive for shortness of breath and wheezing, no cough,  Cardiovascular ROS - No chest pain, no dyspnea on exertion  Gastrointestinal ROS - No abdominal pain, no nausea or vomiting, no change in bowel habits, no black or bloody stools  Genito-Urinary ROS - No dysuria, trouble voiding, or hematuria  Musculoskeletal ROS - No myalgias, No arthalgias  Neurological ROS - No headache, no

## 2025-07-29 NOTE — DISCHARGE SUMMARY
CDU Discharge Summary        Patient:  Dariel Griffiths  YOB: 1960    MRN: [id number]   Acct: [id number]    Primary Care Physician: Gilma Corbett MD    Admit date:  7/28/2025  1:29 PM  Discharge date: No discharge date for patient encounter. 7/29/2025    Discharge Diagnoses:     1.)  Patient had right hand pain with acute onset due to cellulitis.  Treated with IV antibiotics, medication, plastic surgery consult.  Patient's symptoms are stable with the plan to discharge to home with follow-up with plastic surgery in 2 weeks.    Follow-up:  Call today/tomorrow for a follow up appointment with Gilma Corbett MD , or return to the Emergency Room with worsening symptoms    Stressed to patient the importance of following up with primary care doctor and plastic surgery for further workup/management of symptoms.  Maintain elevation of the left hand for resolution of swelling.  Take home antibiotics for full course, and medication/antiemetics as needed for pain and nausea relief.  Pt verbalizes understanding and agrees with plan.    Discharge Medication Changes:       Medication List        START taking these medications      amoxicillin-clavulanate 875-125 MG per tablet  Commonly known as: AUGMENTIN  Take 1 tablet by mouth 2 times daily for 10 days     ondansetron 4 MG disintegrating tablet  Commonly known as: ZOFRAN-ODT  Take 1 tablet by mouth every 8 hours as needed for Nausea or Vomiting     oxyCODONE 5 MG immediate release tablet  Commonly known as: ROXICODONE  Take 1 tablet by mouth every 6 hours as needed for Pain for up to 3 days. Max Daily Amount: 20 mg            CONTINUE taking these medications      * albuterol (2.5 MG/3ML) 0.083% nebulizer solution  Commonly known as: PROVENTIL     * albuterol sulfate  (90 Base) MCG/ACT inhaler  Commonly known as: ProAir HFA  Inhale 2 puffs into the lungs every 6 hours as needed for Wheezing     famotidine 40 MG tablet  Commonly known as: PEPCID

## 2025-08-02 LAB
MICROORGANISM SPEC CULT: NORMAL
MICROORGANISM SPEC CULT: NORMAL
SERVICE CMNT-IMP: NORMAL
SERVICE CMNT-IMP: NORMAL
SPECIMEN DESCRIPTION: NORMAL
SPECIMEN DESCRIPTION: NORMAL

## 2025-08-19 ENCOUNTER — OFFICE VISIT (OUTPATIENT)
Age: 65
End: 2025-08-19
Payer: MEDICARE

## 2025-08-19 VITALS
SYSTOLIC BLOOD PRESSURE: 128 MMHG | BODY MASS INDEX: 18 KG/M2 | WEIGHT: 144.8 LBS | HEART RATE: 105 BPM | DIASTOLIC BLOOD PRESSURE: 84 MMHG | HEIGHT: 75 IN | OXYGEN SATURATION: 98 %
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
## 2025-08-19 DIAGNOSIS — S60.512A CAT SCRATCH OF LEFT HAND WITH INFECTION, INITIAL ENCOUNTER: Primary | ICD-10-CM

## 2025-08-19 DIAGNOSIS — L08.9 CAT SCRATCH OF LEFT HAND WITH INFECTION, INITIAL ENCOUNTER: Primary | ICD-10-CM

## 2025-08-19 PROCEDURE — 3074F SYST BP LT 130 MM HG: CPT | Performed by: NURSE PRACTITIONER

## 2025-08-19 PROCEDURE — 3079F DIAST BP 80-89 MM HG: CPT | Performed by: NURSE PRACTITIONER

## 2025-08-19 PROCEDURE — 99203 OFFICE O/P NEW LOW 30 MIN: CPT | Performed by: NURSE PRACTITIONER

## (undated) DEVICE — TUBING, SUCTION, 1/4" X 12', STRAIGHT: Brand: MEDLINE

## (undated) DEVICE — NEEDLE SPNL 25GA L3.5IN BLU HUB S STL REG WALL FIT STYL W/

## (undated) DEVICE — FORCEPS BX L240CM WRK CHN 2.8MM STD CAP W/ NDL MIC MESH

## (undated) DEVICE — ELECTRODE PT RET AD L9FT HI MOIST COND ADH HYDRGEL CORDED

## (undated) DEVICE — ADAPTER TBNG LUER STUB 15 GA INTMED

## (undated) DEVICE — CUP MED 1OZ CLR POLYPR FEED GRAD W/O LID

## (undated) DEVICE — BLOCK BITE 60FR RUBBER ADLT DENTAL

## (undated) DEVICE — CO2 CANNULA,SUPERSOFT, ADLT,7'O2,7'CO2: Brand: MEDLINE

## (undated) DEVICE — MEDICINE CUP, GRADUATED, STER: Brand: MEDLINE

## (undated) DEVICE — SIX SHOOTER SAEED MULTI-BAND LIGATOR: Brand: SAEED

## (undated) DEVICE — GLOVE SURG 8 11.7IN BEAD CUF LIGHT BRN SENSICARE LTX FREE

## (undated) DEVICE — GAUZE,SPONGE,4"X4",16PLY,STRL,LF,10/TRAY: Brand: MEDLINE

## (undated) DEVICE — BASIN EMSIS 700ML GRAPHITE PLAS KID SHP GRAD

## (undated) DEVICE — SYRINGE MED 50ML LUERLOCK TIP

## (undated) DEVICE — YANKAUER,FLEXIBLE HANDLE,REGLR CAPACITY: Brand: MEDLINE INDUSTRIES, INC.

## (undated) DEVICE — 1 ML INSULIN SYRINGE LUER-LOCK WITH TIP CAP: Brand: MONOJECT

## (undated) DEVICE — Z DISCONTINUED APPLICATOR SURG PREP 0.35OZ 2% CHG 70% ISO ALC W/ HI LT

## (undated) DEVICE — BANDAGE ADH W2XL4IN NITRL FAB STRP CURAD

## (undated) DEVICE — GLOVE SURG SZ 8 L12IN THK91MIL BRN LTX FREE POLYCHLOROPRENE

## (undated) DEVICE — Device: Brand: DEFENDO VALVE AND CONNECTOR KIT

## (undated) DEVICE — TRAP SURG QUAD PARABOLA SLOT DSGN SFTY SCRN TRAPEASE

## (undated) DEVICE — GLOVE SURG SZ 75 L12IN FNGR THK87MIL WHT LTX FREE

## (undated) DEVICE — TOWEL,OR,DSP,ST,BLUE,STD,4/PK,20PK/CS: Brand: MEDLINE

## (undated) DEVICE — JELLY,LUBE,STERILE,FLIP TOP,TUBE,2-OZ: Brand: MEDLINE

## (undated) DEVICE — SNARE ENDOSCP L240CM W15MM SHTH DIA2.4MM CHN 2.8MM STIFF

## (undated) DEVICE — CONMED DISPOSABLE GASTROINTESTINAL CYTOLOGY BRUSH, STRAIGHT HANDLE, 2.5 MM X 160 CM: Brand: CONMED

## (undated) DEVICE — GOWN POLY REINF SONT XLG: Brand: MEDLINE INDUSTRIES, INC.

## (undated) DEVICE — SINGLE SHOT EPIDURAL TRAY: Brand: MEDLINE INDUSTRIES, INC.

## (undated) DEVICE — SINGLE DOSE EPI TY

## (undated) DEVICE — BITEBLOCK ENDOSCP 60FR MAXI WHT POLYETH STURDY W/ VELC WVN

## (undated) DEVICE — GOWN,AURORA,NONREINFORCED,LARGE: Brand: MEDLINE